# Patient Record
Sex: MALE | Race: WHITE | Employment: UNEMPLOYED | URBAN - METROPOLITAN AREA
[De-identification: names, ages, dates, MRNs, and addresses within clinical notes are randomized per-mention and may not be internally consistent; named-entity substitution may affect disease eponyms.]

---

## 2017-01-09 ENCOUNTER — OFFICE VISIT (OUTPATIENT)
Dept: CARDIOLOGY CLINIC | Age: 37
End: 2017-01-09

## 2017-01-09 VITALS
SYSTOLIC BLOOD PRESSURE: 120 MMHG | HEART RATE: 74 BPM | DIASTOLIC BLOOD PRESSURE: 80 MMHG | OXYGEN SATURATION: 97 % | WEIGHT: 229.2 LBS | HEIGHT: 69 IN | BODY MASS INDEX: 33.95 KG/M2

## 2017-01-09 DIAGNOSIS — R07.2 PRECORDIAL CHEST PAIN: Primary | ICD-10-CM

## 2017-01-09 DIAGNOSIS — F17.210 CIGARETTE NICOTINE DEPENDENCE WITHOUT COMPLICATION: ICD-10-CM

## 2017-01-09 DIAGNOSIS — I10 ESSENTIAL HYPERTENSION: ICD-10-CM

## 2017-01-09 PROCEDURE — 99244 OFF/OP CNSLTJ NEW/EST MOD 40: CPT | Performed by: INTERNAL MEDICINE

## 2017-01-09 PROCEDURE — 93000 ELECTROCARDIOGRAM COMPLETE: CPT | Performed by: INTERNAL MEDICINE

## 2017-01-09 RX ORDER — IBUPROFEN 200 MG
200 TABLET ORAL EVERY 6 HOURS PRN
COMMUNITY
End: 2017-07-07 | Stop reason: ALTCHOICE

## 2019-07-02 ENCOUNTER — HOSPITAL ENCOUNTER (EMERGENCY)
Age: 39
Discharge: HOME | End: 2019-07-02
Payer: MEDICARE

## 2019-07-02 VITALS
HEART RATE: 110 BPM | RESPIRATION RATE: 16 BRPM | DIASTOLIC BLOOD PRESSURE: 104 MMHG | SYSTOLIC BLOOD PRESSURE: 179 MMHG | WEIGHT: 214.95 LBS | TEMPERATURE: 100.5 F | OXYGEN SATURATION: 95 % | BODY MASS INDEX: 31.74 KG/M2

## 2019-07-02 DIAGNOSIS — K02.9 PAIN DUE TO DENTAL CARIES: Primary | ICD-10-CM

## 2019-07-02 DIAGNOSIS — I10 HYPERTENSION, UNCONTROLLED: ICD-10-CM

## 2019-07-02 PROCEDURE — 99282 EMERGENCY DEPT VISIT SF MDM: CPT

## 2019-07-02 RX ORDER — AMOXICILLIN 500 MG/1
500 CAPSULE ORAL 3 TIMES DAILY
Qty: 30 CAPSULE | Refills: 0 | Status: SHIPPED | OUTPATIENT
Start: 2019-07-02 | End: 2019-07-12

## 2019-07-02 RX ORDER — LISINOPRIL 10 MG/1
10 TABLET ORAL DAILY
Qty: 30 TABLET | Refills: 0 | Status: SHIPPED | OUTPATIENT
Start: 2019-07-02 | End: 2019-10-10

## 2019-07-02 RX ORDER — TRAMADOL HYDROCHLORIDE 50 MG/1
50-100 TABLET ORAL EVERY 6 HOURS PRN
Qty: 12 TABLET | Refills: 0 | Status: SHIPPED | OUTPATIENT
Start: 2019-07-02 | End: 2019-07-05

## 2019-07-02 ASSESSMENT — PAIN DESCRIPTION - LOCATION: LOCATION: MOUTH

## 2019-07-02 ASSESSMENT — PAIN DESCRIPTION - PAIN TYPE: TYPE: ACUTE PAIN

## 2019-07-02 ASSESSMENT — PAIN SCALES - GENERAL: PAINLEVEL_OUTOF10: 6

## 2019-10-10 ENCOUNTER — HOSPITAL ENCOUNTER (EMERGENCY)
Age: 39
Discharge: HOME | End: 2019-10-10
Payer: MEDICARE

## 2019-10-10 VITALS
DIASTOLIC BLOOD PRESSURE: 100 MMHG | HEART RATE: 85 BPM | WEIGHT: 210.32 LBS | OXYGEN SATURATION: 97 % | BODY MASS INDEX: 31.15 KG/M2 | SYSTOLIC BLOOD PRESSURE: 150 MMHG | HEIGHT: 69 IN | RESPIRATION RATE: 16 BRPM | TEMPERATURE: 98.9 F

## 2019-10-10 DIAGNOSIS — K02.9 PAIN DUE TO DENTAL CARIES: Primary | ICD-10-CM

## 2019-10-10 PROCEDURE — 99282 EMERGENCY DEPT VISIT SF MDM: CPT

## 2019-10-10 RX ORDER — PENICILLIN V POTASSIUM 500 MG/1
500 TABLET ORAL 4 TIMES DAILY
Qty: 40 TABLET | Refills: 0 | Status: SHIPPED | OUTPATIENT
Start: 2019-10-10 | End: 2019-10-20

## 2019-10-10 RX ORDER — TRAMADOL HYDROCHLORIDE 50 MG/1
50 TABLET ORAL EVERY 6 HOURS PRN
Qty: 12 TABLET | Refills: 0 | Status: SHIPPED | OUTPATIENT
Start: 2019-10-10 | End: 2019-10-13

## 2019-10-10 ASSESSMENT — PAIN DESCRIPTION - FREQUENCY: FREQUENCY: CONTINUOUS

## 2019-10-10 ASSESSMENT — ENCOUNTER SYMPTOMS
NAUSEA: 0
VOICE CHANGE: 0
VOMITING: 0
SHORTNESS OF BREATH: 0
FACIAL SWELLING: 0
TROUBLE SWALLOWING: 0

## 2019-10-10 ASSESSMENT — PAIN DESCRIPTION - ORIENTATION: ORIENTATION: RIGHT;LOWER

## 2019-10-10 ASSESSMENT — PAIN DESCRIPTION - DESCRIPTORS: DESCRIPTORS: ACHING;THROBBING

## 2019-10-10 ASSESSMENT — PAIN SCALES - GENERAL: PAINLEVEL_OUTOF10: 8

## 2019-10-10 ASSESSMENT — PAIN DESCRIPTION - LOCATION: LOCATION: TEETH

## 2020-01-07 ENCOUNTER — APPOINTMENT (OUTPATIENT)
Dept: GENERAL RADIOLOGY | Age: 40
End: 2020-01-07
Payer: MEDICARE

## 2020-01-07 ENCOUNTER — HOSPITAL ENCOUNTER (EMERGENCY)
Age: 40
Discharge: HOME | End: 2020-01-07
Payer: MEDICARE

## 2020-01-07 VITALS
SYSTOLIC BLOOD PRESSURE: 150 MMHG | TEMPERATURE: 98.9 F | DIASTOLIC BLOOD PRESSURE: 69 MMHG | HEART RATE: 86 BPM | OXYGEN SATURATION: 94 % | RESPIRATION RATE: 16 BRPM

## 2020-01-07 PROCEDURE — 4500000021 HC ED LEVEL 1 PROCEDURE

## 2020-01-07 PROCEDURE — 90471 IMMUNIZATION ADMIN: CPT | Performed by: NURSE PRACTITIONER

## 2020-01-07 PROCEDURE — 99283 EMERGENCY DEPT VISIT LOW MDM: CPT

## 2020-01-07 PROCEDURE — 90715 TDAP VACCINE 7 YRS/> IM: CPT | Performed by: NURSE PRACTITIONER

## 2020-01-07 PROCEDURE — 73140 X-RAY EXAM OF FINGER(S): CPT

## 2020-01-07 PROCEDURE — 6360000002 HC RX W HCPCS: Performed by: NURSE PRACTITIONER

## 2020-01-07 RX ORDER — IBUPROFEN 800 MG/1
800 TABLET ORAL EVERY 8 HOURS PRN
Qty: 30 TABLET | Refills: 0 | Status: ON HOLD | OUTPATIENT
Start: 2020-01-07 | End: 2020-04-26 | Stop reason: HOSPADM

## 2020-01-07 RX ADMIN — TETANUS TOXOID, REDUCED DIPHTHERIA TOXOID AND ACELLULAR PERTUSSIS VACCINE, ADSORBED 0.5 ML: 5; 2.5; 8; 8; 2.5 SUSPENSION INTRAMUSCULAR at 01:43

## 2020-01-07 ASSESSMENT — PAIN DESCRIPTION - LOCATION: LOCATION: HAND

## 2020-01-07 ASSESSMENT — PAIN SCALES - GENERAL: PAINLEVEL_OUTOF10: 5

## 2020-01-07 NOTE — ED PROVIDER NOTES
**EVALUATED BY ADVANCED PRACTICE PROVIDER**        [address] [facility]      Pt Name: Kane Gongora  SUGAR:[id number]  [place] 1980  Date of evaluation: 1/7/2020  Provider: LACHELLE Cisneros CNP      Chief Complaint:    Chief Complaint   Patient presents with    Finger Injury       Nursing Notes, Past Medical Hx, Past Surgical Hx, Social Hx, Allergies, and Family Hx were all reviewed and agreed with or any disagreements were addressed in the HPI.    HPI:  (Location, Duration, Timing, Severity, Quality, Assoc Sx, Context, Modifying factors)  This is a  37 y.o. male who presents to the emergency department today complaining of a laceration to his left ring finger. Onset was just prior to arrival.  The context was he cut it on a piece of broken glass. Bleeding is controlled. He rates the pain 5/10. No numbness or tingling. Tetanus status unknown. PastMedical/Surgical History:      Diagnosis Date    Headache     HTN (hypertension)          Procedure Laterality Date    CRANIAL LESION RESECTION      MOUTH SURGERY         Medications:  Previous Medications    No medications on file         Review of Systems:  Review of Systems   Constitutional: Negative for diaphoresis. Musculoskeletal: Positive for arthralgias and joint swelling. Skin: Positive for wound. Allergic/Immunologic: Negative for immunocompromised state. Neurological: Negative for weakness and numbness. Psychiatric/Behavioral: Negative for confusion. All other systems reviewed and are negative. Positives and Pertinent negatives as per HPI. Except as noted above in the ROS, problem specific ROS was completed and is negative. Physical Exam:  Physical Exam  Vitals signs and nursing note reviewed. Constitutional:       General: He is not in acute distress. Appearance: Normal appearance. He is well-developed. He is not toxic-appearing.    HENT:      Head: of motion and entire depth of wound probed and visualized      Contaminated: no    Treatment:     Wound cleansed with: Chlorhexidine. Amount of cleaning:  Extensive  Skin repair:     Repair method:  Sutures    Suture size:  6-0    Suture material:  Nylon    Suture technique:  Horizontal mattress    Number of sutures:  4  Approximation:     Approximation:  Close  Post-procedure details:     Dressing:  Sterile dressing    Patient tolerance of procedure: Tolerated well, no immediate complications        Patient was given:  Medications   Tetanus-Diphth-Acell Pertussis (BOOSTRIX) injection 0.5 mL (0.5 mLs Intramuscular Given 1/7/20 0143)       Differential diagnosis: Tendon laceration, neurologic injury, vascular injury, involvement of bone that could lead to osteomyelitis, retained foreign body, delayed bacterial skin infection, other    Patient seen and examined today for laceration. See HPI for patient presentation. Patient is hemodynamically stable, nontoxic, afebrile, and without tachycardia, tachypnea, and hypoxia. Physical exam as above. 37year-old in no acute distress. 2 cm laceration distal fourth digit left hand on the palmar aspect. Distal extremity pink and well-perfused. Sensation is intact with discrimination. Extensor and flexor tendons intact with 5/5 strength. X-ray shows no fracture or foreign body. Finger was anesthetized and then copiously irrigated and vigorously scrubbed with chlorhexidine. Finger tourniquet was applied. I visualize no bone, tendon, or foreign body. A total of 8 horizontal mattress sutures were placed with good approximation of wound edges. Tourniquet was removed. Distal extremity remained pink and well-perfused. Tetanus status was updated. Patient discharged home with pain medications and strict return precautions including any signs of infection. Advised to return in 7 days for suture removal.    The patient tolerated their visit well.   I evaluated the patient. The physician was available for consultation as needed. The patient and / or the family were informed of the results of any tests, a time was given to answer questions, a plan was proposed and they agreed with plan. CLINICAL IMPRESSION:  1.  Laceration of left ring finger without foreign body without damage to nail, initial encounter        DISPOSITION Decision To Discharge 01/07/2020 02:05:17 AM      PATIENT REFERRED TO:  Dee Yousifadelia  [phone]  Call       [facility] Emergency Department  [address]  [phone]  Go to   As needed      DISCHARGE MEDICATIONS:  New Prescriptions    IBUPROFEN (ADVIL;MOTRIN) 800 MG TABLET    Take 1 tablet by mouth every 8 hours as needed for Pain       DISCONTINUED MEDICATIONS:  Discontinued Medications    No medications on file              (Please note the MDM and HPI sections of this note were completed with a voice recognition program.  Efforts were made to edit the dictations but occasionally words are mis-transcribed.)    Electronically signed, LACHELLE Osorio CNP,           LACHELLE Osorio CNP  01/07/20 5450

## 2020-01-20 ENCOUNTER — OFFICE VISIT (OUTPATIENT)
Dept: INTERNAL MEDICINE CLINIC | Age: 40
End: 2020-01-20
Payer: MEDICARE

## 2020-01-20 VITALS
HEART RATE: 77 BPM | TEMPERATURE: 98.3 F | HEIGHT: 69 IN | BODY MASS INDEX: 32.44 KG/M2 | OXYGEN SATURATION: 96 % | WEIGHT: 219 LBS | SYSTOLIC BLOOD PRESSURE: 186 MMHG | DIASTOLIC BLOOD PRESSURE: 110 MMHG

## 2020-01-20 PROCEDURE — 99213 OFFICE O/P EST LOW 20 MIN: CPT | Performed by: INTERNAL MEDICINE

## 2020-01-20 RX ORDER — LISINOPRIL AND HYDROCHLOROTHIAZIDE 12.5; 1 MG/1; MG/1
1 TABLET ORAL DAILY
Qty: 90 TABLET | Refills: 1 | Status: SHIPPED | OUTPATIENT
Start: 2020-01-20 | End: 2020-02-06

## 2020-01-20 RX ORDER — ADHESIVE BANDAGE 3/4"
1 BANDAGE TOPICAL 2 TIMES DAILY
Qty: 1 EACH | Refills: 0 | Status: SHIPPED | OUTPATIENT
Start: 2020-01-20 | End: 2020-04-07 | Stop reason: SDUPTHER

## 2020-01-20 ASSESSMENT — PATIENT HEALTH QUESTIONNAIRE - PHQ9
SUM OF ALL RESPONSES TO PHQ9 QUESTIONS 1 & 2: 0
1. LITTLE INTEREST OR PLEASURE IN DOING THINGS: 0
2. FEELING DOWN, DEPRESSED OR HOPELESS: 0
SUM OF ALL RESPONSES TO PHQ QUESTIONS 1-9: 0
SUM OF ALL RESPONSES TO PHQ QUESTIONS 1-9: 0

## 2020-01-20 NOTE — PATIENT INSTRUCTIONS
-Check blood pressures at home  -please get blood tests in the next few days  -return to clinic in one week  -start taking new BP medicine every day

## 2020-01-20 NOTE — PROGRESS NOTES
Indicated 12/19/2016       Imaging:   Xr Finger Left (min 2 Views)    Result Date: 1/7/2020  EXAMINATION: 3 XRAY VIEWS OF THE LEFT FINGER 1/7/2020 1:49 am COMPARISON: None. HISTORY: ORDERING SYSTEM PROVIDED HISTORY: laceration TECHNOLOGIST PROVIDED HISTORY: Reason for exam:->laceration Reason for Exam: lac to tip of 2nd digit, glass FINDINGS: There is soft tissue swelling and irregularity involving the distal left ring finger. There is no evidence of an acute fracture or dislocation. No evidence of a radiopaque foreign body. Soft tissue injury/laceration of the distal left ring finger with no evidence of bone involvement or radiopaque foreign body. Assessment/Plan:     Finger laceration suture removal  -wound well healed    HTN  -Started Prinzide 10-12.5 mg daily  -CBC, BMP, TSH with reflex, lipid panel   -return to clinic in 1 week  -check BPs at home    Nicotine Dependence  -Not interested in quitting at this time    Patient was discussed and staffed with preceptor. Rosa Mazariegos DO, PGY-3  Internal Medicine Resident  1/20/2020, 4:59 PM     Addendum to Resident H& P/Progress note:  I have personally seen,examined and evaluated the patient.  I have reviewed the current history, physical findings, labs and assessment and plan and agree with note as documented by resident MD ( Jodie Dutta)      Judah Kim MD, Michael Garcia

## 2020-02-06 ENCOUNTER — OFFICE VISIT (OUTPATIENT)
Dept: FAMILY MEDICINE CLINIC | Age: 40
End: 2020-02-06
Payer: MEDICARE

## 2020-02-06 VITALS
BODY MASS INDEX: 32.67 KG/M2 | DIASTOLIC BLOOD PRESSURE: 90 MMHG | HEIGHT: 69 IN | SYSTOLIC BLOOD PRESSURE: 158 MMHG | WEIGHT: 220.6 LBS

## 2020-02-06 DIAGNOSIS — I10 ESSENTIAL HYPERTENSION: ICD-10-CM

## 2020-02-06 LAB
ANION GAP SERPL CALCULATED.3IONS-SCNC: 15 MMOL/L (ref 3–16)
BASOPHILS ABSOLUTE: 0.1 K/UL (ref 0–0.2)
BASOPHILS RELATIVE PERCENT: 0.6 %
BUN BLDV-MCNC: 12 MG/DL (ref 7–20)
CALCIUM SERPL-MCNC: 10 MG/DL (ref 8.3–10.6)
CHLORIDE BLD-SCNC: 99 MMOL/L (ref 99–110)
CHOLESTEROL, TOTAL: 288 MG/DL (ref 0–199)
CO2: 23 MMOL/L (ref 21–32)
CREAT SERPL-MCNC: 0.8 MG/DL (ref 0.9–1.3)
EOSINOPHILS ABSOLUTE: 0.2 K/UL (ref 0–0.6)
EOSINOPHILS RELATIVE PERCENT: 2 %
GFR AFRICAN AMERICAN: >60
GFR NON-AFRICAN AMERICAN: >60
GLUCOSE BLD-MCNC: 107 MG/DL (ref 70–99)
HCT VFR BLD CALC: 50.1 % (ref 40.5–52.5)
HDLC SERPL-MCNC: 28 MG/DL (ref 40–60)
HEMOGLOBIN: 16.6 G/DL (ref 13.5–17.5)
LDL CHOLESTEROL CALCULATED: ABNORMAL MG/DL
LDL CHOLESTEROL DIRECT: 229 MG/DL
LYMPHOCYTES ABSOLUTE: 3.6 K/UL (ref 1–5.1)
LYMPHOCYTES RELATIVE PERCENT: 29.1 %
MCH RBC QN AUTO: 29.6 PG (ref 26–34)
MCHC RBC AUTO-ENTMCNC: 33.2 G/DL (ref 31–36)
MCV RBC AUTO: 89 FL (ref 80–100)
MONOCYTES ABSOLUTE: 0.9 K/UL (ref 0–1.3)
MONOCYTES RELATIVE PERCENT: 7.1 %
NEUTROPHILS ABSOLUTE: 7.5 K/UL (ref 1.7–7.7)
NEUTROPHILS RELATIVE PERCENT: 61.2 %
PDW BLD-RTO: 13.7 % (ref 12.4–15.4)
PLATELET # BLD: 259 K/UL (ref 135–450)
PMV BLD AUTO: 10.1 FL (ref 5–10.5)
POTASSIUM SERPL-SCNC: 4.5 MMOL/L (ref 3.5–5.1)
RBC # BLD: 5.63 M/UL (ref 4.2–5.9)
SODIUM BLD-SCNC: 137 MMOL/L (ref 136–145)
TRIGL SERPL-MCNC: 318 MG/DL (ref 0–150)
TSH REFLEX: 3.23 UIU/ML (ref 0.27–4.2)
VLDLC SERPL CALC-MCNC: ABNORMAL MG/DL
WBC # BLD: 12.3 K/UL (ref 4–11)

## 2020-02-06 PROCEDURE — 4004F PT TOBACCO SCREEN RCVD TLK: CPT | Performed by: FAMILY MEDICINE

## 2020-02-06 PROCEDURE — G8484 FLU IMMUNIZE NO ADMIN: HCPCS | Performed by: FAMILY MEDICINE

## 2020-02-06 PROCEDURE — G8427 DOCREV CUR MEDS BY ELIG CLIN: HCPCS | Performed by: FAMILY MEDICINE

## 2020-02-06 PROCEDURE — 99214 OFFICE O/P EST MOD 30 MIN: CPT | Performed by: FAMILY MEDICINE

## 2020-02-06 PROCEDURE — G8417 CALC BMI ABV UP PARAM F/U: HCPCS | Performed by: FAMILY MEDICINE

## 2020-02-06 RX ORDER — LISINOPRIL 10 MG/1
10 TABLET ORAL DAILY
Qty: 30 TABLET | Refills: 0 | Status: SHIPPED | OUTPATIENT
Start: 2020-02-06 | End: 2020-03-05 | Stop reason: DRUGHIGH

## 2020-02-06 ASSESSMENT — ENCOUNTER SYMPTOMS
EYE DISCHARGE: 0
SHORTNESS OF BREATH: 0
TROUBLE SWALLOWING: 0
NAUSEA: 0
CHEST TIGHTNESS: 0
SORE THROAT: 0
RHINORRHEA: 0
VOMITING: 0
SINUS PRESSURE: 0
DIARRHEA: 0
ABDOMINAL PAIN: 0
COUGH: 0
WHEEZING: 0

## 2020-02-06 NOTE — PROGRESS NOTES
2020     Gerald Moran (:  1980) is a 37 y.o. male, here for evaluation of the following medical concerns:  Patient presented to the clinic to establish care. He was seeing Dr. López Sampson but believes he is unable to follow up with her at this time. Patient is very specific on the type of treatment he wants. 1.  Encounter to establish care with pcp, patient hasn't had a pcp for sometime, generally uses the ER as his caregiver, BP is poorly controlled and is not interested in multiple medications? 2. HTN- poorly controlled, wants only 10 mg of Lisinopril only? Patient stated that the combo tablet Zestoretic caused him to feel \"funny\" at times and no longer takes this, again he stated he is willing to start on Lisinopril 10 mg, denied headache, vision change, or syncope. 3.   Cellulitis on left hip-past 3/4 months, picks at lesions, yariel. 3 dime sized lesion on hip, erythematous, no erythema surrounding wound no discharge, no fever or chills. 4  Tobacco use- patient smokes yariel. 2 packs a day, is not interested in stopping at this time, understands the need but basically enjoys smoking at this time . Today, patient denied chest pain, sob, n, v, or diarrhea. HPI    Review of Systems   Constitutional: Negative for activity change, fatigue, fever and unexpected weight change. HENT: Negative for congestion, ear pain, rhinorrhea, sinus pressure, sore throat and trouble swallowing. Eyes: Negative for discharge and visual disturbance. Respiratory: Negative for cough, chest tightness, shortness of breath and wheezing. Cardiovascular: Negative for chest pain, palpitations and leg swelling. Gastrointestinal: Negative for abdominal pain, diarrhea, nausea and vomiting. Endocrine: Negative for cold intolerance, heat intolerance, polydipsia and polyphagia. Genitourinary: Negative for decreased urine volume, dysuria, frequency and urgency.    Musculoskeletal: Negative for arthralgias. Skin: Positive for color change and wound. Negative for rash. Neurological: Negative for dizziness, syncope, light-headedness and headaches. Psychiatric/Behavioral: Negative for dysphoric mood. The patient is not nervous/anxious. Prior to Visit Medications    Medication Sig Taking? Authorizing Provider   lisinopril (PRINIVIL;ZESTRIL) 10 MG tablet Take 1 tablet by mouth daily Yes Tulio Aviles, DO   Blood Pressure Monitoring (BLOOD PRESSURE CUFF) MISC 1 Device by Does not apply route 2 times daily Yes Colleen Tolliver, DO   ibuprofen (ADVIL;MOTRIN) 800 MG tablet Take 1 tablet by mouth every 8 hours as needed for Pain Yes LACHELLE Alas - CNP        Social History     Tobacco Use    Smoking status: Current Every Day Smoker     Packs/day: 2.00     Types: Cigarettes     Start date: 12/19/1989    Smokeless tobacco: Never Used   Substance Use Topics    Alcohol use: No        Vitals:    02/06/20 1356 02/06/20 1436   BP: (!) 162/92 (!) 158/90   Weight: 220 lb 9.6 oz (100.1 kg)    Height: 5' 9\" (1.753 m)      Estimated body mass index is 32.58 kg/m² as calculated from the following:    Height as of this encounter: 5' 9\" (1.753 m). Weight as of this encounter: 220 lb 9.6 oz (100.1 kg). Physical Exam  Vitals signs and nursing note reviewed. Constitutional:       Appearance: Normal appearance. He is well-developed. HENT:      Head: Normocephalic and atraumatic. Right Ear: Tympanic membrane, ear canal and external ear normal.      Left Ear: Tympanic membrane, ear canal and external ear normal.      Nose: Nose normal.      Mouth/Throat:      Mouth: Mucous membranes are moist.   Eyes:      Conjunctiva/sclera: Conjunctivae normal.      Pupils: Pupils are equal, round, and reactive to light. Neck:      Thyroid: No thyromegaly. Cardiovascular:      Rate and Rhythm: Normal rate and regular rhythm. Heart sounds: Normal heart sounds. No murmur.    Pulmonary:      Effort: Pulmonary effort is normal.      Breath sounds: Normal breath sounds. No wheezing. Abdominal:      General: Bowel sounds are normal.      Palpations: Abdomen is soft. Tenderness: There is no abdominal tenderness. Musculoskeletal: Normal range of motion. General: No tenderness. Skin:     Findings: Erythema present. No rash. Comments: See HPI   Neurological:      General: No focal deficit present. Mental Status: He is alert and oriented to person, place, and time. Cranial Nerves: No cranial nerve deficit. Deep Tendon Reflexes: Reflexes normal.   Psychiatric:         Behavior: Behavior normal.         Thought Content: Thought content normal.         Judgment: Judgment normal.         ASSESSMENT/PLAN:  1. Encounter to establish care  Care established  Medications reviewed  Questions answered  Labs obtained  RTC in three months for follow up. 2. Hair follicle infection  use medication as prescribed. Clean with soap and water  Discussed conservative treatment  Discussed signs and symptoms for immediate evaluation in the ER  RTC if no improved. Tylenol/Ibuprofen for pain    3. Essential hypertension  Difficult and poorly controlled. He is only willing to take certain medications? Discussed signs and symptoms for immediate evaluation in the ER. Reduce sodium. Monitor diet, exercise and lose weight. Keep a BP log and bring it to your next appointment. Needs to rtc in one month for BP check    4. Tobacco abuse  Patient educated on the need to stop smoking. Had [id number] recommended. Educated patient on consequences of smoking. Refuses medications  Spent yariel 5 minutes discussing this. Difficult due to patient not wanting to change lifestyle. Will need to follow BP closely  He will go and have labs that were ordered several weeks ago at lab. Return in about 4 weeks (around 3/5/2020) for Folllow up BP.

## 2020-02-06 NOTE — PATIENT INSTRUCTIONS
Thank you for coming. Please take you medications as prescribed. Please continue to eat a balanced diet and exercise. Call 1800quit now  If you have questions please let me know via phone or email.

## 2020-02-07 ENCOUNTER — TELEPHONE (OUTPATIENT)
Dept: INTERNAL MEDICINE CLINIC | Age: 40
End: 2020-02-07

## 2020-02-09 ASSESSMENT — ENCOUNTER SYMPTOMS: COLOR CHANGE: 1

## 2020-03-05 ENCOUNTER — OFFICE VISIT (OUTPATIENT)
Dept: FAMILY MEDICINE CLINIC | Age: 40
End: 2020-03-05
Payer: MEDICARE

## 2020-03-05 VITALS
SYSTOLIC BLOOD PRESSURE: 148 MMHG | HEIGHT: 69 IN | WEIGHT: 219.8 LBS | BODY MASS INDEX: 32.56 KG/M2 | DIASTOLIC BLOOD PRESSURE: 84 MMHG

## 2020-03-05 PROCEDURE — G8417 CALC BMI ABV UP PARAM F/U: HCPCS | Performed by: FAMILY MEDICINE

## 2020-03-05 PROCEDURE — 4004F PT TOBACCO SCREEN RCVD TLK: CPT | Performed by: FAMILY MEDICINE

## 2020-03-05 PROCEDURE — G8427 DOCREV CUR MEDS BY ELIG CLIN: HCPCS | Performed by: FAMILY MEDICINE

## 2020-03-05 PROCEDURE — G8484 FLU IMMUNIZE NO ADMIN: HCPCS | Performed by: FAMILY MEDICINE

## 2020-03-05 PROCEDURE — 99214 OFFICE O/P EST MOD 30 MIN: CPT | Performed by: FAMILY MEDICINE

## 2020-03-05 RX ORDER — ATORVASTATIN CALCIUM 10 MG/1
10 TABLET, FILM COATED ORAL DAILY
Qty: 30 TABLET | Refills: 0 | Status: SHIPPED | OUTPATIENT
Start: 2020-03-05 | End: 2020-04-07 | Stop reason: SDUPTHER

## 2020-03-05 RX ORDER — NICOTINE 21 MG/24HR
1 PATCH, TRANSDERMAL 24 HOURS TRANSDERMAL DAILY
Qty: 42 PATCH | Refills: 0 | Status: SHIPPED | OUTPATIENT
Start: 2020-03-05 | End: 2020-04-07 | Stop reason: SDUPTHER

## 2020-03-05 RX ORDER — LISINOPRIL 20 MG/1
20 TABLET ORAL DAILY
Qty: 30 TABLET | Refills: 0 | Status: SHIPPED | OUTPATIENT
Start: 2020-03-05 | End: 2020-04-07 | Stop reason: SDUPTHER

## 2020-03-05 ASSESSMENT — ENCOUNTER SYMPTOMS
NAUSEA: 0
SHORTNESS OF BREATH: 0
VOMITING: 0
COUGH: 0
CHEST TIGHTNESS: 0
TROUBLE SWALLOWING: 0
DIARRHEA: 0
ABDOMINAL PAIN: 0
RHINORRHEA: 0
WHEEZING: 0
SORE THROAT: 0
SINUS PRESSURE: 0

## 2020-03-05 NOTE — PROGRESS NOTES
Place 1 patch onto the skin daily Yes Feng Chinchilla,    Blood Pressure Monitoring (BLOOD PRESSURE CUFF) MISC 1 Device by Does not apply route 2 times daily Yes Colleen Tolliver DO   ibuprofen (ADVIL;MOTRIN) 800 MG tablet Take 1 tablet by mouth every 8 hours as needed for Pain Yes Abel Simental APRN - CNP        Social History     Tobacco Use    Smoking status: Current Every Day Smoker     Packs/day: 2.00     Types: Cigarettes     Start date: 12/19/1989    Smokeless tobacco: Never Used   Substance Use Topics    Alcohol use: No        Vitals:    03/05/20 1542 03/05/20 1616   BP: (!) 142/88 (!) 148/84   Weight: 219 lb 12.8 oz (99.7 kg)    Height: 5' 9\" (1.753 m)      Estimated body mass index is 32.46 kg/m² as calculated from the following:    Height as of this encounter: 5' 9\" (1.753 m). Weight as of this encounter: 219 lb 12.8 oz (99.7 kg). Physical Exam  Vitals signs and nursing note reviewed. Constitutional:       Appearance: He is well-developed. He is obese. HENT:      Head: Normocephalic and atraumatic. Right Ear: Tympanic membrane, ear canal and external ear normal.      Left Ear: Tympanic membrane, ear canal and external ear normal.      Mouth/Throat:      Mouth: Mucous membranes are moist.   Eyes:      Conjunctiva/sclera: Conjunctivae normal.      Pupils: Pupils are equal, round, and reactive to light. Neck:      Thyroid: No thyromegaly. Cardiovascular:      Rate and Rhythm: Normal rate and regular rhythm. Heart sounds: No murmur. Pulmonary:      Effort: Pulmonary effort is normal.      Breath sounds: Normal breath sounds. No wheezing or rales. Abdominal:      General: Bowel sounds are normal.      Palpations: Abdomen is soft. Tenderness: There is no abdominal tenderness. Skin:     Findings: No rash. Neurological:      Mental Status: He is alert and oriented to person, place, and time. ASSESSMENT/PLAN:  1.  Essential hypertension  Difficult and poorly controlled. Discussed signs and symptoms for immediate evaluation in the ER. Reduce sodium. Monitor diet, exercise and lose weight. Keep a BP log and bring it to your next appointment. Needs to rtc in one month for BP check    2. Hyperlipidemia, unspecified hyperlipidemia type  Take medication as prescribed. Discussed the need to exercise 30 minutes each day. Monitor diet, avoid fried foods etc... Educated on need to reduce cholesterol in diet and results of poor diet. 3. Tobacco abuse counseling  Patient educated on the need to stop smoking. Had [phone] recommended. Educated patient on consequences of smoking. Prescribed patch at this time. Spent yariel 5 minutes discussing this. Return in about 4 weeks (around 4/2/2020).

## 2020-04-07 ENCOUNTER — TELEPHONE (OUTPATIENT)
Dept: FAMILY MEDICINE CLINIC | Age: 40
End: 2020-04-07

## 2020-04-07 ENCOUNTER — TELEMEDICINE (OUTPATIENT)
Dept: FAMILY MEDICINE CLINIC | Age: 40
End: 2020-04-07
Payer: MEDICARE

## 2020-04-07 VITALS — WEIGHT: 221 LBS | RESPIRATION RATE: 16 BRPM | BODY MASS INDEX: 32.73 KG/M2 | HEIGHT: 69 IN

## 2020-04-07 PROBLEM — Z71.6 TOBACCO ABUSE COUNSELING: Status: ACTIVE | Noted: 2020-04-07

## 2020-04-07 PROBLEM — E78.49 OTHER HYPERLIPIDEMIA: Status: ACTIVE | Noted: 2020-04-07

## 2020-04-07 PROBLEM — I10 ESSENTIAL HYPERTENSION: Status: ACTIVE | Noted: 2020-04-07

## 2020-04-07 PROCEDURE — G8428 CUR MEDS NOT DOCUMENT: HCPCS | Performed by: FAMILY MEDICINE

## 2020-04-07 PROCEDURE — 99213 OFFICE O/P EST LOW 20 MIN: CPT | Performed by: FAMILY MEDICINE

## 2020-04-07 RX ORDER — ADHESIVE BANDAGE 3/4"
1 BANDAGE TOPICAL 2 TIMES DAILY
Qty: 1 EACH | Refills: 0 | Status: ON HOLD | OUTPATIENT
Start: 2020-04-07 | End: 2020-05-06 | Stop reason: HOSPADM

## 2020-04-07 RX ORDER — LISINOPRIL 20 MG/1
20 TABLET ORAL DAILY
Qty: 30 TABLET | Refills: 0 | Status: SHIPPED | OUTPATIENT
Start: 2020-04-07 | End: 2020-05-22 | Stop reason: SDUPTHER

## 2020-04-07 RX ORDER — NICOTINE 21 MG/24HR
1 PATCH, TRANSDERMAL 24 HOURS TRANSDERMAL DAILY
Qty: 42 PATCH | Refills: 0 | Status: SHIPPED | OUTPATIENT
Start: 2020-04-07 | End: 2020-12-29

## 2020-04-07 RX ORDER — ATORVASTATIN CALCIUM 10 MG/1
10 TABLET, FILM COATED ORAL DAILY
Qty: 30 TABLET | Refills: 0 | Status: ON HOLD | OUTPATIENT
Start: 2020-04-07 | End: 2020-04-26 | Stop reason: HOSPADM

## 2020-04-07 ASSESSMENT — ENCOUNTER SYMPTOMS
SHORTNESS OF BREATH: 0
NAUSEA: 0
VOMITING: 0
ABDOMINAL PAIN: 0
DIARRHEA: 0

## 2020-04-07 NOTE — PROGRESS NOTES
patient on consequences of smoking. Prescribed patch at this time. Spent yariel 5 minutes discussing this. Return in about 4 weeks (around 5/5/2020).

## 2020-04-07 NOTE — PATIENT INSTRUCTIONS
Thank you for the visit   Please take you medications as prescribed. Please continue to eat a balanced diet and exercise. If you have questions please let me know via phone or email.

## 2020-04-25 ENCOUNTER — HOSPITAL ENCOUNTER (INPATIENT)
Age: 40
LOS: 1 days | Discharge: HOME | DRG: 174 | End: 2020-04-26
Attending: EMERGENCY MEDICINE | Admitting: INTERNAL MEDICINE
Payer: MEDICARE

## 2020-04-25 ENCOUNTER — APPOINTMENT (OUTPATIENT)
Dept: GENERAL RADIOLOGY | Age: 40
DRG: 174 | End: 2020-04-25
Payer: MEDICARE

## 2020-04-25 ENCOUNTER — APPOINTMENT (OUTPATIENT)
Dept: CT IMAGING | Age: 40
DRG: 174 | End: 2020-04-25
Payer: MEDICARE

## 2020-04-25 PROBLEM — I24.9 ACUTE CORONARY SYNDROME (HCC): Status: ACTIVE | Noted: 2020-04-25

## 2020-04-25 PROBLEM — I21.4 NSTEMI (NON-ST ELEVATED MYOCARDIAL INFARCTION) (HCC): Status: ACTIVE | Noted: 2020-04-25

## 2020-04-25 PROBLEM — R65.10 SIRS (SYSTEMIC INFLAMMATORY RESPONSE SYNDROME) (HCC): Status: ACTIVE | Noted: 2020-04-25

## 2020-04-25 LAB
A/G RATIO: 1.2 (ref 1.1–2.2)
ALBUMIN SERPL-MCNC: 4.3 G/DL (ref 3.4–5)
ALP BLD-CCNC: 106 U/L (ref 40–129)
ALT SERPL-CCNC: 13 U/L (ref 10–40)
ANION GAP SERPL CALCULATED.3IONS-SCNC: 17 MMOL/L (ref 3–16)
APTT: 34.8 SEC (ref 24.2–36.2)
AST SERPL-CCNC: 50 U/L (ref 15–37)
BASOPHILS ABSOLUTE: 0.1 K/UL (ref 0–0.2)
BASOPHILS RELATIVE PERCENT: 0.7 %
BILIRUB SERPL-MCNC: 0.5 MG/DL (ref 0–1)
BUN BLDV-MCNC: 8 MG/DL (ref 7–20)
C-REACTIVE PROTEIN: 7.2 MG/L (ref 0–5.1)
CALCIUM SERPL-MCNC: 10.1 MG/DL (ref 8.3–10.6)
CHLORIDE BLD-SCNC: 100 MMOL/L (ref 99–110)
CO2: 21 MMOL/L (ref 21–32)
CREAT SERPL-MCNC: 0.7 MG/DL (ref 0.9–1.3)
D DIMER: <200 NG/ML DDU (ref 0–229)
EOSINOPHILS ABSOLUTE: 0.1 K/UL (ref 0–0.6)
EOSINOPHILS RELATIVE PERCENT: 0.6 %
FERRITIN: 450.8 NG/ML (ref 30–400)
GFR AFRICAN AMERICAN: >60
GFR NON-AFRICAN AMERICAN: >60
GLOBULIN: 3.5 G/DL
GLUCOSE BLD-MCNC: 117 MG/DL (ref 70–99)
HCT VFR BLD CALC: 52.5 % (ref 40.5–52.5)
HEMOGLOBIN: 17.7 G/DL (ref 13.5–17.5)
LACTATE DEHYDROGENASE: 216 U/L (ref 100–190)
LIPASE: 50 U/L (ref 13–60)
LV EF: 43 %
LVEF MODALITY: NORMAL
LYMPHOCYTES ABSOLUTE: 3.3 K/UL (ref 1–5.1)
LYMPHOCYTES RELATIVE PERCENT: 17.6 %
MCH RBC QN AUTO: 29.7 PG (ref 26–34)
MCHC RBC AUTO-ENTMCNC: 33.7 G/DL (ref 31–36)
MCV RBC AUTO: 87.9 FL (ref 80–100)
MONOCYTES ABSOLUTE: 1.1 K/UL (ref 0–1.3)
MONOCYTES RELATIVE PERCENT: 5.8 %
NEUTROPHILS ABSOLUTE: 14 K/UL (ref 1.7–7.7)
NEUTROPHILS RELATIVE PERCENT: 75.3 %
PDW BLD-RTO: 13.9 % (ref 12.4–15.4)
PLATELET # BLD: 275 K/UL (ref 135–450)
PMV BLD AUTO: 9.1 FL (ref 5–10.5)
POC ACT LR: 219 SEC
POC ACT LR: 287 SEC
POTASSIUM REFLEX MAGNESIUM: 4.6 MMOL/L (ref 3.5–5.1)
PROCALCITONIN: 0.07 NG/ML (ref 0–0.15)
RBC # BLD: 5.98 M/UL (ref 4.2–5.9)
SARS-COV-2, NAAT: NOT DETECTED
SEDIMENTATION RATE, ERYTHROCYTE: 7 MM/HR (ref 0–15)
SODIUM BLD-SCNC: 138 MMOL/L (ref 136–145)
TOTAL PROTEIN: 7.8 G/DL (ref 6.4–8.2)
TROPONIN: 0.22 NG/ML
TROPONIN: 1.38 NG/ML
WBC # BLD: 18.6 K/UL (ref 4–11)

## 2020-04-25 PROCEDURE — 93306 TTE W/DOPPLER COMPLETE: CPT

## 2020-04-25 PROCEDURE — 85347 COAGULATION TIME ACTIVATED: CPT

## 2020-04-25 PROCEDURE — 86140 C-REACTIVE PROTEIN: CPT

## 2020-04-25 PROCEDURE — 2709999900 HC NON-CHARGEABLE SUPPLY

## 2020-04-25 PROCEDURE — 85379 FIBRIN DEGRADATION QUANT: CPT

## 2020-04-25 PROCEDURE — 36415 COLL VENOUS BLD VENIPUNCTURE: CPT

## 2020-04-25 PROCEDURE — 83615 LACTATE (LD) (LDH) ENZYME: CPT

## 2020-04-25 PROCEDURE — B2151ZZ FLUOROSCOPY OF LEFT HEART USING LOW OSMOLAR CONTRAST: ICD-10-PCS | Performed by: INTERNAL MEDICINE

## 2020-04-25 PROCEDURE — 80053 COMPREHEN METABOLIC PANEL: CPT

## 2020-04-25 PROCEDURE — 92978 ENDOLUMINL IVUS OCT C 1ST: CPT | Performed by: INTERNAL MEDICINE

## 2020-04-25 PROCEDURE — C1725 CATH, TRANSLUMIN NON-LASER: HCPCS

## 2020-04-25 PROCEDURE — C1753 CATH, INTRAVAS ULTRASOUND: HCPCS

## 2020-04-25 PROCEDURE — B2111ZZ FLUOROSCOPY OF MULTIPLE CORONARY ARTERIES USING LOW OSMOLAR CONTRAST: ICD-10-PCS | Performed by: INTERNAL MEDICINE

## 2020-04-25 PROCEDURE — 82728 ASSAY OF FERRITIN: CPT

## 2020-04-25 PROCEDURE — 85652 RBC SED RATE AUTOMATED: CPT

## 2020-04-25 PROCEDURE — 6370000000 HC RX 637 (ALT 250 FOR IP)

## 2020-04-25 PROCEDURE — 85730 THROMBOPLASTIN TIME PARTIAL: CPT

## 2020-04-25 PROCEDURE — 6370000000 HC RX 637 (ALT 250 FOR IP): Performed by: EMERGENCY MEDICINE

## 2020-04-25 PROCEDURE — 027135Z DILATION OF CORONARY ARTERY, TWO ARTERIES WITH TWO DRUG-ELUTING INTRALUMINAL DEVICES, PERCUTANEOUS APPROACH: ICD-10-PCS | Performed by: INTERNAL MEDICINE

## 2020-04-25 PROCEDURE — 4A023N7 MEASUREMENT OF CARDIAC SAMPLING AND PRESSURE, LEFT HEART, PERCUTANEOUS APPROACH: ICD-10-PCS | Performed by: INTERNAL MEDICINE

## 2020-04-25 PROCEDURE — 6360000004 HC RX CONTRAST MEDICATION: Performed by: INTERNAL MEDICINE

## 2020-04-25 PROCEDURE — 2580000003 HC RX 258: Performed by: INTERNAL MEDICINE

## 2020-04-25 PROCEDURE — 6360000004 HC RX CONTRAST MEDICATION: Performed by: EMERGENCY MEDICINE

## 2020-04-25 PROCEDURE — 96374 THER/PROPH/DIAG INJ IV PUSH: CPT

## 2020-04-25 PROCEDURE — 6360000002 HC RX W HCPCS

## 2020-04-25 PROCEDURE — 92928 PRQ TCAT PLMT NTRAC ST 1 LES: CPT | Performed by: INTERNAL MEDICINE

## 2020-04-25 PROCEDURE — C1874 STENT, COATED/COV W/DEL SYS: HCPCS

## 2020-04-25 PROCEDURE — 2100000000 HC CCU R&B

## 2020-04-25 PROCEDURE — 92979 ENDOLUMINL IVUS OCT C EA: CPT | Performed by: INTERNAL MEDICINE

## 2020-04-25 PROCEDURE — 2500000003 HC RX 250 WO HCPCS: Performed by: INTERNAL MEDICINE

## 2020-04-25 PROCEDURE — 71046 X-RAY EXAM CHEST 2 VIEWS: CPT

## 2020-04-25 PROCEDURE — C1887 CATHETER, GUIDING: HCPCS

## 2020-04-25 PROCEDURE — 83690 ASSAY OF LIPASE: CPT

## 2020-04-25 PROCEDURE — 93005 ELECTROCARDIOGRAM TRACING: CPT | Performed by: EMERGENCY MEDICINE

## 2020-04-25 PROCEDURE — 85025 COMPLETE CBC W/AUTO DIFF WBC: CPT

## 2020-04-25 PROCEDURE — 6370000000 HC RX 637 (ALT 250 FOR IP): Performed by: INTERNAL MEDICINE

## 2020-04-25 PROCEDURE — 99285 EMERGENCY DEPT VISIT HI MDM: CPT

## 2020-04-25 PROCEDURE — 71260 CT THORAX DX C+: CPT

## 2020-04-25 PROCEDURE — 93458 L HRT ARTERY/VENTRICLE ANGIO: CPT | Performed by: INTERNAL MEDICINE

## 2020-04-25 PROCEDURE — 6360000002 HC RX W HCPCS: Performed by: EMERGENCY MEDICINE

## 2020-04-25 PROCEDURE — 93356 MYOCRD STRAIN IMG SPCKL TRCK: CPT

## 2020-04-25 PROCEDURE — C1894 INTRO/SHEATH, NON-LASER: HCPCS

## 2020-04-25 PROCEDURE — 84145 PROCALCITONIN (PCT): CPT

## 2020-04-25 PROCEDURE — C1769 GUIDE WIRE: HCPCS

## 2020-04-25 PROCEDURE — 2500000003 HC RX 250 WO HCPCS

## 2020-04-25 PROCEDURE — 84484 ASSAY OF TROPONIN QUANT: CPT

## 2020-04-25 PROCEDURE — U0002 COVID-19 LAB TEST NON-CDC: HCPCS

## 2020-04-25 RX ORDER — SODIUM CHLORIDE 0.9 % (FLUSH) 0.9 %
10 SYRINGE (ML) INJECTION PRN
Status: DISCONTINUED | OUTPATIENT
Start: 2020-04-25 | End: 2020-04-26 | Stop reason: HOSPADM

## 2020-04-25 RX ORDER — FAMOTIDINE 20 MG/1
20 TABLET, FILM COATED ORAL 2 TIMES DAILY
Status: DISCONTINUED | OUTPATIENT
Start: 2020-04-25 | End: 2020-04-26 | Stop reason: HOSPADM

## 2020-04-25 RX ORDER — ACETAMINOPHEN 325 MG/1
650 TABLET ORAL EVERY 4 HOURS PRN
Status: DISCONTINUED | OUTPATIENT
Start: 2020-04-25 | End: 2020-04-26 | Stop reason: HOSPADM

## 2020-04-25 RX ORDER — HEPARIN SODIUM 1000 [USP'U]/ML
30 INJECTION, SOLUTION INTRAVENOUS; SUBCUTANEOUS PRN
Status: DISCONTINUED | OUTPATIENT
Start: 2020-04-25 | End: 2020-04-25

## 2020-04-25 RX ORDER — SODIUM CHLORIDE 0.9 % (FLUSH) 0.9 %
10 SYRINGE (ML) INJECTION PRN
Status: DISCONTINUED | OUTPATIENT
Start: 2020-04-25 | End: 2020-04-25 | Stop reason: SDUPTHER

## 2020-04-25 RX ORDER — ACETAMINOPHEN 325 MG/1
650 TABLET ORAL EVERY 4 HOURS PRN
Status: DISCONTINUED | OUTPATIENT
Start: 2020-04-25 | End: 2020-04-25 | Stop reason: SDUPTHER

## 2020-04-25 RX ORDER — LISINOPRIL 10 MG/1
10 TABLET ORAL DAILY
Status: DISCONTINUED | OUTPATIENT
Start: 2020-04-25 | End: 2020-04-25 | Stop reason: SDUPTHER

## 2020-04-25 RX ORDER — POLYETHYLENE GLYCOL 3350 17 G/17G
17 POWDER, FOR SOLUTION ORAL DAILY PRN
Status: DISCONTINUED | OUTPATIENT
Start: 2020-04-25 | End: 2020-04-26 | Stop reason: HOSPADM

## 2020-04-25 RX ORDER — SODIUM CHLORIDE 9 MG/ML
INJECTION, SOLUTION INTRAVENOUS CONTINUOUS
Status: DISCONTINUED | OUTPATIENT
Start: 2020-04-25 | End: 2020-04-26

## 2020-04-25 RX ORDER — HEPARIN SODIUM 1000 [USP'U]/ML
4000 INJECTION, SOLUTION INTRAVENOUS; SUBCUTANEOUS ONCE
Status: COMPLETED | OUTPATIENT
Start: 2020-04-25 | End: 2020-04-25

## 2020-04-25 RX ORDER — ASPIRIN 81 MG/1
81 TABLET, CHEWABLE ORAL DAILY
Status: DISCONTINUED | OUTPATIENT
Start: 2020-04-26 | End: 2020-04-26 | Stop reason: HOSPADM

## 2020-04-25 RX ORDER — ATORVASTATIN CALCIUM 80 MG/1
80 TABLET, FILM COATED ORAL NIGHTLY
Status: DISCONTINUED | OUTPATIENT
Start: 2020-04-25 | End: 2020-04-26 | Stop reason: HOSPADM

## 2020-04-25 RX ORDER — NICOTINE 21 MG/24HR
1 PATCH, TRANSDERMAL 24 HOURS TRANSDERMAL DAILY
Status: DISCONTINUED | OUTPATIENT
Start: 2020-04-25 | End: 2020-04-26 | Stop reason: HOSPADM

## 2020-04-25 RX ORDER — HEPARIN SODIUM 10000 [USP'U]/100ML
10 INJECTION, SOLUTION INTRAVENOUS CONTINUOUS
Status: DISCONTINUED | OUTPATIENT
Start: 2020-04-25 | End: 2020-04-25

## 2020-04-25 RX ORDER — CARVEDILOL 6.25 MG/1
6.25 TABLET ORAL 2 TIMES DAILY WITH MEALS
Status: DISCONTINUED | OUTPATIENT
Start: 2020-04-25 | End: 2020-04-26 | Stop reason: HOSPADM

## 2020-04-25 RX ORDER — ONDANSETRON 2 MG/ML
4 INJECTION INTRAMUSCULAR; INTRAVENOUS EVERY 4 HOURS PRN
Status: DISCONTINUED | OUTPATIENT
Start: 2020-04-25 | End: 2020-04-26 | Stop reason: HOSPADM

## 2020-04-25 RX ORDER — LISINOPRIL 20 MG/1
20 TABLET ORAL DAILY
Status: DISCONTINUED | OUTPATIENT
Start: 2020-04-26 | End: 2020-04-26 | Stop reason: HOSPADM

## 2020-04-25 RX ORDER — SODIUM CHLORIDE 0.9 % (FLUSH) 0.9 %
10 SYRINGE (ML) INJECTION EVERY 12 HOURS SCHEDULED
Status: DISCONTINUED | OUTPATIENT
Start: 2020-04-25 | End: 2020-04-25 | Stop reason: SDUPTHER

## 2020-04-25 RX ORDER — SODIUM CHLORIDE 0.9 % (FLUSH) 0.9 %
10 SYRINGE (ML) INJECTION EVERY 12 HOURS SCHEDULED
Status: DISCONTINUED | OUTPATIENT
Start: 2020-04-25 | End: 2020-04-26 | Stop reason: HOSPADM

## 2020-04-25 RX ORDER — ACETAMINOPHEN 650 MG/1
650 SUPPOSITORY RECTAL EVERY 4 HOURS PRN
Status: DISCONTINUED | OUTPATIENT
Start: 2020-04-25 | End: 2020-04-26 | Stop reason: HOSPADM

## 2020-04-25 RX ORDER — NITROGLYCERIN 20 MG/100ML
5 INJECTION INTRAVENOUS CONTINUOUS
Status: DISCONTINUED | OUTPATIENT
Start: 2020-04-25 | End: 2020-04-25

## 2020-04-25 RX ORDER — ASPIRIN 81 MG/1
81 TABLET, CHEWABLE ORAL DAILY
Status: DISCONTINUED | OUTPATIENT
Start: 2020-04-25 | End: 2020-04-25 | Stop reason: SDUPTHER

## 2020-04-25 RX ORDER — BENZONATATE 100 MG/1
200 CAPSULE ORAL 3 TIMES DAILY PRN
Status: DISCONTINUED | OUTPATIENT
Start: 2020-04-25 | End: 2020-04-25

## 2020-04-25 RX ORDER — ASPIRIN 325 MG
325 TABLET ORAL ONCE
Status: COMPLETED | OUTPATIENT
Start: 2020-04-25 | End: 2020-04-25

## 2020-04-25 RX ORDER — HEPARIN SODIUM 1000 [USP'U]/ML
60 INJECTION, SOLUTION INTRAVENOUS; SUBCUTANEOUS PRN
Status: DISCONTINUED | OUTPATIENT
Start: 2020-04-25 | End: 2020-04-25

## 2020-04-25 RX ORDER — MORPHINE SULFATE 2 MG/ML
2 INJECTION, SOLUTION INTRAMUSCULAR; INTRAVENOUS
Status: DISCONTINUED | OUTPATIENT
Start: 2020-04-25 | End: 2020-04-26 | Stop reason: HOSPADM

## 2020-04-25 RX ORDER — ATORVASTATIN CALCIUM 80 MG/1
80 TABLET, FILM COATED ORAL DAILY
Status: DISCONTINUED | OUTPATIENT
Start: 2020-04-25 | End: 2020-04-25 | Stop reason: SDUPTHER

## 2020-04-25 RX ADMIN — CARVEDILOL 6.25 MG: 6.25 TABLET, FILM COATED ORAL at 17:38

## 2020-04-25 RX ADMIN — TICAGRELOR 180 MG: 90 TABLET ORAL at 09:09

## 2020-04-25 RX ADMIN — ASPIRIN 325 MG ORAL TABLET 325 MG: 325 PILL ORAL at 05:26

## 2020-04-25 RX ADMIN — HEPARIN SODIUM 4000 UNITS: 1000 INJECTION INTRAVENOUS; SUBCUTANEOUS at 06:18

## 2020-04-25 RX ADMIN — IOPAMIDOL 302 ML: 755 INJECTION, SOLUTION INTRAVENOUS at 10:55

## 2020-04-25 RX ADMIN — NITROGLYCERIN 5 MCG/MIN: 20 INJECTION INTRAVENOUS at 07:40

## 2020-04-25 RX ADMIN — ATORVASTATIN CALCIUM 80 MG: 80 TABLET, FILM COATED ORAL at 20:10

## 2020-04-25 RX ADMIN — TICAGRELOR 90 MG: 90 TABLET ORAL at 20:10

## 2020-04-25 RX ADMIN — CARVEDILOL 6.25 MG: 6.25 TABLET, FILM COATED ORAL at 12:27

## 2020-04-25 RX ADMIN — LISINOPRIL 10 MG: 10 TABLET ORAL at 12:28

## 2020-04-25 RX ADMIN — Medication 10 ML: at 20:11

## 2020-04-25 RX ADMIN — IOPAMIDOL 75 ML: 755 INJECTION, SOLUTION INTRAVENOUS at 06:00

## 2020-04-25 RX ADMIN — FAMOTIDINE 20 MG: 20 TABLET, FILM COATED ORAL at 20:10

## 2020-04-25 RX ADMIN — HEPARIN SODIUM 1000 UNITS/HR: 10000 INJECTION, SOLUTION INTRAVENOUS at 06:22

## 2020-04-25 ASSESSMENT — PAIN SCALES - GENERAL
PAINLEVEL_OUTOF10: 0
PAINLEVEL_OUTOF10: 9
PAINLEVEL_OUTOF10: 0
PAINLEVEL_OUTOF10: 6
PAINLEVEL_OUTOF10: 0

## 2020-04-25 ASSESSMENT — ENCOUNTER SYMPTOMS
COLOR CHANGE: 0
SHORTNESS OF BREATH: 0
BACK PAIN: 0
VOMITING: 0
NAUSEA: 0
WHEEZING: 0
RHINORRHEA: 0
PHOTOPHOBIA: 0
ABDOMINAL PAIN: 0

## 2020-04-25 ASSESSMENT — PAIN DESCRIPTION - PROGRESSION
CLINICAL_PROGRESSION: NOT CHANGED
CLINICAL_PROGRESSION: GRADUALLY IMPROVING

## 2020-04-25 ASSESSMENT — PAIN DESCRIPTION - DESCRIPTORS
DESCRIPTORS: BURNING
DESCRIPTORS: BURNING

## 2020-04-25 ASSESSMENT — PAIN DESCRIPTION - PAIN TYPE
TYPE: ACUTE PAIN
TYPE: ACUTE PAIN

## 2020-04-25 ASSESSMENT — PAIN DESCRIPTION - ONSET
ONSET: ON-GOING
ONSET: ON-GOING

## 2020-04-25 ASSESSMENT — PAIN - FUNCTIONAL ASSESSMENT
PAIN_FUNCTIONAL_ASSESSMENT: ACTIVITIES ARE NOT PREVENTED

## 2020-04-25 ASSESSMENT — PAIN DESCRIPTION - LOCATION
LOCATION: CHEST
LOCATION: CHEST

## 2020-04-25 ASSESSMENT — PAIN DESCRIPTION - ORIENTATION: ORIENTATION: MID

## 2020-04-25 ASSESSMENT — PAIN DESCRIPTION - FREQUENCY
FREQUENCY: CONTINUOUS
FREQUENCY: CONTINUOUS

## 2020-04-25 NOTE — CONSULTS
Cardiology Consultation   Referring Physician: Dr Jonathan Ocampo   Reason for Consultation: chest pain   Chief Complaint:   Chief Complaint   Patient presents with    Heartburn     patient reports took zantac without relief, wanted to come in to show his doctor he has heartburn really bad. Subjective:   History of Present Illness:     Jose David Keenan is a 37 y.o. male who presents with the above chief complaint. He admits to sudden onset severe substernal chest pain. Pain associated with diaphoresis and nausea. No h/o similar complaints. Describes the pain as burning. Presented to [facility] ER for further w/u. Anterior lateral ST changes. D- dimer negative. CT PE negative. Continues to have substernal chest pain 7/10 in intensity despite nitro gtt. Prior cardiac testing:    None     Past Medical History:   has a past medical history of Essential hypertension, Headache, HTN (hypertension), Other hyperlipidemia, and Tobacco abuse counseling. Surgical History:   has a past surgical history that includes Mouth surgery and cranial lesion resection. Social History:   reports that he has been smoking cigarettes. He started smoking about 30 years ago. He has been smoking about 2.00 packs per day. He has never used smokeless tobacco. He reports that he does not drink alcohol or use drugs. Family History:  family history includes Heart Disease in his maternal cousin and maternal uncle; High Blood Pressure in his maternal aunt. Home Medications:  Were reviewed and are listed in nursing record and/or below  Prior to Admission medications    Medication Sig Start Date End Date Taking?  Authorizing Provider   atorvastatin (LIPITOR) 10 MG tablet Take 1 tablet by mouth daily 4/7/20   Yuridia Thomas, DO   Blood Pressure Monitoring (BLOOD PRESSURE CUFF) MISC 1 Device by Does not apply route 2 times daily 4/7/20   Yuridia Thomas, DO   lisinopril (PRINIVIL;ZESTRIL) 20 MG tablet Take 1 tablet by mouth daily 4/7/20   Ana Laura Marcano elevated JVD. Lungs:   Clear to auscultation bilaterally, respirations unlabored. No wheeze, rales, or rhonchi. Chest Wall:  No tenderness or deformity. Heart:  Regular rate. S1/S2 normal. No murmur, rub, or gallop. Abdomen:   Soft, non-tender, bowel sounds active. Musculoskeletal: No muscle wasting or digital clubbing. Extremities: Extremities normal, atraumatic. No cyanosis or edema. Pulses: 2+ radial and carotid pulses, symmetric. Skin: No rashes or lesions. Pysch: Normal mood and affect. Alert and oriented x 4. Neurologic: Normal gross motor and sensory exam.       Labs     CBC:   Lab Results   Component Value Date    WBC 18.6 04/25/2020    RBC 5.98 04/25/2020    HGB 17.7 04/25/2020    HCT 52.5 04/25/2020    MCV 87.9 04/25/2020    RDW 13.9 04/25/2020     04/25/2020     CMP:  Lab Results   Component Value Date     04/25/2020    K 4.6 04/25/2020     04/25/2020    CO2 21 04/25/2020    BUN 8 04/25/2020    CREATININE 0.7 04/25/2020    GFRAA >60 04/25/2020    AGRATIO 1.2 04/25/2020    LABGLOM >60 04/25/2020    GLUCOSE 117 04/25/2020    PROT 7.8 04/25/2020    CALCIUM 10.1 04/25/2020    BILITOT 0.5 04/25/2020    ALKPHOS 106 04/25/2020    AST 50 04/25/2020    ALT 13 04/25/2020     PT/INR:  No results found for: PTINR  HgBA1c:No results found for: LABA1C  Lab Results   Component Value Date    TROPONINI 0.22 (H) 04/25/2020     @lipid@      CURRENT Medications:  Current Facility-Administered Medications: heparin 25,000 units in dextrose 5% 250 mL infusion, 10 mL/hr, Intravenous, Continuous  benzonatate (TESSALON) capsule 200 mg, 200 mg, Oral, TID PRN  nicotine (NICODERM CQ) 21 MG/24HR 1 patch, 1 patch, Transdermal, Daily  nitroGLYCERIN 50 mg in dextrose 5% 250 mL infusion, 5 mcg/min, Intravenous, Continuous     Cardiac testing     EKG: anterior lateral ST changes     Echo:     Stress Test:     Cath:      All above diagnostic testing was independently visualized and reviewed by me (not simply review of report)     Patient Active Problem List   Diagnosis    Essential hypertension    Other hyperlipidemia    Tobacco abuse counseling    Acute coronary syndrome (HCC)    SIRS (systemic inflammatory response syndrome) (HCC)    NSTEMI (non-ST elevated myocardial infarction) ([facility] [address]         Assessment and Plan   1) NSTEMI   - on-going chest pain   - recommend emergent LHC   - further recommendations pending clinical course   - This procedure has been fully reviewed with the patient and written informed consent has been obtained. Thank you for allowing us to participate in the care of Nika Denson.     Reyna Santos MD [address] [facility]   [facility]   (C): [phone]  Valerie Woodward): [phone]

## 2020-04-25 NOTE — ED PROVIDER NOTES
[facility]  EMERGENCY DEPARTMENTENCOUNTER      Pt Name: Gui Davis  MRN: [id number]  [place] 1980  Date ofevaluation: 4/25/2020  Provider: Jaclyn Gann MD    CHIEF COMPLAINT       Chief Complaint   Patient presents with    Heartburn     patient reports took zantac without relief, wanted to come in to show his doctor he has heartburn really bad. HISTORY OF PRESENT ILLNESS   (Location/Symptom, Timing/Onset,Context/Setting, Quality, Duration, Modifying Factors, Severity)  Note limiting factors. Gui Davis is a 37 y.o. male  who  has a past medical history of Essential hypertension, Headache, HTN (hypertension), Other hyperlipidemia, and Tobacco abuse counseling. who presents to the emergency department for evaluation of chest pain which she describes as heartburn. Patient states that the pain is substernal and radiates across his chest.  He states he has had similar symptoms in the past which he states he was told was chest pain. States that his symptoms began this evening and have been persistent. He denies remitting or exacerbating factors. Denies shortness of breath diaphoresis fevers or recent illness. Has abdominal pain or changes in bowel or urine function. He states he did try taking over counter medication and drink water for his symptoms and they were not improved. HPI    NursingNotes were reviewed. REVIEW OF SYSTEMS    (2-9 systems for level 4, 10 or more for level 5)     Review of Systems   Constitutional: Negative for activity change, chills and fever. HENT: Negative for congestion and rhinorrhea. Eyes: Negative for photophobia and visual disturbance. Respiratory: Negative for shortness of breath and wheezing. Cardiovascular: Positive for chest pain. Negative for palpitations and leg swelling. Gastrointestinal: Negative for abdominal pain, nausea and vomiting. Endocrine: Negative for polydipsia and polyuria. Smoking status: Current Every Day Smoker     Packs/day: 2.00     Types: Cigarettes     Start date: 12/19/1989    Smokeless tobacco: Never Used   Substance and Sexual Activity    Alcohol use: No    Drug use: No    Sexual activity: Yes     Partners: Female   Lifestyle    Physical activity     Days per week: None     Minutes per session: None    Stress: None   Relationships    Social connections     Talks on phone: None     Gets together: None     Attends Faith service: None     Active member of club or organization: None     Attends meetings of clubs or organizations: None     Relationship status: None    Intimate partner violence     Fear of current or ex partner: None     Emotionally abused: None     Physically abused: None     Forced sexual activity: None   Other Topics Concern    None   Social History Narrative    None       SCREENINGS             PHYSICAL EXAM    (up to 7 for level 4, 8 or more for level 5)     ED Triage Vitals [04/25/20 0403]   BP Temp Temp Source Pulse Resp SpO2 Height Weight   (!) 178/112 98.2 °F (36.8 °C) Oral 94 16 99 % -- --       Physical Exam  Vitals signs and nursing note reviewed. Constitutional:       General: He is not in acute distress. Appearance: He is well-developed. HENT:      Head: Normocephalic and atraumatic. Mouth/Throat:      Mouth: Mucous membranes are moist.      Pharynx: Oropharynx is clear. Eyes:      Extraocular Movements: Extraocular movements intact. Conjunctiva/sclera: Conjunctivae normal.      Pupils: Pupils are equal, round, and reactive to light. Neck:      Musculoskeletal: Normal range of motion. Trachea: No tracheal deviation. Cardiovascular:      Rate and Rhythm: Normal rate and regular rhythm. Pulmonary:      Effort: Pulmonary effort is normal. No respiratory distress. Breath sounds: Normal breath sounds. No wheezing, rhonchi or rales. Abdominal:      General: There is no distension.       Palpations: Abdomen CONSULTS:  None    PROCEDURES:  Unless otherwise noted below, none     Procedures    FINAL IMPRESSION      1. NSTEMI (non-ST elevated myocardial infarction) ([facility] [address]    2. Chest pain, unspecified type          DISPOSITION/PLAN   DISPOSITION        PATIENT REFERREDTO:  No follow-up provider specified.     DISCHARGEMEDICATIONS:  New Prescriptions    No medications on file          (Please note that portions of this note were completed with a voice recognition program.  Efforts were made to edit the dictations but occasionally words are mis-transcribed.)    Jose Alberto Shen MD (electronically signed)  Attending Emergency Physician          Jose Alberto Shen MD  04/26/20 3219

## 2020-04-25 NOTE — PRE SEDATION
Sedation Pre-Procedure Note    Patient Name: Shirley Grire   YOB: 1980  Room/Bed: [address] [place] [address]  Medical Record Number: [id number]  Date: 4/25/2020   Time: 9:33 AM       Indication:  NSTEMI     Consent: I have discussed with the patient and/or the patient representative the indication, alternatives, and the possible risks and/or complications of the planned procedure and the anesthesia methods. The patient and/or patient representative appear to understand and agree to proceed. Vital Signs:   Vitals:    04/25/20 0917   BP: (!) 167/97   Pulse: 73   Resp: 21   Temp:    SpO2: 97%       Past Medical History:   has a past medical history of Essential hypertension, Headache, HTN (hypertension), Other hyperlipidemia, and Tobacco abuse counseling. Past Surgical History:   has a past surgical history that includes Mouth surgery and cranial lesion resection. Medications:   Scheduled Meds:    nicotine  1 patch Transdermal Daily     Continuous Infusions:    heparin (porcine) 1,000 Units/hr (04/25/20 0622)    nitroGLYCERIN 20 mcg/min (04/25/20 0855)     PRN Meds: benzonatate  Home Meds:   Prior to Admission medications    Medication Sig Start Date End Date Taking?  Authorizing Provider   atorvastatin (LIPITOR) 10 MG tablet Take 1 tablet by mouth daily 4/7/20   Edmund Gann DO   Blood Pressure Monitoring (BLOOD PRESSURE CUFF) MISC 1 Device by Does not apply route 2 times daily 4/7/20   Edmund Gann DO   lisinopril (PRINIVIL;ZESTRIL) 20 MG tablet Take 1 tablet by mouth daily 4/7/20   Edmund Gann DO   nicotine (NICODERM CQ) 21 MG/24HR Place 1 patch onto the skin daily 4/7/20 5/19/20  Edmund Gann DO   ibuprofen (ADVIL;MOTRIN) 800 MG tablet Take 1 tablet by mouth every 8 hours as needed for Pain 1/7/20   LACHELLE Alexandre CNP     Coumadin Use Last 7 Days:  no  Antiplatelet drug therapy use last 7 days: no  Other anticoagulant use last 7 days: no  Additional Medication Information:

## 2020-04-25 NOTE — ED NOTES
Pt comes to ER with complaints of heartburn in chest.  Pt states no relief with antiacid medications. Pt denies nausea or vomiting. No complaints of sob.        Brad Thao RN  04/25/20 0046

## 2020-04-25 NOTE — H&P
atraumatic. Pupils equal, round, reactive to light. No scleral icterus. No conjunctival injection. Normal lips, teeth, and gums. No nasal discharge. Neck:  Supple  Heart:  Normal s1/s2, RRR, no murmurs, gallops, or rubs. no leg edema  Lungs:  diminished bilaterally, no wheeze, no rales, no rhonchi, no use of accessory muscles  Abd: bowel sounds present, soft, nontender, nondistended, no masses  Extrem:  No clubbing, cyanosis,  no edema, 2+ pedal pulses, brisk capillary refill  Skin:  Warm and dry, no open lesions or rash,  normal color/perfusion  Psych:  A&O x 3, appropriate mood and affect. Poor insight   Neuro: grossly intact, moves all four extremities.      Breast: deferred  Rectal: deferred  Genitalia:  deferred    LABS:  Lab Results   Component Value Date    WBC 18.6 04/25/2020    RBC 5.98 04/25/2020    HGB 17.7 04/25/2020    HCT 52.5 04/25/2020    MCV 87.9 04/25/2020    MCH 29.7 04/25/2020    MCHC 33.7 04/25/2020    RDW 13.9 04/25/2020     04/25/2020    MPV 9.1 04/25/2020     Lab Results   Component Value Date     04/25/2020    K 4.6 04/25/2020     04/25/2020    CO2 21 04/25/2020    BUN 8 04/25/2020    CREATININE 0.7 04/25/2020    GFRAA >60 04/25/2020    AGRATIO 1.2 04/25/2020    LABGLOM >60 04/25/2020    GLUCOSE 117 04/25/2020    PROT 7.8 04/25/2020    LABALBU 4.3 04/25/2020    CALCIUM 10.1 04/25/2020    BILITOT 0.5 04/25/2020    ALKPHOS 106 04/25/2020    AST 50 04/25/2020    ALT 13 04/25/2020     No results found for: PROTIME, INR  Recent Labs     04/25/20  0422   TROPONINI 0.22*     Lab Results   Component Value Date    NITRU Negative 12/19/2016    COLORU YELLOW 12/19/2016    PHUR 6.0 12/19/2016    CLARITYU Clear 12/19/2016    SPECGRAV 1.006 12/19/2016    LEUKOCYTESUR Negative 12/19/2016    UROBILINOGEN 0.2 12/19/2016    BILIRUBINUR Negative 12/19/2016    BLOODU Negative 12/19/2016    GLUCOSEU Negative 12/19/2016    KETUA Negative 12/19/2016     No results found for: MG, PHOS  No results found for: LABA1C  No results found for: TSH, TFFSBJHN40, FOLATE, FERRITIN, IRON, TIBC, PTRFSAT, TSH, FREET4  Lab Results   Component Value Date    TRIG 318 02/06/2020    HDL 28 02/06/2020    LDLCALC see below 02/06/2020    LDLDIRECT 229 02/06/2020    LABVLDL see below 02/06/2020     Lab Results   Component Value Date    LIPASE 50.0 04/25/2020     No results found for: BNP  No results found for: LACTA  No results found for: PHART, PH, MVT2KCS, PCO2, PO2ART, PO2, MBA5HSV, HCO3, BEART, BE, THGBART, THB, QNU4ONG, F1LZYJLN, O2SAT  No results found for: LABAMPH, BARBSCNU, LABBENZ, CANNAB, COCAINESCRN, LABMETH, OPIATESCREENURINE, PHENCYCLIDINESCREENURINE, PPXUR, ETOH  Lab Results   Component Value Date    DDIMER <200 04/25/2020     No results found for: VITD25        RADIOLOGY:  Xr Chest Standard (2 Vw)    Result Date: 4/25/2020  EXAMINATION: TWO XRAY VIEWS OF THE CHEST 4/25/2020 4:09 am COMPARISON: None. HISTORY: ORDERING SYSTEM PROVIDED HISTORY: sob TECHNOLOGIST PROVIDED HISTORY: Reason for exam:->sob Reason for Exam: heartburn FINDINGS: The lungs are clear. There is no pleural effusion. The cardiomediastinal silhouette is normal. There is no pneumothorax. No acute disease. Ct Chest Pulmonary Embolism W Contrast    Result Date: 4/25/2020  EXAMINATION: CTA OF THE CHEST 4/25/2020 5:54 am TECHNIQUE: CTA of the chest was performed after the administration of intravenous contrast.  Multiplanar reformatted images are provided for review. MIP images are provided for review. Dose modulation, iterative reconstruction, and/or weight based adjustment of the mA/kV was utilized to reduce the radiation dose to as low as reasonably achievable. COMPARISON: Correlation with concurrent radiograph, CT on 12/19/2016 HISTORY: Acute shortness of breath and chest pain. FINDINGS: Pulmonary Arteries: Suboptimal opacification of the pulmonary arteries. No central pulmonary embolism. No evidence of right heart strain.   Main pulmonary artery is normal in caliber. Mediastinum: Borderline cardiomegaly with left ventricular dilatation. No pericardial effusion. Thoracic aorta is normal caliber. No lymphadenopathy. Tiny hiatal hernia. Thyroid is unremarkable. Lungs/pleura: Mild dependent atelectasis. No consolidation, pleural effusion, or pneumothorax. Upper Abdomen: Unremarkable. Soft Tissues/Bones: Unremarkable. No central pulmonary embolism or evidence of right heart strain; limited evaluation of segmental/subsegmental vessels due to inadequate opacification. No acute pulmonary abnormality. EKG:  SR, rate 80, anterolateral infarct possible}    PHYSICIAN CERTIFICATION    I certify that Nika Denson is expected to be hospitalized for >2 midnights based on the following assessment and plan:      ASSESSMENT:      Patient Active Problem List   Diagnosis    Essential hypertension    Other hyperlipidemia    Tobacco abuse counseling    Acute coronary syndrome ([facility] [address]    SIRS (systemic inflammatory response syndrome) ([facility] [address]    NSTEMI (non-ST elevated myocardial infarction) ([facility] [address]       Principal Problem:    Acute coronary syndrome ([facility] [address]  Active Problems:    Essential hypertension    Other hyperlipidemia    SIRS (systemic inflammatory response syndrome) (HCC)    NSTEMI (non-ST elevated myocardial infarction) ([facility] [address]  Resolved Problems:    * No resolved hospital problems. *      PLAN:    1. Acute coronary syndrome/NSTEMI - spoke with Dr. Paul Mckenna - pt going to cath lab right away - does have significant changes and flipped T waves in 1, II, AVL consistent with infarct  2. HTN - poorly controlled - should improve with nitro drip  3. Hyperlipidemia - continue with statin, but increase to 80mg daily - last cholesterol level elevated at 288 with LDL of 229 and triglycerides of 218. - repeat labs  4.   SIRS due to NSTEMI based on leukocytosis, tachycardia, tachypnea, elevated CRP slight and LDH elevation (mild) no evidence of infection and very doubtful for COVID19 although it can present asymptomatic          DVT prophylaxis Yes:  Heparin drip  GI prophylaxis pepcid  Antibiotic prophylaxis:  No    Code status FULL    I spent 45 minutes providing critical care for acute coronary syndrome, NSTEMI. This time is excluding time spent performing procedures.     Please note that over 50 minutes was spent in evaluating the patient, review of records and results, discussion with staff/family, etc.    Electronically signed by Zee Pérez MD on 4/25/2020 at 7:37 AM

## 2020-04-25 NOTE — ED NOTES
Patient resting in bed at this time talking on phone. Patient rates pain 4 on 0-10 scale. Patient denies any wants or needs at this time. Assessment complete. Call light within reach. Will continue to monitor.       Carlota Perez RN  04/25/20 6287

## 2020-04-25 NOTE — ED NOTES
Writer at bedside with patient at this time. Pt. Requesting water. Pt. Informed that he is not able to drink at this time because he is going to the cath lab. Pt. States \"Well then can you get me some nicotine gum or patch? I asked the lady earlier and she told me no. \" pt informed that writer would speak with the physician about this. Pt. States \"Well if they tell me no I am leaving. \" pt. Informed that writer would speak with provider. Pt. Verbalized understanding. Chest pain remains 4 on 0-10 scale a this time. Pt. Resting in bed. Call light within reach. Will continue to monitor.       Mustapha Schroeder RN  04/25/20 6553

## 2020-04-25 NOTE — OP NOTE
mm Clair MI to d2  6. 3.0 X 8 mm NC balloon for postdilation  7. Runthrough wire used to cross LCx lesion   8. 2.5/3.0 regular balloons for predilation   9. IVUS catheter passed from LM/LCx-distal reference diameter 3.5  10.3.5 X 30 mm [place] MI deployed to mid LCx / 3.0 X 15 mm Clair MI to distal LCX  11.  Postdilation with 3.0/3.5 NC balloons     SUMMARY:     3V CAD   Acute MI D2  S/p IVUS guided PCI D2 X 1 MI   S/p IVUS guided PCI LCx X 2 MI     Before:                          After:       Before:                        After:         RECOMMENDATION:      DAPT X 12 months   High intensity statin  Beta blockade  Staged FFR/PCI RCA in 2- 4 wks   Echo     James Ma MD [address] [facility]   [address]   (C): [phone]  (O): [phone]

## 2020-04-26 VITALS
RESPIRATION RATE: 18 BRPM | BODY MASS INDEX: 34.32 KG/M2 | SYSTOLIC BLOOD PRESSURE: 94 MMHG | WEIGHT: 231.7 LBS | TEMPERATURE: 98.2 F | HEIGHT: 69 IN | DIASTOLIC BLOOD PRESSURE: 68 MMHG | HEART RATE: 77 BPM | OXYGEN SATURATION: 94 %

## 2020-04-26 LAB
ANION GAP SERPL CALCULATED.3IONS-SCNC: 16 MMOL/L (ref 3–16)
BASOPHILS ABSOLUTE: 0.1 K/UL (ref 0–0.2)
BASOPHILS RELATIVE PERCENT: 0.9 %
BUN BLDV-MCNC: 10 MG/DL (ref 7–20)
CALCIUM SERPL-MCNC: 9.8 MG/DL (ref 8.3–10.6)
CHLORIDE BLD-SCNC: 100 MMOL/L (ref 99–110)
CHOLESTEROL, TOTAL: 174 MG/DL (ref 0–199)
CO2: 20 MMOL/L (ref 21–32)
CREAT SERPL-MCNC: 0.8 MG/DL (ref 0.9–1.3)
EKG ATRIAL RATE: 79 BPM
EKG ATRIAL RATE: 81 BPM
EKG DIAGNOSIS: NORMAL
EKG DIAGNOSIS: NORMAL
EKG P AXIS: 40 DEGREES
EKG P AXIS: 43 DEGREES
EKG P-R INTERVAL: 146 MS
EKG P-R INTERVAL: 148 MS
EKG Q-T INTERVAL: 382 MS
EKG Q-T INTERVAL: 382 MS
EKG QRS DURATION: 100 MS
EKG QRS DURATION: 92 MS
EKG QTC CALCULATION (BAZETT): 438 MS
EKG QTC CALCULATION (BAZETT): 443 MS
EKG R AXIS: 63 DEGREES
EKG R AXIS: 65 DEGREES
EKG T AXIS: 77 DEGREES
EKG T AXIS: 95 DEGREES
EKG VENTRICULAR RATE: 79 BPM
EKG VENTRICULAR RATE: 81 BPM
EOSINOPHILS ABSOLUTE: 0.2 K/UL (ref 0–0.6)
EOSINOPHILS RELATIVE PERCENT: 1.4 %
GFR AFRICAN AMERICAN: >60
GFR NON-AFRICAN AMERICAN: >60
GLUCOSE BLD-MCNC: 112 MG/DL (ref 70–99)
HCT VFR BLD CALC: 51.5 % (ref 40.5–52.5)
HDLC SERPL-MCNC: 31 MG/DL (ref 40–60)
HEMOGLOBIN: 17 G/DL (ref 13.5–17.5)
LDL CHOLESTEROL CALCULATED: 115 MG/DL
LYMPHOCYTES ABSOLUTE: 3.1 K/UL (ref 1–5.1)
LYMPHOCYTES RELATIVE PERCENT: 23.7 %
MCH RBC QN AUTO: 29.1 PG (ref 26–34)
MCHC RBC AUTO-ENTMCNC: 33 G/DL (ref 31–36)
MCV RBC AUTO: 88.4 FL (ref 80–100)
MONOCYTES ABSOLUTE: 1.2 K/UL (ref 0–1.3)
MONOCYTES RELATIVE PERCENT: 9 %
NEUTROPHILS ABSOLUTE: 8.5 K/UL (ref 1.7–7.7)
NEUTROPHILS RELATIVE PERCENT: 65 %
PDW BLD-RTO: 14 % (ref 12.4–15.4)
PLATELET # BLD: 269 K/UL (ref 135–450)
PMV BLD AUTO: 9.5 FL (ref 5–10.5)
POTASSIUM REFLEX MAGNESIUM: 4.2 MMOL/L (ref 3.5–5.1)
RBC # BLD: 5.83 M/UL (ref 4.2–5.9)
SODIUM BLD-SCNC: 136 MMOL/L (ref 136–145)
TRIGL SERPL-MCNC: 139 MG/DL (ref 0–150)
TROPONIN: 0.93 NG/ML
VLDLC SERPL CALC-MCNC: 28 MG/DL
WBC # BLD: 13 K/UL (ref 4–11)

## 2020-04-26 PROCEDURE — 84484 ASSAY OF TROPONIN QUANT: CPT

## 2020-04-26 PROCEDURE — 36415 COLL VENOUS BLD VENIPUNCTURE: CPT

## 2020-04-26 PROCEDURE — 6370000000 HC RX 637 (ALT 250 FOR IP): Performed by: INTERNAL MEDICINE

## 2020-04-26 PROCEDURE — 2580000003 HC RX 258: Performed by: INTERNAL MEDICINE

## 2020-04-26 PROCEDURE — 99233 SBSQ HOSP IP/OBS HIGH 50: CPT | Performed by: INTERNAL MEDICINE

## 2020-04-26 PROCEDURE — 85025 COMPLETE CBC W/AUTO DIFF WBC: CPT

## 2020-04-26 PROCEDURE — 94761 N-INVAS EAR/PLS OXIMETRY MLT: CPT

## 2020-04-26 PROCEDURE — 80061 LIPID PANEL: CPT

## 2020-04-26 PROCEDURE — 93010 ELECTROCARDIOGRAM REPORT: CPT | Performed by: INTERNAL MEDICINE

## 2020-04-26 PROCEDURE — 80048 BASIC METABOLIC PNL TOTAL CA: CPT

## 2020-04-26 RX ORDER — CARVEDILOL 6.25 MG/1
6.25 TABLET ORAL 2 TIMES DAILY WITH MEALS
Qty: 60 TABLET | Refills: 3 | Status: SHIPPED | OUTPATIENT
Start: 2020-04-26 | End: 2020-05-22 | Stop reason: SDUPTHER

## 2020-04-26 RX ORDER — ASPIRIN 81 MG/1
81 TABLET, CHEWABLE ORAL DAILY
Qty: 30 TABLET | Refills: 3 | Status: SHIPPED | OUTPATIENT
Start: 2020-04-27 | End: 2020-07-08 | Stop reason: SDUPTHER

## 2020-04-26 RX ORDER — FAMOTIDINE 20 MG/1
20 TABLET, FILM COATED ORAL 2 TIMES DAILY
Qty: 60 TABLET | Refills: 3 | Status: SHIPPED | OUTPATIENT
Start: 2020-04-26 | End: 2020-05-22 | Stop reason: ALTCHOICE

## 2020-04-26 RX ORDER — ATORVASTATIN CALCIUM 80 MG/1
80 TABLET, FILM COATED ORAL NIGHTLY
Qty: 30 TABLET | Refills: 3 | Status: SHIPPED | OUTPATIENT
Start: 2020-04-26 | End: 2020-05-22 | Stop reason: SDUPTHER

## 2020-04-26 RX ADMIN — TICAGRELOR 90 MG: 90 TABLET ORAL at 08:34

## 2020-04-26 RX ADMIN — Medication 10 ML: at 08:34

## 2020-04-26 RX ADMIN — LISINOPRIL 20 MG: 20 TABLET ORAL at 08:35

## 2020-04-26 RX ADMIN — CARVEDILOL 6.25 MG: 6.25 TABLET, FILM COATED ORAL at 08:34

## 2020-04-26 RX ADMIN — ASPIRIN 81 MG 81 MG: 81 TABLET ORAL at 08:35

## 2020-04-26 ASSESSMENT — PAIN SCALES - GENERAL
PAINLEVEL_OUTOF10: 0
PAINLEVEL_OUTOF10: 0

## 2020-04-26 NOTE — PROGRESS NOTES
@9232 Was waiting on ride for discharge. States short of breath and light headed. Did check blood pressure and was 94/68 sitting on the side of the bed. Oxygen sats 94-95% on room air. Did then recheck the blood pressure and was 101/73. States feeling a little better. @1596 did call Dr Roc Milligan and update. States to have him take it easy. Just getting use to the medications. Is still OK for discharge. @2502 placed in the wheelchair [facility] did take him out to front of the hospital. Mother her to . Does have his prescriptions and his paperwork and belongings.
@lipid@      CURRENT Medications:  Current Facility-Administered Medications: lisinopril (PRINIVIL;ZESTRIL) tablet 20 mg, 20 mg, Oral, Daily  sodium chloride flush 0.9 % injection 10 mL, 10 mL, Intravenous, 2 times per day  sodium chloride flush 0.9 % injection 10 mL, 10 mL, Intravenous, PRN  acetaminophen (TYLENOL) tablet 650 mg, 650 mg, Oral, Q4H PRN **OR** acetaminophen (TYLENOL) suppository 650 mg, 650 mg, Rectal, Q4H PRN  polyethylene glycol (GLYCOLAX) packet 17 g, 17 g, Oral, Daily PRN  ondansetron (ZOFRAN) injection 4 mg, 4 mg, Intravenous, Q4H PRN  morphine (PF) injection 2 mg, 2 mg, Intravenous, Q2H PRN  nicotine (NICODERM CQ) 21 MG/24HR 1 patch, 1 patch, Transdermal, Daily  nicotine polacrilex (NICORETTE) gum 2 mg, 2 mg, Oral, PRN  famotidine (PEPCID) tablet 20 mg, 20 mg, Oral, BID  magnesium hydroxide (MILK OF MAGNESIA) 400 MG/5ML suspension 30 mL, 30 mL, Oral, Daily PRN  carvedilol (COREG) tablet 6.25 mg, 6.25 mg, Oral, BID WC  atorvastatin (LIPITOR) tablet 80 mg, 80 mg, Oral, Nightly  aspirin chewable tablet 81 mg, 81 mg, Oral, Daily  ticagrelor (BRILINTA) tablet 90 mg, 90 mg, Oral, BID  perflutren lipid microspheres (DEFINITY) injection 1.65 mg, 1.5 mL, Intravenous, ONCE PRN     Cardiac testing     EKG: anterior lateral ST changes     Echo:    Summary   There is akinesis of the mid to apical/ lateral myocardium. Ejection fraction is visually estimated to be 40-45%. Grade I diastolic dysfunction with normal LV filling pressures. No evidence of left ventricular mass or thrombus noted. Stress Test:     Cath:   INTERVENTION  1. XB 3.0 Guide  2. Runthrough wire used to cross D2   3. 2.5 balloon used to open occluded vessel   4. IVUS catheter cross from LM/LAD/Diag - distal reference diameter 3 mm   5. 3.0 X 15 mm Clair MI to d2  6. 3.0 X 8 mm NC balloon for postdilation  7. Runthrough wire used to cross LCx lesion   8. 2.5/3.0 regular balloons for predilation   9.  IVUS catheter passed from

## 2020-04-26 NOTE — DISCHARGE SUMMARY
3.0 Guide  2. Runthrough wire used to cross D2   3. 2.5 balloon used to open occluded vessel   4. IVUS catheter cross from LM/LAD/Diag - distal reference diameter 3 mm   5. 3.0 X 15 mm Clair MI to d2  6. 3.0 X 8 mm NC balloon for postdilation  7. Runthrough wire used to cross LCx lesion   8. 2.5/3.0 regular balloons for predilation   9. IVUS catheter passed from LM/LCx-distal reference diameter 3.5  10.3.5 X 30 mm [place] MI deployed to mid LCx / 3.0 X 15 mm Clair MI to distal LCX  11. Postdilation with 3.0/3.5 NC balloons      SUMMARY:      3V CAD   Acute MI D2  S/p IVUS guided PCI D2 X 1 MI   S/p IVUS guided PCI LCx X 2 MI       ECHO:     Summary   There is akinesis of the mid to apical/ lateral myocardium.   Ejection fraction is visually estimated to be 40-45%.   Grade I diastolic dysfunction with normal LV filling pressures.   No evidence of left ventricular mass or thrombus noted. Assessment on Discharge: Stable, improved     Discharge ROS:  A complete review of systems was asked and negative except for denies chest pain or heart burn    Discharge Exam:  BP (!) 100/56   Pulse 83   Temp 98.2 °F (36.8 °C) (Oral)   Resp 18   Ht 5' 9\" (1.753 m)   Wt 231 lb 11.3 oz (105.1 kg)   SpO2 94%   BMI 34.22 kg/m²     Gen: NAD  HEENT: NC/AT, moist mucous membranes, no oropharyngeal erythema or exudate  Neck: supple, trachea midline, no anterior cervical or SC LAD  Heart:  Normal s1/s2, RRR, no murmurs, gallops, or rubs. no leg edema  Lungs:  clear bilaterally, no wheeze,no rales or rhonchi, no use of accessory muscles  Abd: bowel sounds present, soft, nontender, nondistended, no masses  Extrem:  No clubbing, cyanosis,  no edema  Skin: no lesion or masses  Psych:  A & O x3  Neuro: grossly intact, moves all four extremities    Pertinent Studies During Hospital Stay:  Radiology:  Xr Chest Standard (2 Vw)    Result Date: 4/25/2020  EXAMINATION: TWO XRAY VIEWS OF THE CHEST 4/25/2020 4:09 am COMPARISON: None.

## 2020-04-26 NOTE — DISCHARGE INSTR - COC
Continuity of Care Form    Patient Name: Nika Denson   :  1980  MRN:  [id number]    Admit date:  2020  Discharge date:  ***    Code Status Order: Full Code   Advance Directives:   Advance Care Flowsheet Documentation     Date/Time Healthcare Directive Type of Healthcare Directive Copy in [address] Agent's Name Healthcare Agent's Phone Number    20 1603  No, patient does not have an advance directive for healthcare treatment -- -- -- -- --          Admitting Physician:  Yoanna Pleitez MD  PCP: Brandee Dixon DO    Discharging Nurse: [facility] Unit/Room#: [id number]-7318/1302-01  Discharging Unit Phone Number: ***    Emergency Contact:   Extended Emergency Contact Information  Primary Emergency Contact: Mara Gonzales  Address: [address] [place] [address] Phone: [phone]  Relation: Parent    Past Surgical History:  Past Surgical History:   Procedure Laterality Date    CRANIAL LESION RESECTION      MOUTH SURGERY         Immunization History:   Immunization History   Administered Date(s) Administered    Td, unspecified formulation 2011    Tdap (Boostrix, Adacel) 2020       Active Problems:  Patient Active Problem List   Diagnosis Code    Essential hypertension I10    Other hyperlipidemia E78.49    Tobacco abuse counseling Z71.6    Acute coronary syndrome ([facility] [address] I24.9    SIRS (systemic inflammatory response syndrome) ([facility] [address] R65.10    NSTEMI (non-ST elevated myocardial infarction) ([facility] [address] I21.4       Isolation/Infection:   Isolation          No Isolation        Patient Infection Status     Infection Onset Added Last Indicated Last Indicated By Review Planned Expiration Resolved Resolved By    None active    Resolved    COVID-19 Rule Out 20 COVID-19 (Ordered)   20 Rule-Out Test Resulted          Nurse Assessment:  Last Vital Signs: BP (!) 100/56 Pulse 83   Temp 98.2 °F (36.8 °C) (Oral)   Resp 18   Ht 5' 9\" (1.753 m)   Wt 231 lb 11.3 oz (105.1 kg)   SpO2 94%   BMI 34.22 kg/m²     Last documented pain score (0-10 scale): Pain Level: 0  Last Weight:   Wt Readings from Last 1 Encounters:   04/26/20 231 lb 11.3 oz (105.1 kg)     Mental Status:  {IP PT MENTAL STATUS:62519}    IV Access:  { JESSE IV ACCESS:783418620}    Nursing Mobility/ADLs:  Walking   {CHP DME WWCQ:685568092}  Transfer  {CHP DME PORJ:636521960}  Bathing  {CHP DME NYXJ:409303249}  Dressing  {CHP DME MXBJ:023701488}  Toileting  {CHP DME UGEB:832278853}  Feeding  {CHP DME RDRX:383923722}  Med Admin  {P DME WVJU:638835366}  Med Delivery   { JESSE MED Delivery:998565448}    Wound Care Documentation and Therapy:        Elimination:  Continence:   · Bowel: {YES / MX:35735}  · Bladder: {YES / NI:10746}  Urinary Catheter: {Urinary Catheter:791872064}   Colostomy/Ileostomy/Ileal Conduit: {YES / FK:80392}       Date of Last BM: ***    Intake/Output Summary (Last 24 hours) at 4/26/2020 1043  Last data filed at 4/26/2020 0834  Gross per 24 hour   Intake 2830 ml   Output 1350 ml   Net 1480 ml     I/O last 3 completed shifts:   In: 2820 [P.O.:2820]  Out: 1350 [Urine:1350]    Safety Concerns:     [address] [employer] Safety Concerns:986295616}    Impairments/Disabilities:      [employer] Impairments/Disabilities:818671024}    Nutrition Therapy:  Current Nutrition Therapy:   [employer] Diet List:259591829}    Routes of Feeding: {CHP DME Other Feedings:686497481}  Liquids: {Slp liquid thickness:09384}  Daily Fluid Restriction: {CHP DME Yes amt example:831160087}  Last Modified Barium Swallow with Video (Video Swallowing Test): {Done Not Done LHPV:308822317}    Treatments at the Time of Hospital Discharge:   Respiratory Treatments: ***  Oxygen Therapy:  {Therapy; copd oxygen:10302}  Ventilator:    {MH CC Vent COXQ:035312775}    Rehab Therapies: {THERAPEUTIC INTERVENTION:0256156788}  Weight Bearing Status/Restrictions: [address]

## 2020-04-26 NOTE — PLAN OF CARE
Problem: Falls - Risk of:  Goal: Will remain free from falls  Description: Will remain free from falls  Outcome: Ongoing  Note: Ambulatory with steady gait. IVs are NS locked, no history of falls. Non-skid socks on. Call light, urinal, and personal belongings within reach. Problem: Bleeding:  Goal: Will show no signs and symptoms of excessive bleeding  Description: Will show no signs and symptoms of excessive bleeding  Outcome: Ongoing  Note: Visually assessed skin for hematomas, gross blood, swelling, and ecchymosis. Hemorrhage precautions in place. Vitals stable. Problem: Tissue Perfusion - Cardiopulmonary, Altered:  Goal: Absence of angina  Description: Absence of angina  Outcome: Ongoing  Goal: Hemodynamic stability will improve  Description: Hemodynamic stability will improve  Outcome: Ongoing  Note: Assess cath lab puncture site for bruising, oozing, hematoma. Notify cardiologist if hematoma develops. Apply manual pressure until hemostasis achieved and hematoma soft.

## 2020-04-27 ENCOUNTER — TELEPHONE (OUTPATIENT)
Dept: CARDIOLOGY CLINIC | Age: 40
End: 2020-04-27

## 2020-04-27 NOTE — TELEPHONE ENCOUNTER
[facility] scheduled for 5/21/2020 at 8:30 AM  Arrive at 7:00      The morning of your procedure you will park in the hospital parking lot and report directly to the cath lab to check in.     Pre-Procedure Instructions   1. You will need to fast for at least 8 hours prior to procedure. No caffeine the morning of.   2. All other medications can be taken in the morning with sips of water. 3. You will need to take 325 mg aspirin the morning of. If you are currently taking 81 mg please take 4 tablets that morning. 4. Do not use any lotions, creams or perfume the morning of procedure. 5. Pre-procedure lab work will need to be completed 5-7 days prior to procedure. 6. Please have a responsible adult to drive you home after procedure. We advise you have someone to stay with you for 24 hours following procedure for precautionary measures. Depending on procedure you may require an overnight stay. 7. Cath lab will provide you with all post procedure instructions. If you have any questions regarding the procedure itself or medications, please call [phone] and ask to speak with a nurse. Called Ronda Radford and let him know of the date/time of procedure. He is agreeable. Will go over instructions during office visit on 5/6/2020. Published on [employer] and e-mail to [place].

## 2020-05-05 ENCOUNTER — APPOINTMENT (OUTPATIENT)
Dept: GENERAL RADIOLOGY | Age: 40
DRG: 175 | End: 2020-05-05
Payer: MEDICARE

## 2020-05-05 ENCOUNTER — HOSPITAL ENCOUNTER (INPATIENT)
Age: 40
LOS: 1 days | Discharge: HOME | DRG: 175 | End: 2020-05-06
Attending: EMERGENCY MEDICINE | Admitting: INTERNAL MEDICINE
Payer: MEDICARE

## 2020-05-05 ENCOUNTER — APPOINTMENT (OUTPATIENT)
Dept: CARDIAC CATH/INVASIVE PROCEDURES | Age: 40
DRG: 175 | End: 2020-05-05
Payer: MEDICARE

## 2020-05-05 PROBLEM — I20.0 UNSTABLE ANGINA (HCC): Status: ACTIVE | Noted: 2020-05-05

## 2020-05-05 PROBLEM — R07.9 CHEST PAIN: Status: ACTIVE | Noted: 2020-05-05

## 2020-05-05 LAB
A/G RATIO: 1.2 (ref 1.1–2.2)
ALBUMIN SERPL-MCNC: 4.2 G/DL (ref 3.4–5)
ALP BLD-CCNC: 103 U/L (ref 40–129)
ALT SERPL-CCNC: 8 U/L (ref 10–40)
ANION GAP SERPL CALCULATED.3IONS-SCNC: 14 MMOL/L (ref 3–16)
AST SERPL-CCNC: 14 U/L (ref 15–37)
BASOPHILS ABSOLUTE: 0.1 K/UL (ref 0–0.2)
BASOPHILS RELATIVE PERCENT: 0.8 %
BILIRUB SERPL-MCNC: 0.3 MG/DL (ref 0–1)
BUN BLDV-MCNC: 9 MG/DL (ref 7–20)
CALCIUM SERPL-MCNC: 9.7 MG/DL (ref 8.3–10.6)
CHLORIDE BLD-SCNC: 99 MMOL/L (ref 99–110)
CO2: 22 MMOL/L (ref 21–32)
CREAT SERPL-MCNC: 0.7 MG/DL (ref 0.9–1.3)
EKG ATRIAL RATE: 80 BPM
EKG ATRIAL RATE: 84 BPM
EKG DIAGNOSIS: NORMAL
EKG DIAGNOSIS: NORMAL
EKG P AXIS: 63 DEGREES
EKG P AXIS: 67 DEGREES
EKG P-R INTERVAL: 166 MS
EKG P-R INTERVAL: 168 MS
EKG Q-T INTERVAL: 356 MS
EKG Q-T INTERVAL: 374 MS
EKG QRS DURATION: 102 MS
EKG QRS DURATION: 92 MS
EKG QTC CALCULATION (BAZETT): 420 MS
EKG QTC CALCULATION (BAZETT): 431 MS
EKG R AXIS: 86 DEGREES
EKG R AXIS: 86 DEGREES
EKG T AXIS: 83 DEGREES
EKG T AXIS: 87 DEGREES
EKG VENTRICULAR RATE: 80 BPM
EKG VENTRICULAR RATE: 84 BPM
EOSINOPHILS ABSOLUTE: 0.3 K/UL (ref 0–0.6)
EOSINOPHILS RELATIVE PERCENT: 1.8 %
GFR AFRICAN AMERICAN: >60
GFR NON-AFRICAN AMERICAN: >60
GLOBULIN: 3.5 G/DL
GLUCOSE BLD-MCNC: 122 MG/DL (ref 70–99)
HCT VFR BLD CALC: 48.9 % (ref 40.5–52.5)
HEMOGLOBIN: 16.4 G/DL (ref 13.5–17.5)
LYMPHOCYTES ABSOLUTE: 4 K/UL (ref 1–5.1)
LYMPHOCYTES RELATIVE PERCENT: 25 %
MCH RBC QN AUTO: 29.4 PG (ref 26–34)
MCHC RBC AUTO-ENTMCNC: 33.6 G/DL (ref 31–36)
MCV RBC AUTO: 87.7 FL (ref 80–100)
MONOCYTES ABSOLUTE: 1.4 K/UL (ref 0–1.3)
MONOCYTES RELATIVE PERCENT: 8.8 %
NEUTROPHILS ABSOLUTE: 10.1 K/UL (ref 1.7–7.7)
NEUTROPHILS RELATIVE PERCENT: 63.6 %
PDW BLD-RTO: 13.7 % (ref 12.4–15.4)
PLATELET # BLD: 307 K/UL (ref 135–450)
PMV BLD AUTO: 8.9 FL (ref 5–10.5)
POTASSIUM SERPL-SCNC: 4.4 MMOL/L (ref 3.5–5.1)
RBC # BLD: 5.57 M/UL (ref 4.2–5.9)
SODIUM BLD-SCNC: 135 MMOL/L (ref 136–145)
TOTAL PROTEIN: 7.7 G/DL (ref 6.4–8.2)
TROPONIN: <0.01 NG/ML
WBC # BLD: 15.8 K/UL (ref 4–11)

## 2020-05-05 PROCEDURE — 92978 ENDOLUMINL IVUS OCT C 1ST: CPT | Performed by: INTERNAL MEDICINE

## 2020-05-05 PROCEDURE — 2580000003 HC RX 258: Performed by: INTERNAL MEDICINE

## 2020-05-05 PROCEDURE — 84484 ASSAY OF TROPONIN QUANT: CPT

## 2020-05-05 PROCEDURE — C1753 CATH, INTRAVAS ULTRASOUND: HCPCS

## 2020-05-05 PROCEDURE — 93454 CORONARY ARTERY ANGIO S&I: CPT | Performed by: INTERNAL MEDICINE

## 2020-05-05 PROCEDURE — B2111ZZ FLUOROSCOPY OF MULTIPLE CORONARY ARTERIES USING LOW OSMOLAR CONTRAST: ICD-10-PCS | Performed by: INTERNAL MEDICINE

## 2020-05-05 PROCEDURE — 93010 ELECTROCARDIOGRAM REPORT: CPT | Performed by: INTERNAL MEDICINE

## 2020-05-05 PROCEDURE — C1769 GUIDE WIRE: HCPCS

## 2020-05-05 PROCEDURE — 6360000004 HC RX CONTRAST MEDICATION: Performed by: INTERNAL MEDICINE

## 2020-05-05 PROCEDURE — 99153 MOD SED SAME PHYS/QHP EA: CPT

## 2020-05-05 PROCEDURE — 6370000000 HC RX 637 (ALT 250 FOR IP): Performed by: PHYSICIAN ASSISTANT

## 2020-05-05 PROCEDURE — 36415 COLL VENOUS BLD VENIPUNCTURE: CPT

## 2020-05-05 PROCEDURE — C1887 CATHETER, GUIDING: HCPCS

## 2020-05-05 PROCEDURE — 2500000003 HC RX 250 WO HCPCS

## 2020-05-05 PROCEDURE — 99152 MOD SED SAME PHYS/QHP 5/>YRS: CPT

## 2020-05-05 PROCEDURE — 71046 X-RAY EXAM CHEST 2 VIEWS: CPT

## 2020-05-05 PROCEDURE — 1200000000 HC SEMI PRIVATE

## 2020-05-05 PROCEDURE — 6370000000 HC RX 637 (ALT 250 FOR IP): Performed by: INTERNAL MEDICINE

## 2020-05-05 PROCEDURE — C1874 STENT, COATED/COV W/DEL SYS: HCPCS

## 2020-05-05 PROCEDURE — 99285 EMERGENCY DEPT VISIT HI MDM: CPT

## 2020-05-05 PROCEDURE — C1725 CATH, TRANSLUMIN NON-LASER: HCPCS

## 2020-05-05 PROCEDURE — 4A023N7 MEASUREMENT OF CARDIAC SAMPLING AND PRESSURE, LEFT HEART, PERCUTANEOUS APPROACH: ICD-10-PCS | Performed by: INTERNAL MEDICINE

## 2020-05-05 PROCEDURE — 85347 COAGULATION TIME ACTIVATED: CPT

## 2020-05-05 PROCEDURE — C1894 INTRO/SHEATH, NON-LASER: HCPCS

## 2020-05-05 PROCEDURE — 6360000002 HC RX W HCPCS

## 2020-05-05 PROCEDURE — 94760 N-INVAS EAR/PLS OXIMETRY 1: CPT

## 2020-05-05 PROCEDURE — B2151ZZ FLUOROSCOPY OF LEFT HEART USING LOW OSMOLAR CONTRAST: ICD-10-PCS | Performed by: INTERNAL MEDICINE

## 2020-05-05 PROCEDURE — 027036Z DILATION OF CORONARY ARTERY, ONE ARTERY WITH THREE DRUG-ELUTING INTRALUMINAL DEVICES, PERCUTANEOUS APPROACH: ICD-10-PCS | Performed by: INTERNAL MEDICINE

## 2020-05-05 PROCEDURE — 80053 COMPREHEN METABOLIC PANEL: CPT

## 2020-05-05 PROCEDURE — 92978 ENDOLUMINL IVUS OCT C 1ST: CPT

## 2020-05-05 PROCEDURE — 93005 ELECTROCARDIOGRAM TRACING: CPT | Performed by: PHYSICIAN ASSISTANT

## 2020-05-05 PROCEDURE — 6360000002 HC RX W HCPCS: Performed by: INTERNAL MEDICINE

## 2020-05-05 PROCEDURE — 2709999900 HC NON-CHARGEABLE SUPPLY

## 2020-05-05 PROCEDURE — 92928 PRQ TCAT PLMT NTRAC ST 1 LES: CPT | Performed by: INTERNAL MEDICINE

## 2020-05-05 PROCEDURE — 92928 PRQ TCAT PLMT NTRAC ST 1 LES: CPT

## 2020-05-05 PROCEDURE — 85025 COMPLETE CBC W/AUTO DIFF WBC: CPT

## 2020-05-05 RX ORDER — NITROGLYCERIN 0.4 MG/1
0.4 TABLET SUBLINGUAL EVERY 5 MIN PRN
Status: DISCONTINUED | OUTPATIENT
Start: 2020-05-05 | End: 2020-05-06 | Stop reason: HOSPADM

## 2020-05-05 RX ORDER — ATORVASTATIN CALCIUM 80 MG/1
80 TABLET, FILM COATED ORAL NIGHTLY
Status: DISCONTINUED | OUTPATIENT
Start: 2020-05-05 | End: 2020-05-06 | Stop reason: HOSPADM

## 2020-05-05 RX ORDER — CARVEDILOL 6.25 MG/1
6.25 TABLET ORAL 2 TIMES DAILY WITH MEALS
Status: DISCONTINUED | OUTPATIENT
Start: 2020-05-05 | End: 2020-05-06 | Stop reason: HOSPADM

## 2020-05-05 RX ORDER — ASPIRIN 81 MG/1
324 TABLET, CHEWABLE ORAL ONCE
Status: COMPLETED | OUTPATIENT
Start: 2020-05-05 | End: 2020-05-05

## 2020-05-05 RX ORDER — POLYETHYLENE GLYCOL 3350 17 G/17G
17 POWDER, FOR SOLUTION ORAL DAILY PRN
Status: DISCONTINUED | OUTPATIENT
Start: 2020-05-05 | End: 2020-05-06 | Stop reason: HOSPADM

## 2020-05-05 RX ORDER — ACETAMINOPHEN 325 MG/1
650 TABLET ORAL EVERY 6 HOURS PRN
Status: DISCONTINUED | OUTPATIENT
Start: 2020-05-05 | End: 2020-05-05 | Stop reason: SDUPTHER

## 2020-05-05 RX ORDER — SODIUM CHLORIDE 0.9 % (FLUSH) 0.9 %
10 SYRINGE (ML) INJECTION PRN
Status: DISCONTINUED | OUTPATIENT
Start: 2020-05-05 | End: 2020-05-06 | Stop reason: HOSPADM

## 2020-05-05 RX ORDER — SODIUM CHLORIDE 9 MG/ML
INJECTION, SOLUTION INTRAVENOUS CONTINUOUS
Status: DISCONTINUED | OUTPATIENT
Start: 2020-05-05 | End: 2020-05-06 | Stop reason: HOSPADM

## 2020-05-05 RX ORDER — SODIUM CHLORIDE 0.9 % (FLUSH) 0.9 %
10 SYRINGE (ML) INJECTION EVERY 12 HOURS SCHEDULED
Status: DISCONTINUED | OUTPATIENT
Start: 2020-05-05 | End: 2020-05-05 | Stop reason: SDUPTHER

## 2020-05-05 RX ORDER — LISINOPRIL 20 MG/1
20 TABLET ORAL DAILY
Status: DISCONTINUED | OUTPATIENT
Start: 2020-05-05 | End: 2020-05-06 | Stop reason: HOSPADM

## 2020-05-05 RX ORDER — ACETAMINOPHEN 325 MG/1
650 TABLET ORAL EVERY 4 HOURS PRN
Status: DISCONTINUED | OUTPATIENT
Start: 2020-05-05 | End: 2020-05-06 | Stop reason: HOSPADM

## 2020-05-05 RX ORDER — SODIUM CHLORIDE 0.9 % (FLUSH) 0.9 %
10 SYRINGE (ML) INJECTION EVERY 12 HOURS SCHEDULED
Status: DISCONTINUED | OUTPATIENT
Start: 2020-05-05 | End: 2020-05-06 | Stop reason: HOSPADM

## 2020-05-05 RX ORDER — ASPIRIN 81 MG/1
81 TABLET, CHEWABLE ORAL DAILY
Status: DISCONTINUED | OUTPATIENT
Start: 2020-05-05 | End: 2020-05-06 | Stop reason: HOSPADM

## 2020-05-05 RX ORDER — PROMETHAZINE HYDROCHLORIDE 25 MG/1
12.5 TABLET ORAL EVERY 6 HOURS PRN
Status: DISCONTINUED | OUTPATIENT
Start: 2020-05-05 | End: 2020-05-06 | Stop reason: HOSPADM

## 2020-05-05 RX ORDER — SODIUM CHLORIDE 0.9 % (FLUSH) 0.9 %
10 SYRINGE (ML) INJECTION PRN
Status: DISCONTINUED | OUTPATIENT
Start: 2020-05-05 | End: 2020-05-05 | Stop reason: SDUPTHER

## 2020-05-05 RX ORDER — ONDANSETRON 2 MG/ML
4 INJECTION INTRAMUSCULAR; INTRAVENOUS EVERY 6 HOURS PRN
Status: DISCONTINUED | OUTPATIENT
Start: 2020-05-05 | End: 2020-05-06 | Stop reason: HOSPADM

## 2020-05-05 RX ORDER — NICOTINE 21 MG/24HR
1 PATCH, TRANSDERMAL 24 HOURS TRANSDERMAL DAILY
Status: DISCONTINUED | OUTPATIENT
Start: 2020-05-05 | End: 2020-05-06 | Stop reason: HOSPADM

## 2020-05-05 RX ORDER — ACETAMINOPHEN 650 MG/1
650 SUPPOSITORY RECTAL EVERY 6 HOURS PRN
Status: DISCONTINUED | OUTPATIENT
Start: 2020-05-05 | End: 2020-05-06 | Stop reason: HOSPADM

## 2020-05-05 RX ADMIN — IOPAMIDOL 125 ML: 755 INJECTION, SOLUTION INTRAVENOUS at 13:53

## 2020-05-05 RX ADMIN — CARVEDILOL 6.25 MG: 6.25 TABLET, FILM COATED ORAL at 17:05

## 2020-05-05 RX ADMIN — Medication 10 ML: at 09:47

## 2020-05-05 RX ADMIN — TICAGRELOR 90 MG: 90 TABLET ORAL at 08:17

## 2020-05-05 RX ADMIN — Medication 10 ML: at 20:56

## 2020-05-05 RX ADMIN — SODIUM CHLORIDE: 9 INJECTION, SOLUTION INTRAVENOUS at 09:46

## 2020-05-05 RX ADMIN — ATORVASTATIN CALCIUM 80 MG: 80 TABLET, FILM COATED ORAL at 20:56

## 2020-05-05 RX ADMIN — TICAGRELOR 90 MG: 90 TABLET ORAL at 20:56

## 2020-05-05 RX ADMIN — SODIUM CHLORIDE: 9 INJECTION, SOLUTION INTRAVENOUS at 20:55

## 2020-05-05 RX ADMIN — LISINOPRIL 20 MG: 20 TABLET ORAL at 08:17

## 2020-05-05 RX ADMIN — ACETAMINOPHEN 650 MG: 325 TABLET, FILM COATED ORAL at 08:16

## 2020-05-05 RX ADMIN — ASPIRIN 81 MG 81 MG: 81 TABLET ORAL at 08:17

## 2020-05-05 RX ADMIN — CARVEDILOL 6.25 MG: 6.25 TABLET, FILM COATED ORAL at 08:17

## 2020-05-05 RX ADMIN — ENOXAPARIN SODIUM 40 MG: 40 INJECTION SUBCUTANEOUS at 08:16

## 2020-05-05 RX ADMIN — ASPIRIN 81 MG 324 MG: 81 TABLET ORAL at 01:32

## 2020-05-05 RX ADMIN — Medication 10 ML: at 08:17

## 2020-05-05 ASSESSMENT — PAIN SCALES - GENERAL
PAINLEVEL_OUTOF10: 0
PAINLEVEL_OUTOF10: 3
PAINLEVEL_OUTOF10: 0

## 2020-05-05 ASSESSMENT — PAIN - FUNCTIONAL ASSESSMENT: PAIN_FUNCTIONAL_ASSESSMENT: ACTIVITIES ARE NOT PREVENTED

## 2020-05-05 ASSESSMENT — ENCOUNTER SYMPTOMS
SHORTNESS OF BREATH: 0
ABDOMINAL PAIN: 0
COUGH: 0
BACK PAIN: 0
VOMITING: 0
COLOR CHANGE: 0

## 2020-05-05 ASSESSMENT — PAIN DESCRIPTION - ORIENTATION: ORIENTATION: LOWER

## 2020-05-05 ASSESSMENT — PAIN DESCRIPTION - PROGRESSION: CLINICAL_PROGRESSION: GRADUALLY WORSENING

## 2020-05-05 ASSESSMENT — PAIN DESCRIPTION - DESCRIPTORS: DESCRIPTORS: ACHING

## 2020-05-05 ASSESSMENT — PAIN DESCRIPTION - ONSET: ONSET: ON-GOING

## 2020-05-05 ASSESSMENT — PAIN DESCRIPTION - FREQUENCY: FREQUENCY: CONTINUOUS

## 2020-05-05 ASSESSMENT — PAIN DESCRIPTION - PAIN TYPE: TYPE: ACUTE PAIN

## 2020-05-05 ASSESSMENT — PAIN DESCRIPTION - LOCATION: LOCATION: BACK

## 2020-05-05 NOTE — CONSULTS
(PRINIVIL;ZESTRIL) 20 MG tablet Take 1 tablet by mouth daily 4/7/20  Yes Ganesh Dapper, DO   nicotine (NICODERM CQ) 21 MG/24HR Place 1 patch onto the skin daily 4/7/20 5/19/20 Yes Ganesh Dapper, DO   nicotine polacrilex (NICORETTE) 2 MG gum Take 1 each by mouth as needed for Smoking cessation 4/26/20   Jesi Méndez MD   Blood Pressure Monitoring (BLOOD PRESSURE CUFF) MISC 1 Device by Does not apply route 2 times daily 4/7/20   Ganesh Dapper, DO          Allergies:  Patient has no known allergies. Review of Systems:   · Constitutional: no unanticipated weight loss. There's been no change in energy level, sleep pattern, or activity level. No fevers, chills. · Eyes: No visual changes or diplopia. No scleral icterus. · ENT: No Headaches, hearing loss or vertigo. No mouth sores or sore throat. · Cardiovascular: as reviewed in HPI  · Respiratory: No cough or wheezing, no sputum production. No hemoptysis. · Gastrointestinal: No abdominal pain, appetite loss, blood in stools. No change in bowel or bladder habits. · Genitourinary: No dysuria, trouble voiding, or hematuria. · Musculoskeletal:  No gait disturbance, no joint complaints. · Integumentary: No rash or pruritis. · Neurological: No headache, diplopia, change in muscle strength, numbness or tingling. · Psychiatric: No anxiety or depression. · Endocrine: No temperature intolerance. No excessive thirst, fluid intake, or urination. No tremor. · Hematologic/Lymphatic: No abnormal bruising or bleeding, blood clots or swollen lymph nodes. · Allergic/Immunologic: No nasal congestion or hives. Objective:   PHYSICAL EXAM:    Vitals:    05/05/20 1400   BP: 104/67   Pulse: 71   Resp: 18   Temp: 97.4 °F (36.3 °C)   SpO2: 97%    Weight: 227 lb 1.2 oz (103 kg)       General Appearance:  Alert, cooperative, no distress, appears stated age. Head:  Normocephalic, without obvious abnormality, atraumatic. Eyes:  Pupils equal and round.  No scleral icterus. Mouth: Moist mucosa, no pharyngeal erythema. Nose: Nares normal. No drainage or sinus tenderness. Neck: Supple, symmetrical, trachea midline. No adenopathy. No tenderness/mass/nodules. No carotid bruit or elevated JVD. Lungs:   Clear to auscultation bilaterally, respirations unlabored. No wheeze, rales, or rhonchi. Chest Wall:  No tenderness or deformity. Heart:  Regular rate. S1/S2 normal. No murmur, rub, or gallop. Abdomen:   Soft, non-tender, bowel sounds active. Musculoskeletal: No muscle wasting or digital clubbing. Extremities: Extremities normal, atraumatic. No cyanosis or edema. Pulses: 2+ radial and carotid pulses, symmetric. Skin: No rashes or lesions. Pysch: Normal mood and affect. Alert and oriented x 4.    Neurologic: Normal gross motor and sensory exam.       Labs     CBC:   Lab Results   Component Value Date    WBC 15.8 05/05/2020    RBC 5.57 05/05/2020    HGB 16.4 05/05/2020    HCT 48.9 05/05/2020    MCV 87.7 05/05/2020    RDW 13.7 05/05/2020     05/05/2020     CMP:  Lab Results   Component Value Date     05/05/2020    K 4.4 05/05/2020    K 4.2 04/26/2020    CL 99 05/05/2020    CO2 22 05/05/2020    BUN 9 05/05/2020    CREATININE 0.7 05/05/2020    GFRAA >60 05/05/2020    AGRATIO 1.2 05/05/2020    LABGLOM >60 05/05/2020    GLUCOSE 122 05/05/2020    PROT 7.7 05/05/2020    CALCIUM 9.7 05/05/2020    BILITOT 0.3 05/05/2020    ALKPHOS 103 05/05/2020    AST 14 05/05/2020    ALT 8 05/05/2020     PT/INR:  No results found for: PTINR  HgBA1c:No results found for: LABA1C  Lab Results   Component Value Date    TROPONINI <0.01 05/05/2020     @lipid@      CURRENT Medications:  Current Facility-Administered Medications: aspirin chewable tablet 81 mg, 81 mg, Oral, Daily  atorvastatin (LIPITOR) tablet 80 mg, 80 mg, Oral, Nightly  carvedilol (COREG) tablet 6.25 mg, 6.25 mg, Oral, BID WC  lisinopril (PRINIVIL;ZESTRIL) tablet 20 mg, 20 mg, Oral, Daily  ticagrelor (BRILINTA) tablet 90 mg, 90 mg, Oral, BID  sodium chloride flush 0.9 % injection 10 mL, 10 mL, Intravenous, 2 times per day  sodium chloride flush 0.9 % injection 10 mL, 10 mL, Intravenous, PRN  acetaminophen (TYLENOL) tablet 650 mg, 650 mg, Oral, Q6H PRN **OR** acetaminophen (TYLENOL) suppository 650 mg, 650 mg, Rectal, Q6H PRN  polyethylene glycol (GLYCOLAX) packet 17 g, 17 g, Oral, Daily PRN  promethazine (PHENERGAN) tablet 12.5 mg, 12.5 mg, Oral, Q6H PRN **OR** ondansetron (ZOFRAN) injection 4 mg, 4 mg, Intravenous, Q6H PRN  enoxaparin (LOVENOX) injection 40 mg, 40 mg, Subcutaneous, Daily  nitroGLYCERIN (NITROSTAT) SL tablet 0.4 mg, 0.4 mg, Sublingual, Q5 Min PRN  nicotine (NICODERM CQ) 21 MG/24HR 1 patch, 1 patch, Transdermal, Daily  0.9 % sodium chloride infusion, , Intravenous, Continuous  sodium chloride flush 0.9 % injection 10 mL, 10 mL, Intravenous, 2 times per day  sodium chloride flush 0.9 % injection 10 mL, 10 mL, Intravenous, PRN     Cardiac testing     EKG:     Echo:     Stress Test:     Cath: All above diagnostic testing was independently visualized and reviewed by me (not simply review of report)     Patient Active Problem List   Diagnosis    Essential hypertension    Other hyperlipidemia    Tobacco abuse counseling    Acute coronary syndrome ([facility]    SIRS (systemic inflammatory response syndrome) ([address]    NSTEMI (non-ST elevated myocardial infarction) ([address]    Chest pain    Unstable angina ([address]         Assessment and Plan   1)           Thank you for allowing us to participate in the care of Ashleigh Alonzo.     Mercedes Hearn MD [address] [facility]   [employer]   (C): [phone]  Molly Jesus): [phone]     [facility]

## 2020-05-05 NOTE — ED PROVIDER NOTES
SOCIAL HISTORY      reports that he has been smoking cigarettes. He started smoking about 30 years ago. He has been smoking about 2.00 packs per day. He has never used smokeless tobacco. He reports that he does not drink alcohol or use drugs. PHYSICAL EXAM    (up to 7 for level 4, 8 or more for level 5)     ED Triage Vitals   BP Temp Temp Source Pulse Resp SpO2 Height Weight   05/05/20 0121 05/05/20 0121 05/05/20 0121 05/05/20 0121 05/05/20 0121 05/05/20 0121 05/05/20 0123 05/05/20 0123   (!) 148/96 98 °F (36.7 °C) Oral 92 14 97 % 5' 9\" (1.753 m) 231 lb 7.7 oz (105 kg)       Physical Exam  Vitals signs and nursing note reviewed. Constitutional:       Appearance: Normal appearance. HENT:      Head: Normocephalic and atraumatic. Eyes:      Pupils: Pupils are equal, round, and reactive to light. Neck:      Musculoskeletal: Normal range of motion. Cardiovascular:      Rate and Rhythm: Normal rate. Pulmonary:      Effort: Pulmonary effort is normal. No respiratory distress. Abdominal:      Tenderness: There is no abdominal tenderness. Musculoskeletal: Normal range of motion. Skin:     General: Skin is warm. Neurological:      General: No focal deficit present. Mental Status: He is alert and oriented to person, place, and time. Psychiatric:         Mood and Affect: Mood normal.         Behavior: Behavior normal.         DIAGNOSTIC RESULTS     EKG: All EKG's are interpreted by MERNA Beth in the absence of a cardiologist.    EKG interpreted by myself - please refer to attending physician's note for complete EKG interpretation:    Rhythm: sinus rhythm     RADIOLOGY:   Non-plain film images such as CT, Ultrasound and MRI are read by the radiologist. Plain radiographic images are visualized and preliminarily interpreted by MERNA Beth with the below findings:    Reviewed radiologist's interpretation.      Interpretation per the Radiologist below, if available at the time of this note:    XR CHEST STANDARD (2 VW)   Final Result   No radiographic evidence of acute cardiopulmonary process. LABS:  Labs Reviewed   CBC WITH AUTO DIFFERENTIAL - Abnormal; Notable for the following components:       Result Value    WBC 15.8 (*)     Neutrophils Absolute 10.1 (*)     Monocytes Absolute 1.4 (*)     All other components within normal limits    Narrative:     Performed at:  [place] [address], [place] [facility]   Phone [phone]   COMPREHENSIVE METABOLIC PANEL - Abnormal; Notable for the following components:    Sodium 135 (*)     Glucose 122 (*)     CREATININE 0.7 (*)     ALT 8 (*)     AST 14 (*)     All other components within normal limits    Narrative:     Performed at:  [address] [facility] [address]   Phone [phone]   TROPONIN    Narrative:     Performed at:  [address] [facility] [address]   Phone [phone]       All other labs were within normal range or not returned as of this dictation. EMERGENCY DEPARTMENT COURSE and DIFFERENTIAL DIAGNOSIS/MDM:   Vitals:    Vitals:    05/05/20 0121 05/05/20 0123   BP: (!) 148/96    Pulse: 92    Resp: 14    Temp: 98 °F (36.7 °C)    TempSrc: Oral    SpO2: 97%    Weight:  231 lb 7.7 oz (105 kg)   Height:  5' 9\" (1.753 m)     Patient returns with chest pain. Had stenting x3 approximately 10 days ago. Per the chart and the patient he is to have staged PCI of the RCA within the next week or 2. EKG does appear changed but does not meet STEMI criteria. He was given aspirin. Will be admitted to the hospitalist.     CONSULTS:  IP CONSULT TO HOSPITALIST    PROCEDURES:  Procedures      FINAL IMPRESSION      1. Chest pain, unspecified type          DISPOSITION/PLAN   DISPOSITION        PATIENT REFERRED TO:  No follow-up provider specified.     DISCHARGE MEDICATIONS:  New Prescriptions    No medications on file       (Please note that portions of this note were completed with a voice recognition program.  Efforts were made to edit the dictations but occasionally words are mis-transcribed.)    Barbie Baker, [address]  05/05/20 5358

## 2020-05-05 NOTE — ED TRIAGE NOTES
Pt states that yesterday afternoon he started to have pain in the right chest and into his shoulder. He states he was walking his dog when this happened. He states that this is similar to the pain that he had when he had his heart attack last month.  Right now he rates the pain at a 0/10

## 2020-05-05 NOTE — PRE SEDATION
MD   ticagrelor (BRILINTA) 90 MG TABS tablet Take 1 tablet by mouth 2 times daily 4/26/20  Yes Annie Bush MD   famotidine (PEPCID) 20 MG tablet Take 1 tablet by mouth 2 times daily 4/26/20  Yes Annie Bush MD   lisinopril (PRINIVIL;ZESTRIL) 20 MG tablet Take 1 tablet by mouth daily 4/7/20  Yes Yuridia Thomas DO   nicotine (NICODERM CQ) 21 MG/24HR Place 1 patch onto the skin daily 4/7/20 5/19/20 Yes Yuridia Thomas DO   nicotine polacrilex (NICORETTE) 2 MG gum Take 1 each by mouth as needed for Smoking cessation 4/26/20   Annie Bush MD   Blood Pressure Monitoring (BLOOD PRESSURE CUFF) MISC 1 Device by Does not apply route 2 times daily 4/7/20   Yuridia Thomas DO     Coumadin Use Last 7 Days:  no  Antiplatelet drug therapy use last 7 days: yes - brilinta  Other anticoagulant use last 7 days: no  Additional Medication Information:  n/a      Pre-Sedation Documentation and Exam:   I have personally completed a history, physical exam & review of systems for this patient (see notes).     Mallampati Airway Assessment:  Mallampati Class I - (soft palate, fauces, uvula & anterior/posterior tonsillar pillars are visible)    Prior History of Anesthesia Complications:   none    ASA Classification:  Class 3 - A patient with severe systemic disease that limits activity but is not incapacitating    Sedation/ Anesthesia Plan:   intravenous sedation    Medications Planned:   midazolam (Versed) intravenously    Patient is an appropriate candidate for plan of sedation: yes    Electronically signed by Barbara Petty MD on 5/5/2020 at 1:49 PM

## 2020-05-05 NOTE — ED NOTES
Report called to Abbe Zepeda RN on 5W. All questions answered.      Bianca Kirk RN  05/05/20 [address], RN  05/05/20 [id number]

## 2020-05-05 NOTE — H&P
1 Device by Does not apply route 2 times daily 4/7/20   Francia Thayer, DO   lisinopril (PRINIVIL;ZESTRIL) 20 MG tablet Take 1 tablet by mouth daily 4/7/20   Francia Thayer, DO       Allergies:  Patient has no known allergies. Social History:  The patient currently lives home    TOBACCO:   reports that he has been smoking cigarettes. He started smoking about 30 years ago. He has been smoking about 2.00 packs per day. He has never used smokeless tobacco.  ETOH:   reports no history of alcohol use. Family History:  Reviewed in detail and negative for DM, Early CAD, Cancer, CVA. Positive as follows:        Problem Relation Age of Onset    High Blood Pressure Maternal Aunt     Heart Disease Maternal Uncle     Heart Disease Maternal Cousin        REVIEW OF SYSTEMS:   Positive for chest pain  and as noted in the HPI. All other systems reviewed and negative. PHYSICAL EXAM:    /62   Pulse 72   Temp 97.5 °F (36.4 °C) (Oral)   Resp 18   Ht 5' 9\" (1.753 m)   Wt 227 lb 1.2 oz (103 kg)   SpO2 97%   BMI 33.53 kg/m²     General appearance: No apparent distress appears stated age and cooperative. HEENT Normal cephalic, atraumatic without obvious deformity. Pupils equal, round, and reactive to light. Extra ocular muscles intact. Conjunctivae/corneas clear. Neck: Supple, No jugular venous distention/bruits. Trachea midline without thyromegaly or adenopathy with full range of motion. Lungs: Clear to auscultation, bilaterally without Rales/Wheezes/Rhonchi with good respiratory effort. Heart: Regular rate and rhythm with Normal S1/S2 without murmurs, rubs or gallops, point of maximum impulse non-displaced  Abdomen: Soft, non-tender or non-distended without rigidity or guarding and positive bowel sounds all four quadrants. Extremities: No clubbing, cyanosis, or edema bilaterally. Full range of motion without deformity and normal gait intact.   Skin: Skin color, texture, turgor normal.  No rashes or

## 2020-05-05 NOTE — PLAN OF CARE
output will improve  Description: Ability to achieve a balanced intake and output will improve  Outcome: Ongoing     Problem: Tobacco Use:  Goal: Will participate in inpatient tobacco-use cessation counseling  Description: Will participate in inpatient tobacco-use cessation counseling  Outcome: Ongoing

## 2020-05-05 NOTE — PROGRESS NOTES
to light. Conjunctivae/corneas clear. Neck: Supple, with full range of motion. No jugular venous distention. Trachea midline. Respiratory:  Normal respiratory effort. Clear to auscultation, bilaterally without Rales/Wheezes/Rhonchi. Cardiovascular: Regular rate and rhythm with normal S1/S2 without murmurs, rubs or gallops. Abdomen: Soft, non-tender, non-distended with normal bowel sounds. Musculoskeletal: No clubbing, cyanosis or edema bilaterally. Full range of motion without deformity. + tenderness to right rhomboid region. Reproducible with movement  Skin: Skin color, texture, turgor normal.  No rashes or lesions. Neurologic:  Neurovascularly intact without any focal sensory/motor deficits. Cranial nerves: II-XII intact, grossly non-focal.  Psychiatric: Alert and oriented, thought content appropriate, normal insight  Capillary Refill: Brisk,< 3 seconds   Peripheral Pulses: +2 palpable, equal bilaterally       Labs:   Recent Labs     05/05/20  0129   WBC 15.8*   HGB 16.4   HCT 48.9        Recent Labs     05/05/20  0129   *   K 4.4   CL 99   CO2 22   BUN 9   CREATININE 0.7*   CALCIUM 9.7     Recent Labs     05/05/20  0129   AST 14*   ALT 8*   BILITOT 0.3   ALKPHOS 103     No results for input(s): INR in the last 72 hours. Recent Labs     05/05/20  0129 05/05/20  0428 05/05/20  0703   TROPONINI <0.01 <0.01 <0.01       Urinalysis:      Lab Results   Component Value Date    NITRU Negative 12/19/2016    BLOODU Negative 12/19/2016    SPECGRAV 1.006 12/19/2016    GLUCOSEU Negative 12/19/2016       Radiology:  XR CHEST STANDARD (2 VW)   Final Result   No radiographic evidence of acute cardiopulmonary process.                  Assessment/Plan:    Active Hospital Problems    Diagnosis Date Noted    Chest pain [R07.9] 05/05/2020     Chest pain-Acute  Suspect etiology muscular but given heart hx will have cardiology see him  -cont home meds,  -tele  -trop -  -NPO  -PCI today per cardiology      DVT Prophylaxis: lovenox  Diet: DIET CARDIAC; No Caffeine  Code Status: Full Code    PT/OT Eval Status: na    Dispo - today or tomorrow    [employer], APRN - CNP

## 2020-05-06 VITALS
SYSTOLIC BLOOD PRESSURE: 110 MMHG | HEIGHT: 69 IN | HEART RATE: 82 BPM | BODY MASS INDEX: 33.63 KG/M2 | DIASTOLIC BLOOD PRESSURE: 74 MMHG | TEMPERATURE: 97.4 F | WEIGHT: 227.07 LBS | OXYGEN SATURATION: 96 % | RESPIRATION RATE: 18 BRPM

## 2020-05-06 PROBLEM — I20.0 UNSTABLE ANGINA (HCC): Status: RESOLVED | Noted: 2020-05-05 | Resolved: 2020-05-06

## 2020-05-06 PROBLEM — R07.9 CHEST PAIN: Status: RESOLVED | Noted: 2020-05-05 | Resolved: 2020-05-06

## 2020-05-06 LAB
HCT VFR BLD CALC: 47.2 % (ref 40.5–52.5)
HEMOGLOBIN: 15.6 G/DL (ref 13.5–17.5)
MCH RBC QN AUTO: 29.3 PG (ref 26–34)
MCHC RBC AUTO-ENTMCNC: 33.1 G/DL (ref 31–36)
MCV RBC AUTO: 88.6 FL (ref 80–100)
PDW BLD-RTO: 13.7 % (ref 12.4–15.4)
PLATELET # BLD: 294 K/UL (ref 135–450)
PMV BLD AUTO: 8.9 FL (ref 5–10.5)
POC ACT LR: >400 SEC
RBC # BLD: 5.32 M/UL (ref 4.2–5.9)
WBC # BLD: 12.3 K/UL (ref 4–11)

## 2020-05-06 PROCEDURE — 85027 COMPLETE CBC AUTOMATED: CPT

## 2020-05-06 PROCEDURE — 6360000002 HC RX W HCPCS: Performed by: INTERNAL MEDICINE

## 2020-05-06 PROCEDURE — 99232 SBSQ HOSP IP/OBS MODERATE 35: CPT | Performed by: NURSE PRACTITIONER

## 2020-05-06 PROCEDURE — 36415 COLL VENOUS BLD VENIPUNCTURE: CPT

## 2020-05-06 PROCEDURE — 94760 N-INVAS EAR/PLS OXIMETRY 1: CPT

## 2020-05-06 PROCEDURE — 2580000003 HC RX 258: Performed by: INTERNAL MEDICINE

## 2020-05-06 PROCEDURE — 6370000000 HC RX 637 (ALT 250 FOR IP): Performed by: INTERNAL MEDICINE

## 2020-05-06 RX ORDER — NITROGLYCERIN 0.4 MG/1
TABLET SUBLINGUAL
Qty: 25 TABLET | Refills: 3 | Status: SHIPPED | OUTPATIENT
Start: 2020-05-06 | End: 2021-01-22 | Stop reason: SDUPTHER

## 2020-05-06 RX ADMIN — TICAGRELOR 90 MG: 90 TABLET ORAL at 08:38

## 2020-05-06 RX ADMIN — LISINOPRIL 20 MG: 20 TABLET ORAL at 08:38

## 2020-05-06 RX ADMIN — ENOXAPARIN SODIUM 40 MG: 40 INJECTION SUBCUTANEOUS at 08:39

## 2020-05-06 RX ADMIN — ASPIRIN 81 MG 81 MG: 81 TABLET ORAL at 08:38

## 2020-05-06 RX ADMIN — Medication 10 ML: at 08:41

## 2020-05-06 RX ADMIN — CARVEDILOL 6.25 MG: 6.25 TABLET, FILM COATED ORAL at 08:38

## 2020-05-06 ASSESSMENT — PAIN SCALES - GENERAL: PAINLEVEL_OUTOF10: 0

## 2020-05-06 NOTE — PROGRESS NOTES
IV and tele d/cd. Discharge instructions reviewed with pt, verbalized understanding. Belongings gathered per pt.  Pt taken down via wheelchair and family to drive pt home  Electronically signed by Nell Her RN on 5/6/2020 at 11:28 AM

## 2020-05-06 NOTE — DISCHARGE SUMMARY
Hospital Medicine Discharge Summary    Patient ID: Berna Loera      Patient's PCP: Shawn Chavarria DO    Admit Date: 5/5/2020     Discharge Date:   5/6/2020    Admitting Physician: Elizabeth Shore MD     Discharge Physician: LACHELLE Vallecillo - CNP     Discharge Diagnoses: Active Hospital Problems    Diagnosis Date Noted    Chest pain [R07.9] 05/05/2020    Unstable angina ([address] [I20.0] 05/05/2020       The patient was seen and examined on day of discharge and this discharge summary is in conjunction with any daily progress note from day of discharge. Hospital Course:     41 y. o. male who [facility] to [facility] with acute onset and progressive course of chest pain started yesterday pain is located to right chest and radiating to shoulder pain is dull aching no aggravting or relieving factors no fever chills sob cough , Pt was just d/c 4/26/20 secondary to NSTEMI with heart cath and 2 stents. Still smoking, was 2PPD now 1.4 PPd.   - from 4/26/20 note Had emergent cardiac cath which revealed LAD with diffuse disease, Lcirc with 99% occlusion and RCA with 70% prox and distal stenosis.  Pt did get 2 stents in L circ and 1 stent to D2 with improvement in sx.  He does have a reduced EF of 40-45% with some akinesis of lateral myocardium and was started on coreg and will continue with lisinopril. 5/5/20 was still having right sided CP- had PCI- RCA stented- now with 3 stents by Dr Mackenzie Sampson    1. Coronary artery disease with recurrent angina  -known multivessel disease  -S/P MI x 3 to RCA on 5/5/2020  -S/P MI to D1 and MI x 2 to LCX in 4/2020 (NSTEMI)  -angina quiet this AM  -continue medical management with DAPT, statin and BB  -risk factor modification     2. Tobacco use  -smoking cessation emphasized  - cant afford patches and is allergic to Chantix  -encouraged setting goals to decrease. He was at 2 PPD now 1 1/2 PPD     3.  Hyperlipidemia with goal LDL < 70  -on high intensity statin  -

## 2020-05-14 ENCOUNTER — TELEMEDICINE (OUTPATIENT)
Dept: FAMILY MEDICINE CLINIC | Age: 40
End: 2020-05-14
Payer: MEDICARE

## 2020-05-14 PROCEDURE — 1111F DSCHRG MED/CURRENT MED MERGE: CPT | Performed by: FAMILY MEDICINE

## 2020-05-14 PROCEDURE — 99214 OFFICE O/P EST MOD 30 MIN: CPT | Performed by: FAMILY MEDICINE

## 2020-05-14 PROCEDURE — G8427 DOCREV CUR MEDS BY ELIG CLIN: HCPCS | Performed by: FAMILY MEDICINE

## 2020-05-14 NOTE — PROGRESS NOTES
[] Abnormal-            Psychiatric:       [x] Normal Affect [x] No Hallucinations        [] Abnormal-     Other pertinent observable physical exam findings-     ASSESSMENT/PLAN:  1. Essential hypertension  Well controlled? Discussed signs and symptoms for immediate evaluation in the ER. Reduce sodium. Monitor diet, exercise and lose weight. Keep a BP log and bring it to your next appointment. Needs to rtc in one month for BP check    2. Tobacco abuse counseling  Patient educated on the need to stop smoking. Had [id number] recommended. Educated patient on consequences of smoking. Patient continuing the patches. Spent yariel 5 minutes discussing this. 3. NSTEMI (non-ST elevated myocardial infarction) ([facility] [address]  Stable today  Continue with medication  Keep appointments with specialist.   Oval Slay questions      Return in about 3 months (around 8/14/2020). Ursula Kimbrough is a 41 y.o. male being evaluated by a Virtual Visit (video visit) encounter to address concerns as mentioned above. A caregiver was present when appropriate. Due to this being a TeleHealth encounter (During [place] [address] public health emergency), evaluation of the following organ systems was limited: Vitals/Constitutional/EENT/Resp/CV/GI//MS/Neuro/Skin/Heme-Lymph-Imm. Pursuant to the emergency declaration under the [address] [place] [address], [address] [place] [address] authority and the [employer] Act, this Virtual Visit was conducted with patient's (and/or legal guardian's) consent, to reduce the patient's risk of exposure to COVID-19 and provide necessary medical care. The patient (and/or legal guardian) has also been advised to contact this office for worsening conditions or problems, and seek emergency medical treatment and/or call 911 if deemed necessary.      Patient identification was verified at the start of the visit: Yes    Total time spent on this encounter: Not

## 2020-05-17 VITALS — HEIGHT: 69 IN | RESPIRATION RATE: 18 BRPM | BODY MASS INDEX: 32.44 KG/M2 | WEIGHT: 219 LBS

## 2020-05-22 ENCOUNTER — OFFICE VISIT (OUTPATIENT)
Dept: CARDIOLOGY CLINIC | Age: 40
End: 2020-05-22
Payer: MEDICARE

## 2020-05-22 VITALS
HEIGHT: 69 IN | TEMPERATURE: 97.7 F | BODY MASS INDEX: 34.07 KG/M2 | OXYGEN SATURATION: 98 % | HEART RATE: 78 BPM | SYSTOLIC BLOOD PRESSURE: 126 MMHG | WEIGHT: 230 LBS | DIASTOLIC BLOOD PRESSURE: 82 MMHG

## 2020-05-22 PROCEDURE — 99214 OFFICE O/P EST MOD 30 MIN: CPT | Performed by: INTERNAL MEDICINE

## 2020-05-22 RX ORDER — PRASUGREL 10 MG/1
10 TABLET, FILM COATED ORAL DAILY
Qty: 96 TABLET | Refills: 3 | Status: SHIPPED | OUTPATIENT
Start: 2020-05-22 | End: 2020-09-29 | Stop reason: SDUPTHER

## 2020-05-22 RX ORDER — CARVEDILOL 6.25 MG/1
6.25 TABLET ORAL 2 TIMES DAILY WITH MEALS
Qty: 180 TABLET | Refills: 3 | Status: SHIPPED | OUTPATIENT
Start: 2020-05-22 | End: 2021-01-05 | Stop reason: SDUPTHER

## 2020-05-22 RX ORDER — LISINOPRIL 20 MG/1
20 TABLET ORAL DAILY
Qty: 90 TABLET | Refills: 3 | Status: SHIPPED | OUTPATIENT
Start: 2020-05-22 | End: 2020-07-03 | Stop reason: SDUPTHER

## 2020-05-22 RX ORDER — ATORVASTATIN CALCIUM 80 MG/1
80 TABLET, FILM COATED ORAL NIGHTLY
Qty: 90 TABLET | Refills: 3 | Status: SHIPPED | OUTPATIENT
Start: 2020-05-22 | End: 2020-11-02 | Stop reason: SDUPTHER

## 2020-05-22 NOTE — PROGRESS NOTES
Cardiology Consultation   Referring Physician: Ruddy Tinajero DO  Reason for Consultation: NSTEMI follow up s/p PCI  Chief Complaint:   Chief Complaint   Patient presents with    Follow-Up from Hospital     ED to [address] [place] [address] 5/5/20 ~ CP  /  HTN, CAD, NSTEMI, HLD  /  no CP or swelling    Shortness of Breath     per pt he is still having some SOB due to his medication Brilinta 90mg    Other     pt was told by Dr. Moises Escalona that he could take Lisinopril or not, should he be on it? Subjective:   History of Present Illness:  Sunitha Pearson is a 41 y.o. male who presents with the above complaint. He was admitted to [facility] with complaints of sudden onset severe substernal chest pain. He underwent heart catheterization on 5/5/20 resulting in MI x 3 to RCA. He presents today for follow up. He admits to shortness of breath that began post procedure. He denies any chest pain with rest or exertion. He reports to medication compliance. Prior cardiac testing:    EKG:     Echo: 4/25/2020  Summary  There is akinesis of the mid to apical/ lateral myocardium. Ejection fraction is visually estimated to be 40-45%. Grade I diastolic dysfunction with normal LV filling pressures. No evidence of left ventricular mass or thrombus noted. Stress Test:     Cath: 5/5/2020  ANGIOGRAM/CORONARY ARTERIOGRAM:        The left main coronary artery is patent   The left anterior descending artery is patent, D1 stent is widely patent . The left circumflex artery is patent, mid stent is widely patent. The right coronary artery is diffusely diseased, prox 80%, distal 80%      INTERVENTION  1. JR 4 guide   2. runthrough wire used to cross the lesion   3. Distal RCA predilated w/ 2.5/3.5 regular balloons  4. IVUS catheter passed to distal RCA, distal reference diameter 4.5 mm   5. 4.0 X 18 mm [place] MI distal RCA  6. 4.0 X 26 / 4.5 X 12 prox RCA  7.  Post dilation pattern, or activity level. No fevers, chills. · Eyes: No visual changes or diplopia. No scleral icterus. · ENT: No Headaches, hearing loss or vertigo. No mouth sores or sore throat. · Cardiovascular: as reviewed in HPI  · Respiratory: No cough or wheezing, no sputum production. No hemoptysis. · Gastrointestinal: No abdominal pain, appetite loss, blood in stools. No change in bowel or bladder habits. · Genitourinary: No dysuria, trouble voiding, or hematuria. · Musculoskeletal:  No gait disturbance, no joint complaints. · Integumentary: No rash or pruritis. · Neurological: No headache, diplopia, change in muscle strength, numbness or tingling. · Psychiatric: No anxiety or depression. · Endocrine: No temperature intolerance. No excessive thirst, fluid intake, or urination. No tremor. · Hematologic/Lymphatic: No abnormal bruising or bleeding, blood clots or swollen lymph nodes. · Allergic/Immunologic: No nasal congestion or hives. Objective:   PHYSICAL EXAM:    Vitals:    05/22/20 1248   BP: 126/82   Pulse: 78   Temp: 97.7 °F (36.5 °C)   SpO2: 98%    Weight: 230 lb (104.3 kg)(with shoes)       General Appearance:  Alert, cooperative, no distress, appears stated age. Head:  Normocephalic, without obvious abnormality, atraumatic. Eyes:  Pupils equal and round. No scleral icterus. Mouth: Moist mucosa, no pharyngeal erythema. Nose: Nares normal. No drainage or sinus tenderness. Neck: Supple, symmetrical, trachea midline. No adenopathy. No tenderness/mass/nodules. No carotid bruit or elevated JVD. Lungs:   Clear to auscultation bilaterally, respirations unlabored. No wheeze, rales, or rhonchi. Chest Wall:  No tenderness or deformity. Heart:  Regular rate. S1/S2 normal. No murmur, rub, or gallop. Abdomen:   Soft, non-tender, bowel sounds active. Musculoskeletal: No muscle wasting or digital clubbing. Extremities: Extremities normal, atraumatic. No cyanosis or edema.    Pulses: 2+

## 2020-06-10 ENCOUNTER — TELEPHONE (OUTPATIENT)
Dept: CARDIOLOGY CLINIC | Age: 40
End: 2020-06-10

## 2020-06-10 ENCOUNTER — OFFICE VISIT (OUTPATIENT)
Dept: FAMILY MEDICINE CLINIC | Age: 40
End: 2020-06-10
Payer: MEDICARE

## 2020-06-10 VITALS
WEIGHT: 225.6 LBS | SYSTOLIC BLOOD PRESSURE: 122 MMHG | DIASTOLIC BLOOD PRESSURE: 84 MMHG | BODY MASS INDEX: 33.41 KG/M2 | HEIGHT: 69 IN

## 2020-06-10 PROCEDURE — G8427 DOCREV CUR MEDS BY ELIG CLIN: HCPCS | Performed by: FAMILY MEDICINE

## 2020-06-10 PROCEDURE — 99214 OFFICE O/P EST MOD 30 MIN: CPT | Performed by: FAMILY MEDICINE

## 2020-06-10 PROCEDURE — G8417 CALC BMI ABV UP PARAM F/U: HCPCS | Performed by: FAMILY MEDICINE

## 2020-06-10 PROCEDURE — 4004F PT TOBACCO SCREEN RCVD TLK: CPT | Performed by: FAMILY MEDICINE

## 2020-06-10 ASSESSMENT — ENCOUNTER SYMPTOMS
WHEEZING: 0
BLOOD IN STOOL: 0
TROUBLE SWALLOWING: 0
VOMITING: 0
SORE THROAT: 0
NAUSEA: 0
COUGH: 0
ABDOMINAL PAIN: 0
SINUS PRESSURE: 0
EYE DISCHARGE: 0
CHEST TIGHTNESS: 0
RHINORRHEA: 0
ANAL BLEEDING: 0
DIARRHEA: 0

## 2020-06-10 NOTE — PROGRESS NOTES
6/10/2020     Chelly Grimes (:  1980) is a 41 y.o. male, here for evaluation of the following medical concerns:    HPI     1. CAD- patient suffered a Nstemi, has two stents placed during his stay, patient had EF of 40-45 percent, yariel. 10 days later patient returned to the hospital with unstable angina and has third stent placed in RCA. Today, he feels much improved, still smoking but trying to reduce tobacco use, still take Lisinopril, atorvastatin, Carvedilol and Nitroglycerin as needed. Patient stated that since starting back to work he feels that he develops chest pain with activity that improves with rest. Stated that days off he doesn't have the pain, patient stated that it generally occurs with mopping the floor and exertion,  No pain today.      2.  HTN- patient returns to clinic for follow up on elevated BP, he was taking Lisinopril at 20 mg, BP is much improved but still high, has agreed to increase this medication, he is not wanting to have a water pill added, stated that it made he feel \"worse\", he denied headache, vision change or dizziness.      3.  Hyperlipidemia- discussed his lipid profile, , patient agreed to start low dose statin due to changing several medication and is concerned of side effects, will titrate later. Understood the importance of treated his elevated cholesterol.      Today, he denied chest pain, sob ,n,v, or diarrhea.     Review of Systems   Constitutional: Negative for activity change, fatigue, fever and unexpected weight change. HENT: Negative for congestion, ear pain, rhinorrhea, sinus pressure, sore throat and trouble swallowing. Eyes: Negative for discharge and visual disturbance. Respiratory: Positive for shortness of breath. Negative for cough, chest tightness and wheezing. Cardiovascular: Positive for chest pain. Negative for palpitations and leg swelling.    Gastrointestinal: Negative for abdominal pain, anal bleeding, blood in stool, Pupils are equal, round, and reactive to light. Neck:      Thyroid: No thyromegaly. Cardiovascular:      Rate and Rhythm: Normal rate and regular rhythm. Heart sounds: No murmur. Pulmonary:      Effort: Pulmonary effort is normal.      Breath sounds: Normal breath sounds. No wheezing or rales. Abdominal:      General: Bowel sounds are normal.      Palpations: Abdomen is soft. Tenderness: There is no abdominal tenderness. Skin:     Findings: No rash. Neurological:      Mental Status: He is alert and oriented to person, place, and time. Cranial Nerves: No cranial nerve deficit. Deep Tendon Reflexes: Reflexes normal.   Psychiatric:         Behavior: Behavior normal.         Judgment: Judgment normal.         ASSESSMENT/PLAN:  1. Essential hypertension  Well controlled. Discussed signs and symptoms for immediate evaluation in the ER. Reduce sodium. Monitor diet, exercise and lose weight. Keep a BP log and bring it to your next appointment. Needs to rtc in one month for BP check    2. Other hyperlipidemia  Take medication as prescribed. Discussed the need to exercise 30 minutes each day. Monitor diet, avoid fried foods etc... Educated on need to reduce cholesterol in diet and results of poor diet. 3. NSTEMI (non-ST elevated myocardial infarction) ([facility] [address]  Stable, but concerning for angina, patient is having pain with activity  He was told to contact his Cardiologist and discuss possible followup. NO PAIN TODAY only with exertion   Continue with medication  Keep appointments with specialist.   Answered questions  Educated on signs and symptoms for immediate evaluation in the ER. Return in about 3 months (around 9/10/2020).

## 2020-06-11 ENCOUNTER — OFFICE VISIT (OUTPATIENT)
Dept: PRIMARY CARE CLINIC | Age: 40
End: 2020-06-11

## 2020-06-11 LAB — SARS-COV-2, NAAT: NOT DETECTED

## 2020-06-11 NOTE — TELEPHONE ENCOUNTER
[facility] scheduled for 6/12/2020 at 8:30  Arrive at 7:00      The morning of your procedure you will park in the hospital parking lot and report directly to the cath lab to check in.     Pre-Procedure Instructions   1. You will need to fast for at least 8 hours prior to procedure. No caffeine the morning of.   2. All other medications can be taken in the morning with sips of water. 3. You will need to take 325 mg aspirin the morning of. If you are currently taking 81 mg please take 4 tablets that morning. 4. Do not use any lotions, creams or perfume the morning of procedure. 5. Pre-procedure lab work will need to be completed 5-7 days prior to procedure. 6. Please have a responsible adult to drive you home after procedure. We advise you have someone to stay with you for 24 hours following procedure for precautionary measures. Depending on procedure you may require an overnight stay. 7. Cath lab will provide you with all post procedure instructions. If you have any questions regarding the procedure itself or medications, please call [phone] and ask to speak with a nurse. Will call the patient after noon to go over instructions as he requested.

## 2020-06-11 NOTE — PRE-PROCEDURE INSTRUCTIONS
Called patient to confirm procedure with arrival time of 0700 and procedure time of 0830. Patient requesting a later start time. Instructed patient to call Dr. Ludin Leiva office to discuss schedule time,  Informed patient he may want to reschedule procedure if time does not work for him. Patient verbalizes understanding.

## 2020-06-12 ENCOUNTER — HOSPITAL ENCOUNTER (OUTPATIENT)
Dept: CARDIAC CATH/INVASIVE PROCEDURES | Age: 40
Discharge: HOME | End: 2020-06-12
Payer: MEDICARE

## 2020-06-12 VITALS
WEIGHT: 227.07 LBS | DIASTOLIC BLOOD PRESSURE: 68 MMHG | HEIGHT: 69 IN | SYSTOLIC BLOOD PRESSURE: 111 MMHG | BODY MASS INDEX: 33.63 KG/M2 | HEART RATE: 68 BPM | RESPIRATION RATE: 16 BRPM | TEMPERATURE: 98.1 F | OXYGEN SATURATION: 99 %

## 2020-06-12 LAB
ANION GAP SERPL CALCULATED.3IONS-SCNC: 9 MMOL/L (ref 3–16)
BUN BLDV-MCNC: 11 MG/DL (ref 7–20)
CALCIUM IONIZED: 1.27 MMOL/L (ref 1.12–1.32)
CALCIUM SERPL-MCNC: 9.1 MG/DL (ref 8.3–10.6)
CHLORIDE BLD-SCNC: 105 MMOL/L (ref 99–110)
CO2: 25 MMOL/L (ref 21–32)
CREAT SERPL-MCNC: 0.7 MG/DL (ref 0.9–1.3)
GFR AFRICAN AMERICAN: >60
GFR AFRICAN AMERICAN: >60
GFR NON-AFRICAN AMERICAN: >60
GFR NON-AFRICAN AMERICAN: >60
GLUCOSE BLD-MCNC: 115 MG/DL (ref 70–99)
GLUCOSE BLD-MCNC: 116 MG/DL (ref 70–99)
HCT VFR BLD CALC: 47.5 % (ref 40.5–52.5)
HEMOGLOBIN: 16.3 G/DL (ref 13.5–17.5)
MCH RBC QN AUTO: 30.7 PG (ref 26–34)
MCHC RBC AUTO-ENTMCNC: 34.3 G/DL (ref 31–36)
MCV RBC AUTO: 89.5 FL (ref 80–100)
PDW BLD-RTO: 13.8 % (ref 12.4–15.4)
PERFORMED ON: ABNORMAL
PLATELET # BLD: 269 K/UL (ref 135–450)
PMV BLD AUTO: 9 FL (ref 5–10.5)
POC CHLORIDE: 106 MMOL/L (ref 99–110)
POC CREATININE: 0.8 MG/DL (ref 0.9–1.3)
POC POTASSIUM: 4 MMOL/L (ref 3.5–5.1)
POC SAMPLE TYPE: ABNORMAL
POC SODIUM: 143 MMOL/L (ref 136–145)
POTASSIUM REFLEX MAGNESIUM: 4 MMOL/L (ref 3.5–5.1)
RBC # BLD: 5.31 M/UL (ref 4.2–5.9)
SODIUM BLD-SCNC: 139 MMOL/L (ref 136–145)
WBC # BLD: 11 K/UL (ref 4–11)

## 2020-06-12 PROCEDURE — 84295 ASSAY OF SERUM SODIUM: CPT

## 2020-06-12 PROCEDURE — 6360000002 HC RX W HCPCS

## 2020-06-12 PROCEDURE — C1894 INTRO/SHEATH, NON-LASER: HCPCS

## 2020-06-12 PROCEDURE — 2500000003 HC RX 250 WO HCPCS

## 2020-06-12 PROCEDURE — 82330 ASSAY OF CALCIUM: CPT

## 2020-06-12 PROCEDURE — 84132 ASSAY OF SERUM POTASSIUM: CPT

## 2020-06-12 PROCEDURE — C1769 GUIDE WIRE: HCPCS

## 2020-06-12 PROCEDURE — 93458 L HRT ARTERY/VENTRICLE ANGIO: CPT

## 2020-06-12 PROCEDURE — 93454 CORONARY ARTERY ANGIO S&I: CPT | Performed by: INTERNAL MEDICINE

## 2020-06-12 PROCEDURE — 99153 MOD SED SAME PHYS/QHP EA: CPT

## 2020-06-12 PROCEDURE — 99152 MOD SED SAME PHYS/QHP 5/>YRS: CPT

## 2020-06-12 PROCEDURE — 6360000004 HC RX CONTRAST MEDICATION: Performed by: INTERNAL MEDICINE

## 2020-06-12 PROCEDURE — 85027 COMPLETE CBC AUTOMATED: CPT

## 2020-06-12 PROCEDURE — 80048 BASIC METABOLIC PNL TOTAL CA: CPT

## 2020-06-12 PROCEDURE — 82435 ASSAY OF BLOOD CHLORIDE: CPT

## 2020-06-12 PROCEDURE — 82565 ASSAY OF CREATININE: CPT

## 2020-06-12 PROCEDURE — 2709999900 HC NON-CHARGEABLE SUPPLY

## 2020-06-12 PROCEDURE — 82947 ASSAY GLUCOSE BLOOD QUANT: CPT

## 2020-06-12 RX ORDER — SODIUM CHLORIDE 0.9 % (FLUSH) 0.9 %
10 SYRINGE (ML) INJECTION EVERY 12 HOURS SCHEDULED
Status: DISCONTINUED | OUTPATIENT
Start: 2020-06-12 | End: 2020-06-13 | Stop reason: HOSPADM

## 2020-06-12 RX ORDER — SODIUM CHLORIDE 0.9 % (FLUSH) 0.9 %
10 SYRINGE (ML) INJECTION PRN
Status: DISCONTINUED | OUTPATIENT
Start: 2020-06-12 | End: 2020-06-13 | Stop reason: HOSPADM

## 2020-06-12 RX ORDER — SODIUM CHLORIDE 9 MG/ML
INJECTION, SOLUTION INTRAVENOUS CONTINUOUS
Status: DISCONTINUED | OUTPATIENT
Start: 2020-06-12 | End: 2020-06-13 | Stop reason: HOSPADM

## 2020-06-12 RX ORDER — RANOLAZINE 500 MG/1
500 TABLET, EXTENDED RELEASE ORAL 2 TIMES DAILY
Qty: 60 TABLET | Refills: 3 | Status: SHIPPED | OUTPATIENT
Start: 2020-06-12 | End: 2020-06-24

## 2020-06-12 RX ADMIN — IOPAMIDOL 30 ML: 755 INJECTION, SOLUTION INTRAVENOUS at 08:35

## 2020-06-13 ASSESSMENT — ENCOUNTER SYMPTOMS: SHORTNESS OF BREATH: 1

## 2020-06-16 ENCOUNTER — TELEPHONE (OUTPATIENT)
Dept: CARDIOLOGY CLINIC | Age: 40
End: 2020-06-16

## 2020-06-23 NOTE — TELEPHONE ENCOUNTER
Dr. Traci Weathers,  See below message update on Ranexa PA. Patient is on BB with Coreg 6.25 mg BID.   s/p IVUS PCI distal/prox RCA x3 MI, LVEF 40-45%, next echo 7/15/20, f/u 7/20/20  Would you like to trial Imdur instead or titrate his Coreg? Please advise. Thanks.

## 2020-06-24 RX ORDER — ISOSORBIDE MONONITRATE 60 MG/1
60 TABLET, EXTENDED RELEASE ORAL DAILY
COMMUNITY
End: 2020-06-24 | Stop reason: SDUPTHER

## 2020-06-29 RX ORDER — ISOSORBIDE MONONITRATE 60 MG/1
60 TABLET, EXTENDED RELEASE ORAL DAILY
Qty: 30 TABLET | Refills: 5 | Status: SHIPPED | OUTPATIENT
Start: 2020-06-29 | End: 2020-07-03 | Stop reason: SDUPTHER

## 2020-06-30 ENCOUNTER — OFFICE VISIT (OUTPATIENT)
Dept: PRIMARY CARE CLINIC | Age: 40
End: 2020-06-30
Payer: MEDICARE

## 2020-06-30 PROCEDURE — G8428 CUR MEDS NOT DOCUMENT: HCPCS | Performed by: NURSE PRACTITIONER

## 2020-06-30 PROCEDURE — 99211 OFF/OP EST MAY X REQ PHY/QHP: CPT | Performed by: NURSE PRACTITIONER

## 2020-06-30 PROCEDURE — G8417 CALC BMI ABV UP PARAM F/U: HCPCS | Performed by: NURSE PRACTITIONER

## 2020-07-03 LAB
SARS-COV-2: NORMAL
SOURCE: NORMAL

## 2020-07-05 ENCOUNTER — CARE COORDINATION (OUTPATIENT)
Dept: CARE COORDINATION | Age: 40
End: 2020-07-05

## 2020-07-05 NOTE — CARE COORDINATION
Date/Time:  2020 9:23 AM    Patient contacted regarding remote symptom monitoring program alert or comment received. Verified patients name and  as identifiers. Discussed COVID-19 related testing which was inconclusive at this time. Test results were inconclusive. Patient informed of results, if available? Yes    RN contacted the patient by telephone regarding yellow alert in Loop remote symptom monitoring program.    Patient reported the following symptoms: cough, vomiting, no new symptoms, no worsening symptoms and runny nose. Due to continued symptoms, patient was advised to self-monitor symptoms at home. Due to no new or worsening symptoms encounter was not routed to provider for escalation. Education provided regarding infection prevention, and signs and symptoms of COVID-19 and when to seek medical attention with patient who verbalized understanding. Discussed exposure protocols and quarantine from [address] you at higher risk for severe illness  and given an opportunity for questions and concerns. The patient agrees to contact the [facility] exposure line [phone], local health department [place] Department of Health: ([phone]) and PCP office for questions related to their healthcare. From CDC: Are you at higher risk for severe illness?  Wash your hands often.  Avoid close contact (6 feet, which is about two arm lengths) with people who are sick.  Put distance between yourself and other people if COVID-19 is spreading in your community.  Clean and disinfect frequently touched surfaces.  Avoid all cruise travel and non-essential air travel.  Call your healthcare professional if you have concerns about COVID-19 and your underlying condition or if you are sick. For more information on steps you can take to protect yourself, see CDC's How to Protect Yourself      Patient will continue to self report symptoms using Loop self monitoring.   Patient has RN's contact information. Patient contacted regarding Sohan Graham. Discussed COVID-19 related testing which was inconclusive at this time. Test results were inconclusive. Patient informed of results, if available? Yes    Care Transition Nurse/ Ambulatory Care Manager contacted the patient by telephone to perform post discharge assessment. Verified name and  with patient as identifiers. Provided introduction to self, and explanation of the CTN/ACM role, and reason for call due to risk factors for infection and/or exposure to COVID-19. Symptoms reviewed with patient who verbalized the following symptoms: cough, vomiting, no new symptoms, no worsening symptoms and runny nose. Due to no new or worsening symptoms encounter was not routed to provider for escalation. Discussed follow-up appointments. If no appointment was previously scheduled, appointment scheduling offered: Yes  [address] follow up appointment(s):   Future Appointments   Date Time Provider Dee Nobles   7/15/2020 10:30 AM SCHEDULE, [facility] ROOM 2 [facility]   2020  8:45 AM Wilbert Avery MD [facility]     [facility] follow up appointment(s):      Patient has following risk factors of: heart failure. CTN/ACM reviewed discharge instructions, medical action plan and red flags such as increased shortness of breath, increasing fever and signs of decompensation with patient who verbalized understanding. Discussed exposure protocols and quarantine with CDC Guidelines What to do if you are sick with coronavirus disease .  Patient was given an opportunity for questions and concerns. The patient agrees to contact the [facility] exposure line [phone], local health department [place] Department of [facility]: ([phone]) and PCP office for questions related to their healthcare. CTN/ACM provided contact information for future needs.     Reviewed and educated patient on any new and changed medications related to discharge diagnosis Patient/family/caregiver given information for [employer] and agrees to enroll yes  Based on Loop alert triggers, patient will be contacted by nurse care manager for worsening symptoms. Pt will be further monitored by COVID Loop Team based on severity of symptoms and risk factors. Patient with a cough, runny nose, and vomiting. He states he has a thick coating in the back of his throat which causes vomiting when he is trying to cough it up. He has been drinking lots of water. Encouraged some warm tea, with honey and lemon. He says when he blows his nose, it helps also. His test results were non conclusive. He plans to call his doctor tomorrow to get another test done. He needs a negative test result to return to work. Encouraged him also, to check with his doctor to see if there is something he can take to help loosen secretions.

## 2020-07-06 ENCOUNTER — OFFICE VISIT (OUTPATIENT)
Dept: PRIMARY CARE CLINIC | Age: 40
End: 2020-07-06
Payer: MEDICARE

## 2020-07-06 PROCEDURE — 99211 OFF/OP EST MAY X REQ PHY/QHP: CPT | Performed by: INTERNAL MEDICINE

## 2020-07-06 PROCEDURE — G8428 CUR MEDS NOT DOCUMENT: HCPCS | Performed by: INTERNAL MEDICINE

## 2020-07-06 PROCEDURE — G8417 CALC BMI ABV UP PARAM F/U: HCPCS | Performed by: INTERNAL MEDICINE

## 2020-07-06 NOTE — PROGRESS NOTES
[facility] received a viral test for COVID-19. They were educated on isolation and quarantine as appropriate. For any symptoms, they were directed to seek care from their PCP, given contact information to establish with a doctor, directed to an urgent care or the emergency room.

## 2020-07-07 RX ORDER — ISOSORBIDE MONONITRATE 60 MG/1
60 TABLET, EXTENDED RELEASE ORAL DAILY
Qty: 30 TABLET | Refills: 5 | Status: SHIPPED | OUTPATIENT
Start: 2020-07-07 | End: 2021-02-13 | Stop reason: SDUPTHER

## 2020-07-07 RX ORDER — LISINOPRIL 20 MG/1
20 TABLET ORAL DAILY
Qty: 90 TABLET | Refills: 3 | Status: SHIPPED | OUTPATIENT
Start: 2020-07-07 | End: 2020-08-09 | Stop reason: SDUPTHER

## 2020-07-08 RX ORDER — ASPIRIN 81 MG/1
81 TABLET, CHEWABLE ORAL DAILY
Qty: 30 TABLET | Refills: 11 | Status: SHIPPED | OUTPATIENT
Start: 2020-07-08 | End: 2020-08-15 | Stop reason: SDUPTHER

## 2020-07-09 LAB
SARS-COV-2: NOT DETECTED
SOURCE: NORMAL

## 2020-07-15 ENCOUNTER — HOSPITAL ENCOUNTER (OUTPATIENT)
Dept: NON INVASIVE DIAGNOSTICS | Age: 40
Discharge: HOME | End: 2020-07-15
Payer: MEDICARE

## 2020-07-15 LAB
LV EF: 48 %
LVEF MODALITY: NORMAL

## 2020-07-15 PROCEDURE — 93306 TTE W/DOPPLER COMPLETE: CPT

## 2020-07-15 NOTE — RESULT ENCOUNTER NOTE
Please notify patient that their heart function is improving!  EF has increase, continue current therapy

## 2020-07-20 ENCOUNTER — OFFICE VISIT (OUTPATIENT)
Dept: CARDIOLOGY CLINIC | Age: 40
End: 2020-07-20
Payer: MEDICARE

## 2020-07-20 VITALS
BODY MASS INDEX: 33.77 KG/M2 | HEART RATE: 81 BPM | HEIGHT: 69 IN | SYSTOLIC BLOOD PRESSURE: 101 MMHG | DIASTOLIC BLOOD PRESSURE: 63 MMHG | OXYGEN SATURATION: 96 % | WEIGHT: 228 LBS | TEMPERATURE: 97.7 F

## 2020-07-20 PROCEDURE — 99214 OFFICE O/P EST MOD 30 MIN: CPT | Performed by: INTERNAL MEDICINE

## 2020-07-20 PROCEDURE — 93000 ELECTROCARDIOGRAM COMPLETE: CPT | Performed by: INTERNAL MEDICINE

## 2020-07-20 RX ORDER — FAMOTIDINE 20 MG/1
20 TABLET, FILM COATED ORAL 2 TIMES DAILY
Qty: 180 TABLET | Refills: 1 | Status: SHIPPED | OUTPATIENT
Start: 2020-07-20 | End: 2020-08-09 | Stop reason: SDUPTHER

## 2020-07-20 NOTE — PROGRESS NOTES
Cardiology Consultation   Referring Physician: Carina England DO  Reason for Consultation: NSTEMI follow up s/p PCI  Chief Complaint:   Chief Complaint   Patient presents with    3 Month Follow-Up     HTN, HLD, CAD, NSTEMI/ no chest pains, edema,     Other     back pain on left side - pt     Shortness of Breath     with activity     Dizziness     everyday, pt states he has vertigo     Palpitations     last night - heart racing when he was laying down     Headache     last 4 days     Other      pt on what he should take for headaches/pain. pt has stated he has taken both tylenol and advil      Subjective:   History of Present Illness:  Tasneem Blas is a 41 y.o. male who presents with the above complaint. He was admitted to [facility] with complaints of sudden onset severe substernal chest pain. He underwent heart catheterization on 20 resulting in MI x 3 to RCA. He called the office with worsening chest pain and underwent repeat angiography 20 that showed patent stents. Today, he states he is doing well and denies any new chest pains or worsening shortness of breath. He reports chronic shoulder pain and plans to follow up with his PCP. He reports compliance with his medications and tolerating. He states he limiting his sugar and sodium intake. He states he was drinking 2 cases of pop a week and now down to a 12 pack/week. He reports one episode of palpitations last night but denies any recurrent symptoms. Patient denies exertional chest pain/pressure, dyspnea at rest, STEWARD, PND, orthopnea, lightheadedness, weight changes, changes in LE edema, and syncope. Prior cardiac testing:    EK20 reviewed. NSR     Echo: 2020  There is akinesis of the mid to apical/ lateral myocardium. Ejection fraction is visually estimated to be 40-45%. Grade I diastolic dysfunction with normal LV filling pressures.   No evidence of left ventricular mass or thrombus noted. Echo: 7/15/20  Left ventricular cavity size is normal.  There is moderate concentric left ventricular hypertrophy. Ejection fraction is visually estimated to be 45-50%. There is moderate hypokinesis of the mid to apical inferoseptal and inferior walls. Diastolic filling parameters suggest grade I diastolic dysfunction. Normal right ventricular size. Right ventricular systolic function is moderately reduced. TAPSE 1.2 cm     Cath: 5/5/2020  ANGIOGRAM/CORONARY ARTERIOGRAM:        The left main coronary artery is patent   The left anterior descending artery is patent, D1 stent is widely patent . The left circumflex artery is patent, mid stent is widely patent. The right coronary artery is diffusely diseased, prox 80%, distal 80%      INTERVENTION  1. JR 4 guide   2. runthrough wire used to cross the lesion   3. Distal RCA predilated w/ 2.5/3.5 regular balloons  4. IVUS catheter passed to distal RCA, distal reference diameter 4.5 mm   5. 4.0 X 18 mm [place] MI distal RCA  6. 4.0 X 26 / 4.5 X 12 prox RCA  7. Post dilation distal/prox  RCA w/ 4.0 NC balloon to 18 keyur      SUMMARY:      Single vessel CAD   S/p successful IVUS guided PCI distal/prox RCA X 3 MI     Cath: 6/12/20   The left main coronary artery is patent   The left anterior descending artery is widely patent D1 stent is widely patent   The left circumflex artery is widely patent, small vessel OM disease   The right coronary artery is widely patent, with patents stents, small vessel disease of the distal RPDA      Past Medical History:   has a past medical history of Coronary artery disease, Essential hypertension, Headache, HTN (hypertension), Other hyperlipidemia, and Tobacco abuse counseling. Surgical History:   has a past surgical history that includes Mouth surgery; cranial lesion resection; Coronary angioplasty with stent (05/05/2020); and Coronary angioplasty with stent (04/25/2020).      Social History:   reports that he has been smoking cigarettes. He started smoking about 30 years ago. He has been smoking about 1.00 pack per day. He has never used smokeless tobacco. He reports that he does not drink alcohol or use drugs. Family History:  family history includes Heart Disease in his maternal cousin and maternal uncle; High Blood Pressure in his maternal aunt. Home Medications:  Were reviewed and are listed in nursing record and/or below  Prior to Admission medications    Medication Sig Start Date End Date Taking? Authorizing Provider   FAMOTIDINE PO Take by mouth   Yes Historical Provider, MD   aspirin 81 MG chewable tablet Take 1 tablet by mouth daily 7/8/20  Yes Ivon Mullins MD   lisinopril (PRINIVIL;ZESTRIL) 20 MG tablet Take 1 tablet by mouth daily 7/7/20  Yes Ivon Mullins MD   isosorbide mononitrate (IMDUR) 60 MG extended release tablet Take 1 tablet by mouth daily 7/7/20  Yes Ivon Mullins MD   prasugrel (EFFIENT) 10 MG TABS Take 1 tablet by mouth daily 5/22/20  Yes Ivon Mullins MD   atorvastatin (LIPITOR) 80 MG tablet Take 1 tablet by mouth nightly 5/22/20  Yes Ivon Mullins MD   carvedilol (COREG) 6.25 MG tablet Take 1 tablet by mouth 2 times daily (with meals) 5/22/20  Yes Ivon Mullins MD   nitroGLYCERIN (NITROSTAT) 0.4 MG SL tablet up to max of 3 total doses. If no relief after 1 dose, call 911. 5/6/20  Yes LACHELLE Nava CNP   nicotine (NICODERM CQ) 21 MG/24HR Place 1 patch onto the skin daily  Patient not taking: Reported on 7/20/2020 4/7/20 5/19/20  Carina England DO          Allergies:  Patient has no known allergies. Review of Systems:   · Constitutional: no unanticipated weight loss. There's been no change in energy level, sleep pattern, or activity level. No fevers, chills. · Eyes: No visual changes or diplopia. No scleral icterus. · ENT: No Headaches, hearing loss or vertigo. No mouth sores or sore throat.   · Cardiovascular: as reviewed in Value Date    WBC 11.0 06/12/2020    RBC 5.31 06/12/2020    HGB 16.3 06/12/2020    HCT 47.5 06/12/2020    MCV 89.5 06/12/2020    RDW 13.8 06/12/2020     06/12/2020     CMP:  Lab Results   Component Value Date     06/12/2020    K 4.0 06/12/2020     06/12/2020    CO2 25 06/12/2020    BUN 11 06/12/2020    CREATININE 0.8 06/12/2020    CREATININE 0.7 06/12/2020    GFRAA >60 06/12/2020    AGRATIO 1.2 05/05/2020    LABGLOM >60 06/12/2020    GLUCOSE 116 06/12/2020    PROT 7.7 05/05/2020    CALCIUM 9.1 06/12/2020    BILITOT 0.3 05/05/2020    ALKPHOS 103 05/05/2020    AST 14 05/05/2020    ALT 8 05/05/2020     PT/INR:  No results found for: PTINR  HgBA1c:No results found for: LABA1C  Lab Results   Component Value Date    TROPONINI <0.01 05/05/2020       CURRENT Medications:  Current Outpatient Medications on File Prior to Visit   Medication Sig Dispense Refill    FAMOTIDINE PO Take by mouth      aspirin 81 MG chewable tablet Take 1 tablet by mouth daily 30 tablet 11    lisinopril (PRINIVIL;ZESTRIL) 20 MG tablet Take 1 tablet by mouth daily 90 tablet 3    isosorbide mononitrate (IMDUR) 60 MG extended release tablet Take 1 tablet by mouth daily 30 tablet 5    prasugrel (EFFIENT) 10 MG TABS Take 1 tablet by mouth daily 96 tablet 3    atorvastatin (LIPITOR) 80 MG tablet Take 1 tablet by mouth nightly 90 tablet 3    carvedilol (COREG) 6.25 MG tablet Take 1 tablet by mouth 2 times daily (with meals) 180 tablet 3    nitroGLYCERIN (NITROSTAT) 0.4 MG SL tablet up to max of 3 total doses. If no relief after 1 dose, call 911. 25 tablet 3    nicotine (NICODERM CQ) 21 MG/24HR Place 1 patch onto the skin daily (Patient not taking: Reported on 7/20/2020) 42 patch 0     No current facility-administered medications on file prior to visit.         Assessment and Plan   1) CAD  -s/p successful IVUS guided PCI distal/prox RCA X 3 MI   -On BB carvedilol 6.25 mg for anti remodeling  -Switched to Effient 10 mg due to SOB with Brilinta  -Continue DAPT for a minimum of 1 year   -Risk factor modification discussed     2) Essential hypertensin  -Controlled   -On Lisinopril 20 mg daily  -Encouraged low sodium diet     3) Tobacco abuse   -Encouraged smoking cessation     4) Hyperlipidemia  -On Lipitor 80 mg daily  - on 4/26/2020  -Will repeat lipid panel at follow up     Follow up in 6 months. Thank you for allowing us to participate in the care of James Curiel. Bharat Regalado MD [address] [place] [address] and Interventional Cardiology   [facility]   (C): [phone]  [facility]): [phone]     This note was scribed in the presence of Dr Ramo Denny MD by Nona Jones RN. Physician Attestation:  The scribes documentation has been prepared under my direction and personally reviewed by me in its entirety. I confirm the note above accurately reflects all work, treatment, procedures, and medical decision making performed by me.     Electronically signed by Kamini Enciso MD on 7/25/2020 at 10:26 PM

## 2020-07-20 NOTE — PATIENT INSTRUCTIONS
Patient Education        DASH Diet: Care Instructions  Your Care Instructions     The DASH diet is an eating plan that can help lower your blood pressure. DASH stands for Dietary Approaches to Stop Hypertension. Hypertension is high blood pressure. The DASH diet focuses on eating foods that are high in calcium, potassium, and magnesium. These nutrients can lower blood pressure. The foods that are highest in these nutrients are fruits, vegetables, low-fat dairy products, nuts, seeds, and legumes. But taking calcium, potassium, and magnesium supplements instead of eating foods that are high in those nutrients does not have the same effect. The DASH diet also includes whole grains, fish, and poultry. The DASH diet is one of several lifestyle changes your doctor may recommend to lower your high blood pressure. Your doctor may also want you to decrease the amount of sodium in your diet. Lowering sodium while following the DASH diet can lower blood pressure even further than just the DASH diet alone. Follow-up care is a key part of your treatment and safety. Be sure to make and go to all appointments, and call your doctor if you are having problems. It's also a good idea to know your test results and keep a list of the medicines you take. How can you care for yourself at home? Following the DASH diet  · Eat 4 to 5 servings of fruit each day. A serving is 1 medium-sized piece of fruit, ½ cup chopped or canned fruit, 1/4 cup dried fruit, or 4 ounces (½ cup) of fruit juice. Choose fruit more often than fruit juice. · Eat 4 to 5 servings of vegetables each day. A serving is 1 cup of lettuce or raw leafy vegetables, ½ cup of chopped or cooked vegetables, or 4 ounces (½ cup) of vegetable juice. Choose vegetables more often than vegetable juice. · Get 2 to 3 servings of low-fat and fat-free dairy each day. A serving is 8 ounces of milk, 1 cup of yogurt, or 1 ½ ounces of cheese. · Eat 6 to 8 servings of grains each day. A serving is 1 slice of bread, 1 ounce of dry cereal, or ½ cup of cooked rice, pasta, or cooked cereal. Try to choose whole-grain products as much as possible. · Limit lean meat, poultry, and fish to 2 servings each day. A serving is 3 ounces, about the size of a deck of cards. · Eat 4 to 5 servings of nuts, seeds, and legumes (cooked dried beans, lentils, and split peas) each week. A serving is 1/3 cup of nuts, 2 tablespoons of seeds, or ½ cup of cooked beans or peas. · Limit fats and oils to 2 to 3 servings each day. A serving is 1 teaspoon of vegetable oil or 2 tablespoons of salad dressing. · Limit sweets and added sugars to 5 servings or less a week. A serving is 1 tablespoon jelly or jam, ½ cup sorbet, or 1 cup of lemonade. · Eat less than 2,300 milligrams (mg) of sodium a day. If you limit your sodium to 1,500 mg a day, you can lower your blood pressure even more. Tips for success  · Start small. Do not try to make dramatic changes to your diet all at once. You might feel that you are missing out on your favorite foods and then be more likely to not follow the plan. Make small changes, and stick with them. Once those changes become habit, add a few more changes. · Try some of the following:  ? Make it a goal to eat a fruit or vegetable at every meal and at snacks. This will make it easy to get the recommended amount of fruits and vegetables each day. ? Try yogurt topped with fruit and nuts for a snack or healthy dessert. ? Add lettuce, tomato, cucumber, and onion to sandwiches. ? Combine a ready-made pizza crust with low-fat mozzarella cheese and lots of vegetable toppings. Try using tomatoes, squash, spinach, broccoli, carrots, cauliflower, and onions. ? Have a variety of cut-up vegetables with a low-fat dip as an appetizer instead of chips and dip. ? Sprinkle sunflower seeds or chopped almonds over salads. Or try adding chopped walnuts or almonds to cooked vegetables.   ? Try some vegetarian meals using beans and peas. Add garbanzo or kidney beans to salads. Make burritos and tacos with mashed pierre beans or black beans. Where can you learn more? Go to https://chtayloreb.[employer]. org and sign in to your Sway account. Enter T570 in the Bapul box to learn more about \"DASH Diet: Care Instructions. \"     If you do not have an account, please click on the \"Sign Up Now\" link. Current as of: December 16, 2019               Content Version: 12.5  © 0069-0918 Healthwise, Incorporated. Care instructions adapted under license by [facility] ([facility]). If you have questions about a medical condition or this instruction, always ask your healthcare professional. [address] any warranty or liability for your use of this information.

## 2020-08-10 RX ORDER — FAMOTIDINE 20 MG/1
20 TABLET, FILM COATED ORAL 2 TIMES DAILY
Qty: 180 TABLET | Refills: 1 | Status: SHIPPED | OUTPATIENT
Start: 2020-08-10 | End: 2020-11-28 | Stop reason: SDUPTHER

## 2020-08-10 RX ORDER — LISINOPRIL 20 MG/1
20 TABLET ORAL DAILY
Qty: 90 TABLET | Refills: 3 | Status: SHIPPED | OUTPATIENT
Start: 2020-08-10 | End: 2020-12-11 | Stop reason: SDUPTHER

## 2020-08-17 RX ORDER — ASPIRIN 81 MG/1
81 TABLET, CHEWABLE ORAL DAILY
Qty: 30 TABLET | Refills: 11 | Status: SHIPPED | OUTPATIENT
Start: 2020-08-17 | End: 2020-09-29 | Stop reason: SDUPTHER

## 2020-10-01 RX ORDER — PRASUGREL 10 MG/1
10 TABLET, FILM COATED ORAL DAILY
Qty: 90 TABLET | Refills: 3 | Status: SHIPPED | OUTPATIENT
Start: 2020-10-01 | End: 2020-11-02 | Stop reason: SDUPTHER

## 2020-10-01 RX ORDER — ASPIRIN 81 MG/1
81 TABLET, CHEWABLE ORAL DAILY
Qty: 30 TABLET | Refills: 11 | Status: SHIPPED | OUTPATIENT
Start: 2020-10-01 | End: 2020-11-02 | Stop reason: SDUPTHER

## 2020-11-03 RX ORDER — ATORVASTATIN CALCIUM 80 MG/1
80 TABLET, FILM COATED ORAL NIGHTLY
Qty: 90 TABLET | Refills: 3 | Status: SHIPPED | OUTPATIENT
Start: 2020-11-03 | End: 2020-12-11 | Stop reason: SDUPTHER

## 2020-11-03 RX ORDER — ASPIRIN 81 MG/1
81 TABLET, CHEWABLE ORAL DAILY
Qty: 30 TABLET | Refills: 11 | Status: SHIPPED | OUTPATIENT
Start: 2020-11-03 | End: 2020-12-11 | Stop reason: SDUPTHER

## 2020-11-03 RX ORDER — PRASUGREL 10 MG/1
10 TABLET, FILM COATED ORAL DAILY
Qty: 90 TABLET | Refills: 3 | Status: SHIPPED | OUTPATIENT
Start: 2020-11-03 | End: 2020-12-11 | Stop reason: SDUPTHER

## 2020-11-30 RX ORDER — FAMOTIDINE 20 MG/1
20 TABLET, FILM COATED ORAL 2 TIMES DAILY
Qty: 180 TABLET | Refills: 2 | Status: SHIPPED | OUTPATIENT
Start: 2020-11-30 | End: 2021-01-05 | Stop reason: SDUPTHER

## 2020-12-29 ENCOUNTER — APPOINTMENT (OUTPATIENT)
Dept: GENERAL RADIOLOGY | Age: 40
End: 2020-12-29
Payer: MEDICARE

## 2020-12-29 ENCOUNTER — HOSPITAL ENCOUNTER (EMERGENCY)
Age: 40
Discharge: HOME | End: 2020-12-29
Payer: MEDICARE

## 2020-12-29 ENCOUNTER — PATIENT MESSAGE (OUTPATIENT)
Dept: CARDIOLOGY CLINIC | Age: 40
End: 2020-12-29

## 2020-12-29 VITALS
TEMPERATURE: 98.3 F | SYSTOLIC BLOOD PRESSURE: 110 MMHG | DIASTOLIC BLOOD PRESSURE: 73 MMHG | RESPIRATION RATE: 17 BRPM | OXYGEN SATURATION: 98 % | HEART RATE: 60 BPM

## 2020-12-29 LAB
ANION GAP SERPL CALCULATED.3IONS-SCNC: 9 MMOL/L (ref 3–16)
BASOPHILS ABSOLUTE: 0.1 K/UL (ref 0–0.2)
BASOPHILS RELATIVE PERCENT: 0.6 %
BUN BLDV-MCNC: 10 MG/DL (ref 7–20)
CALCIUM SERPL-MCNC: 9.4 MG/DL (ref 8.3–10.6)
CHLORIDE BLD-SCNC: 103 MMOL/L (ref 99–110)
CO2: 24 MMOL/L (ref 21–32)
CREAT SERPL-MCNC: 0.8 MG/DL (ref 0.9–1.3)
EOSINOPHILS ABSOLUTE: 0.1 K/UL (ref 0–0.6)
EOSINOPHILS RELATIVE PERCENT: 1.2 %
GFR AFRICAN AMERICAN: >60
GFR NON-AFRICAN AMERICAN: >60
GLUCOSE BLD-MCNC: 91 MG/DL (ref 70–99)
HCT VFR BLD CALC: 48.7 % (ref 40.5–52.5)
HEMOGLOBIN: 16.4 G/DL (ref 13.5–17.5)
LYMPHOCYTES ABSOLUTE: 3.2 K/UL (ref 1–5.1)
LYMPHOCYTES RELATIVE PERCENT: 33.1 %
MCH RBC QN AUTO: 30 PG (ref 26–34)
MCHC RBC AUTO-ENTMCNC: 33.6 G/DL (ref 31–36)
MCV RBC AUTO: 89.1 FL (ref 80–100)
MONOCYTES ABSOLUTE: 0.7 K/UL (ref 0–1.3)
MONOCYTES RELATIVE PERCENT: 6.9 %
NEUTROPHILS ABSOLUTE: 5.7 K/UL (ref 1.7–7.7)
NEUTROPHILS RELATIVE PERCENT: 58.2 %
PDW BLD-RTO: 13.5 % (ref 12.4–15.4)
PLATELET # BLD: 229 K/UL (ref 135–450)
PMV BLD AUTO: 8.8 FL (ref 5–10.5)
POTASSIUM REFLEX MAGNESIUM: 4.7 MMOL/L (ref 3.5–5.1)
RBC # BLD: 5.47 M/UL (ref 4.2–5.9)
SODIUM BLD-SCNC: 136 MMOL/L (ref 136–145)
TROPONIN: <0.01 NG/ML
WBC # BLD: 9.8 K/UL (ref 4–11)

## 2020-12-29 PROCEDURE — 99284 EMERGENCY DEPT VISIT MOD MDM: CPT

## 2020-12-29 PROCEDURE — 93005 ELECTROCARDIOGRAM TRACING: CPT | Performed by: PHYSICIAN ASSISTANT

## 2020-12-29 PROCEDURE — 84484 ASSAY OF TROPONIN QUANT: CPT

## 2020-12-29 PROCEDURE — 80048 BASIC METABOLIC PNL TOTAL CA: CPT

## 2020-12-29 PROCEDURE — 71046 X-RAY EXAM CHEST 2 VIEWS: CPT

## 2020-12-29 PROCEDURE — 85025 COMPLETE CBC W/AUTO DIFF WBC: CPT

## 2020-12-29 PROCEDURE — 99214 OFFICE O/P EST MOD 30 MIN: CPT | Performed by: INTERNAL MEDICINE

## 2020-12-29 PROCEDURE — 6370000000 HC RX 637 (ALT 250 FOR IP): Performed by: PHYSICIAN ASSISTANT

## 2020-12-29 RX ORDER — ASPIRIN 81 MG/1
243 TABLET, CHEWABLE ORAL ONCE
Status: COMPLETED | OUTPATIENT
Start: 2020-12-29 | End: 2020-12-29

## 2020-12-29 RX ADMIN — ASPIRIN 243 MG: 81 TABLET, CHEWABLE ORAL at 16:19

## 2020-12-29 ASSESSMENT — HEART SCORE: ECG: 0

## 2020-12-29 ASSESSMENT — PAIN - FUNCTIONAL ASSESSMENT: PAIN_FUNCTIONAL_ASSESSMENT: ACTIVITIES ARE NOT PREVENTED

## 2020-12-29 ASSESSMENT — PAIN DESCRIPTION - PAIN TYPE: TYPE: ACUTE PAIN

## 2020-12-29 ASSESSMENT — ENCOUNTER SYMPTOMS
WHEEZING: 0
SHORTNESS OF BREATH: 0
DIARRHEA: 0
VOMITING: 0
CONSTIPATION: 0
COUGH: 0
CHEST TIGHTNESS: 1
NAUSEA: 0
ABDOMINAL PAIN: 0

## 2020-12-29 ASSESSMENT — PAIN DESCRIPTION - ORIENTATION
ORIENTATION: RIGHT;LEFT
ORIENTATION: UPPER

## 2020-12-29 ASSESSMENT — PAIN DESCRIPTION - DESCRIPTORS: DESCRIPTORS: ACHING

## 2020-12-29 ASSESSMENT — PAIN SCALES - GENERAL
PAINLEVEL_OUTOF10: 1

## 2020-12-29 ASSESSMENT — PAIN DESCRIPTION - ONSET: ONSET: ON-GOING

## 2020-12-29 ASSESSMENT — PAIN DESCRIPTION - LOCATION
LOCATION: BACK;ARM
LOCATION: BACK

## 2020-12-29 ASSESSMENT — PAIN DESCRIPTION - FREQUENCY: FREQUENCY: CONTINUOUS

## 2020-12-29 ASSESSMENT — PAIN DESCRIPTION - PROGRESSION: CLINICAL_PROGRESSION: NOT CHANGED

## 2020-12-29 NOTE — ED TRIAGE NOTES
Ailene Soulier is a 41 y.o. male  Brought himself to the ER for back pain that is at the base of his neck that is a 1/10. The patient states the pain is worse with movement. The patient also states that he had a chest pain a couple of days ago that went away and currently has no chest pain and pain down his arms. The patient is alert and oriented with an open and patent airway with a normal respiratory effort.

## 2020-12-29 NOTE — TELEPHONE ENCOUNTER
I called and spoke with patient advised not to use hospitals SERVICES when communicating urgent matters like chest pain and to jody our office directly to make sure there is no delay in treatment. He reports bilateral arm pain and upper back pain. He also reporting that he feels like his heart is racing, bounding out of his chest and shortness of breath. He states the symptoms were similar  to those he experienced before he had his previous heart stenting. I instructed him to go to ED for evaluation and treatment patient verbalized understanding.

## 2020-12-29 NOTE — PROGRESS NOTES
Medication Reconciliation    List of medications patient is currently taking is complete. Patient received his morning home medications prior to arrival to the emergency department today. He endorses that he has not been taking his medications regularly.     Ludin Reynolds PharmD, BCPS  12/29/2020 4:54 PM

## 2020-12-29 NOTE — ED PROVIDER NOTES
[address] [facility] [place]        Pt Name: Henrique Lugo  MRN: [id number]  [place] 1980  Date of evaluation: 12/29/2020  Provider: MERNA Nunez  PCP: Walter Eddy DO    SHAQ. I have evaluated this patient. My supervising physician was available for consultation. CHIEF COMPLAINT       Chief Complaint   Patient presents with    Back Pain     back pain and bilateral arm pain. deneis n/v.  denies diaphoresis. onste 4 days ago       HISTORY OF PRESENT ILLNESS   (Location, Timing/Onset, Context/Setting, Quality, Duration, Modifying Factors, Severity, Associated Signs and Symptoms)  Note limiting factors. Henrique Lugo is a 41 y.o. male with PMH including CAD (NSTEMI, 4/2020, 3 stents placed, follows with Dr. Merlene Miller), HTN, MARTELL, HLD presents to the ER today with complaints of chest discomfort, feels like heartburn. Pain radiated to both arms, with his previous MI the pain radiated only into his right arm. Reports that this has been going on for the last 4 days, and thinks it feels similar to when he had the previous MI in April, but also wonders if it could be related to heartburn. Today he noticed the symptoms became worse with exertion and he felt short of breath, but again wasn't sure if this was related to heartburn and worrying about it or if it was related to his heart. He has taken his home medications today, to include aspirin 81mg. He has not been taking his medications \"to treat my enlarged heart\" because it \"puts me down\" and makes him feel very tired. He has no further complaints at this time. Nursing Notes were all reviewed and agreed with or any disagreements were addressed in the HPI. REVIEW OF SYSTEMS    (2-9 systems for level 4, 10 or more for level 5)     Review of Systems   Constitutional: Negative for chills, diaphoresis and fever. Respiratory: Positive for chest tightness.  Negative for cough, shortness of breath Behavior is cooperative. Thought Content: Thought content normal.         DIAGNOSTIC RESULTS   LABS:    Labs Reviewed   BASIC METABOLIC PANEL W/ REFLEX TO MG FOR LOW K - Abnormal; Notable for the following components:       Result Value    CREATININE 0.8 (*)     All other components within normal limits    Narrative:     Performed at:  [facility]  [address] [place], [place] [address]   Phone [phone]   CBC WITH AUTO DIFFERENTIAL    Narrative:     Performed at:  [facility]  [address] [place], [place] [address]   Phone [phone]   TROPONIN    Narrative:     Performed at:  [place] [address] [place], [place] [address]   Phone [phone]       All other labs were within normal range or not returned as of this dictation. EKG: All EKG's are interpreted by the Emergency Department Physician in the absence of a cardiologist.  Please see their note for interpretation of EKG. RADIOLOGY:   Non-plain film images such as CT, Ultrasound and MRI are read by the radiologist. Plain radiographic images are visualized and preliminarily interpreted by the ED Provider with the below findings:        Interpretation per the Radiologist below, if available at the time of this note:    XR CHEST (2 VW)   Final Result   No active cardiopulmonary disease           Xr Chest (2 Vw)    Result Date: 12/29/2020  EXAMINATION: TWO XRAY VIEWS OF THE CHEST 12/29/2020 2:00 pm COMPARISON: 05/05/2020 HISTORY: ORDERING SYSTEM PROVIDED HISTORY: Chest Pain TECHNOLOGIST PROVIDED HISTORY: Reason for exam:->Chest Pain Reason for Exam: CHEST & BACK PAIN,THUY NUMNESS. SX x 4 DAYS Acuity: Acute Type of Exam: Initial FINDINGS: Heart size and pulmonary vasculature within normal limits. Coronary artery stents noted. Lungs clear.   Costophrenic angles sharp     No active cardiopulmonary disease           PROCEDURES Unless otherwise noted below, none     Procedures    CRITICAL CARE TIME   N/A    CONSULTS:  IP CONSULT TO CARDIOLOGY      EMERGENCY DEPARTMENT COURSE and DIFFERENTIAL DIAGNOSIS/MDM:   Vitals:    Vitals:    12/29/20 1350 12/29/20 1354   BP:  126/85   Pulse: 67    Resp: 18    Temp: 98.3 °F (36.8 °C)    TempSrc: Tympanic    SpO2: 100%        Patient was given the following medications:  Medications   aspirin chewable tablet 243 mg (243 mg Oral Given 12/29/20 1619)           ED COURSE & MEDICAL DECISION MAKING    - The patient presented to the ER with complaints of symptoms of heartburn, and concern for possible heart attack. Vital signs were reviewed. Exam with WDWN male in no apparent distress, stating he feels much improved. Peripheral IV placed. Labs, Imaging ordered. - Pertinent Labs & Imaging studies reviewed. (See chart for details)   -  Patient seen and evaluated in the emergency department. -  Triage and nursing notes reviewed and incorporated. -  Old chart records reviewed and incorporated. -  SHAQ. I have evaluated this patient. My supervising physician was available for consultation.  -  Differential diagnosis includes: acute coronary syndrome, pulmonary embolism, COPD/asthma, pneumonia, musculoskeletal, reflux/PUD/gastritis, pneumothorax, CHF, thoracic aortic dissection, anxiety  -  Work-up included:  See above  -  ED treatment included:  aspirin  - Consults: Cardiology - The patient has establish care with Dr. Jessica Hensley, that is who placed his stents in April 2020. I did contact Dr. Jessica Hensley and discussed the case with him, and he came to the emergency department to evaluate the patient formally. At this time he does recommend discharge and will follow up with the patient in the next 3 weeks, encouraged used of nitroglycerin and to stop smoking.  -  Results discussed with patient.   Labs show no leukocytosis or concerning anemia, metabolic panel with no concerning abnormalities, his troponin is less than 0.01. Imaging studies show no acute cardiopulmonary process. Please see attending physician's note for EKG interpretation. Patient feels significantly improved. His heart score is 2. At this time, we recommend discharge, as the patient was physically evaluated in the emergency department by his cardiologist that placed the stents a few months ago, and Dr. Cliff Fofana does recommend discharge and outpatient follow-up. The patient is very agreeable with plan of care and disposition.  -  Disposition:   Home in stable condition      FINAL IMPRESSION      1.  Chest discomfort          DISPOSITION/PLAN   DISPOSITION Decision To Discharge 12/29/2020 05:04:41 PM      PATIENT REFERREDTO:  Brodie Homans, DO  [address] [place] [address]    Schedule an appointment as soon as possible for a visit       Russ Amezcua MD  [address] [place] [address] [facility] [address] [place] [id number] [phone]    Schedule an appointment as soon as possible for a visit in 3 weeks      [facility] Emergency Department  [address] [facility]  [phone]    If symptoms worsen      DISCHARGE MEDICATIONS:  New Prescriptions    No medications on file       DISCONTINUED MEDICATIONS:  Discontinued Medications    FAMOTIDINE PO    Take by mouth    NICOTINE (NICODERM CQ) 21 MG/24HR    Place 1 patch onto the skin daily              (Please note that portions of this note were completed with a voice recognition program.  Efforts were made to edit the dictations but occasionally words are mis-transcribed.)    MERNA Justice (electronically signed)            Ama Scott, [address]  12/29/20 6180

## 2020-12-29 NOTE — CONSULTS
Cardiology Consultation   Referring Physician: GARLAND Bradley   Reason for Consultation: chest pain   Chief Complaint: chest pain                                                                               Subjective:   History of Present Illness:  Queenie Colbert is a 41 y.o. male who presents with the above complaint. One week ago developed intermittent chest pain with associated back, neck, shoulder pain. None exertional. Spontaneously resolved, no specific exacerbating symptoms. Presented to [facility] ER for evaluation. Currently he denies chest pain, PND, orthopnea, dyspnea at rest, palpitations, syncope or edema. Prior catheterization on 20 resulting in MI x 3 to RCA. and underwent repeat angiography 20 that showed patent stents.      Prior cardiac testing:     EK20 reviewed. NSR      Echo: 2020  There is akinesis of the mid to apical/ lateral myocardium. Ejection fraction is visually estimated to be 40-45%. Grade I diastolic dysfunction with normal LV filling pressures. No evidence of left ventricular mass or thrombus noted.     Echo: 7/15/20  Left ventricular cavity size is normal.  There is moderate concentric left ventricular hypertrophy. Ejection fraction is visually estimated to be 45-50%. There is moderate hypokinesis of the mid to apical inferoseptal and inferior walls. Diastolic filling parameters suggest grade I diastolic dysfunction. Normal right ventricular size. Right ventricular systolic function is moderately reduced. TAPSE 1.2 cm     Cath: 2020  ANGIOGRAM/CORONARY ARTERIOGRAM:        The left main coronary artery is patent   The left anterior descending artery is patent, D1 stent is widely patent .  The left circumflex artery is patent, mid stent is widely patent.   The right coronary artery is diffusely diseased, prox 80%, distal 80%      INTERVENTION  1. JR 4 guide   2. runthrough wire used to cross the lesion   3. Distal RCA predilated w/ 2.5/3.5 regular balloons  4. IVUS catheter passed to distal RCA, distal reference diameter 4.5 mm   5. 4.0 X 18 mm Ari MI distal RCA  6. 4.0 X 26 / 4.5 X 12 prox RCA  7. Post dilation distal/prox  RCA w/ 4.0 NC balloon to 18 keyur      SUMMARY:      Single vessel CAD   S/p successful IVUS guided PCI distal/prox RCA X 3 MI      Cath: 6/12/20   The left main coronary artery is patent   The left anterior descending artery is widely patent D1 stent is widely patent   The left circumflex artery is widely patent, small vessel OM disease   The right coronary artery is widely patent, with patents stents, small vessel disease of the distal RPDA      Past Medical History:   has a past medical history of Coronary artery disease, Essential hypertension, Headache, HTN (hypertension), Other hyperlipidemia, and Tobacco abuse counseling.     Surgical History:   has a past surgical history that includes Mouth surgery; cranial lesion resection; Coronary angioplasty with stent (05/05/2020); and Coronary angioplasty with stent (04/25/2020).    Social History:   reports that he has been smoking cigarettes. He started smoking about 30 years ago. He has been smoking about 1.00 pack per day. He has never used smokeless tobacco. He reports that he does not drink alcohol or use drugs.      Family History:  family history includes Heart Disease in his maternal cousin and maternal uncle; High Blood Pressure in his maternal aunt.     Home Medications:  Were reviewed and are listed in nursing record and/or below  Home Medications           Prior to Admission medications    Medication Sig Start Date End Date Taking?  Authorizing Provider   FAMOTIDINE PO Take by mouth     Yes Historical Provider, MD   aspirin 81 MG chewable tablet Take 1 tablet by mouth daily 7/8/20   Yes Chantel Ocampo MD   lisinopril (PRINIVIL;ZESTRIL) 20 MG tablet Take 1 tablet by mouth daily 7/7/20   Yes Chantel Ocampo MD   isosorbide mononitrate (IMDUR) 60 MG extended release tablet Take 1 tablet by mouth daily 7/7/20   Yes Donovan Chaidez MD   prasugrel (EFFIENT) 10 MG TABS Take 1 tablet by mouth daily 5/22/20   Yes Donovan Chaidez MD   atorvastatin (LIPITOR) 80 MG tablet Take 1 tablet by mouth nightly 5/22/20   Yes Donovan Chaidez MD   carvedilol (COREG) 6.25 MG tablet Take 1 tablet by mouth 2 times daily (with meals) 5/22/20   Yes Donovan Chaidez MD   nitroGLYCERIN (NITROSTAT) 0.4 MG SL tablet up to max of 3 total doses. If no relief after 1 dose, call 911. 5/6/20   Yes LACHELLE Nava CNP   nicotine (NICODERM CQ) 21 MG/24HR Place 1 patch onto the skin daily  Patient not taking: Reported on 7/20/2020 4/7/20 5/19/20   Latonia Waters DO               Allergies:  Patient has no known allergies.      Review of Systems:   · Constitutional: no unanticipated weight loss. There's been no change in energy level, sleep pattern, or activity level. No fevers, chills. · Eyes: No visual changes or diplopia. No scleral icterus. · ENT: No Headaches, hearing loss or vertigo. No mouth sores or sore throat. · Cardiovascular: as reviewed in HPI  · Respiratory: No cough or wheezing, no sputum production. No hemoptysis. · Gastrointestinal: No abdominal pain, appetite loss, blood in stools. No change in bowel or bladder habits. · Genitourinary: No dysuria, trouble voiding, or hematuria. · Musculoskeletal:  No gait disturbance, no joint complaints. · Integumentary: No rash or pruritis. · Neurological: No headache, diplopia, change in muscle strength, numbness or tingling. · Psychiatric: No anxiety or depression. · Endocrine: No temperature intolerance. No excessive thirst, fluid intake, or urination. No tremor. · Hematologic/Lymphatic: No abnormal bruising or bleeding, blood clots or swollen lymph nodes.   · Allergic/Immunologic: No nasal congestion or hives.     Objective:   PHYSICAL EXAM:        Vitals:     07/20/20 0842   BP: 101/63   Pulse: 81   Temp: 97.7 °F (36.5 °C)   SpO2: 96%    Weight: 228 lb (103.4 kg)(WITH SHOES)       General Appearance:  Alert, cooperative, no distress, appears stated age. Head:  Normocephalic, without obvious abnormality, atraumatic. Eyes:  Pupils equal and round. No scleral icterus. Mouth: Moist mucosa, no pharyngeal erythema. Nose: Nares normal. No drainage or sinus tenderness. Neck: Supple, symmetrical, trachea midline. No adenopathy. No tenderness/mass/nodules. No carotid bruit or elevated JVD. Lungs:   Clear to auscultation bilaterally, respirations unlabored. No wheeze, rales, or rhonchi. Chest Wall:  No tenderness or deformity. Heart:  Regular rate. S1/S2 normal. No murmur, rub, or gallop. Abdomen:   Soft, non-tender, bowel sounds active. Musculoskeletal: No muscle wasting or digital clubbing. Extremities: Extremities normal, atraumatic. No cyanosis or edema. Pulses: 2+ radial and carotid pulses, symmetric. Skin: No rashes or lesions. Pysch: Normal mood and affect. Alert and oriented x 4.    Neurologic: Normal gross motor and sensory exam.       Labs      CBC:         Lab Results   Component Value Date     WBC 11.0 06/12/2020     RBC 5.31 06/12/2020     HGB 16.3 06/12/2020     HCT 47.5 06/12/2020     MCV 89.5 06/12/2020     RDW 13.8 06/12/2020      06/12/2020      CMP:        Lab Results   Component Value Date      06/12/2020     K 4.0 06/12/2020      06/12/2020     CO2 25 06/12/2020     BUN 11 06/12/2020     CREATININE 0.8 06/12/2020     CREATININE 0.7 06/12/2020     GFRAA >60 06/12/2020     AGRATIO 1.2 05/05/2020     LABGLOM >60 06/12/2020     GLUCOSE 116 06/12/2020     PROT 7.7 05/05/2020     CALCIUM 9.1 06/12/2020     BILITOT 0.3 05/05/2020     ALKPHOS 103 05/05/2020     AST 14 05/05/2020     ALT 8 05/05/2020      PT/INR:  No results found for: PTINR  HgBA1c:No results found for: LABA1C        Lab Results   Component Value Date     TROPONINI <0.01 05/05/2020         CURRENT Medications:         Current Outpatient Medications on File Prior to Visit   Medication Sig Dispense Refill    FAMOTIDINE PO Take by mouth        aspirin 81 MG chewable tablet Take 1 tablet by mouth daily 30 tablet 11    lisinopril (PRINIVIL;ZESTRIL) 20 MG tablet Take 1 tablet by mouth daily 90 tablet 3    isosorbide mononitrate (IMDUR) 60 MG extended release tablet Take 1 tablet by mouth daily 30 tablet 5    prasugrel (EFFIENT) 10 MG TABS Take 1 tablet by mouth daily 96 tablet 3    atorvastatin (LIPITOR) 80 MG tablet Take 1 tablet by mouth nightly 90 tablet 3    carvedilol (COREG) 6.25 MG tablet Take 1 tablet by mouth 2 times daily (with meals) 180 tablet 3    nitroGLYCERIN (NITROSTAT) 0.4 MG SL tablet up to max of 3 total doses.  If no relief after 1 dose, call 911. 25 tablet 3    nicotine (NICODERM CQ) 21 MG/24HR Place 1 patch onto the skin daily (Patient not taking: Reported on 7/20/2020) 42 patch 0      No current facility-administered medications on file prior to visit.          Assessment and Plan   1) Chest pain   - ACS ruled out  - more likely MSK  - can be d/c home with f/u with me in 3 weeks     2) CAD   -s/p successful IVUS guided PCI distal/prox RCA X 3 MI   -On BB carvedilol 6.25 mg for anti remodeling  -Continue DAPT for a minimum of 1 year   -Risk factor modification discussed      3) Tobacco abuse   -Encouraged smoking cessation           Thank you for allowing us to participate in the care of Hilda North MD [address] [place] [address] and Interventional Cardiology   [address]   (C): [phone]  Merari Collins): [phone]

## 2020-12-29 NOTE — TELEPHONE ENCOUNTER
From: Randall Hutchinson  To:  Janell Montgomery MD  Sent: 12/29/2020 12:59 PM EST  Subject: Visit Follow-Up Question    I feel like I'm having a heart attach my arm still hurts

## 2020-12-30 NOTE — ED PROVIDER NOTES
EKG NSR W SINUS ARRHYTHMIA, RATE 70 BPM, NO ABRIL. I solely interpreted this patient's EKG; I did not participate in management/treatment/disposition.      Nona Ganser, MD  01/04/21 2633

## 2020-12-31 LAB
EKG ATRIAL RATE: 70 BPM
EKG DIAGNOSIS: NORMAL
EKG P AXIS: 52 DEGREES
EKG P-R INTERVAL: 176 MS
EKG Q-T INTERVAL: 404 MS
EKG QRS DURATION: 104 MS
EKG QTC CALCULATION (BAZETT): 436 MS
EKG R AXIS: 68 DEGREES
EKG T AXIS: 36 DEGREES
EKG VENTRICULAR RATE: 70 BPM

## 2020-12-31 PROCEDURE — 93010 ELECTROCARDIOGRAM REPORT: CPT | Performed by: INTERNAL MEDICINE

## 2021-01-06 RX ORDER — CARVEDILOL 6.25 MG/1
6.25 TABLET ORAL 2 TIMES DAILY WITH MEALS
Qty: 180 TABLET | Refills: 3 | Status: SHIPPED | OUTPATIENT
Start: 2021-01-06 | End: 2021-07-30 | Stop reason: SDUPTHER

## 2021-01-06 RX ORDER — FAMOTIDINE 20 MG/1
20 TABLET, FILM COATED ORAL 2 TIMES DAILY
Qty: 180 TABLET | Refills: 2 | Status: SHIPPED | OUTPATIENT
Start: 2021-01-06 | End: 2021-08-04 | Stop reason: SDUPTHER

## 2021-01-06 RX ORDER — ASPIRIN 81 MG/1
81 TABLET, CHEWABLE ORAL DAILY
Qty: 90 TABLET | Refills: 3 | Status: SHIPPED | OUTPATIENT
Start: 2021-01-06 | End: 2021-02-13 | Stop reason: SDUPTHER

## 2021-01-06 RX ORDER — ATORVASTATIN CALCIUM 80 MG/1
80 TABLET, FILM COATED ORAL NIGHTLY
Qty: 90 TABLET | Refills: 3 | Status: SHIPPED | OUTPATIENT
Start: 2021-01-06 | End: 2021-07-30 | Stop reason: SDUPTHER

## 2021-01-06 RX ORDER — PRASUGREL 10 MG/1
10 TABLET, FILM COATED ORAL DAILY
Qty: 90 TABLET | Refills: 3 | Status: ON HOLD | OUTPATIENT
Start: 2021-01-06 | End: 2021-03-19 | Stop reason: HOSPADM

## 2021-01-22 RX ORDER — NITROGLYCERIN 0.4 MG/1
TABLET SUBLINGUAL
Qty: 25 TABLET | Refills: 3 | Status: SHIPPED | OUTPATIENT
Start: 2021-01-22 | End: 2021-02-13 | Stop reason: SDUPTHER

## 2021-01-22 NOTE — TELEPHONE ENCOUNTER
Medication Refill    nitroGLYCERIN (NITROSTAT) 0.4 MG SL tablet  up to max of 3 total doses. If no relief after 1 dose, call 911.     [facility] [address], [address] [facility] -  [phone]

## 2021-02-15 RX ORDER — ISOSORBIDE MONONITRATE 60 MG/1
60 TABLET, EXTENDED RELEASE ORAL DAILY
Qty: 30 TABLET | Refills: 5 | Status: ON HOLD | OUTPATIENT
Start: 2021-02-15 | End: 2021-03-19 | Stop reason: HOSPADM

## 2021-02-15 RX ORDER — NITROGLYCERIN 0.4 MG/1
TABLET SUBLINGUAL
Qty: 25 TABLET | Refills: 3 | Status: ON HOLD | OUTPATIENT
Start: 2021-02-15 | End: 2021-03-19 | Stop reason: HOSPADM

## 2021-02-15 RX ORDER — ASPIRIN 81 MG/1
81 TABLET, CHEWABLE ORAL DAILY
Qty: 90 TABLET | Refills: 3 | Status: ON HOLD | OUTPATIENT
Start: 2021-02-15 | End: 2021-03-19 | Stop reason: HOSPADM

## 2021-03-05 ENCOUNTER — TELEPHONE (OUTPATIENT)
Dept: FAMILY MEDICINE CLINIC | Age: 41
End: 2021-03-05

## 2021-03-05 ENCOUNTER — APPOINTMENT (OUTPATIENT)
Dept: GENERAL RADIOLOGY | Age: 41
DRG: 165 | End: 2021-03-05
Payer: MEDICARE

## 2021-03-05 ENCOUNTER — HOSPITAL ENCOUNTER (INPATIENT)
Age: 41
LOS: 14 days | Discharge: HOME | DRG: 165 | End: 2021-03-19
Attending: EMERGENCY MEDICINE | Admitting: THORACIC SURGERY (CARDIOTHORACIC VASCULAR SURGERY)
Payer: MEDICARE

## 2021-03-05 DIAGNOSIS — I21.4 NSTEMI (NON-ST ELEVATED MYOCARDIAL INFARCTION) (HCC): Primary | ICD-10-CM

## 2021-03-05 DIAGNOSIS — Z95.1 S/P CABG X 4: ICD-10-CM

## 2021-03-05 DIAGNOSIS — Z98.890 S/P LEFT ATRIAL APPENDAGE LIGATION: ICD-10-CM

## 2021-03-05 DIAGNOSIS — I25.10 CAD IN NATIVE ARTERY: ICD-10-CM

## 2021-03-05 LAB
ALBUMIN SERPL-MCNC: 4 G/DL (ref 3.4–5)
ALP BLD-CCNC: 119 U/L (ref 40–129)
ALT SERPL-CCNC: 12 U/L (ref 10–40)
ANION GAP SERPL CALCULATED.3IONS-SCNC: 9 MMOL/L (ref 3–16)
APTT: 52.3 SEC (ref 24.2–36.2)
APTT: 56.1 SEC (ref 24.2–36.2)
AST SERPL-CCNC: 23 U/L (ref 15–37)
BASOPHILS ABSOLUTE: 0.1 K/UL (ref 0–0.2)
BASOPHILS RELATIVE PERCENT: 1 %
BILIRUB SERPL-MCNC: <0.2 MG/DL (ref 0–1)
BILIRUBIN DIRECT: <0.2 MG/DL (ref 0–0.3)
BILIRUBIN, INDIRECT: NORMAL MG/DL (ref 0–1)
BUN BLDV-MCNC: 12 MG/DL (ref 7–20)
CALCIUM SERPL-MCNC: 9.3 MG/DL (ref 8.3–10.6)
CHLORIDE BLD-SCNC: 102 MMOL/L (ref 99–110)
CO2: 24 MMOL/L (ref 21–32)
CREAT SERPL-MCNC: 0.8 MG/DL (ref 0.9–1.3)
EKG ATRIAL RATE: 61 BPM
EKG DIAGNOSIS: NORMAL
EKG P AXIS: 50 DEGREES
EKG P-R INTERVAL: 164 MS
EKG Q-T INTERVAL: 416 MS
EKG QRS DURATION: 106 MS
EKG QTC CALCULATION (BAZETT): 418 MS
EKG R AXIS: 85 DEGREES
EKG T AXIS: 26 DEGREES
EKG VENTRICULAR RATE: 61 BPM
EOSINOPHILS ABSOLUTE: 0.2 K/UL (ref 0–0.6)
EOSINOPHILS RELATIVE PERCENT: 1.7 %
GFR AFRICAN AMERICAN: >60
GFR NON-AFRICAN AMERICAN: >60
GLUCOSE BLD-MCNC: 126 MG/DL (ref 70–99)
HCT VFR BLD CALC: 48.9 % (ref 40.5–52.5)
HEMOGLOBIN: 16.5 G/DL (ref 13.5–17.5)
LIPASE: 56 U/L (ref 13–60)
LV EF: 60 %
LVEF MODALITY: NORMAL
LYMPHOCYTES ABSOLUTE: 4.1 K/UL (ref 1–5.1)
LYMPHOCYTES RELATIVE PERCENT: 34.4 %
MCH RBC QN AUTO: 30.2 PG (ref 26–34)
MCHC RBC AUTO-ENTMCNC: 33.8 G/DL (ref 31–36)
MCV RBC AUTO: 89.4 FL (ref 80–100)
MONOCYTES ABSOLUTE: 1 K/UL (ref 0–1.3)
MONOCYTES RELATIVE PERCENT: 8.3 %
NEUTROPHILS ABSOLUTE: 6.5 K/UL (ref 1.7–7.7)
NEUTROPHILS RELATIVE PERCENT: 54.6 %
PDW BLD-RTO: 13.8 % (ref 12.4–15.4)
PLATELET # BLD: 253 K/UL (ref 135–450)
PMV BLD AUTO: 9.1 FL (ref 5–10.5)
POTASSIUM REFLEX MAGNESIUM: 4.5 MMOL/L (ref 3.5–5.1)
PRO-BNP: 151 PG/ML (ref 0–124)
RBC # BLD: 5.47 M/UL (ref 4.2–5.9)
SODIUM BLD-SCNC: 135 MMOL/L (ref 136–145)
TOTAL PROTEIN: 7.2 G/DL (ref 6.4–8.2)
TROPONIN: 0.04 NG/ML
TROPONIN: 0.06 NG/ML
TROPONIN: 0.13 NG/ML
WBC # BLD: 12 K/UL (ref 4–11)

## 2021-03-05 PROCEDURE — 94760 N-INVAS EAR/PLS OXIMETRY 1: CPT

## 2021-03-05 PROCEDURE — 93010 ELECTROCARDIOGRAM REPORT: CPT | Performed by: INTERNAL MEDICINE

## 2021-03-05 PROCEDURE — 83690 ASSAY OF LIPASE: CPT

## 2021-03-05 PROCEDURE — 6370000000 HC RX 637 (ALT 250 FOR IP): Performed by: EMERGENCY MEDICINE

## 2021-03-05 PROCEDURE — 6370000000 HC RX 637 (ALT 250 FOR IP): Performed by: FAMILY MEDICINE

## 2021-03-05 PROCEDURE — 80048 BASIC METABOLIC PNL TOTAL CA: CPT

## 2021-03-05 PROCEDURE — 36415 COLL VENOUS BLD VENIPUNCTURE: CPT

## 2021-03-05 PROCEDURE — 6370000000 HC RX 637 (ALT 250 FOR IP): Performed by: INTERNAL MEDICINE

## 2021-03-05 PROCEDURE — 2500000003 HC RX 250 WO HCPCS: Performed by: EMERGENCY MEDICINE

## 2021-03-05 PROCEDURE — 71045 X-RAY EXAM CHEST 1 VIEW: CPT

## 2021-03-05 PROCEDURE — 2060000000 HC ICU INTERMEDIATE R&B

## 2021-03-05 PROCEDURE — 78452 HT MUSCLE IMAGE SPECT MULT: CPT

## 2021-03-05 PROCEDURE — 84484 ASSAY OF TROPONIN QUANT: CPT

## 2021-03-05 PROCEDURE — 83880 ASSAY OF NATRIURETIC PEPTIDE: CPT

## 2021-03-05 PROCEDURE — 2500000003 HC RX 250 WO HCPCS

## 2021-03-05 PROCEDURE — 85730 THROMBOPLASTIN TIME PARTIAL: CPT

## 2021-03-05 PROCEDURE — 3430000000 HC RX DIAGNOSTIC RADIOPHARMACEUTICAL: Performed by: INTERNAL MEDICINE

## 2021-03-05 PROCEDURE — A9502 TC99M TETROFOSMIN: HCPCS | Performed by: INTERNAL MEDICINE

## 2021-03-05 PROCEDURE — 99254 IP/OBS CNSLTJ NEW/EST MOD 60: CPT | Performed by: INTERNAL MEDICINE

## 2021-03-05 PROCEDURE — 96374 THER/PROPH/DIAG INJ IV PUSH: CPT

## 2021-03-05 PROCEDURE — 2580000003 HC RX 258: Performed by: INTERNAL MEDICINE

## 2021-03-05 PROCEDURE — 6360000002 HC RX W HCPCS: Performed by: INTERNAL MEDICINE

## 2021-03-05 PROCEDURE — 99284 EMERGENCY DEPT VISIT MOD MDM: CPT

## 2021-03-05 PROCEDURE — 80076 HEPATIC FUNCTION PANEL: CPT

## 2021-03-05 PROCEDURE — 6360000002 HC RX W HCPCS: Performed by: EMERGENCY MEDICINE

## 2021-03-05 PROCEDURE — 93005 ELECTROCARDIOGRAM TRACING: CPT | Performed by: EMERGENCY MEDICINE

## 2021-03-05 PROCEDURE — 85025 COMPLETE CBC W/AUTO DIFF WBC: CPT

## 2021-03-05 PROCEDURE — 93017 CV STRESS TEST TRACING ONLY: CPT

## 2021-03-05 PROCEDURE — 6360000002 HC RX W HCPCS

## 2021-03-05 PROCEDURE — 96375 TX/PRO/DX INJ NEW DRUG ADDON: CPT

## 2021-03-05 RX ORDER — NICOTINE 21 MG/24HR
1 PATCH, TRANSDERMAL 24 HOURS TRANSDERMAL DAILY
Status: DISCONTINUED | OUTPATIENT
Start: 2021-03-05 | End: 2021-03-08

## 2021-03-05 RX ORDER — FAMOTIDINE 20 MG/1
20 TABLET, FILM COATED ORAL 2 TIMES DAILY
Status: DISCONTINUED | OUTPATIENT
Start: 2021-03-05 | End: 2021-03-15

## 2021-03-05 RX ORDER — ONDANSETRON 2 MG/ML
4 INJECTION INTRAMUSCULAR; INTRAVENOUS EVERY 4 HOURS PRN
Status: DISCONTINUED | OUTPATIENT
Start: 2021-03-05 | End: 2021-03-15

## 2021-03-05 RX ORDER — SODIUM CHLORIDE 0.9 % (FLUSH) 0.9 %
10 SYRINGE (ML) INJECTION PRN
Status: DISCONTINUED | OUTPATIENT
Start: 2021-03-05 | End: 2021-03-08 | Stop reason: SDUPTHER

## 2021-03-05 RX ORDER — PRASUGREL 10 MG/1
10 TABLET, FILM COATED ORAL DAILY
Status: DISCONTINUED | OUTPATIENT
Start: 2021-03-05 | End: 2021-03-08

## 2021-03-05 RX ORDER — HEPARIN SODIUM 1000 [USP'U]/ML
60 INJECTION, SOLUTION INTRAVENOUS; SUBCUTANEOUS PRN
Status: DISCONTINUED | OUTPATIENT
Start: 2021-03-05 | End: 2021-03-05

## 2021-03-05 RX ORDER — HEPARIN SODIUM 1000 [USP'U]/ML
4000 INJECTION, SOLUTION INTRAVENOUS; SUBCUTANEOUS ONCE
Status: COMPLETED | OUTPATIENT
Start: 2021-03-05 | End: 2021-03-05

## 2021-03-05 RX ORDER — ASPIRIN 81 MG/1
81 TABLET, CHEWABLE ORAL DAILY
Status: DISCONTINUED | OUTPATIENT
Start: 2021-03-05 | End: 2021-03-15

## 2021-03-05 RX ORDER — ISOSORBIDE MONONITRATE 60 MG/1
60 TABLET, EXTENDED RELEASE ORAL DAILY
Status: DISCONTINUED | OUTPATIENT
Start: 2021-03-05 | End: 2021-03-15

## 2021-03-05 RX ORDER — HEPARIN SODIUM 1000 [USP'U]/ML
30 INJECTION, SOLUTION INTRAVENOUS; SUBCUTANEOUS PRN
Status: DISCONTINUED | OUTPATIENT
Start: 2021-03-05 | End: 2021-03-05

## 2021-03-05 RX ORDER — HEPARIN SODIUM 10000 [USP'U]/100ML
12.1 INJECTION, SOLUTION INTRAVENOUS CONTINUOUS
Status: DISPENSED | OUTPATIENT
Start: 2021-03-05 | End: 2021-03-12

## 2021-03-05 RX ORDER — SODIUM CHLORIDE 0.9 % (FLUSH) 0.9 %
10 SYRINGE (ML) INJECTION EVERY 12 HOURS SCHEDULED
Status: DISCONTINUED | OUTPATIENT
Start: 2021-03-05 | End: 2021-03-08 | Stop reason: SDUPTHER

## 2021-03-05 RX ORDER — POLYETHYLENE GLYCOL 3350 17 G/17G
17 POWDER, FOR SOLUTION ORAL DAILY PRN
Status: DISCONTINUED | OUTPATIENT
Start: 2021-03-05 | End: 2021-03-17

## 2021-03-05 RX ORDER — SODIUM CHLORIDE 9 MG/ML
INJECTION, SOLUTION INTRAVENOUS CONTINUOUS
Status: DISCONTINUED | OUTPATIENT
Start: 2021-03-05 | End: 2021-03-07

## 2021-03-05 RX ORDER — LISINOPRIL 20 MG/1
20 TABLET ORAL DAILY
Status: DISCONTINUED | OUTPATIENT
Start: 2021-03-05 | End: 2021-03-08

## 2021-03-05 RX ORDER — CARVEDILOL 6.25 MG/1
6.25 TABLET ORAL 2 TIMES DAILY WITH MEALS
Status: DISCONTINUED | OUTPATIENT
Start: 2021-03-05 | End: 2021-03-15

## 2021-03-05 RX ORDER — MORPHINE SULFATE 4 MG/ML
4 INJECTION, SOLUTION INTRAMUSCULAR; INTRAVENOUS ONCE
Status: COMPLETED | OUTPATIENT
Start: 2021-03-05 | End: 2021-03-05

## 2021-03-05 RX ORDER — NITROGLYCERIN 0.4 MG/1
0.4 TABLET SUBLINGUAL EVERY 5 MIN PRN
Status: DISCONTINUED | OUTPATIENT
Start: 2021-03-05 | End: 2021-03-16

## 2021-03-05 RX ORDER — ACETAMINOPHEN 325 MG/1
650 TABLET ORAL EVERY 4 HOURS PRN
Status: DISCONTINUED | OUTPATIENT
Start: 2021-03-05 | End: 2021-03-15

## 2021-03-05 RX ORDER — ASPIRIN 81 MG/1
324 TABLET, CHEWABLE ORAL ONCE
Status: COMPLETED | OUTPATIENT
Start: 2021-03-05 | End: 2021-03-05

## 2021-03-05 RX ORDER — ACETAMINOPHEN 650 MG/1
650 SUPPOSITORY RECTAL EVERY 4 HOURS PRN
Status: DISCONTINUED | OUTPATIENT
Start: 2021-03-05 | End: 2021-03-15

## 2021-03-05 RX ORDER — ATORVASTATIN CALCIUM 80 MG/1
80 TABLET, FILM COATED ORAL NIGHTLY
Status: DISCONTINUED | OUTPATIENT
Start: 2021-03-05 | End: 2021-03-15

## 2021-03-05 RX ADMIN — HEPARIN SODIUM 10 ML/HR: 10000 INJECTION, SOLUTION INTRAVENOUS at 05:23

## 2021-03-05 RX ADMIN — TETROFOSMIN 30 MILLICURIE: 1.38 INJECTION, POWDER, LYOPHILIZED, FOR SOLUTION INTRAVENOUS at 13:56

## 2021-03-05 RX ADMIN — SODIUM CHLORIDE: 9 INJECTION, SOLUTION INTRAVENOUS at 08:00

## 2021-03-05 RX ADMIN — MORPHINE SULFATE 4 MG: 4 INJECTION, SOLUTION INTRAMUSCULAR; INTRAVENOUS at 05:17

## 2021-03-05 RX ADMIN — ATORVASTATIN CALCIUM 80 MG: 80 TABLET, FILM COATED ORAL at 20:04

## 2021-03-05 RX ADMIN — TETROFOSMIN 10 MILLICURIE: 1.38 INJECTION, POWDER, LYOPHILIZED, FOR SOLUTION INTRAVENOUS at 09:20

## 2021-03-05 RX ADMIN — ASPIRIN 81 MG: 81 TABLET, CHEWABLE ORAL at 09:17

## 2021-03-05 RX ADMIN — PRASUGREL 10 MG: 10 TABLET, FILM COATED ORAL at 09:21

## 2021-03-05 RX ADMIN — ISOSORBIDE MONONITRATE 60 MG: 60 TABLET, EXTENDED RELEASE ORAL at 09:17

## 2021-03-05 RX ADMIN — FAMOTIDINE 20 MG: 20 TABLET, FILM COATED ORAL at 09:17

## 2021-03-05 RX ADMIN — REGADENOSON 0.4 MG: 0.08 INJECTION, SOLUTION INTRAVENOUS at 13:53

## 2021-03-05 RX ADMIN — Medication 10 ML: at 20:04

## 2021-03-05 RX ADMIN — FAMOTIDINE 20 MG: 20 TABLET, FILM COATED ORAL at 20:04

## 2021-03-05 RX ADMIN — ASPIRIN 324 MG: 81 TABLET, CHEWABLE ORAL at 05:17

## 2021-03-05 RX ADMIN — ACETAMINOPHEN 650 MG: 325 TABLET ORAL at 20:13

## 2021-03-05 RX ADMIN — HEPARIN SODIUM 4000 UNITS: 1000 INJECTION INTRAVENOUS; SUBCUTANEOUS at 05:23

## 2021-03-05 RX ADMIN — CARVEDILOL 6.25 MG: 6.25 TABLET, FILM COATED ORAL at 18:07

## 2021-03-05 ASSESSMENT — ENCOUNTER SYMPTOMS
EYE PAIN: 0
CHEST TIGHTNESS: 0
EYE ITCHING: 0
PHOTOPHOBIA: 0
SHORTNESS OF BREATH: 0
ABDOMINAL DISTENTION: 0
RECTAL PAIN: 0
BACK PAIN: 0
CHOKING: 0
EYE DISCHARGE: 0
DIARRHEA: 0
COUGH: 0
STRIDOR: 0
WHEEZING: 0
COLOR CHANGE: 0
EYE REDNESS: 0
BLOOD IN STOOL: 0
CONSTIPATION: 0
ABDOMINAL PAIN: 0
NAUSEA: 0
ANAL BLEEDING: 0
VOMITING: 0
APNEA: 0

## 2021-03-05 ASSESSMENT — PAIN DESCRIPTION - FREQUENCY
FREQUENCY: CONTINUOUS

## 2021-03-05 ASSESSMENT — PAIN SCALES - GENERAL
PAINLEVEL_OUTOF10: 5
PAINLEVEL_OUTOF10: 0
PAINLEVEL_OUTOF10: 0
PAINLEVEL_OUTOF10: 3
PAINLEVEL_OUTOF10: 4

## 2021-03-05 ASSESSMENT — PAIN DESCRIPTION - DESCRIPTORS
DESCRIPTORS: HEADACHE
DESCRIPTORS: THROBBING

## 2021-03-05 ASSESSMENT — PAIN DESCRIPTION - LOCATION
LOCATION: CHEST
LOCATION: HEAD

## 2021-03-05 ASSESSMENT — PAIN DESCRIPTION - PAIN TYPE
TYPE: ACUTE PAIN

## 2021-03-05 ASSESSMENT — PAIN - FUNCTIONAL ASSESSMENT: PAIN_FUNCTIONAL_ASSESSMENT: ACTIVITIES ARE NOT PREVENTED

## 2021-03-05 ASSESSMENT — PAIN DESCRIPTION - PROGRESSION: CLINICAL_PROGRESSION: NOT CHANGED

## 2021-03-05 ASSESSMENT — PAIN DESCRIPTION - ORIENTATION: ORIENTATION: ANTERIOR

## 2021-03-05 ASSESSMENT — PAIN DESCRIPTION - ONSET: ONSET: ON-GOING

## 2021-03-05 NOTE — CONSULTS
Cardiology Consultation   Referring Physician: Dr. Fartun Gaming   Reason for Consultation: NSTEMI  Chief Complaint:   Chief Complaint   Patient presents with    Chest Pain     Pt reports \"chest pain since 10 pm\". Pt states that he has \"had a heart attack in the past\". Pt alert and oriented with no signs of distress noted. Pt rates pain as a 3/10. Subjective:   History of Present Illness:     Tor Chirinos is a 41 y.o. male with significant history of CAD ( s/p PCI LCx/RCA), HTN, HL, tobacco abuse who presents with the above complaint. Episode of chest discomfort at night, while at rest, with no associated symptoms. No specific alleviating or exacerbating factors. Currently asymptomatic and denies chest pain, PND, orthopnea, dyspnea at rest, palpitations, syncope or edema. Prior cardiac testing:    EK20 reviewed. NSR      Echo: 2020  There is akinesis of the mid to apical/ lateral myocardium. Ejection fraction is visually estimated to be 40-45%. Grade I diastolic dysfunction with normal LV filling pressures. No evidence of left ventricular mass or thrombus noted.     Echo: 7/15/20  Left ventricular cavity size is normal.  There is moderate concentric left ventricular hypertrophy. Ejection fraction is visually estimated to be 45-50%. There is moderate hypokinesis of the mid to apical inferoseptal and inferior walls. Diastolic filling parameters suggest grade I diastolic dysfunction. Normal right ventricular size. Right ventricular systolic function is moderately reduced. TAPSE 1.2 cm     Cath: 2020  ANGIOGRAM/CORONARY ARTERIOGRAM:        The left main coronary artery is patent   The left anterior descending artery is patent, D1 stent is widely patent .  The left circumflex artery is patent, mid stent is widely patent. The right coronary artery is diffusely diseased, prox 80%, distal 80%      INTERVENTION  1. JR 4 guide   2. runthrough wire used to cross the lesion   3.  Distal RCA predilated w/ 2.5/3.5 regular balloons  4. IVUS catheter passed to distal RCA, distal reference diameter 4.5 mm   5. 4.0 X 18 mm Ari MI distal RCA  6. 4.0 X 26 / 4.5 X 12 prox RCA  7. Post dilation distal/prox  RCA w/ 4.0 NC balloon to 18 keyur      SUMMARY:      Single vessel CAD   S/p successful IVUS guided PCI distal/prox RCA X 3 MI      Cath: 6/12/20   The left main coronary artery is patent   The left anterior descending artery is widely patent D1 stent is widely patent   The left circumflex artery is widely patent, small vessel OM disease   The right coronary artery is widely patent, with patents stents, small vessel disease of the distal RPDA      Past Medical History:   has a past medical history of Coronary artery disease, Essential hypertension, Headache, HTN (hypertension), Other hyperlipidemia, and Tobacco abuse counseling. Surgical History:   has a past surgical history that includes Mouth surgery; cranial lesion resection; Coronary angioplasty with stent (05/05/2020); and Coronary angioplasty with stent (04/25/2020). Social History:   reports that he has been smoking cigarettes. He started smoking about 31 years ago. He has been smoking about 1.00 pack per day. He has never used smokeless tobacco. He reports that he does not drink alcohol or use drugs. Family History:  family history includes Heart Disease in his maternal cousin and maternal uncle; High Blood Pressure in his maternal aunt. Home Medications:  Were reviewed and are listed in nursing record and/or below  Prior to Admission medications    Medication Sig Start Date End Date Taking? Authorizing Provider   isosorbide mononitrate (IMDUR) 60 MG extended release tablet Take 1 tablet by mouth daily 2/15/21  Yes Tomer Damian MD   aspirin 81 MG chewable tablet Take 1 tablet by mouth daily 2/15/21  Yes Tomer Damian MD   nitroGLYCERIN (NITROSTAT) 0.4 MG SL tablet up to max of 3 total doses.  If no relief after 1 dose, call 911. 2/15/21 atraumatic. Eyes:  Pupils equal and round. No scleral icterus. Mouth: Moist mucosa, no pharyngeal erythema. Nose: Nares normal. No drainage or sinus tenderness. Neck: Supple, symmetrical, trachea midline. No adenopathy. No tenderness/mass/nodules. No carotid bruit or elevated JVD. Lungs:   Clear to auscultation bilaterally, respirations unlabored. No wheeze, rales, or rhonchi. Chest Wall:  No tenderness or deformity. Heart:  Regular rate. S1/S2 normal. No murmur, rub, or gallop. Abdomen:   Soft, non-tender, bowel sounds active. Musculoskeletal: No muscle wasting or digital clubbing. Extremities: Extremities normal, atraumatic. No cyanosis or edema. Pulses: 2+ radial and carotid pulses, symmetric. Skin: No rashes or lesions. Pysch: Normal mood and affect. Alert and oriented x 4.    Neurologic: Normal gross motor and sensory exam.       Labs     CBC:   Lab Results   Component Value Date    WBC 12.0 03/05/2021    RBC 5.47 03/05/2021    HGB 16.5 03/05/2021    HCT 48.9 03/05/2021    MCV 89.4 03/05/2021    RDW 13.8 03/05/2021     03/05/2021     CMP:  Lab Results   Component Value Date     03/05/2021    K 4.5 03/05/2021     03/05/2021    CO2 24 03/05/2021    BUN 12 03/05/2021    CREATININE 0.8 03/05/2021    GFRAA >60 03/05/2021    AGRATIO 1.2 05/05/2020    LABGLOM >60 03/05/2021    GLUCOSE 126 03/05/2021    PROT 7.2 03/05/2021    CALCIUM 9.3 03/05/2021    BILITOT <0.2 03/05/2021    ALKPHOS 119 03/05/2021    AST 23 03/05/2021    ALT 12 03/05/2021     PT/INR:  No results found for: PTINR  HgBA1c:No results found for: LABA1C  @RESUFAST(CKTOTAL,CKMB,CKMBINDEX,TROPONINI    CURRENT Medications:  Current Facility-Administered Medications: heparin 25,000 unit in sodium chloride 0.45% 250 mL (premix) infusion, 10 mL/hr, Intravenous, Continuous  prasugrel (EFFIENT) tablet 10 mg, 10 mg, Oral, Daily  nitroGLYCERIN (NITROSTAT) SL tablet 0.4 mg, 0.4 mg, Sublingual, Q5 Min PRN  lisinopril (PRINIVIL;ZESTRIL) tablet 20 mg, 20 mg, Oral, Daily  isosorbide mononitrate (IMDUR) extended release tablet 60 mg, 60 mg, Oral, Daily  famotidine (PEPCID) tablet 20 mg, 20 mg, Oral, BID  carvedilol (COREG) tablet 6.25 mg, 6.25 mg, Oral, BID WC  atorvastatin (LIPITOR) tablet 80 mg, 80 mg, Oral, Nightly  aspirin chewable tablet 81 mg, 81 mg, Oral, Daily  sodium chloride flush 0.9 % injection 10 mL, 10 mL, Intravenous, 2 times per day  sodium chloride flush 0.9 % injection 10 mL, 10 mL, Intravenous, PRN  ondansetron (ZOFRAN) injection 4 mg, 4 mg, Intravenous, Q4H PRN  polyethylene glycol (GLYCOLAX) packet 17 g, 17 g, Oral, Daily PRN  acetaminophen (TYLENOL) tablet 650 mg, 650 mg, Oral, Q4H PRN **OR** acetaminophen (TYLENOL) suppository 650 mg, 650 mg, Rectal, Q4H PRN  0.9 % sodium chloride infusion, , Intravenous, Continuous  technetium tetrofosmin (Tc-MYOVIEW) injection 10 millicurie, 10 millicurie, Intravenous, ONCE PRN  nicotine (NICODERM CQ) 21 MG/24HR 1 patch, 1 patch, Transdermal, Daily     Cardiac testing     EKG: NSR, no acute ischemia     Echo:     Stress Test:      Cath: All above diagnostic testing was independently visualized and reviewed by me (not simply review of report)     Patient Active Problem List   Diagnosis    Essential hypertension    Other hyperlipidemia    Tobacco abuse counseling    Acute coronary syndrome ([address]    SIRS (systemic inflammatory response syndrome) ([address]    NSTEMI (non-ST elevated myocardial infarction) ([address]    Coronary artery disease    Tobacco use         Assessment and Plan   1) NSTEMI  - c/w heparin gtt   - asa/effient   - statin  - beta blockade  - nuclear stress test to localize ischemia and true burden    2) CAD   - asa/effient   - heparin gtt   - statin     3) tobacco abuse   - cessation advised       Thank you for allowing us to participate in the care of Rebekah Baxter.     Osiris Alexandra MD [address] [facility],  Interventional Cardiology, and Peripheral Vascular [address]   (C): [phone]  (O): [phone]

## 2021-03-05 NOTE — PROGRESS NOTES
Pt arrived via stretcher from ED to room 5270  Heart monitor connected and verified with [facility]. VS, assessment, and admission complete. 4 eyes assessment complete. Pt oriented to unit and room. Call light and bedside table in reach. All questions answered. Pt resting quietly in bed with no complaints or voiced needs at this time. Will continue to monitor.

## 2021-03-05 NOTE — PLAN OF CARE
Problem: Pain:  Goal: Pain level will decrease  Description: Pain level will decrease  Outcome: Ongoing  Goal: Control of acute pain  Description: Control of acute pain  Outcome: Ongoing  Goal: Control of chronic pain  Description: Control of chronic pain  Outcome: Ongoing  Goal: Patient's pain/discomfort is manageable  Description: Patient's pain/discomfort is manageable  Outcome: Ongoing     Problem: Infection:  Goal: Will remain free from infection  Description: Will remain free from infection  Outcome: Ongoing     Problem: Safety:  Goal: Free from accidental physical injury  Description: Free from accidental physical injury  Outcome: Ongoing  Goal: Free from intentional harm  Description: Free from intentional harm  Outcome: Ongoing     Problem: Daily Care:  Goal: Daily care needs are met  Description: Daily care needs are met  Outcome: Ongoing     Problem: Skin Integrity:  Goal: Skin integrity will stabilize  Description: Skin integrity will stabilize  Outcome: Ongoing     Problem: Discharge Planning:  Goal: Patients continuum of care needs are met  Description: Patients continuum of care needs are met  Outcome: Ongoing     Problem: Cardiac:  Goal: Ability to maintain vital signs within normal range will improve  Description: Ability to maintain vital signs within normal range will improve  Outcome: Ongoing  Goal: Cardiovascular alteration will improve  Description: Cardiovascular alteration will improve  Outcome: Ongoing     Problem: Health Behavior:  Goal: Will modify at least one risk factor affecting health status  Description: Will modify at least one risk factor affecting health status  Outcome: Ongoing  Goal: Identification of resources available to assist in meeting health care needs will improve  Description: Identification of resources available to assist in meeting health care needs will improve  Outcome: Ongoing     Problem: Physical Regulation:  Goal: Complications related to the disease process, condition or treatment will be avoided or minimized  Description: Complications related to the disease process, condition or treatment will be avoided or minimized  Outcome: Ongoing

## 2021-03-05 NOTE — PROGRESS NOTES
Stress test results reported to [facility] Dr Torin Luis; spoke with Kathy Blake.  Electronically signed by Shakeel Ricardo RN on 3/5/2021 at 4:55 PM

## 2021-03-05 NOTE — PROGRESS NOTES
Seen and examined  NSTEMI  Lexiscan stress to identify area at risk   [facility] scheduled for Monday AM   NPO Hilda Alexandra MD [address], Interventional Cardiology, and Peripheral Vascular [address]   (C): [phone]  Kniza Putt): [phone]

## 2021-03-05 NOTE — H&P
Hospital Medicine History & Physical      PCP: Aimee Taylor DO    Date of Admission: 3/5/2021    Date of Service: Pt seen/examined, with 1st encounter, on 3/5/2021 and Admitted to Inpatient     Chief Complaint:  Chest pain      History Of Present Illness: The patient is a 41 y.o. male with cad s/p yaz to rca x3 (5/2020), s/d chf, htn, hld, who presents to [facility] with chest pain, got some relief with nitro at home. States it feels like his previous MI. When I saw the Pt later he denies chest pain. ED workup  Vitals stable, on room air  Pertinent labs: troponins . 04, wbc 12  Imaging: no infiltrates or consolidation      Past Medical History:        Diagnosis Date    Coronary artery disease     Essential hypertension 4/7/2020    Headache     HTN (hypertension)     Other hyperlipidemia 4/7/2020    Tobacco abuse counseling 4/7/2020       Past Surgical History:        Procedure Laterality Date    CORONARY ANGIOPLASTY WITH STENT PLACEMENT  05/05/2020    YAZ RCA    CORONARY ANGIOPLASTY WITH STENT PLACEMENT  04/25/2020    YAZ Diag    CRANIAL LESION RESECTION      MOUTH SURGERY         Medications Prior to Admission:    Prior to Admission medications    Medication Sig Start Date End Date Taking? Authorizing Provider   isosorbide mononitrate (IMDUR) 60 MG extended release tablet Take 1 tablet by mouth daily 2/15/21  Yes Sally Morales MD   aspirin 81 MG chewable tablet Take 1 tablet by mouth daily 2/15/21  Yes Sally Morales MD   nitroGLYCERIN (NITROSTAT) 0.4 MG SL tablet up to max of 3 total doses.  If no relief after 1 dose, call 911. 2/15/21  Yes Sally Morales MD   carvedilol (COREG) 6.25 MG tablet Take 1 tablet by mouth 2 times daily (with meals) 1/6/21  Yes Sonja Caputo MD   famotidine (PEPCID) 20 MG tablet Take 1 tablet by mouth 2 times daily 1/6/21  Yes Karla PLUMMER Vaibhav Moon MD   atorvastatin (LIPITOR) 80 MG tablet Take 1 tablet by mouth nightly 1/6/21  Yes Salazar Mckee MD   prasugrel (EFFIENT) 10 MG TABS Take 1 tablet by mouth daily 1/6/21  Yes Salazar Mckee MD   lisinopril (PRINIVIL;ZESTRIL) 20 MG tablet Take 1 tablet by mouth daily 12/14/20  Yes Salazar Mckee MD       Allergies:  Patient has no known allergies. Social History:      TOBACCO:   reports that he has been smoking cigarettes. He started smoking about 31 years ago. He has been smoking about 1.00 pack per day. He has never used smokeless tobacco.  ETOH:   reports no history of alcohol use. Family History:          Problem Relation Age of Onset    High Blood Pressure Maternal Aunt     Heart Disease Maternal Uncle     Heart Disease Maternal Cousin        REVIEW OF SYSTEMS:   Positive for chest pain and as noted in the HPI. All other systems reviewed and negative. PHYSICAL EXAM:    /83   Pulse 70   Temp 97.5 °F (36.4 °C) (Oral)   Resp 18   Wt 235 lb 14.3 oz (107 kg)   SpO2 99%   BMI 34.84 kg/m²     General appearance: No apparent distress, cooperative. Morbidly obese  HEENT Normal cephalic, atraumatic without obvious deformity. PERRL. EOM. Conjunctivae/corneas clear. Neck: Supple, No jugular venous distention/bruits. Trachea midline   Lungs: Clear to auscultation, bilaterally without Rales/Wheezes/Rhonchi without accessory muscle use. Heart: Regular rate and rhythm with Normal S1/S2 without murmurs, rubs or gallops  Abdomen: Soft, non-tender or non-distended without rigidity or guarding and positive bowel sounds all four quadrants. Extremities: No clubbing, cyanosis, or edema bilaterally. Skin: Skin color, texture, turgor normal.  No rashes or lesions. Neurologic: Alert and oriented X 3, neurovascularly intact with sensory/motor intact upper extremities/lower extremities, bilaterally. Grossly non-focal.  Mental status: Alert, oriented, thought content appropriate.   Peripheral Pulses: +3 Easily felt, not easily obliterated with pressure  Cap refill  +2 sec    CXR:  I have reviewed the CXR with the following interpretation: negative    CBC   Recent Labs     03/05/21 0412   WBC 12.0*   HGB 16.5   HCT 48.9         RENAL  Recent Labs     03/05/21 0412   *   K 4.5      CO2 24   BUN 12   CREATININE 0.8*     LFT'S  Recent Labs     03/05/21 0412   AST 23   ALT 12   BILIDIR <0.2   BILITOT <0.2   ALKPHOS 119     COAG  No results for input(s): INR in the last 72 hours. CARDIAC ENZYMES  Recent Labs     03/05/21 0412   TROPONINI 0.04*       U/A:    Lab Results   Component Value Date    COLORU YELLOW 12/19/2016    CLARITYU Clear 12/19/2016    SPECGRAV 1.006 12/19/2016    LEUKOCYTESUR Negative 12/19/2016    BLOODU Negative 12/19/2016    GLUCOSEU Negative 12/19/2016       ABG  No results found for: IKZ3IKR, BEART, N5RUOJCM, PHART, THGBART, CEC0QWZ, PO2ART, CKP0BCB        Active Hospital Problems    Diagnosis Date Noted    NSTEMI (non-ST elevated myocardial infarction) ([facility] [address] [I21.4] 04/25/2020         PHYSICIANS CERTIFICATION:    I certify that Javad Adam is expected to be hospitalized for more than 2 midnights based on the following assessment and plan:      ASSESSMENT/PLAN:    Chest pain, nstemi  Known cad  - s/p yaz to the rca x3 in 5/2020  - ECG shows no ischemic changes  - troponins . 04, will trend  - on effient, ASA, statin, bb  - on heparin gtt  - check lipid panel  -cardiology consulted     HTN  - stable  - cont home meds    tobacco dependence  - counseled on cessation  - nicotine patch      DVT Prophylaxis: heparin  Diet: Diet NPO Effective Now Exceptions are: Sips of Water with Meds  Code Status: Full Code    Dispo - in pt       Kiara Boyd DO    Thank you Marzena Colbert DO for the opportunity to be involved in this patient's care. If you have any questions or concerns please feel free to contact me at [phone].

## 2021-03-05 NOTE — PROGRESS NOTES
Returned from testing; denies chest pain.  Electronically signed by Lujean Kanner, RN on 3/5/2021 at 3:34 PM

## 2021-03-05 NOTE — PRE-PROCEDURE INSTRUCTIONS
Dr. Steffen Cody called regarding Troponin elevation to 0.13.   Writer instructed by Dr Jessica Salazar to proceed with Lanora Sers stress test.  Electronically signed by Joby Yee RN on 3/5/2021 at 1:38 PM

## 2021-03-05 NOTE — PROGRESS NOTES
4 Eyes Skin Assessment     NAME:  Caitlin Velasco  YOB: 1980  MEDICAL RECORD NUMBER:  [id number]    The patient is being assess for  Admission    I agree that 2 RN's have performed a thorough Head to Toe Skin Assessment on the patient. ALL assessment sites listed below have been assessed. Areas assessed by both nurses:    Head, Face, Ears, Shoulders, Back, Chest, Arms, Elbows, Hands, Sacrum. Buttock, Coccyx, Ischium and Legs. Feet and Heels        Does the Patient have a Wound?  No noted wound(s)       [employer] Prevention initiated:  No   Wound Care Orders initiated:  No    Pressure Injury (Stage 3,4, Unstageable, DTI, NWPT, and Complex wounds) if present place consult order under [de-identified] No    New and Established Ostomies if present place consult order under : No      Nurse 1 eSignature: Electronically signed by Chari Valderrama RN on 3/5/21 at 7:01 AM EST    **SHARE this note so that the co-signing nurse is able to place an eSignature**    Nurse 2 eSignature: Electronically signed by Radha Salinas RN on 3/5/21 at 7:01 AM EST

## 2021-03-05 NOTE — PROGRESS NOTES
Clinical Pharmacy Note  Heparin Dosing       Lab Results   Component Value Date    APTT 56.1 03/05/2021     Lab Results   Component Value Date    HGB 16.5 03/05/2021    HCT 48.9 03/05/2021     03/05/2021       Current Infusion Rate: 10 mL/hr    Plan:  Continue current rate: 10 mL/hr  Next aPTT: 1800 on 3-5-21    Pharmacy will continue to monitor and adjust based on aPTT results.   Rosana White [facility]  3/5/2021  1:00 PM

## 2021-03-05 NOTE — ED PROVIDER NOTES
[address] [facility]      Pt Name: Vincent Randhawa  MRN: [id number]  [place] 1980  Date of evaluation: 3/5/2021  Provider: Stan Kasper MD    CHIEF COMPLAINT       Chief Complaint   Patient presents with    Chest Pain     Pt reports \"chest pain since 10 pm\". Pt states that he has \"had a heart attack in the past\". Pt alert and oriented with no signs of distress noted. Pt rates pain as a 3/10. HISTORY OF PRESENT ILLNESS    Vincent Randhawa is a 41 y.o. male who presents to the emergency department with chest pain. Patient endorses chest pain starting at 10 PM tonight. States is 3 out of 10 pressure-like in nature. Took multiple doses of nitro which kind of helped. States it feels similar to the heart attacks has had in the past.  No shortness of breath. No other associated symptoms. Symptoms started spontaneously. Have been constant but improving. No fevers or chills. Nursing Notes were reviewed. Including nursing noted for FM, Surgical History, Past Medical History, Social History, vitals, and allergies; agree with all. REVIEW OF SYSTEMS       Review of Systems   Constitutional: Negative for activity change, appetite change, chills, diaphoresis, fatigue, fever and unexpected weight change. HENT: Negative for congestion, dental problem, drooling, ear discharge and ear pain. Eyes: Negative for photophobia, pain, discharge, redness, itching and visual disturbance. Respiratory: Negative for apnea, cough, choking, chest tightness, shortness of breath, wheezing and stridor. Cardiovascular: Positive for chest pain. Negative for palpitations and leg swelling. Gastrointestinal: Negative for abdominal distention, abdominal pain, anal bleeding, blood in stool, constipation, diarrhea, nausea, rectal pain and vomiting. Endocrine: Negative for cold intolerance and heat intolerance.    Genitourinary: Negative for decreased urine volume and urgency. Musculoskeletal: Negative for arthralgias and back pain. Skin: Negative for color change and pallor. Neurological: Negative for dizziness and facial asymmetry. Hematological: Negative for adenopathy. Does not bruise/bleed easily. Psychiatric/Behavioral: Negative for agitation, behavioral problems, confusion and decreased concentration. Except as noted above the remainder of the review of systems was reviewed and negative. PAST MEDICAL HISTORY     Past Medical History:   Diagnosis Date    Coronary artery disease     Essential hypertension 4/7/2020    Headache     HTN (hypertension)     Other hyperlipidemia 4/7/2020    Tobacco abuse counseling 4/7/2020       SURGICAL HISTORY       Past Surgical History:   Procedure Laterality Date    CORONARY ANGIOPLASTY WITH STENT PLACEMENT  05/05/2020    MI RCA    CORONARY ANGIOPLASTY WITH STENT PLACEMENT  04/25/2020    MI Diag    CRANIAL LESION RESECTION      MOUTH SURGERY         CURRENT MEDICATIONS       Previous Medications    ASPIRIN 81 MG CHEWABLE TABLET    Take 1 tablet by mouth daily    ATORVASTATIN (LIPITOR) 80 MG TABLET    Take 1 tablet by mouth nightly    CARVEDILOL (COREG) 6.25 MG TABLET    Take 1 tablet by mouth 2 times daily (with meals)    FAMOTIDINE (PEPCID) 20 MG TABLET    Take 1 tablet by mouth 2 times daily    ISOSORBIDE MONONITRATE (IMDUR) 60 MG EXTENDED RELEASE TABLET    Take 1 tablet by mouth daily    LISINOPRIL (PRINIVIL;ZESTRIL) 20 MG TABLET    Take 1 tablet by mouth daily    NITROGLYCERIN (NITROSTAT) 0.4 MG SL TABLET    up to max of 3 total doses. If no relief after 1 dose, call 911. PRASUGREL (EFFIENT) 10 MG TABS    Take 1 tablet by mouth daily       ALLERGIES     Patient has no known allergies.     FAMILY HISTORY        Family History   Problem Relation Age of Onset    High Blood Pressure Maternal Aunt     Heart Disease Maternal Uncle     Heart Disease Maternal Cousin        SOCIAL HISTORY       Social History     Socioeconomic History    Marital status: Single     Spouse name: None    Number of children: None    Years of education: None    Highest education level: None   Occupational History    None   Social Needs    Financial resource strain: None    Food insecurity     Worry: None     Inability: None    Transportation needs     Medical: None     Non-medical: None   Tobacco Use    Smoking status: Current Every Day Smoker     Packs/day: 1.00     Types: Cigarettes     Start date: 12/19/1989    Smokeless tobacco: Never Used   Substance and Sexual Activity    Alcohol use: No    Drug use: No    Sexual activity: Yes     Partners: Female   Lifestyle    Physical activity     Days per week: None     Minutes per session: None    Stress: None   Relationships    Social connections     Talks on phone: None     Gets together: None     Attends Faith service: None     Active member of club or organization: None     Attends meetings of clubs or organizations: None     Relationship status: None    Intimate partner violence     Fear of current or ex partner: None     Emotionally abused: None     Physically abused: None     Forced sexual activity: None   Other Topics Concern    None   Social History Narrative    None       PHYSICAL EXAM       ED Triage Vitals   BP Temp Temp Source Pulse Resp SpO2 Height Weight   03/05/21 0405 03/05/21 0405 03/05/21 0405 03/05/21 0405 03/05/21 0405 03/05/21 0405 -- 03/05/21 0505   (!) 137/94 97.6 °F (36.4 °C) Oral 62 23 100 %  235 lb 14.3 oz (107 kg)       Physical Exam  Vitals signs and nursing note reviewed. Constitutional:       General: He is not in acute distress. Appearance: He is well-developed. He is ill-appearing. He is not diaphoretic. HENT:      Head: Normocephalic and atraumatic. Eyes:      General:         Right eye: No discharge. Left eye: No discharge. Pupils: Pupils are equal, round, and reactive to light.    Neck:      Musculoskeletal: Normal range of motion. Thyroid: No thyromegaly. Trachea: No tracheal deviation. Cardiovascular:      Rate and Rhythm: Normal rate and regular rhythm. Heart sounds: No murmur. Pulmonary:      Breath sounds: No wheezing or rales. Chest:      Chest wall: No tenderness. Abdominal:      General: There is no distension. Palpations: Abdomen is soft. There is no mass. Tenderness: There is no abdominal tenderness. There is no guarding or rebound. Musculoskeletal: Normal range of motion. General: No tenderness or deformity. Skin:     General: Skin is warm. Coloration: Skin is not pale. Findings: No erythema or rash. Neurological:      Mental Status: He is alert. Cranial Nerves: No cranial nerve deficit. Motor: No abnormal muscle tone.       Coordination: Coordination normal.         DIAGNOSTIC RESULTS     EKG: All EKG's are interpreted by the Emergency Department Physician who either signs or Co-signs this chart in the absence of acardiologist.    EKG normal sinus rhythm nonspecific EKG changes no ectopy    RADIOLOGY:   Non-plain film images such as CT, Ultrasoundand MRI are read by the radiologist. Plain radiographic images are visualized and preliminarily interpreted by the emergency physician with the below findings:    Chest x-ray reassuring    ED BEDSIDE ULTRASOUND:   Performed by ED Physician - none    LABS:  Labs Reviewed   CBC WITH AUTO DIFFERENTIAL - Abnormal; Notable for the following components:       Result Value    WBC 12.0 (*)     All other components within normal limits    Narrative:     Performed at:  [address] [address]   Phone [phone]   BASIC METABOLIC PANEL W/ REFLEX TO MG FOR LOW K - Abnormal; Notable for the following components:    Sodium 135 (*)     Glucose 126 (*)     CREATININE 0.8 (*)     All other components within normal limits    Narrative:     Performed at:  [facility]  [address] [facility] [address] [place] [address]   Phone [phone]   TROPONIN - Abnormal; Notable for the following components:    Troponin 0.04 (*)     All other components within normal limits    Narrative:     Performed at:  [facility]  [address] [facility] [address] [place] [address]   Phone [phone]   BRAIN NATRIURETIC PEPTIDE - Abnormal; Notable for the following components:    Pro- (*)     All other components within normal limits    Narrative:     Performed at:  [facility]  [address] [facility] [facility] [address] [place] [address]   Phone [phone]   LIPASE    Narrative:     Performed at:  [facility]  [facility] [address] [facility] [address]   Phone [phone]   HEPATIC FUNCTION PANEL    Narrative:     Performed at:  [facility]  [address] [facility] [address] [employer] [address]   Phone [phone]   APTT   APTT   APTT       All other labs were withinnormal range or not returned as of this dictation. EMERGENCY DEPARTMENT COURSE and DIFFERENTIAL DIAGNOSIS/MDM:     PMH, Surgical Hx, FH, Social Hx reviewed by myself (ETOH usage, Tobacco usage, Drug usage reviewed by myself, no pertinent Hx)- No Pertinent Hx     Old records were reviewed by me    52-year-old male with concern for NSTEMI. Aspirin and heparin. Pain controlled. Morphine given. Pain currently 0 out of 10. Admission to the hospitalist.  Follows with Dr. Milana Mcfarland. CRITICAL CARE TIME   Total Critical Caretime was 39 minutes, excluding separately reportable procedures. There was a high probability of clinically significant/life threatening deterioration in the patient's condition which required my urgent intervention.         PROCEDURES:  Unlessotherwise noted below, none    FINAL IMPRESSION      1. NSTEMI (non-ST elevated myocardial infarction) ([facility] [address] DISPOSITION/PLAN   DISPOSITION Decision To Admit 03/05/2021 05:13:33 AM    PATIENT REFERRED TO:  No follow-up provider specified.     DISCHARGE MEDICATIONS:  New Prescriptions    No medications on file          (Please note that portions ofthis note were completed with a voice recognition program.  Efforts were made to edit the dictations but occasionally words are mis-transcribed.)    Kim Estrella MD(electronically signed)  Attending Emergency Physician           Kim Estrella MD  03/05/21 1648

## 2021-03-06 LAB
ANION GAP SERPL CALCULATED.3IONS-SCNC: 7 MMOL/L (ref 3–16)
APTT: 43 SEC (ref 24.2–36.2)
APTT: 52.2 SEC (ref 24.2–36.2)
APTT: 62.7 SEC (ref 24.2–36.2)
BASOPHILS ABSOLUTE: 0.1 K/UL (ref 0–0.2)
BASOPHILS RELATIVE PERCENT: 0.6 %
BUN BLDV-MCNC: 10 MG/DL (ref 7–20)
CALCIUM SERPL-MCNC: 9.2 MG/DL (ref 8.3–10.6)
CHLORIDE BLD-SCNC: 105 MMOL/L (ref 99–110)
CO2: 24 MMOL/L (ref 21–32)
CREAT SERPL-MCNC: 0.7 MG/DL (ref 0.9–1.3)
EOSINOPHILS ABSOLUTE: 0.1 K/UL (ref 0–0.6)
EOSINOPHILS RELATIVE PERCENT: 1.5 %
GFR AFRICAN AMERICAN: >60
GFR NON-AFRICAN AMERICAN: >60
GLUCOSE BLD-MCNC: 136 MG/DL (ref 70–99)
HCT VFR BLD CALC: 45.7 % (ref 40.5–52.5)
HEMOGLOBIN: 15.1 G/DL (ref 13.5–17.5)
LYMPHOCYTES ABSOLUTE: 3.4 K/UL (ref 1–5.1)
LYMPHOCYTES RELATIVE PERCENT: 40.6 %
MCH RBC QN AUTO: 29.8 PG (ref 26–34)
MCHC RBC AUTO-ENTMCNC: 33.1 G/DL (ref 31–36)
MCV RBC AUTO: 89.9 FL (ref 80–100)
MONOCYTES ABSOLUTE: 0.5 K/UL (ref 0–1.3)
MONOCYTES RELATIVE PERCENT: 6.2 %
NEUTROPHILS ABSOLUTE: 4.2 K/UL (ref 1.7–7.7)
NEUTROPHILS RELATIVE PERCENT: 51.1 %
PDW BLD-RTO: 13.8 % (ref 12.4–15.4)
PLATELET # BLD: 240 K/UL (ref 135–450)
PMV BLD AUTO: 9.4 FL (ref 5–10.5)
POTASSIUM REFLEX MAGNESIUM: 4.2 MMOL/L (ref 3.5–5.1)
RBC # BLD: 5.08 M/UL (ref 4.2–5.9)
REASON FOR REJECTION: NORMAL
REJECTED TEST: NORMAL
SODIUM BLD-SCNC: 136 MMOL/L (ref 136–145)
WBC # BLD: 8.3 K/UL (ref 4–11)

## 2021-03-06 PROCEDURE — 6370000000 HC RX 637 (ALT 250 FOR IP): Performed by: FAMILY MEDICINE

## 2021-03-06 PROCEDURE — 85730 THROMBOPLASTIN TIME PARTIAL: CPT

## 2021-03-06 PROCEDURE — 6370000000 HC RX 637 (ALT 250 FOR IP): Performed by: INTERNAL MEDICINE

## 2021-03-06 PROCEDURE — 6360000002 HC RX W HCPCS: Performed by: FAMILY MEDICINE

## 2021-03-06 PROCEDURE — 99232 SBSQ HOSP IP/OBS MODERATE 35: CPT | Performed by: NURSE PRACTITIONER

## 2021-03-06 PROCEDURE — 36415 COLL VENOUS BLD VENIPUNCTURE: CPT

## 2021-03-06 PROCEDURE — 2580000003 HC RX 258: Performed by: INTERNAL MEDICINE

## 2021-03-06 PROCEDURE — 80048 BASIC METABOLIC PNL TOTAL CA: CPT

## 2021-03-06 PROCEDURE — 2500000003 HC RX 250 WO HCPCS: Performed by: INTERNAL MEDICINE

## 2021-03-06 PROCEDURE — 85025 COMPLETE CBC W/AUTO DIFF WBC: CPT

## 2021-03-06 PROCEDURE — 2060000000 HC ICU INTERMEDIATE R&B

## 2021-03-06 RX ORDER — HEPARIN SODIUM 1000 [USP'U]/ML
2000 INJECTION, SOLUTION INTRAVENOUS; SUBCUTANEOUS ONCE
Status: COMPLETED | OUTPATIENT
Start: 2021-03-06 | End: 2021-03-06

## 2021-03-06 RX ORDER — BUPROPION HYDROCHLORIDE 150 MG/1
150 TABLET, EXTENDED RELEASE ORAL DAILY
Status: COMPLETED | OUTPATIENT
Start: 2021-03-06 | End: 2021-03-08

## 2021-03-06 RX ADMIN — HEPARIN SODIUM 2000 UNITS: 1000 INJECTION INTRAVENOUS; SUBCUTANEOUS at 07:08

## 2021-03-06 RX ADMIN — SODIUM CHLORIDE: 9 INJECTION, SOLUTION INTRAVENOUS at 01:00

## 2021-03-06 RX ADMIN — FAMOTIDINE 20 MG: 20 TABLET, FILM COATED ORAL at 10:37

## 2021-03-06 RX ADMIN — CARVEDILOL 6.25 MG: 6.25 TABLET, FILM COATED ORAL at 10:37

## 2021-03-06 RX ADMIN — FAMOTIDINE 20 MG: 20 TABLET, FILM COATED ORAL at 19:58

## 2021-03-06 RX ADMIN — Medication 10 ML: at 19:58

## 2021-03-06 RX ADMIN — ATORVASTATIN CALCIUM 80 MG: 80 TABLET, FILM COATED ORAL at 19:58

## 2021-03-06 RX ADMIN — ACETAMINOPHEN 650 MG: 325 TABLET ORAL at 11:51

## 2021-03-06 RX ADMIN — BUPROPION HYDROCHLORIDE 150 MG: 150 TABLET, EXTENDED RELEASE ORAL at 14:02

## 2021-03-06 RX ADMIN — CARVEDILOL 6.25 MG: 6.25 TABLET, FILM COATED ORAL at 18:01

## 2021-03-06 RX ADMIN — HEPARIN SODIUM 12.1 ML/HR: 10000 INJECTION, SOLUTION INTRAVENOUS at 07:12

## 2021-03-06 RX ADMIN — ASPIRIN 81 MG: 81 TABLET, CHEWABLE ORAL at 10:36

## 2021-03-06 RX ADMIN — LISINOPRIL 20 MG: 20 TABLET ORAL at 10:36

## 2021-03-06 RX ADMIN — PRASUGREL 10 MG: 10 TABLET, FILM COATED ORAL at 10:36

## 2021-03-06 RX ADMIN — ISOSORBIDE MONONITRATE 60 MG: 60 TABLET, EXTENDED RELEASE ORAL at 10:36

## 2021-03-06 ASSESSMENT — PAIN SCALES - GENERAL
PAINLEVEL_OUTOF10: 0

## 2021-03-06 ASSESSMENT — PAIN DESCRIPTION - PROGRESSION: CLINICAL_PROGRESSION: GRADUALLY WORSENING

## 2021-03-06 ASSESSMENT — PAIN DESCRIPTION - PAIN TYPE: TYPE: ACUTE PAIN

## 2021-03-06 ASSESSMENT — PAIN - FUNCTIONAL ASSESSMENT: PAIN_FUNCTIONAL_ASSESSMENT: PREVENTS OR INTERFERES SOME ACTIVE ACTIVITIES AND ADLS

## 2021-03-06 ASSESSMENT — PAIN DESCRIPTION - LOCATION: LOCATION: HEAD

## 2021-03-06 NOTE — PLAN OF CARE
Problem: Pain:  Goal: Pain level will decrease  Description: Pain level will decrease  3/6/2021 0007 by Mathew Purcell RN  Outcome: Ongoing     Problem: Pain:  Goal: Control of acute pain  Description: Control of acute pain  3/6/2021 0007 by Mathew Purcell RN  Outcome: Ongoing     Problem: Pain:  Goal: Control of chronic pain  Description: Control of chronic pain  3/6/2021 0007 by Mathew Purcell RN  Outcome: Ongoing     Problem: Pain:  Goal: Patient's pain/discomfort is manageable  Description: Patient's pain/discomfort is manageable  3/6/2021 0007 by Mathew Purcell RN  Outcome: Ongoing     Problem: Infection:  Goal: Will remain free from infection  Description: Will remain free from infection  3/6/2021 0007 by Mathew Purcell RN  Outcome: Ongoing     Problem: Safety:  Goal: Free from accidental physical injury  Description: Free from accidental physical injury  3/6/2021 0007 by Mathew Purcell RN  Outcome: Ongoing     Problem: Safety:  Goal: Free from intentional harm  Description: Free from intentional harm  3/6/2021 0007 by Mathew Purcell RN  Outcome: Ongoing     Problem: Daily Care:  Goal: Daily care needs are met  Description: Daily care needs are met  3/6/2021 0007 by Mathew Purcell RN  Outcome: Ongoing     Problem: Skin Integrity:  Goal: Skin integrity will stabilize  Description: Skin integrity will stabilize  3/6/2021 0007 by Mathew Purcell RN  Outcome: Ongoing     Problem: Discharge Planning:  Goal: Patients continuum of care needs are met  Description: Patients continuum of care needs are met  3/6/2021 0007 by Mathew Purcell RN  Outcome: Ongoing     Problem: Cardiac:  Goal: Ability to maintain vital signs within normal range will improve  Description: Ability to maintain vital signs within normal range will improve  3/6/2021 0007 by aMthew Purcell RN  Outcome: Ongoing     Problem: Cardiac:  Goal: Cardiovascular alteration will improve  Description: Cardiovascular alteration will

## 2021-03-06 NOTE — PROGRESS NOTES
*Late Entry  Pt now agreeable to stay after discussing the risks of leaving with his mother and this RN. Pt still unwilling to try nicotine gum at this time, but is trying other methods of coping such as eating food. Pt encouraged to notify this RN should he feel worsening nicotine withdrawal symptoms.

## 2021-03-06 NOTE — PLAN OF CARE
Problem: Pain:  Description: Pain management should include both nonpharmacologic and pharmacologic interventions. Goal: Pain level will decrease  Description: Pain level will decrease  Outcome: Ongoing  Pain being managed with medications as needed      Problem: Infection:  Goal: Will remain free from infection  Description: Will remain free from infection  Outcome: Ongoing  No indication of infection      Problem: Safety:  Goal: Free from accidental physical injury  Description: Free from accidental physical injury  Outcome: Ongoing  ID band in place. Call light within reach. Problem: Daily Care:  Goal: Daily care needs are met  Description: Daily care needs are met  Outcome: Ongoing   Assistance provided as needed     Problem: Skin Integrity:  Goal: Skin integrity will stabilize  Description: Skin integrity will stabilize  Outcome: Ongoing   Skin assessment completed every shift per protocol. Pt assessed for incontinence, appropriate barrier cream applied as needed. Pt encouraged to turn/rotate every 2 hours. Assistance provided if pt unable to do so themselves. Will continue to monitor. Problem: Discharge Planning:  Goal: Patients continuum of care needs are met  Description: Patients continuum of care needs are met  Outcome: Ongoing  SW involved in d/c planning      Problem: Cardiac:  Goal: Ability to maintain vital signs within normal range will improve  Description: Ability to maintain vital signs within normal range will improve  Outcome: Ongoing  Heart rate and rhythm continuously monitored. Vitals taken every four hours. Palpable pulses. Daily weights.       Problem: Health Behavior:  Goal: Will modify at least one risk factor affecting health status  Description: Will modify at least one risk factor affecting health status  Outcome: Ongoing  Heparin drip, nicotine patch      Problem: Falls - Risk of:  Goal: Will remain free from falls  Description: Will remain free from falls  Outcome: Ongoing  Patient is wearing nonskid socks. Bed is locked, and in lowest position. Room is free of clutter. Call light is within reach and used appropriately. Fall risk assessed per protocol. Will continue to monitor.       Problem: Tobacco Use:  Goal: Inpatient tobacco use cessation counseling participation  Description: Inpatient tobacco use cessation counseling participation  Outcome: Ongoing  Patient expressed interest in quitting

## 2021-03-06 NOTE — PROGRESS NOTES
Cardiology - PROGRESS NOTE    Admit Date: 3/5/2021     Reason for follow up: chest pain     Alfa Allen is a 41 y.o. male with significant history of CAD ( s/p PCI LCx/RCA), HTN, HL, tobacco abuse who presents with the above complaint. Episode of chest discomfort at night, while at rest, with no associated symptoms. No specific alleviating or exacerbating factors      Social History:   reports that he has been smoking cigarettes. He started smoking about 31 years ago. He has been smoking about 1.00 pack per day. He has never used smokeless tobacco. He reports that he does not drink alcohol or use drugs. Family History: family history includes Heart Disease in his maternal cousin and maternal uncle; High Blood Pressure in his maternal aunt. Interval History:   Patient seen and examined and notes reviewed   Awakens easily   NAD  No chest pain  Remains on heparin  Having difficulty with nicotine withdrawal   C/o left lateral thigh numbness when standing   All other systems reviewed and negative except as above. Diet: DIET CARDIAC;   Diet NPO, After Midnight Exceptions are: Sips of Water with Meds  Pain is:None  Nausea:None    In: 2842 [P.O.:960; I.V.:1882]  Out: -    Wt Readings from Last 3 Encounters:   03/06/21 228 lb 2.8 oz (103.5 kg)   07/20/20 228 lb (103.4 kg)   06/12/20 227 lb 1.2 oz (103 kg)           Data:   Scheduled Meds:   Scheduled Meds:   prasugrel  10 mg Oral Daily    lisinopril  20 mg Oral Daily    isosorbide mononitrate  60 mg Oral Daily    famotidine  20 mg Oral BID    carvedilol  6.25 mg Oral BID WC    atorvastatin  80 mg Oral Nightly    aspirin  81 mg Oral Daily    sodium chloride flush  10 mL Intravenous 2 times per day    nicotine  1 patch Transdermal Daily     Continuous Infusions:   heparin (PORCINE) Infusion 12.1 mL/hr (03/06/21 0712)    sodium chloride 75 mL/hr at 03/06/21 0100     PRN Meds:.nitroGLYCERIN, sodium chloride flush, ondansetron, polyethylene glycol, acetaminophen **OR** acetaminophen, nicotine polacrilex  Continuous Infusions:   heparin (PORCINE) Infusion 12.1 mL/hr (03/06/21 0712)    sodium chloride 75 mL/hr at 03/06/21 0100       Intake/Output Summary (Last 24 hours) at 3/6/2021 0847  Last data filed at 3/6/2021 0550  Gross per 24 hour   Intake 2842 ml   Output --   Net 2842 ml       CBC:   Recent Labs     03/06/21  0556   WBC 8.3   HGB 15.1        BMP:  Recent Labs     03/06/21  0556      K 4.2      CO2 24   BUN 10   CREATININE 0.7*   GLUCOSE 136*     ABGs: No results found for: PHART, PO2ART, RCX2TVK  INR: No results for input(s): INR in the last 72 hours. CARDIAC LABS     ENZYMES:  Recent Labs     03/05/21  0412 03/05/21  0759 03/05/21  1216   TROPONINI 0.04* 0.06* 0.13*     FASTING LIPID PANEL:  Lab Results   Component Value Date    HDL 31 04/26/2020    LDLDIRECT 229 02/06/2020    LDLCALC 115 04/26/2020    TRIG 139 04/26/2020     LIVER PROFILE:  Recent Labs     03/05/21  0412   AST 23   ALT 12       -----------------------------------------------------------------  Telemetry: personally reviewed   RAD:     CXR:  FINDINGS:   The lungs are underinflated, resulting in vascular crowding and subsegmental   atelectasis.  There is no focal consolidation, pleural effusion or   pneumothorax.  The heart and mediastinal contours are stable.  No acute bony   findings are identified.           Impression   Low lung volumes with bibasilar atelectasis. EKG: 3/5/2021  Echo:   7/2020  Summary   Left ventricular cavity size is normal.   There is moderate concentric left ventricular hypertrophy. Ejection fraction is visually estimated to be 45-50%. There is moderate hypokinesis of the mid to apical inferoseptal and inferior   walls. Diastolic filling parameters suggest grade I diastolic dysfunction. Normal right ventricular size.    Right ventricular systolic function is moderately reduced. TAPSE 1.2 cm    GXT:   3/5/2021  Summary    moderate size moderate severity inferior lateral wall defect consistent with    ischemia     Cath: 5/5/2020  ANGIOGRAM/CORONARY ARTERIOGRAM:        The left main coronary artery is patent   The left anterior descending artery is patent, D1 stent is widely patent .  The left circumflex artery is patent, mid stent is widely patent. The right coronary artery is diffusely diseased, prox 80%, distal 80%      INTERVENTION  1. JR 4 guide   2. runthrough wire used to cross the lesion   3. Distal RCA predilated w/ 2.5/3.5 regular balloons  4. IVUS catheter passed to distal RCA, distal reference diameter 4.5 mm   5. 4.0 X 18 mm Ari MI distal RCA  6. 4.0 X 26 / 4.5 X 12 prox RCA  7. Post dilation distal/prox  RCA w/ 4.0 NC balloon to 18 keyur         Objective:   Vitals: /79   Pulse 59   Temp 97.5 °F (36.4 °C) (Oral)   Resp 18   Ht 5' 9\" (1.753 m)   Wt 228 lb 2.8 oz (103.5 kg)   SpO2 94%   BMI 33.70 kg/m²   General appearance: alert, appears stated age and cooperative, No acute distress   Skin: Skin color, texture, turgor normal. No rashes or ecchymosis. HEENT: Head: Normocephalic, no lesions, without obvious abnormality.   Neck: no carotid bruit  Lungs: clear to auscultation bilaterally, no accessory muscle use, no respiratory distress, on RA  Heart: regular rate and rhythm, S1, S2 normal, no murmur, click, rub or gallop  Abdomen: soft, non-tender; bowel sounds normal; no masses,  no organomegaly  Extremities: extremities normal, atraumatic, no cyanosis or edema, pulses: DP +2/+2, PT +2/+2  Neurologic: Mental status: Alert, oriented, thought content appropriate, no tremors, no gross sensory motor deficit,   Psychiatric: normal insight and affect      Assessment & Plan:    Patient Active Problem List:     Essential hypertension     Other hyperlipidemia     Tobacco abuse counseling     Acute coronary syndrome (HCC)     SIRS (systemic inflammatory

## 2021-03-06 NOTE — PROGRESS NOTES
Clinical Pharmacy Note  Heparin Dosing       Lab Results   Component Value Date    APTT 52.3 03/05/2021     Lab Results   Component Value Date    HGB 16.5 03/05/2021    HCT 48.9 03/05/2021     03/05/2021       Current Infusion Rate: 10 mL/hr    Plan:  Continue current rate: 10 mL/hr  Next aPTT: 0600 on 03/06/21    Pharmacy will continue to monitor and adjust based on aPTT results.     Vanesa Govea, [address] [facility]  3/5/2021  7:20 PM

## 2021-03-06 NOTE — PROGRESS NOTES
Clinical Pharmacy Note  Heparin Dosing       Lab Results   Component Value Date    APTT 62.7 03/06/2021     Lab Results   Component Value Date    HGB 15.1 03/06/2021    HCT 45.7 03/06/2021     03/06/2021       Current Infusion Rate: 12.1 mL/hr    Plan:  Bolus: 00 units  Rate: 12.1 mL/hr  Next aPTT: 2100    Pharmacy will continue to monitor and adjust based on aPTT results.

## 2021-03-06 NOTE — PROGRESS NOTES
Hospitalist Progress Note    CC: <principal problem not specified>      Admit date: 3/5/2021  Days in hospital:  1    Subjective/interval history: Pt S/E. No new complaints. Denies chest pain. States he wants to try smoking cessation cold turkey. He has tried chantix, nicotine and patches without help. O2 status:    ROS:   Pertinent items are noted in HPI. Objective:    /79   Pulse 59   Temp 97.5 °F (36.4 °C) (Oral)   Resp 18   Ht 5' 9\" (1.753 m)   Wt 228 lb 2.8 oz (103.5 kg)   SpO2 94%   BMI 33.70 kg/m²     Gen: morbidly obese, NAD  HEENT: NC/AT, moist mucous membranes, no oropharyngeal erythema or exudate  Neck: supple, trachea midline, no anterior cervical or SC LAD  Heart: Normal s1/s2, RRR, no murmurs, gallops, or rubs. Lungs: clear bilaterally, no wheezing, no rales, no rhonchi, no use of accessory muscles  Abd: bowel sounds present, soft, nontender, nondistended, no masses  Extrem: No clubbing, cyanosis, no edema  Skin: no rashes or lesions  Psych: A & O x3, affect appropriate  Neuro: grossly intact, moves all four extremities spontaneously.  No focal deficits   Cap refill: +2 sec    Medications:  Scheduled Meds:   prasugrel  10 mg Oral Daily    lisinopril  20 mg Oral Daily    isosorbide mononitrate  60 mg Oral Daily    famotidine  20 mg Oral BID    carvedilol  6.25 mg Oral BID WC    atorvastatin  80 mg Oral Nightly    aspirin  81 mg Oral Daily    sodium chloride flush  10 mL Intravenous 2 times per day    nicotine  1 patch Transdermal Daily       PRN Meds:  nitroGLYCERIN, sodium chloride flush, ondansetron, polyethylene glycol, acetaminophen **OR** acetaminophen, nicotine polacrilex    IV:   heparin (PORCINE) Infusion 12.1 mL/hr (03/06/21 0712)    sodium chloride 75 mL/hr at 03/06/21 0100         Intake/Output Summary (Last 24 hours) at 3/6/2021 0950  Last data filed at 3/6/2021 0550  Gross per 24 hour   Intake 2842 ml   Output --   Net 2842 ml Results:  CBC:   Recent Labs     03/05/21  0412 03/06/21  0556   WBC 12.0* 8.3   HGB 16.5 15.1   HCT 48.9 45.7   MCV 89.4 89.9    240     BMP:   Recent Labs     03/05/21  0412 03/06/21  0556   * 136   K 4.5 4.2    105   CO2 24 24   BUN 12 10   CREATININE 0.8* 0.7*     Mag: No results for input(s): MAG in the last 72 hours. Phos: No results found for: PHOS  No components found for: GLU    LIVER PROFILE:   Recent Labs     03/05/21 0412   AST 23   ALT 12   LIPASE 56.0   BILIDIR <0.2   BILITOT <0.2   ALKPHOS 119     PT/INR: No results for input(s): PROTIME, INR in the last 72 hours. APTT:   Recent Labs     03/05/21  1142 03/05/21  1752 03/06/21  0556   APTT 56.1* 52.3* 43.0*     UA:No results for input(s): NITRITE, COLORU, PHUR, LABCAST, WBCUA, RBCUA, MUCUS, TRICHOMONAS, YEAST, BACTERIA, CLARITYU, SPECGRAV, LEUKOCYTESUR, UROBILINOGEN, BILIRUBINUR, BLOODU, GLUCOSEU, AMORPHOUS in the last 72 hours. Invalid input(s): Toni Anderson input(s): ABG  Lab Results   Component Value Date    CALCIUM 9.2 03/06/2021       Assessment:    Active Problems:    NSTEMI (non-ST elevated myocardial infarction) ([facility] [address]  Resolved Problems:    * No resolved hospital problems. [facility] course: a 41 y.o. male with cad s/p yaz to rca x3 (5/2020), s/d chf, htn, hld, who presents to [facility] with chest pain, got some relief with nitro at home. States it feels like his previous MI. When I saw the Pt later he denies chest pain. ED workup  Vitals stable, on room air  Pertinent labs: troponins . 04, wbc 12  Imaging: no infiltrates or consolidation     Plan:  Chest pain, nstemi  Known cad  - s/p yaz to the rca x3 in 5/2020  - troponins . 04 -> -> .13  - on effient, ASA, statin, bb  - on heparin gtt  - stress test with ischemia   -cardiology consulted, will have angio on Monday with Dr Christine Giron     HTN  - stable  - cont home meds     tobacco dependence  - counseled on cessation  - nicotine patch  - wants to try wellbutrin, will start       Code status:  full  DVT prophylaxis: [] Lovenox  [x] Heparin  [] SCDs because of  [] warfarin/oral direct thrombin inhibitor [] Encourage ambulation      Disposition:  [] Home [] Rehab [] Psych [] SNF  [] LTAC  [] Transfer to ICU  [] Transfer to PCU [] Other: in pt      Electronically signed by Greg Guillaume DO on 3/6/2021 at 9:50 AM

## 2021-03-06 NOTE — PROGRESS NOTES
Clinical Pharmacy Note  Heparin Dosing       Lab Results   Component Value Date    APTT 43.0 03/06/2021     Lab Results   Component Value Date    HGB 15.1 03/06/2021    HCT 45.7 03/06/2021     03/06/2021       Current Infusion Rate: 10 mL/hr    Plan:  Bolus: 2000 units  Rate: Increase to 12.1 mL/hr  Next aPTT: 1300  3/6/21    Pharmacy will continue to monitor and adjust based on aPTT results.     Kenyetta Gamez, PharmD  3/6/2021 6:52 AM

## 2021-03-06 NOTE — PROGRESS NOTES
Pt called out to PCA requesting to leave the unit to smoke a cigarette. Pt is a current 1.5 ppd smoker and is already wearing a nicotine patch. Pt educated on no smoking policy and offered possibility of nicotine gum. Pt also educated on tobacco cessation, especially with admission for NSTEMI. Pt refused nicotine gum and said he would like to leave AMA.  Pt also requesting a doctor's note to excuse him from work this evening as he was admitted to the hospital. Message sent to on call NP.

## 2021-03-06 NOTE — PROGRESS NOTES
Stress reviewed, likely Lcx territory   Plan for coronary angiogram Monday AM   NPO sund midnight   C/w current therapy     Darcus Cockayne, MD [address], Interventional Cardiology, and Peripheral Vascular [address] [facility]   (C): [phone]  (O): [phone]

## 2021-03-07 LAB
ALBUMIN SERPL-MCNC: 3.7 G/DL (ref 3.4–5)
ALP BLD-CCNC: 104 U/L (ref 40–129)
ALT SERPL-CCNC: 8 U/L (ref 10–40)
ANION GAP SERPL CALCULATED.3IONS-SCNC: 11 MMOL/L (ref 3–16)
APTT: 54.3 SEC (ref 24.2–36.2)
AST SERPL-CCNC: 14 U/L (ref 15–37)
BASOPHILS ABSOLUTE: 0.1 K/UL (ref 0–0.2)
BASOPHILS RELATIVE PERCENT: 0.7 %
BILIRUB SERPL-MCNC: <0.2 MG/DL (ref 0–1)
BILIRUBIN DIRECT: <0.2 MG/DL (ref 0–0.3)
BILIRUBIN, INDIRECT: ABNORMAL MG/DL (ref 0–1)
BUN BLDV-MCNC: 7 MG/DL (ref 7–20)
CALCIUM SERPL-MCNC: 9.2 MG/DL (ref 8.3–10.6)
CHLORIDE BLD-SCNC: 108 MMOL/L (ref 99–110)
CHOLESTEROL, TOTAL: 172 MG/DL (ref 0–199)
CO2: 23 MMOL/L (ref 21–32)
CREAT SERPL-MCNC: 0.7 MG/DL (ref 0.9–1.3)
EOSINOPHILS ABSOLUTE: 0.1 K/UL (ref 0–0.6)
EOSINOPHILS RELATIVE PERCENT: 1.2 %
GFR AFRICAN AMERICAN: >60
GFR NON-AFRICAN AMERICAN: >60
GLUCOSE BLD-MCNC: 107 MG/DL (ref 70–99)
HCT VFR BLD CALC: 43.3 % (ref 40.5–52.5)
HDLC SERPL-MCNC: 30 MG/DL (ref 40–60)
HEMOGLOBIN: 14.1 G/DL (ref 13.5–17.5)
LDL CHOLESTEROL CALCULATED: 93 MG/DL
LYMPHOCYTES ABSOLUTE: 3.7 K/UL (ref 1–5.1)
LYMPHOCYTES RELATIVE PERCENT: 40.1 %
MCH RBC QN AUTO: 29.6 PG (ref 26–34)
MCHC RBC AUTO-ENTMCNC: 32.6 G/DL (ref 31–36)
MCV RBC AUTO: 90.7 FL (ref 80–100)
MONOCYTES ABSOLUTE: 0.7 K/UL (ref 0–1.3)
MONOCYTES RELATIVE PERCENT: 7.5 %
NEUTROPHILS ABSOLUTE: 4.6 K/UL (ref 1.7–7.7)
NEUTROPHILS RELATIVE PERCENT: 50.5 %
PDW BLD-RTO: 14.1 % (ref 12.4–15.4)
PLATELET # BLD: 242 K/UL (ref 135–450)
PMV BLD AUTO: 10 FL (ref 5–10.5)
POTASSIUM REFLEX MAGNESIUM: 4 MMOL/L (ref 3.5–5.1)
RBC # BLD: 4.78 M/UL (ref 4.2–5.9)
SODIUM BLD-SCNC: 142 MMOL/L (ref 136–145)
TOTAL PROTEIN: 6.5 G/DL (ref 6.4–8.2)
TRIGL SERPL-MCNC: 244 MG/DL (ref 0–150)
VLDLC SERPL CALC-MCNC: 49 MG/DL
WBC # BLD: 9.2 K/UL (ref 4–11)

## 2021-03-07 PROCEDURE — 36415 COLL VENOUS BLD VENIPUNCTURE: CPT

## 2021-03-07 PROCEDURE — 85025 COMPLETE CBC W/AUTO DIFF WBC: CPT

## 2021-03-07 PROCEDURE — 6370000000 HC RX 637 (ALT 250 FOR IP): Performed by: INTERNAL MEDICINE

## 2021-03-07 PROCEDURE — 2500000003 HC RX 250 WO HCPCS: Performed by: INTERNAL MEDICINE

## 2021-03-07 PROCEDURE — 2060000000 HC ICU INTERMEDIATE R&B

## 2021-03-07 PROCEDURE — 2580000003 HC RX 258: Performed by: INTERNAL MEDICINE

## 2021-03-07 PROCEDURE — 6370000000 HC RX 637 (ALT 250 FOR IP): Performed by: FAMILY MEDICINE

## 2021-03-07 PROCEDURE — 80061 LIPID PANEL: CPT

## 2021-03-07 PROCEDURE — 80076 HEPATIC FUNCTION PANEL: CPT

## 2021-03-07 PROCEDURE — 99232 SBSQ HOSP IP/OBS MODERATE 35: CPT | Performed by: NURSE PRACTITIONER

## 2021-03-07 PROCEDURE — 80048 BASIC METABOLIC PNL TOTAL CA: CPT

## 2021-03-07 PROCEDURE — 85730 THROMBOPLASTIN TIME PARTIAL: CPT

## 2021-03-07 RX ADMIN — CARVEDILOL 6.25 MG: 6.25 TABLET, FILM COATED ORAL at 10:07

## 2021-03-07 RX ADMIN — ATORVASTATIN CALCIUM 80 MG: 80 TABLET, FILM COATED ORAL at 20:39

## 2021-03-07 RX ADMIN — FAMOTIDINE 20 MG: 20 TABLET, FILM COATED ORAL at 20:38

## 2021-03-07 RX ADMIN — HEPARIN SODIUM 12.1 ML/HR: 10000 INJECTION, SOLUTION INTRAVENOUS at 04:51

## 2021-03-07 RX ADMIN — ISOSORBIDE MONONITRATE 60 MG: 60 TABLET, EXTENDED RELEASE ORAL at 10:08

## 2021-03-07 RX ADMIN — LISINOPRIL 20 MG: 20 TABLET ORAL at 10:08

## 2021-03-07 RX ADMIN — BUPROPION HYDROCHLORIDE 150 MG: 150 TABLET, EXTENDED RELEASE ORAL at 10:08

## 2021-03-07 RX ADMIN — CARVEDILOL 6.25 MG: 6.25 TABLET, FILM COATED ORAL at 18:14

## 2021-03-07 RX ADMIN — SODIUM CHLORIDE: 9 INJECTION, SOLUTION INTRAVENOUS at 00:00

## 2021-03-07 RX ADMIN — Medication 10 ML: at 20:39

## 2021-03-07 RX ADMIN — FAMOTIDINE 20 MG: 20 TABLET, FILM COATED ORAL at 10:08

## 2021-03-07 RX ADMIN — PRASUGREL 10 MG: 10 TABLET, FILM COATED ORAL at 10:08

## 2021-03-07 RX ADMIN — ASPIRIN 81 MG: 81 TABLET, CHEWABLE ORAL at 10:07

## 2021-03-07 ASSESSMENT — PAIN SCALES - GENERAL
PAINLEVEL_OUTOF10: 0
PAINLEVEL_OUTOF10: 0

## 2021-03-07 NOTE — PROGRESS NOTES
sodium chloride flush, ondansetron, polyethylene glycol, acetaminophen **OR** acetaminophen, nicotine polacrilex  Continuous Infusions:   heparin (PORCINE) Infusion 12.1 mL/hr (03/07/21 0451)    sodium chloride 75 mL/hr at 03/07/21 0000       Intake/Output Summary (Last 24 hours) at 3/7/2021 0810  Last data filed at 3/7/2021 0342  Gross per 24 hour   Intake 3865.38 ml   Output --   Net 3865.38 ml       CBC:   Recent Labs     03/07/21  0646   WBC 9.2   HGB 14.1        BMP:  Recent Labs     03/06/21  0556      K 4.2      CO2 24   BUN 10   CREATININE 0.7*   GLUCOSE 136*     ABGs: No results found for: PHART, PO2ART, KUC0MBS  INR: No results for input(s): INR in the last 72 hours. CARDIAC LABS     ENZYMES:  Recent Labs     03/05/21  0412 03/05/21  0759 03/05/21  1216   TROPONINI 0.04* 0.06* 0.13*     FASTING LIPID PANEL:  Lab Results   Component Value Date    HDL 31 04/26/2020    LDLDIRECT 229 02/06/2020    LDLCALC 115 04/26/2020    TRIG 139 04/26/2020     LIVER PROFILE:  Recent Labs     03/05/21  0412   AST 23   ALT 12       -----------------------------------------------------------------  Telemetry: personally reviewed   RAD:     CXR:  FINDINGS:   The lungs are underinflated, resulting in vascular crowding and subsegmental   atelectasis.  There is no focal consolidation, pleural effusion or   pneumothorax.  The heart and mediastinal contours are stable.  No acute bony   findings are identified.           Impression   Low lung volumes with bibasilar atelectasis. EKG: 3/5/2021  Echo:   7/2020  Summary   Left ventricular cavity size is normal.   There is moderate concentric left ventricular hypertrophy. Ejection fraction is visually estimated to be 45-50%. There is moderate hypokinesis of the mid to apical inferoseptal and inferior   walls. Diastolic filling parameters suggest grade I diastolic dysfunction. Normal right ventricular size.    Right ventricular systolic function is response syndrome) (HCC)     NSTEMI (non-ST elevated myocardial infarction) ([facility] [address]     Coronary artery disease     Tobacco use        Plan:  1. Chest pain      Plan for [facility] on 3/8/2021   NPO after midnight    On IV heparin    Continue    On ASA, statin, Coreg, Imdur, ACE, effient    Nitro SL PRN   2. CAD   Hx of PCI to RCA 5/2020  3. Tobacco abuse   Patient counseled  4. Dyslipidemia    On statin therapy    Continue   5.  Left leg numbness   Likely sciatic pain however given hx of CAD and tobacco abuse check vascular U/S     LACHELLE Fernandez-CNP   [employer]  Cardiology   3/7/2021  8:10 AM

## 2021-03-07 NOTE — PROGRESS NOTES
Clinical Pharmacy Note  Heparin Dosing       Lab Results   Component Value Date    APTT 52.2 03/06/2021     Lab Results   Component Value Date    HGB 15.1 03/06/2021    HCT 45.7 03/06/2021     03/06/2021       Current Infusion Rate: 12.1 mL/hr    Plan:  Rate: Continue 12.1 mL/hr  Next aPTT: 0600  3/7/21    Pharmacy will continue to monitor and adjust based on aPTT results.     Parsanth Ge, PharmD  3/6/2021 11:21 PM

## 2021-03-07 NOTE — CARE COORDINATION
Reviewed patient's chart and attempted to see patient to assess for needs and discuss case management. Patient off the floor at this time. SW/CM to follow up with patient as time permits.    GREGG Benito, SPENCER, Social Work/Case Management   [phone]  Electronically signed by GREGG Benito LSW on 3/7/2021 at 4:21 PM

## 2021-03-07 NOTE — PLAN OF CARE
Problem: Pain:  Description: Pain management should include both nonpharmacologic and pharmacologic interventions. Goal: Pain level will decrease  Description: Pain level will decrease  3/7/2021 1025 by Nikita Knight RN  Outcome: Ongoing  Pain being managed with PO medications as ordered by MD      Problem: Infection:  Goal: Will remain free from infection  Description: Will remain free from infection  3/7/2021 1025 by Nikita Knight RN  Outcome: Ongoing  No indication of infection      Problem: Daily Care:  Goal: Daily care needs are met  Description: Daily care needs are met  3/7/2021 1025 by Nikita Knight RN  Outcome: Ongoing  Assistance provided as needed by pt      Problem: Skin Integrity:  Goal: Skin integrity will stabilize  Description: Skin integrity will stabilize  3/7/2021 1025 by Nikita Knight RN  Outcome: Ongoing  Skin assessment completed every shift per protocol. Pt assessed for incontinence, appropriate barrier cream applied as needed. Pt encouraged to turn/rotate every 2 hours. Assistance provided if pt unable to do so themselves. Will continue to monitor. Problem: Discharge Planning:  Goal: Patients continuum of care needs are met  Description: Patients continuum of care needs are met  3/7/2021 1025 by Nikita Knight RN  Outcome: Ongoing  SW involved in d/c planning      Problem: Cardiac:  Goal: Ability to maintain vital signs within normal range will improve  Description: Ability to maintain vital signs within normal range will improve  3/7/2021 1025 by Nikita Knight RN  Outcome: Ongoing  Heart rate and rhythm continuously monitored. Vitals taken every four hours. Palpable pulses. Daily weights.      Goal: Cardiovascular alteration will improve  Description: Cardiovascular alteration will improve  3/7/2021 1025 by Nikita Knight RN  Outcome: Ongoing  Heparin drip      Problem: Health Behavior:  Goal: Will modify at least one risk factor affecting health status  Description: Will modify

## 2021-03-07 NOTE — PROGRESS NOTES
Hospitalist Progress Note    CC: <principal problem not specified>      Admit date: 3/5/2021  Days in hospital:  2    Subjective/interval history: Pt S/E. No acute events. Pt sleepy comfortably in bed    O2 status:    ROS:   Pertinent items are noted in HPI. Objective:    /67   Pulse 71   Temp 97.4 °F (36.3 °C) (Oral)   Resp 18   Ht 5' 9\" (1.753 m)   Wt 231 lb 0.7 oz (104.8 kg)   SpO2 96%   BMI 34.12 kg/m²     Gen: morbidly obese, NAD  HEENT: NC/AT, moist mucous membranes  Neck: supple, trachea midline  Heart: Normal s1/s2, RRR, no murmurs, gallops, or rubs. Lungs: clear bilaterally, no wheezing, no rales, no rhonchi, no use of accessory muscles  Abd: bowel sounds present, soft, nontender, nondistended, no masses  Extrem: No clubbing, cyanosis, no edema  Skin: no rashes or lesions  Psych: Asleep, easily awakened, oriented, affect appropriate  Neuro: grossly intact, moves all four extremities spontaneously.  No focal deficits   Cap refill: +2 sec    Medications:  Scheduled Meds:   buPROPion  150 mg Oral Daily    prasugrel  10 mg Oral Daily    lisinopril  20 mg Oral Daily    isosorbide mononitrate  60 mg Oral Daily    famotidine  20 mg Oral BID    carvedilol  6.25 mg Oral BID WC    atorvastatin  80 mg Oral Nightly    aspirin  81 mg Oral Daily    sodium chloride flush  10 mL Intravenous 2 times per day    nicotine  1 patch Transdermal Daily       PRN Meds:  nitroGLYCERIN, sodium chloride flush, ondansetron, polyethylene glycol, acetaminophen **OR** acetaminophen, nicotine polacrilex    IV:   heparin (PORCINE) Infusion 12.1 mL/hr (03/07/21 0451)    sodium chloride 75 mL/hr at 03/07/21 0000         Intake/Output Summary (Last 24 hours) at 3/7/2021 0926  Last data filed at 3/7/2021 0342  Gross per 24 hour   Intake 3865.38 ml   Output --   Net 3865.38 ml       Results:  CBC:   Recent Labs     03/05/21  0412 03/06/21  0556 03/07/21  0646   WBC 12.0* 8.3 9.2   HGB 16.5 15.1 14.1   HCT 48.9 45.7 43.3   MCV 89.4 89.9 90.7    240 242     BMP:   Recent Labs     03/05/21  0412 03/06/21  0556 03/07/21  0646   * 136 142   K 4.5 4.2 4.0    105 108   CO2 24 24 23   BUN 12 10 7   CREATININE 0.8* 0.7* 0.7*     Mag: No results for input(s): MAG in the last 72 hours. Phos: No results found for: PHOS  No components found for: GLU    LIVER PROFILE:   Recent Labs     03/05/21  0412 03/07/21  0646   AST 23 14*   ALT 12 8*   LIPASE 56.0  --    BILIDIR <0.2 <0.2   BILITOT <0.2 <0.2   ALKPHOS 119 104     PT/INR: No results for input(s): PROTIME, INR in the last 72 hours. APTT:   Recent Labs     03/06/21  1408 03/06/21  2151 03/07/21  0646   APTT 62.7* 52.2* 54.3*     UA:No results for input(s): NITRITE, COLORU, PHUR, LABCAST, WBCUA, RBCUA, MUCUS, TRICHOMONAS, YEAST, BACTERIA, CLARITYU, SPECGRAV, LEUKOCYTESUR, UROBILINOGEN, BILIRUBINUR, BLOODU, GLUCOSEU, AMORPHOUS in the last 72 hours. Invalid input(s): Tasneem Sharp input(s): ABG  Lab Results   Component Value Date    CALCIUM 9.2 03/07/2021       Assessment:    Active Problems:    NSTEMI (non-ST elevated myocardial infarction) (HCC)    Tobacco abuse    Left leg numbness  Resolved Problems:    * No resolved hospital problems. [facility] course: a 41 y.o. male with cad s/p yaz to rca x3 (5/2020), s/d chf, htn, hld, admitted with chest pain, got some relief with nitro at home. States it feels like his previous MI. When I saw the Pt later he denies chest pain. ED workup  Vitals stable, on room air  Pertinent labs: troponins . 04, wbc 12  Imaging: no infiltrates or consolidation     Plan:  Chest pain, nstemi  Known cad  - s/p yaz to the rca x3 in 5/2020  - troponins . 04 -> -> .13  - on effient, ASA, statin, bb  - on heparin gtt  - stress test with ischemia   -cardiology consulted, will have angio on Monday with Dr Catrina Ruano     HTN  - stable  - cont home meds     tobacco dependence  - counseled on cessation  - nicotine patch  - started wellbutrin    Code status:  full  DVT prophylaxis: [] Lovenox  [x] Heparin  [] SCDs because of  [] warfarin/oral direct thrombin inhibitor [] Encourage ambulation      Disposition:  [] Home [] Rehab [] Psych [] SNF  [] LTAC  [] Transfer to ICU  [] Transfer to PCU [] Other: in pt      Electronically signed by Tabitha Trujillo DO on 3/7/2021 at 9:26 AM

## 2021-03-07 NOTE — PLAN OF CARE
Problem: Pain:  Goal: Pain level will decrease  Description: Pain level will decrease  3/6/2021 2326 by Sakina Fan RN  Outcome: Ongoing     Problem: Pain:  Goal: Control of acute pain  Description: Control of acute pain  3/6/2021 2326 by Sakina Fan RN  Outcome: Ongoing     Problem: Pain:  Goal: Control of chronic pain  Description: Control of chronic pain  3/6/2021 2326 by Sakina Fan RN  Outcome: Ongoing     Problem: Pain:  Goal: Patient's pain/discomfort is manageable  Description: Patient's pain/discomfort is manageable  3/6/2021 2326 by Sakina Fan RN  Outcome: Ongoing     Problem: Infection:  Goal: Will remain free from infection  Description: Will remain free from infection  3/6/2021 2326 by Sakina Fan RN  Outcome: Ongoing     Problem: Safety:  Goal: Free from accidental physical injury  Description: Free from accidental physical injury  3/6/2021 2326 by Sakina Fan RN  Outcome: Ongoing     Problem: Safety:  Goal: Free from intentional harm  Description: Free from intentional harm  3/6/2021 2326 by Sakina Fan RN  Outcome: Ongoing     Problem: Daily Care:  Goal: Daily care needs are met  Description: Daily care needs are met  3/6/2021 2326 by Sakina Fan RN  Outcome: Ongoing     Problem: Skin Integrity:  Goal: Skin integrity will stabilize  Description: Skin integrity will stabilize  3/6/2021 2326 by Sakina Fan RN  Outcome: Ongoing     Problem: Discharge Planning:  Goal: Patients continuum of care needs are met  Description: Patients continuum of care needs are met  3/6/2021 2326 by Sakina Fan RN  Outcome: Ongoing     Problem: Cardiac:  Goal: Ability to maintain vital signs within normal range will improve  Description: Ability to maintain vital signs within normal range will improve  3/6/2021 2326 by Sakina Fan RN  Outcome: Ongoing     Problem: Cardiac:  Goal: Cardiovascular alteration will improve  Description: Cardiovascular alteration will improve  3/6/2021 2326 by Laura Gold RN  Outcome: Ongoing     Problem: Health Behavior:  Goal: Will modify at least one risk factor affecting health status  Description: Will modify at least one risk factor affecting health status  3/6/2021 2326 by Laura Gold RN  Outcome: Ongoing     Problem: Health Behavior:  Goal: Identification of resources available to assist in meeting health care needs will improve  Description: Identification of resources available to assist in meeting health care needs will improve  3/6/2021 2326 by Laura Gold RN  Outcome: Ongoing     Problem: Physical Regulation:  Goal: Complications related to the disease process, condition or treatment will be avoided or minimized  Description: Complications related to the disease process, condition or treatment will be avoided or minimized  3/6/2021 2326 by Laura Gold RN  Outcome: Ongoing     Problem: Falls - Risk of:  Goal: Will remain free from falls  Description: Will remain free from falls  3/6/2021 2326 by Laura Gold RN  Outcome: Ongoing     Problem: Falls - Risk of:  Goal: Absence of physical injury  Description: Absence of physical injury  3/6/2021 2326 by Laura Gold RN  Outcome: Ongoing     Problem: Tobacco Use:  Goal: Inpatient tobacco use cessation counseling participation  Description: Inpatient tobacco use cessation counseling participation  3/6/2021 2326 by Laura Gold RN  Outcome: Ongoing

## 2021-03-07 NOTE — PROGRESS NOTES
Clinical Pharmacy Note  Heparin Dosing       Lab Results   Component Value Date    APTT 54.3 03/07/2021     Lab Results   Component Value Date    HGB 14.1 03/07/2021    HCT 43.3 03/07/2021     03/07/2021       Current Infusion Rate: 12.1 mL/hr    Plan:  Rate: Continue 12.1 mL/hr  Next aPTT: 0600  3/8/21    Pharmacy will continue to monitor and adjust based on aPTT results.     Antonette Amaya Rph  3/7/2021 9:12 AM

## 2021-03-08 ENCOUNTER — APPOINTMENT (OUTPATIENT)
Dept: CARDIAC CATH/INVASIVE PROCEDURES | Age: 41
DRG: 165 | End: 2021-03-08
Payer: MEDICARE

## 2021-03-08 LAB
ANION GAP SERPL CALCULATED.3IONS-SCNC: 12 MMOL/L (ref 3–16)
APTT: 49.8 SEC (ref 24.2–36.2)
APTT: 52.3 SEC (ref 24.2–36.2)
BASOPHILS ABSOLUTE: 0.1 K/UL (ref 0–0.2)
BASOPHILS RELATIVE PERCENT: 0.6 %
BUN BLDV-MCNC: 13 MG/DL (ref 7–20)
CALCIUM SERPL-MCNC: 9.4 MG/DL (ref 8.3–10.6)
CHLORIDE BLD-SCNC: 105 MMOL/L (ref 99–110)
CO2: 23 MMOL/L (ref 21–32)
CREAT SERPL-MCNC: 0.7 MG/DL (ref 0.9–1.3)
EOSINOPHILS ABSOLUTE: 0.2 K/UL (ref 0–0.6)
EOSINOPHILS RELATIVE PERCENT: 1.6 %
ESTIMATED AVERAGE GLUCOSE: 116.9 MG/DL
GFR AFRICAN AMERICAN: >60
GFR NON-AFRICAN AMERICAN: >60
GLUCOSE BLD-MCNC: 104 MG/DL (ref 70–99)
HBA1C MFR BLD: 5.7 %
HCT VFR BLD CALC: 44.7 % (ref 40.5–52.5)
HEMOGLOBIN: 15 G/DL (ref 13.5–17.5)
LYMPHOCYTES ABSOLUTE: 3.8 K/UL (ref 1–5.1)
LYMPHOCYTES RELATIVE PERCENT: 37.5 %
MCH RBC QN AUTO: 30 PG (ref 26–34)
MCHC RBC AUTO-ENTMCNC: 33.6 G/DL (ref 31–36)
MCV RBC AUTO: 89.3 FL (ref 80–100)
MONOCYTES ABSOLUTE: 0.7 K/UL (ref 0–1.3)
MONOCYTES RELATIVE PERCENT: 7.2 %
NEUTROPHILS ABSOLUTE: 5.4 K/UL (ref 1.7–7.7)
NEUTROPHILS RELATIVE PERCENT: 53.1 %
PDW BLD-RTO: 14.2 % (ref 12.4–15.4)
PLATELET # BLD: 232 K/UL (ref 135–450)
PMV BLD AUTO: 9.5 FL (ref 5–10.5)
POC ACT LR: 237 SEC
POTASSIUM REFLEX MAGNESIUM: 4 MMOL/L (ref 3.5–5.1)
RBC # BLD: 5 M/UL (ref 4.2–5.9)
SARS-COV-2, NAAT: NOT DETECTED
SODIUM BLD-SCNC: 140 MMOL/L (ref 136–145)
WBC # BLD: 10.2 K/UL (ref 4–11)

## 2021-03-08 PROCEDURE — C1894 INTRO/SHEATH, NON-LASER: HCPCS

## 2021-03-08 PROCEDURE — 85730 THROMBOPLASTIN TIME PARTIAL: CPT

## 2021-03-08 PROCEDURE — 99153 MOD SED SAME PHYS/QHP EA: CPT

## 2021-03-08 PROCEDURE — 36415 COLL VENOUS BLD VENIPUNCTURE: CPT

## 2021-03-08 PROCEDURE — 6370000000 HC RX 637 (ALT 250 FOR IP): Performed by: INTERNAL MEDICINE

## 2021-03-08 PROCEDURE — 6370000000 HC RX 637 (ALT 250 FOR IP)

## 2021-03-08 PROCEDURE — 87641 MR-STAPH DNA AMP PROBE: CPT

## 2021-03-08 PROCEDURE — C1725 CATH, TRANSLUMIN NON-LASER: HCPCS

## 2021-03-08 PROCEDURE — 92920 PRQ TRLUML C ANGIOP 1ART&/BR: CPT | Performed by: INTERNAL MEDICINE

## 2021-03-08 PROCEDURE — 2709999900 HC NON-CHARGEABLE SUPPLY

## 2021-03-08 PROCEDURE — 92920 PRQ TRLUML C ANGIOP 1ART&/BR: CPT

## 2021-03-08 PROCEDURE — 87635 SARS-COV-2 COVID-19 AMP PRB: CPT

## 2021-03-08 PROCEDURE — 99254 IP/OBS CNSLTJ NEW/EST MOD 60: CPT | Performed by: THORACIC SURGERY (CARDIOTHORACIC VASCULAR SURGERY)

## 2021-03-08 PROCEDURE — 6360000002 HC RX W HCPCS

## 2021-03-08 PROCEDURE — 6370000000 HC RX 637 (ALT 250 FOR IP): Performed by: FAMILY MEDICINE

## 2021-03-08 PROCEDURE — 85025 COMPLETE CBC W/AUTO DIFF WBC: CPT

## 2021-03-08 PROCEDURE — 80048 BASIC METABOLIC PNL TOTAL CA: CPT

## 2021-03-08 PROCEDURE — 2500000003 HC RX 250 WO HCPCS

## 2021-03-08 PROCEDURE — 83036 HEMOGLOBIN GLYCOSYLATED A1C: CPT

## 2021-03-08 PROCEDURE — 2500000003 HC RX 250 WO HCPCS: Performed by: INTERNAL MEDICINE

## 2021-03-08 PROCEDURE — 2140000000 HC CCU INTERMEDIATE R&B

## 2021-03-08 PROCEDURE — 99152 MOD SED SAME PHYS/QHP 5/>YRS: CPT

## 2021-03-08 PROCEDURE — 6360000002 HC RX W HCPCS: Performed by: INTERNAL MEDICINE

## 2021-03-08 PROCEDURE — 6360000004 HC RX CONTRAST MEDICATION: Performed by: FAMILY MEDICINE

## 2021-03-08 PROCEDURE — C1769 GUIDE WIRE: HCPCS

## 2021-03-08 PROCEDURE — 93458 L HRT ARTERY/VENTRICLE ANGIO: CPT | Performed by: INTERNAL MEDICINE

## 2021-03-08 PROCEDURE — 85347 COAGULATION TIME ACTIVATED: CPT

## 2021-03-08 PROCEDURE — C1887 CATHETER, GUIDING: HCPCS

## 2021-03-08 PROCEDURE — 93926 LOWER EXTREMITY STUDY: CPT

## 2021-03-08 PROCEDURE — 93458 L HRT ARTERY/VENTRICLE ANGIO: CPT

## 2021-03-08 PROCEDURE — 6370000000 HC RX 637 (ALT 250 FOR IP): Performed by: NURSE PRACTITIONER

## 2021-03-08 RX ORDER — SODIUM CHLORIDE 0.9 % (FLUSH) 0.9 %
10 SYRINGE (ML) INJECTION EVERY 12 HOURS SCHEDULED
Status: DISCONTINUED | OUTPATIENT
Start: 2021-03-08 | End: 2021-03-10 | Stop reason: SDUPTHER

## 2021-03-08 RX ORDER — SODIUM CHLORIDE 0.9 % (FLUSH) 0.9 %
10 SYRINGE (ML) INJECTION PRN
Status: DISCONTINUED | OUTPATIENT
Start: 2021-03-08 | End: 2021-03-10 | Stop reason: SDUPTHER

## 2021-03-08 RX ORDER — BUPROPION HYDROCHLORIDE 150 MG/1
150 TABLET, EXTENDED RELEASE ORAL 2 TIMES DAILY
Status: DISCONTINUED | OUTPATIENT
Start: 2021-03-09 | End: 2021-03-19 | Stop reason: HOSPADM

## 2021-03-08 RX ORDER — EPTIFIBATIDE 0.75 MG/ML
2 INJECTION, SOLUTION INTRAVENOUS CONTINUOUS
Status: DISCONTINUED | OUTPATIENT
Start: 2021-03-08 | End: 2021-03-09

## 2021-03-08 RX ORDER — ACETAMINOPHEN 325 MG/1
650 TABLET ORAL EVERY 4 HOURS PRN
Status: DISCONTINUED | OUTPATIENT
Start: 2021-03-08 | End: 2021-03-08 | Stop reason: SDUPTHER

## 2021-03-08 RX ORDER — SENNA AND DOCUSATE SODIUM 50; 8.6 MG/1; MG/1
2 TABLET, FILM COATED ORAL DAILY
Status: DISCONTINUED | OUTPATIENT
Start: 2021-03-08 | End: 2021-03-15

## 2021-03-08 RX ADMIN — ATORVASTATIN CALCIUM 80 MG: 80 TABLET, FILM COATED ORAL at 21:04

## 2021-03-08 RX ADMIN — IOPAMIDOL 90 ML: 755 INJECTION, SOLUTION INTRAVENOUS at 09:50

## 2021-03-08 RX ADMIN — HEPARIN SODIUM 12.1 ML/HR: 10000 INJECTION, SOLUTION INTRAVENOUS at 03:14

## 2021-03-08 RX ADMIN — ISOSORBIDE MONONITRATE 60 MG: 60 TABLET, EXTENDED RELEASE ORAL at 11:10

## 2021-03-08 RX ADMIN — EPTIFIBATIDE 2 MCG/KG/MIN: 75 INJECTION INTRAVENOUS at 10:06

## 2021-03-08 RX ADMIN — FAMOTIDINE 20 MG: 20 TABLET, FILM COATED ORAL at 21:04

## 2021-03-08 RX ADMIN — DOCUSATE SODIUM 50 MG AND SENNOSIDES 8.6 MG 2 TABLET: 8.6; 5 TABLET, FILM COATED ORAL at 18:00

## 2021-03-08 RX ADMIN — CARVEDILOL 6.25 MG: 6.25 TABLET, FILM COATED ORAL at 18:00

## 2021-03-08 RX ADMIN — EPTIFIBATIDE 2 MCG/KG/MIN: 75 INJECTION INTRAVENOUS at 21:04

## 2021-03-08 RX ADMIN — FAMOTIDINE 20 MG: 20 TABLET, FILM COATED ORAL at 11:10

## 2021-03-08 RX ADMIN — BUPROPION HYDROCHLORIDE 150 MG: 150 TABLET, EXTENDED RELEASE ORAL at 11:10

## 2021-03-08 ASSESSMENT — PAIN SCALES - GENERAL
PAINLEVEL_OUTOF10: 0
PAINLEVEL_OUTOF10: 0

## 2021-03-08 NOTE — PROGRESS NOTES
1000: Pt admitted to [place] [address] room 1312 from cath lab with staff at bedside. R radial TR band in place with 9ml of air. <3 second cap refill hand warm to touch but discolored. Cath lab did only put 7ml of air and site was still bleeding. Integrilin infusing @2mcg/kg/min and fluids @75ml/hr. No morning medications administered from the floor. Did received ASA and effient in cath lab before procedure. 1003: Left upper /67 map of 79    1005: Right upper /74 map of 85    1010: Dr. Jasmin Joaquin at bedside to speak with patient. 1016: Lisinopril held d/t no ACE before surgery along with nicotine patch. 1050: 2ml of air removed from TR band. 7ml remain. 1115: 2ml of air removed form TR band. 5ml remain    1130: 2ml of air removed from TR band. 3ml remain. MRSA and COVID-19 swabbed collected    1140: pt voided in urinal, urine sample sent down. 1215: Dr. Cricket Hernandez and Israel Morning NP with CTS at bedside. 1245: remaining amount of air removed from TR band    1330: TR band removed from wrist. Site cleansed and dry dressing placed over top.     1500: reassessment complete.      1900: Handoff with Ángela Muller

## 2021-03-08 NOTE — CONSULTS
Consultation H&P    Date of Admission:  3/5/2021  4:01 AM  Date of Consultation:  3/8/2021    PCP:  Eunice Colon DO      Cards: Gurvinder Lopez    Chief Complaint:  CAD    History of Present Illness: We are asked to see this patient in consultation by Dr. Gurvinder Lopez regarding candidacy for cabg. Bhavin Causey is a 41 y.o. male with hx CAD/NSTEMI - 4/25/20 MI diag, LCx; 5/5/20 MI RCA. On ASA, statin, BB, Imdur, ACE, Effient. Presented to ED 3/5/21 - chest pain, 3/10, pressure type, similar to prior, took ntg with partial relief,   bp 137/94. Trop 0.04, probnp 151  Admitted. NSTEMI. Hep gtt. Peak trop 0.13 3/5. Lexiscan 3/5/21 inferolat ischemia  Cath 3/8/21, PTCA RCA. Integrillin. Past Medical History:  Past Medical History:   Diagnosis Date    Coronary artery disease     Essential hypertension 04/07/2020    GERD (gastroesophageal reflux disease)     Headache     NSTEMI (non-ST elevated myocardial infarction) ([facility] [address]     4/25/20, 5/5/20, 3/5/21    Obesity     Other hyperlipidemia 04/07/2020    Tobacco abuse counseling 04/07/2020       Past Surgical History:  Past Surgical History:   Procedure Laterality Date    CORONARY ANGIOPLASTY  03/08/2021    PTCA RCA    CORONARY ANGIOPLASTY WITH STENT PLACEMENT  05/05/2020    MI RCA    CORONARY ANGIOPLASTY WITH STENT PLACEMENT  04/25/2020    MI Diag, LCx    MOUTH SURGERY      wisdom teeth removed       Home Medications:   Prior to Admission medications    Medication Sig Start Date End Date Taking? Authorizing Provider   isosorbide mononitrate (IMDUR) 60 MG extended release tablet Take 1 tablet by mouth daily 2/15/21  Yes Sammuel Cooks, MD   aspirin 81 MG chewable tablet Take 1 tablet by mouth daily 2/15/21  Yes Sammuel Cooks, MD   nitroGLYCERIN (NITROSTAT) 0.4 MG SL tablet up to max of 3 total doses.  If no relief after 1 dose, call 911. 2/15/21  Yes Sammuel Cooks, MD   carvedilol (COREG) 6.25 MG tablet Take 1 tablet by mouth 2 times daily (with meals) 1/6/21  Yes Faye Quigley MD   famotidine (PEPCID) 20 MG tablet Take 1 tablet by mouth 2 times daily 1/6/21  Yes Faye Quigley MD   atorvastatin (LIPITOR) 80 MG tablet Take 1 tablet by mouth nightly 1/6/21  Yes Faye Quigley MD   prasugrel (EFFIENT) 10 MG TABS Take 1 tablet by mouth daily 1/6/21  Yes Faye Quigley MD   lisinopril (PRINIVIL;ZESTRIL) 20 MG tablet Take 1 tablet by mouth daily 12/14/20  Yes Faye Quigley MD        Facility Administered Medications:   Bibi Sal ON 3/9/2021] buPROPion  150 mg Oral BID    sodium chloride flush  10 mL Intravenous 2 times per day    sennosides-docusate sodium  2 tablet Oral Daily    isosorbide mononitrate  60 mg Oral Daily    famotidine  20 mg Oral BID    carvedilol  6.25 mg Oral BID WC    atorvastatin  80 mg Oral Nightly    aspirin  81 mg Oral Daily       Allergies:  No Known Allergies     Social History:    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  Caffeine: pop, 4L daily  Lifestyle: mother and aunt live with him  Social History     Socioeconomic History    Marital status: Single     Spouse name: Not on file    Number of children: Not on file    Years of education: Not on file    Highest education level: Not on file   Occupational History    Not on file   Social Needs    Financial resource strain: Not on file    Food insecurity     Worry: Not on file     Inability: Not on file    Transportation needs     Medical: Not on file     Non-medical: Not on file   Tobacco Use    Smoking status: Current Every Day Smoker     Packs/day: 1.00     Types: Cigarettes     Start date: 12/19/1989    Smokeless tobacco: Never Used    Tobacco comment: started age 13.  tried vaping age 25 when trying to quit smoking   Substance and Sexual Activity    Alcohol use: No    Drug use: Yes     Types: Marijuana     Comment: did use age 22, last time age 26    Sexual activity: Yes     Partners: Female   Lifestyle    Physical activity     Days per week: Not on file     Minutes per session: Not on file  Stress: Not on file   Relationships    Social connections     Talks on phone: Not on file     Gets together: Not on file     Attends Yarsani service: Not on file     Active member of club or organization: Not on file     Attends meetings of clubs or organizations: Not on file     Relationship status: Not on file    Intimate partner violence     Fear of current or ex partner: Not on file     Emotionally abused: Not on file     Physically abused: Not on file     Forced sexual activity: Not on file   Other Topics Concern    Not on file   Social History Narrative    Not on file       Family History:      Problem Relation Age of Onset    Other Mother         smoker    Diabetes Mother         borderline    Other Father          of natural causes age 76    High Blood Pressure Maternal Aunt     Heart Disease Maternal Aunt         pacemaker    Heart Disease Maternal Uncle          of MI 80    Heart Disease Maternal Cousin         dx 39    Diabetes Brother        Review of Systems:  Reviewed, negative unless noted  Constitutional: weight change, weakness, impairment of ADLs  Eyes: eyestrain, redness, discharges  ENMT: post nasal drip, sinus pain, discharge   Cardiovascular: faintness, vertigo, color changes in fingers/toes  Respiratory: cough, sputum, hemoptysis  GI: excessive thirst, changes in bowel habits, abdominal swelling  : painful urination, pyuria, incontinence  Musculoskeletal: cramps, swelling, limitation of motor activity  Integumentary: cyanosis, changes in skin, dryness  Neurological: paralysis, tingling, tremors  Psychiatric: restlessness, irritability, mood swings  Endocrine: heat/cold intolerance, excessive sweating, hair loss  Hematologic/lymphatic: swollen glands, anemia, easy bruising/bleeding      Physical Examination:    BP (S) 128/74 Comment: RIGHT UPPER  Pulse 59   Temp 97.3 °F (36.3 °C) (Oral)   Resp 16   Ht 5' 9\" (1.753 m)   Wt 229 lb 0.9 oz (103.9 kg)   SpO2 95% BMI 33.83 kg/m²      BP RUE:  128/74 (85)  BP LUE: 115/67 (79)  Admission Weight: 235 lb 14.3 oz (107 kg)   Hand dominance: right    General appearance: NAD, well nourished  Eyes: anicteric, PERRLA  ENMT: no scars or lesions, no nasal deformity, poor dentition, no cyanosis of oral mucosa  Neck: no masses, no thyroid enlargement, no JVD. Respiratory: effort is unlabored, symmetric, no crackles, wheezes or rubs. No palpable/percussable abnormalities. Cardiovascular: regular, no murmur. PMI normal, no thrill. No carotid bruits. No edema or varicosities. Abdominal aorta cannot be appreciated given body habitus. Pulses:    carotid brachial radial femoral popliteal DP PT   RIGHT   TR band   2+ 2+   LEFT   2+   2+ 2+   GI: abdomen soft, nondistended, no organomegaly. No masses. Lymphatic: no cervical/supraclavicular adenopathy  Musculoskeletal: strength and tone normal. Full ROM. No scoliosis. Extremities: warm and pink. No clubbing or petechiae. Skin: no dermatitis or ulceration. No nodularity or induration. Neuro: CN grossly intact. Sensation and motor function grossly intact. Psychiatric: oriented, appropriate mood/affect. MEDICAL DECISION MAKING/TESTING  Studies personally reviewed. Stress:   3/5/21   moderate size moderate severity inferior lateral wall defect consistent with    ischemia   Ejection fraction:60 %    Cath:   4/25/20   The left main coronary artery is patent without disease.   The left anterior descending artery is patent with mild diffuse disease               D2 100% occluded    The left circumflex artery is severely diseased, mid 99% .    The right coronary artery is severely disease, prox 70% distal 70%  Diag, LCx stents    5/5/20   The left main coronary artery is patent    The left anterior descending artery is patent, D1 stent is widely patent .   The left circumflex artery is patent, mid stent is widely patent.   The right coronary artery is diffusely diseased, prox 80%, distal 80% stent RCA    6/12/20  The left main coronary artery is patent    The left anterior descending artery is widely patent             D1 stent is widely patent    The left circumflex artery is widely patent, small vessel OM disease    The right coronary artery is widely patent, with patents stents, small vessel disease of the distal RPDA     3/8/21   The left main coronary artery is patent .   The left anterior descending artery has a mid 90% lesion,               D1 stent is widely patent .   The left circumflex artery has an ostial 90% lesion, mid ISR 90% .    The right coronary artery has a mid and distal 99% lesion with left to right collaterals of the PDA               Ostial and distal stents are patent   S/p POBA mid and distal RCA       Echo:   4/25/20  There is akinesis of the mid to apical/ lateral myocardium. Ejection fraction is visually estimated to be 40-45%. Grade I diastolic dysfunction with normal LV filling pressures. No evidence of left ventricular mass or thrombus noted. 7/15/20  Left ventricular cavity size is normal.   There is moderate concentric left ventricular hypertrophy. Ejection fraction is visually estimated to be 45-50%. There is moderate hypokinesis of the mid to apical inferoseptal and inferior   walls. Diastolic filling parameters suggest grade I diastolic dysfunction. Normal right ventricular size. Right ventricular systolic function is moderately reduced. TAPSE 1.2 cm    CXR: 3/5/21  The lungs are underinflated, resulting in vascular crowding and subsegmental   atelectasis. Alize Spoon is no focal consolidation, pleural effusion or   pneumothorax.  The heart and mediastinal contours are stable.  No acute bony   findings are identified.        CT chest:   12/19/16   Pulmonary Arteries: Pulmonary arteries are adequately opacified for   evaluation. Alize Spoon is an apparent filling defect within branch of the right   upper lobe pulmonary artery which is favored to represent Labs     03/06/21  2151 03/07/21  0646 03/08/21  0528   APTT 52.2* 54.3* 52.3*     Liver Profile:  Lab Results   Component Value Date    AST 14 03/07/2021    ALT 8 03/07/2021    BILIDIR <0.2 03/07/2021    BILITOT <0.2 03/07/2021    ALKPHOS 104 03/07/2021    LABALBU 3.7 03/07/2021     Lab Results   Component Value Date    CHOL 172 03/07/2021    HDL 30 03/07/2021    TRIG 244 03/07/2021     HgbA1c:  No results found for: LABA1C  UA:   Lab Results   Component Value Date    COLORU YELLOW 12/19/2016    PHUR 6.0 12/19/2016    CLARITYU Clear 12/19/2016    SPECGRAV 1.006 12/19/2016    LEUKOCYTESUR Negative 12/19/2016    UROBILINOGEN 0.2 12/19/2016    BILIRUBINUR Negative 12/19/2016    BLOODU Negative 12/19/2016    GLUCOSEU Negative 12/19/2016     covid neg 7/6/20    History obtained: chart, pt    Risk factors: smoking, htn, hld    Diagnosis:  CAD    Plan:   - CAD  3VD. Symptomatic. NSTEMI. Culprit vessel addressed - PTCA RCA. Preserved LV function. Proposed cabg. Typical periop/postop course reviewed including initial limitations on driving/heavy lifting. Risks, benefits and postoperative complications discussed including bleeding (Effient), infection, stroke, death (NSTEMI), postop pulmonary (smoker) and renal issues. Discussed with pt including choice of conduits. Not sure that he will be able to quit smoking. Consider everything. Will check back with him later.       Discussed with Dr. Crystal Kendall MD  3/8/2021  12:24 PM

## 2021-03-08 NOTE — PLAN OF CARE
Problem: Pain:  Goal: Pain level will decrease  Description: Pain level will decrease  3/7/2021 2125 by Corbin Angel RN  Outcome: Ongoing     Problem: Pain:  Goal: Control of acute pain  Description: Control of acute pain  3/7/2021 2125 by Corbin Angel RN  Outcome: Ongoing     Problem: Pain:  Goal: Control of chronic pain  Description: Control of chronic pain  3/7/2021 2125 by Corbin Angel RN  Outcome: Ongoing     Problem: Pain:  Goal: Patient's pain/discomfort is manageable  Description: Patient's pain/discomfort is manageable  3/7/2021 2125 by Corbin Angel RN  Outcome: Ongoing     Problem: Infection:  Goal: Will remain free from infection  Description: Will remain free from infection  3/7/2021 2125 by Corbin Angel RN  Outcome: Ongoing     Problem: Safety:  Goal: Free from accidental physical injury  Description: Free from accidental physical injury  3/7/2021 2125 by Corbin Angel RN  Outcome: Ongoing     Problem: Safety:  Goal: Free from intentional harm  Description: Free from intentional harm  3/7/2021 2125 by Corbin Angel RN  Outcome: Ongoing     Problem: Daily Care:  Goal: Daily care needs are met  Description: Daily care needs are met  3/7/2021 2125 by Corbin Angel RN  Outcome: Ongoing     Problem: Skin Integrity:  Goal: Skin integrity will stabilize  Description: Skin integrity will stabilize  3/7/2021 2125 by Corbin Angel RN  Outcome: Ongoing     Problem: Discharge Planning:  Goal: Patients continuum of care needs are met  Description: Patients continuum of care needs are met  3/7/2021 2125 by Corbin Angel RN  Outcome: Ongoing     Problem: Cardiac:  Goal: Ability to maintain vital signs within normal range will improve  Description: Ability to maintain vital signs within normal range will improve  3/7/2021 2125 by Corbin Angel RN  Outcome: Ongoing     Problem: Cardiac:  Goal: Cardiovascular alteration will improve  Description: Cardiovascular alteration will

## 2021-03-08 NOTE — PLAN OF CARE
Problem: Pain:  Goal: Pain level will decrease  Description: Pain level will decrease  3/8/2021 0813 by Angelina Hdz RN  Outcome: Ongoing     Problem: Pain:  Goal: Control of acute pain  Description: Control of acute pain  3/8/2021 0813 by Angelina Hdz RN  Outcome: Ongoing     Problem: Pain:  Goal: Control of chronic pain  Description: Control of chronic pain  3/8/2021 0813 by Angelina Hdz RN  Outcome: Ongoing     Problem: Pain:  Goal: Patient's pain/discomfort is manageable  Description: Patient's pain/discomfort is manageable  3/8/2021 0813 by Angelina Hdz RN  Outcome: Ongoing  Assessing pain using appropriate pain rating scale q shift and PRN. Medicating pt as prescribed and indicated. Offering pt non-pharmacologic pain interventions. Reassessing pain and pts response to pain intervention. Problem: Infection:  Goal: Will remain free from infection  Description: Will remain free from infection  3/8/2021 0813 by Angelina Hdz RN  Outcome: Ongoing     Problem: Safety:  Goal: Free from accidental physical injury  Description: Free from accidental physical injury  3/8/2021 0813 by Angelina Hdz RN  Outcome: Ongoing     Problem: Safety:  Goal: Free from intentional harm  Description: Free from intentional harm  3/8/2021 0813 by Angelina Hdz RN  Outcome: Ongoing     Problem: Daily Care:  Goal: Daily care needs are met  Description: Daily care needs are met  3/8/2021 0813 by Angelina Hdz RN  Outcome: Ongoing     Problem: Skin Integrity:  Goal: Skin integrity will stabilize  Description: Skin integrity will stabilize  3/8/2021 0813 by Angelina Hdz RN  Outcome: Ongoing  Assessing tommy scale Q shift. Performing skin assessments every shift. Maintaining dry and clean skin. Promoting adequate nutritional intake. Heels elevated off bed. Performing skin care q shift. Utilizing lift equipment when indicated.      Problem: Discharge Planning:  Goal: Patients continuum of care needs are met  Description: Patients continuum of care needs are met  3/8/2021 0813 by Isaias Johnson RN  Outcome: Ongoing     Problem: Cardiac:  Goal: Ability to maintain vital signs within normal range will improve  Description: Ability to maintain vital signs within normal range will improve  3/8/2021 0813 by Isaias Johnson RN  Outcome: Ongoing     Problem: Cardiac:  Goal: Cardiovascular alteration will improve  Description: Cardiovascular alteration will improve  3/8/2021 0813 by Isaias Johnson RN  Outcome: Ongoing     Problem: Health Behavior:  Goal: Will modify at least one risk factor affecting health status  Description: Will modify at least one risk factor affecting health status  3/8/2021 0813 by Isaias Johnson RN  Outcome: Ongoing     Problem: Health Behavior:  Goal: Identification of resources available to assist in meeting health care needs will improve  Description: Identification of resources available to assist in meeting health care needs will improve  3/8/2021 0813 by Isaias Johnson RN  Outcome: Ongoing     Problem: Physical Regulation:  Goal: Complications related to the disease process, condition or treatment will be avoided or minimized  Description: Complications related to the disease process, condition or treatment will be avoided or minimized  3/8/2021 0813 by Isaias Johnson RN  Outcome: Ongoing     Problem: Falls - Risk of:  Goal: Will remain free from falls  Description: Will remain free from falls  3/8/2021 0813 by Isaias Johnson RN  Outcome: Ongoing     Problem: Falls - Risk of:  Goal: Absence of physical injury  Description: Absence of physical injury  3/8/2021 0813 by Isaias Johnson RN  Outcome: Ongoing  Bed alarm on, bed in lowest position, wheels locked. Fall risk assessment completed every shift. Nonskid socks on, fall sign posted. Pt educated on use of call light. No falls on this shift.      Problem: Tobacco Use:  Goal: Inpatient tobacco use cessation counseling participation  Description: Inpatient tobacco use cessation counseling participation  3/8/2021 0813 by Everardo Mirza RN  Outcome: Ongoing

## 2021-03-08 NOTE — OP NOTE
slender sheath. Left coronary angiography was done using a Jasmin L3.5 diagnostic catheter. Right coronary angiography was done using a Jasmin R4 guide catheter. CONTRAST:  Total of 90 cc. COMPLICATIONS:  None. At the end of the procedure a TR device was used for hemostasis. EBL: 10 cc    HEMODYNAMICS:  Aortic pressure was 99/68/78;  LVEDP 10. There was no gradient between the left ventricle and aorta. ANGIOGRAM/CORONARY ARTERIOGRAM:       The left main coronary artery is patent . The left anterior descending artery has a mid 90% lesion,    D1 stent is widely patent . The left circumflex artery has an ostial 90% lesion, mid ISR 90% . The right coronary artery has a mid and distal 99% lesion with left to right collaterals of the PDA    Ostial and distal stents are patent       INTERVENTION  1. JR 4 guide  2. Runthrought wire used to corss RCA lesion   3.  POBA of the mid and distal RCA w/ 3.0 X 12 balloon     SUMMARY:   Severe native 3V CAD   S/p POBA mid and distal RCA       RECOMMENDATION:      - heparin gtt  - Integrilin gtt   - CTSx consultation for CABG   - Transfer to [facility] Hernando Sabillon MD [address] [place], [place] [address]e, Interventional Cardiology, and Peripheral Vascular Disease   [address]   (C): [phone]  (O): [phone]

## 2021-03-08 NOTE — PROGRESS NOTES
Clinical Pharmacy Note  Heparin Dosing       Lab Results   Component Value Date    APTT 52.3 03/08/2021     Lab Results   Component Value Date    HGB 15.0 03/08/2021    HCT 44.7 03/08/2021     03/08/2021       Current Infusion Rate: 12.1 mL/hr    Plan:  Rate: Continue 12.1 mL/hr  Next aPTT: 0600  3/9/21    Pharmacy will continue to monitor and adjust based on aPTT results.     Jayson Brandt PharmD  3/8/2021 7:00 AM

## 2021-03-08 NOTE — PROGRESS NOTES
Pt to be transferred to CVU after cath lab per cath lab RN. This RN gave report to ICU nurse Last Ogden at the bedside. pts belongings taken down to room 1312 including cell phone and , jacket, shoes and other miscellaneous clothing pieces. This RN updated Daxa Orantes, the patients Mother on plan of care.     Electronically signed by Naren Oneal RN on 3/8/2021 at 9:59 AM

## 2021-03-08 NOTE — PRE SEDATION
Sedation Pre-Procedure Note    Patient Name: Asia Grimes   YOB: 1980  Room/Bed: CATH/NONE  Medical Record Number: [id number]  Date: 3/8/2021   Time: 9:50 AM       Indication:  nstemi    Consent: I have discussed with the patient and/or the patient representative the indication, alternatives, and the possible risks and/or complications of the planned procedure and the anesthesia methods. The patient and/or patient representative appear to understand and agree to proceed. Vital Signs:   Vitals:    03/08/21 0738   BP: 123/83   Pulse: 62   Resp: 22   Temp: 98 °F (36.7 °C)   SpO2: 96%       Past Medical History:   has a past medical history of Coronary artery disease, Essential hypertension, Headache, HTN (hypertension), Other hyperlipidemia, and Tobacco abuse counseling. Past Surgical History:   has a past surgical history that includes Mouth surgery; cranial lesion resection; Coronary angioplasty with stent (05/05/2020); and Coronary angioplasty with stent (04/25/2020). Medications:   Scheduled Meds:    [START ON 3/9/2021] buPROPion  150 mg Oral BID    sodium chloride flush  10 mL Intravenous 2 times per day    buPROPion  150 mg Oral Daily    lisinopril  20 mg Oral Daily    isosorbide mononitrate  60 mg Oral Daily    famotidine  20 mg Oral BID    carvedilol  6.25 mg Oral BID WC    atorvastatin  80 mg Oral Nightly    aspirin  81 mg Oral Daily    sodium chloride flush  10 mL Intravenous 2 times per day    nicotine  1 patch Transdermal Daily     Continuous Infusions:    eptifibatide      heparin (PORCINE) Infusion 12.1 mL/hr (03/08/21 0314)     PRN Meds: sodium chloride flush, acetaminophen, nitroGLYCERIN, sodium chloride flush, ondansetron, polyethylene glycol, acetaminophen **OR** acetaminophen, nicotine polacrilex  Home Meds:   Prior to Admission medications    Medication Sig Start Date End Date Taking?  Authorizing Provider   isosorbide mononitrate (IMDUR) 60 MG extended release tablet Take 1 tablet by mouth daily 2/15/21  Yes Ashley Jasmine MD   aspirin 81 MG chewable tablet Take 1 tablet by mouth daily 2/15/21  Yes Ashley Jasmine MD   nitroGLYCERIN (NITROSTAT) 0.4 MG SL tablet up to max of 3 total doses. If no relief after 1 dose, call 911. 2/15/21  Yes Ashley Jasmine MD   carvedilol (COREG) 6.25 MG tablet Take 1 tablet by mouth 2 times daily (with meals) 1/6/21  Yes Avery Pang MD   famotidine (PEPCID) 20 MG tablet Take 1 tablet by mouth 2 times daily 1/6/21  Yes Avery Pang MD   atorvastatin (LIPITOR) 80 MG tablet Take 1 tablet by mouth nightly 1/6/21  Yes Avery Pang MD   prasugrel (EFFIENT) 10 MG TABS Take 1 tablet by mouth daily 1/6/21  Yes Avery Pang MD   lisinopril (PRINIVIL;ZESTRIL) 20 MG tablet Take 1 tablet by mouth daily 12/14/20  Yes Avery Pang MD     Coumadin Use Last 7 Days:  no  Antiplatelet drug therapy use last 7 days: yes - effient   Other anticoagulant use last 7 days: no  Additional Medication Information:  n/a      Pre-Sedation Documentation and Exam:   I have personally completed a history, physical exam & review of systems for this patient (see notes).     Mallampati Airway Assessment:  normal    Prior History of Anesthesia Complications:   none    ASA Classification:  Class 3 - A patient with severe systemic disease that limits activity but is not incapacitating    Sedation/ Anesthesia Plan:   intravenous sedation    Medications Planned:   midazolam (Versed) intravenously    Patient is an appropriate candidate for plan of sedation: yes    Electronically signed by Avery Pang MD on 3/8/2021 at 9:50 AM

## 2021-03-08 NOTE — PROGRESS NOTES
Hospitalist Progress Note    CC: <principal problem not specified>      Admit date: 3/5/2021  Days in hospital:  3    Subjective/interval history: Pt S/E. Pt returned from angiography today. Denies chest pain. O2 status: room air    ROS:   Pertinent items are noted in HPI. Objective:    /83   Pulse 62   Temp 98 °F (36.7 °C) (Oral)   Resp 22   Ht 5' 9\" (1.753 m)   Wt 229 lb 0.9 oz (103.9 kg)   SpO2 96%   BMI 33.83 kg/m²     Gen: morbidly obese, NAD  HEENT: NC/AT, moist mucous membranes  Neck: supple, trachea midline  Heart: Normal s1/s2, RRR, no murmurs, gallops, or rubs. Lungs: clear bilaterally, no wheezing, no rales, no rhonchi, no use of accessory muscles  Abd: bowel sounds present, soft, nontender, nondistended, no masses  Extrem: No clubbing, cyanosis, no edema  Skin: no rashes or lesions  Psych: Alert, oriented x3, affect appropriate  Neuro: grossly intact, moves all four extremities spontaneously.  No focal deficits   Cap refill: +2 sec    Medications:  Scheduled Meds:   buPROPion  150 mg Oral Daily    prasugrel  10 mg Oral Daily    lisinopril  20 mg Oral Daily    isosorbide mononitrate  60 mg Oral Daily    famotidine  20 mg Oral BID    carvedilol  6.25 mg Oral BID WC    atorvastatin  80 mg Oral Nightly    aspirin  81 mg Oral Daily    sodium chloride flush  10 mL Intravenous 2 times per day    nicotine  1 patch Transdermal Daily       PRN Meds:  nitroGLYCERIN, sodium chloride flush, ondansetron, polyethylene glycol, acetaminophen **OR** acetaminophen, nicotine polacrilex    IV:   heparin (PORCINE) Infusion 12.1 mL/hr (03/08/21 0314)         Intake/Output Summary (Last 24 hours) at 3/8/2021 0932  Last data filed at 3/8/2021 0336  Gross per 24 hour   Intake 1623.17 ml   Output --   Net 1623.17 ml       Results:  CBC:   Recent Labs     03/06/21  0556 03/07/21  0646 03/08/21  0528   WBC 8.3 9.2 10.2   HGB 15.1 14.1 15.0   HCT 45.7 43.3 44.7   MCV 89.9 90.7 89.3  242 232     BMP:   Recent Labs     03/06/21  0556 03/07/21  0646 03/08/21  0528    142 140   K 4.2 4.0 4.0    108 105   CO2 24 23 23   BUN 10 7 13   CREATININE 0.7* 0.7* 0.7*     Mag: No results for input(s): MAG in the last 72 hours. Phos: No results found for: PHOS  No components found for: GLU    LIVER PROFILE:   Recent Labs     03/07/21  0646   AST 14*   ALT 8*   BILIDIR <0.2   BILITOT <0.2   ALKPHOS 104     PT/INR: No results for input(s): PROTIME, INR in the last 72 hours. APTT:   Recent Labs     03/06/21  2151 03/07/21  0646 03/08/21  0528   APTT 52.2* 54.3* 52.3*     UA:No results for input(s): NITRITE, COLORU, PHUR, LABCAST, WBCUA, RBCUA, MUCUS, TRICHOMONAS, YEAST, BACTERIA, CLARITYU, SPECGRAV, LEUKOCYTESUR, UROBILINOGEN, BILIRUBINUR, BLOODU, GLUCOSEU, AMORPHOUS in the last 72 hours. Invalid input(s): Tricia Petersen input(s): ABG  Lab Results   Component Value Date    CALCIUM 9.4 03/08/2021       Assessment:    Active Problems:    NSTEMI (non-ST elevated myocardial infarction) (HCC)    Tobacco abuse    Left leg numbness    Dyslipidemia  Resolved Problems:    * No resolved hospital problems. [facility] course: a 41 y.o. male with cad s/p yaz to rca x3 (5/2020), s/d chf, htn, hld, admitted with chest pain, got some relief with nitro at home. States it feels like his previous MI. When I saw the Pt later he denies chest pain. ED workup  Vitals stable, on room air  Pertinent labs: troponins . 04, wbc 12  Imaging: no infiltrates or consolidation     Plan:  Chest pain, nstemi  Known cad  - stress test with ischemia   - s/p c today, with 90% lesion in the lad, d1 stent is patent, lcx 90% ostial lesion, mid isr 90% lesion, rca 2 lesions 99%  - s/p yaz to the rca x3 in 5/2020  - on effient, ASA, statin, bb  - on heparin gtt  -cardiology consulted  - ct surgery consulted for consideration of cabg     HTN  - stable  - cont home meds     tobacco dependence  - counseled on

## 2021-03-09 LAB
ANION GAP SERPL CALCULATED.3IONS-SCNC: 11 MMOL/L (ref 3–16)
APTT: 52.8 SEC (ref 24.2–36.2)
APTT: 74.2 SEC (ref 24.2–36.2)
BASE EXCESS ARTERIAL: 0.3 MMOL/L (ref -3–3)
BASOPHILS ABSOLUTE: 0.1 K/UL (ref 0–0.2)
BASOPHILS RELATIVE PERCENT: 0.8 %
BUN BLDV-MCNC: 12 MG/DL (ref 7–20)
CALCIUM SERPL-MCNC: 9.6 MG/DL (ref 8.3–10.6)
CARBOXYHEMOGLOBIN ARTERIAL: 0 % (ref 0–1.5)
CHLORIDE BLD-SCNC: 105 MMOL/L (ref 99–110)
CO2: 25 MMOL/L (ref 21–32)
CREAT SERPL-MCNC: 0.7 MG/DL (ref 0.9–1.3)
EOSINOPHILS ABSOLUTE: 0.2 K/UL (ref 0–0.6)
EOSINOPHILS RELATIVE PERCENT: 2 %
GFR AFRICAN AMERICAN: >60
GFR NON-AFRICAN AMERICAN: >60
GLUCOSE BLD-MCNC: 107 MG/DL (ref 70–99)
HCO3 ARTERIAL: 24.6 MMOL/L (ref 21–29)
HCT VFR BLD CALC: 45.4 % (ref 40.5–52.5)
HEMOGLOBIN, ART, EXTENDED: 15.8 G/DL (ref 13.5–17.5)
HEMOGLOBIN: 15.4 G/DL (ref 13.5–17.5)
LV EF: 65 %
LVEF MODALITY: NORMAL
LYMPHOCYTES ABSOLUTE: 3 K/UL (ref 1–5.1)
LYMPHOCYTES RELATIVE PERCENT: 31.5 %
MCH RBC QN AUTO: 30.3 PG (ref 26–34)
MCHC RBC AUTO-ENTMCNC: 33.9 G/DL (ref 31–36)
MCV RBC AUTO: 89.2 FL (ref 80–100)
METHEMOGLOBIN ARTERIAL: 0.5 %
MONOCYTES ABSOLUTE: 0.9 K/UL (ref 0–1.3)
MONOCYTES RELATIVE PERCENT: 9.2 %
MRSA SCREEN RT-PCR: NORMAL
NEUTROPHILS ABSOLUTE: 5.5 K/UL (ref 1.7–7.7)
NEUTROPHILS RELATIVE PERCENT: 56.5 %
O2 CONTENT ARTERIAL: 22 ML/DL
O2 SAT, ARTERIAL: 97.6 %
O2 THERAPY: ABNORMAL
PCO2 ARTERIAL: 37.8 MMHG (ref 35–45)
PDW BLD-RTO: 13.8 % (ref 12.4–15.4)
PH ARTERIAL: 7.42 (ref 7.35–7.45)
PLATELET # BLD: 234 K/UL (ref 135–450)
PMV BLD AUTO: 9.4 FL (ref 5–10.5)
PO2 ARTERIAL: 147 MMHG (ref 75–108)
POTASSIUM REFLEX MAGNESIUM: 4.1 MMOL/L (ref 3.5–5.1)
RBC # BLD: 5.1 M/UL (ref 4.2–5.9)
SODIUM BLD-SCNC: 141 MMOL/L (ref 136–145)
TCO2 ARTERIAL: 25.7 MMOL/L
WBC # BLD: 9.6 K/UL (ref 4–11)

## 2021-03-09 PROCEDURE — 6370000000 HC RX 637 (ALT 250 FOR IP): Performed by: NURSE PRACTITIONER

## 2021-03-09 PROCEDURE — 6370000000 HC RX 637 (ALT 250 FOR IP): Performed by: INTERNAL MEDICINE

## 2021-03-09 PROCEDURE — 97166 OT EVAL MOD COMPLEX 45 MIN: CPT

## 2021-03-09 PROCEDURE — 93931 UPPER EXTREMITY STUDY: CPT

## 2021-03-09 PROCEDURE — 2140000000 HC CCU INTERMEDIATE R&B

## 2021-03-09 PROCEDURE — 99232 SBSQ HOSP IP/OBS MODERATE 35: CPT | Performed by: INTERNAL MEDICINE

## 2021-03-09 PROCEDURE — 93306 TTE W/DOPPLER COMPLETE: CPT

## 2021-03-09 PROCEDURE — 36600 WITHDRAWAL OF ARTERIAL BLOOD: CPT

## 2021-03-09 PROCEDURE — 2500000003 HC RX 250 WO HCPCS: Performed by: INTERNAL MEDICINE

## 2021-03-09 PROCEDURE — 94010 BREATHING CAPACITY TEST: CPT

## 2021-03-09 PROCEDURE — 80048 BASIC METABOLIC PNL TOTAL CA: CPT

## 2021-03-09 PROCEDURE — 36415 COLL VENOUS BLD VENIPUNCTURE: CPT

## 2021-03-09 PROCEDURE — 97162 PT EVAL MOD COMPLEX 30 MIN: CPT

## 2021-03-09 PROCEDURE — 97530 THERAPEUTIC ACTIVITIES: CPT

## 2021-03-09 PROCEDURE — 6370000000 HC RX 637 (ALT 250 FOR IP): Performed by: FAMILY MEDICINE

## 2021-03-09 PROCEDURE — 93971 EXTREMITY STUDY: CPT

## 2021-03-09 PROCEDURE — 6360000002 HC RX W HCPCS: Performed by: INTERNAL MEDICINE

## 2021-03-09 PROCEDURE — 85730 THROMBOPLASTIN TIME PARTIAL: CPT

## 2021-03-09 PROCEDURE — 94761 N-INVAS EAR/PLS OXIMETRY MLT: CPT

## 2021-03-09 PROCEDURE — 97535 SELF CARE MNGMENT TRAINING: CPT

## 2021-03-09 PROCEDURE — 82803 BLOOD GASES ANY COMBINATION: CPT

## 2021-03-09 PROCEDURE — 93880 EXTRACRANIAL BILAT STUDY: CPT

## 2021-03-09 PROCEDURE — 85025 COMPLETE CBC W/AUTO DIFF WBC: CPT

## 2021-03-09 RX ORDER — EPTIFIBATIDE 0.75 MG/ML
2 INJECTION, SOLUTION INTRAVENOUS CONTINUOUS
Status: DISPENSED | OUTPATIENT
Start: 2021-03-09 | End: 2021-03-12

## 2021-03-09 RX ADMIN — CARVEDILOL 6.25 MG: 6.25 TABLET, FILM COATED ORAL at 07:50

## 2021-03-09 RX ADMIN — BUPROPION HYDROCHLORIDE 150 MG: 150 TABLET, EXTENDED RELEASE ORAL at 20:08

## 2021-03-09 RX ADMIN — ASPIRIN 81 MG: 81 TABLET, CHEWABLE ORAL at 07:50

## 2021-03-09 RX ADMIN — EPTIFIBATIDE 2 MCG/KG/MIN: 75 INJECTION INTRAVENOUS at 01:30

## 2021-03-09 RX ADMIN — ISOSORBIDE MONONITRATE 60 MG: 60 TABLET, EXTENDED RELEASE ORAL at 07:50

## 2021-03-09 RX ADMIN — HEPARIN SODIUM 12.1 ML/HR: 10000 INJECTION, SOLUTION INTRAVENOUS at 22:09

## 2021-03-09 RX ADMIN — EPTIFIBATIDE 2 MCG/KG/MIN: 75 INJECTION INTRAVENOUS at 16:51

## 2021-03-09 RX ADMIN — HEPARIN SODIUM 12.1 ML/HR: 10000 INJECTION, SOLUTION INTRAVENOUS at 01:30

## 2021-03-09 RX ADMIN — EPTIFIBATIDE 2 MCG/KG/MIN: 75 INJECTION INTRAVENOUS at 23:35

## 2021-03-09 RX ADMIN — FAMOTIDINE 20 MG: 20 TABLET, FILM COATED ORAL at 07:50

## 2021-03-09 RX ADMIN — FAMOTIDINE 20 MG: 20 TABLET, FILM COATED ORAL at 20:08

## 2021-03-09 RX ADMIN — EPTIFIBATIDE 2 MCG/KG/MIN: 75 INJECTION INTRAVENOUS at 11:35

## 2021-03-09 RX ADMIN — CARVEDILOL 6.25 MG: 6.25 TABLET, FILM COATED ORAL at 16:52

## 2021-03-09 RX ADMIN — SALINE NASAL SPRAY 1 SPRAY: 1.5 SOLUTION NASAL at 20:08

## 2021-03-09 RX ADMIN — ATORVASTATIN CALCIUM 80 MG: 80 TABLET, FILM COATED ORAL at 20:08

## 2021-03-09 RX ADMIN — BUPROPION HYDROCHLORIDE 150 MG: 150 TABLET, EXTENDED RELEASE ORAL at 07:50

## 2021-03-09 RX ADMIN — DOCUSATE SODIUM 50 MG AND SENNOSIDES 8.6 MG 2 TABLET: 8.6; 5 TABLET, FILM COATED ORAL at 07:50

## 2021-03-09 ASSESSMENT — PAIN SCALES - GENERAL
PAINLEVEL_OUTOF10: 0
PAINLEVEL_OUTOF10: 0

## 2021-03-09 NOTE — PROGRESS NOTES
Clinical Pharmacy Note  Heparin Dosing       Lab Results   Component Value Date    APTT 49.8 03/08/2021     Lab Results   Component Value Date    HGB 15.0 03/08/2021    HCT 44.7 03/08/2021     03/08/2021     Gtt off for procedure earlier today, restarted at noon. Current Infusion Rate: 12.1 mL/hr    Plan:  Rate: Continue 12.1 mL/hr  Next aPTT: 0430  3/9/21    Pharmacy will continue to monitor and adjust based on aPTT results.     Darlyn Linder, PharmD  3/8/2021 11:16 PM

## 2021-03-09 NOTE — PROGRESS NOTES
Occupational Therapy   Occupational Therapy Initial Assessment/ Treatment/ Discharge  Date: 3/9/2021   Patient Name: Christine Gastelum  MRN: [id number]     : 1980    Date of Service: 3/9/2021    Discharge Recommendations:  Home with assist PRN  OT Equipment Recommendations  Equipment Needed: No  Other: defer until after surgery    Assessment   Performance deficits / Impairments: Decreased high-level IADLs  Assessment: Prior to admission pt was living at home with family, was independent with ADLs and IADLs. Pt admitted to ED with c/o chest pain and SOB, cardiac cath performed with cardiac surgery following/ pending possible surgery. At this time however pt at his baseline, demonstrating independent for transfers and mobility. Pt was educated on sternal precautions and demonstrated good understanding of sternal precautions during transfers and ADLs. Pt demonstrates no acute OT needs at this time, however will await new therapy orders post surgery if warranted. Pt reporting he is unsure if he will be discharging prior to surgery, however if pt discharges prior to therapy pt would benefit from PRN assist at home. Treatment Diagnosis: decreased IADLs  Prognosis: Fair  Decision Making: Medium Complexity  OT Education: Plan of Care;OT Role  Patient Education: verb understanding; ed pt on sternal precautions after pending surgery, verb understanding. REQUIRES OT FOLLOW UP: No  Activity Tolerance  Activity Tolerance: Patient Tolerated treatment well  Activity Tolerance: Pt reported no fatigue after mobility in room  Safety Devices  Safety Devices in place: Not Applicable(left in room with RN; RN reporting pt up ad robert)           Patient Diagnosis(es): The encounter diagnosis was NSTEMI (non-ST elevated myocardial infarction) ([facility] [address]      has a past medical history of Coronary artery disease, Essential hypertension, GERD (gastroesophageal reflux disease), Headache, NSTEMI (non-ST elevated myocardial infarction) ([facility] [address] Obesity, Other hyperlipidemia, and Tobacco abuse counseling.   has a past surgical history that includes Mouth surgery; Coronary angioplasty with stent (05/05/2020); Coronary angioplasty with stent (04/25/2020); and Coronary angioplasty (03/08/2021). Treatment Diagnosis: decreased IADLs      Restrictions  Position Activity Restriction  Other position/activity restrictions: up ad robert; Therapy order: \"practice sternal precautions for pending CABG\"    Subjective   General  Chart Reviewed: Yes  Patient assessed for rehabilitation services?: Yes  Additional Pertinent Hx: Pt admitted to ED with c/o CP and SOB. Diagnosed with NSTEMI. Cardiac cath on 3/8/21, culprit vessels addressed. Cardiac surgery following, pt reporting believing he will have cardiac surgery on friday 3/12. PMHx includes:  Coronary artery disease, Essential hypertension (04/07/2020), GERD (gastroesophageal reflux disease), Headache, NSTEMI (non-ST elevated myocardial infarction) ([facility] [address] Obesity, Other hyperlipidemia (04/07/2020), and Tobacco abuse counseling (04/07/2020). Family / Caregiver Present: No  Referring Practitioner: Ector Paul MD  Diagnosis: NSTEMI  Subjective  Subjective: Pt semi supine in bed upon arrival, agreeable to OT eval and treat.     Social/Functional History  Social/Functional History  Lives With: Family  Type of Home: House  Home Layout: (bed/ bathroom on basement level, kitchen on main level)  Home Access: Stairs to enter without rails  Entrance Stairs - Number of Steps: 3  Bathroom Shower/Tub: (walk in shower downstairs near his bedroom, however pt reporting shower does not work downstairs and has been going upstairs to [employer].)  [employer]: Standard(pt reporting very low)  Home Equipment: (no AE prior)  ADL Assistance: Independent  Homemaking Assistance: Independent  Ambulation Assistance: Independent  Transfer Assistance: Independent  Active : No  Patient's  Info: mother drives  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)  Additional Comments: pt denies recent falls       Objective   Vision: Impaired  Vision Exceptions: (pt reporting he needs glasses but doesnt have)  Hearing: Within functional limits    Orientation  Overall Orientation Status: Within Functional Limits     Balance  Sitting Balance: Independent  Standing Balance: Independent  Standing Balance  Time: 10 mins total  Activity: mobility in room, into bathroom  Functional Mobility  Functional - Mobility Device: No device  Activity: To/from bathroom  Assist Level: Independent  Toilet Transfers  Toilet - Technique: Ambulating  Equipment Used: Standard toilet  Toilet Transfer: Independent  ADL  Grooming: Independent  LE Dressing: Independent  Toileting: Independent           Transfers  Sit to stand: Independent  Stand to sit: Independent  Transfer Comments: Pt educated on sternal precautions that will be in place after his surgery, pt demonstrated good understanding of completing during sit to stand transfers and bed mobility. Cognition  Overall Cognitive Status: WFL  Cognition Comment: pt somewhat slow to process questions, possible cognitive deficits at baseline? LUE AROM (degrees)  LUE AROM : WFL  Left Hand AROM (degrees)  Left Hand AROM: WFL  RUE AROM (degrees)  RUE AROM : WFL  Right Hand AROM (degrees)  Right Hand AROM: WFL             Treatment included functional transfer training, ADLs, and patient education.             Plan   Plan  Times per week: eval and dc    AM-PAC Score        AM-PAC Inpatient Daily Activity Raw Score: 24 (03/09/21 1147)  AM-PAC Inpatient ADL T-Scale Score : 57.54 (03/09/21 1147)  ADL Inpatient CMS 0-100% Score: 0 (03/09/21 1147)  ADL Inpatient CMS G-Code Modifier : CH (03/09/21 1147)    Goals  Patient Goals   Patient goals : eval and dc       Therapy Time   Individual Concurrent Group Co-treatment   Time In 1102         Time Out 1130         Minutes 28         Timed Code Treatment Minutes: 13 Minutes(+15 jose alberto RAMIREZ  [facility], OTR/L [phone]

## 2021-03-09 NOTE — PROGRESS NOTES
Pt had a uneventful night with no complications. Pt remained hemodynamically stable throughout the shift. Pt denied pain for the entire shift. Pt was up ad robert with no complications.

## 2021-03-09 NOTE — PLAN OF CARE
Problem: Pain:  Description: Pain management should include both nonpharmacologic and pharmacologic interventions.   Goal: Pain level will decrease  Description: Pain level will decrease  Outcome: Ongoing     Problem: Safety:  Goal: Free from accidental physical injury  Description: Free from accidental physical injury  Outcome: Ongoing     Problem: Safety:  Goal: Free from intentional harm  Description: Free from intentional harm  Outcome: Ongoing     Problem: Discharge Planning:  Goal: Patients continuum of care needs are met  Description: Patients continuum of care needs are met  Outcome: Ongoing     Problem: Cardiac:  Goal: Ability to maintain vital signs within normal range will improve  Description: Ability to maintain vital signs within normal range will improve  Outcome: Ongoing     Problem: Cardiac:  Goal: Cardiovascular alteration will improve  Description: Cardiovascular alteration will improve  Outcome: Ongoing     Problem: Falls - Risk of:  Goal: Will remain free from falls  Description: Will remain free from falls  Outcome: Ongoing     Problem: Falls - Risk of:  Goal: Absence of physical injury  Description: Absence of physical injury  Outcome: Ongoing

## 2021-03-09 NOTE — PROGRESS NOTES
Clinical Pharmacy Note  Heparin Dosing       Lab Results   Component Value Date    APTT 52.8 03/09/2021     Lab Results   Component Value Date    HGB 15.0 03/08/2021    HCT 44.7 03/08/2021     03/08/2021       Current Infusion Rate: 12.1 mL/hr    Plan:  Rate: Continue 12.1 mL/hr  Next aPTT: 0600  3/10/21    Pharmacy will continue to monitor and adjust based on aPTT results.     Jose Alberto Gama, PharmD  3/9/2021 5:02 AM

## 2021-03-09 NOTE — PROGRESS NOTES
0715--Handoff report with [facility], RN. Pt A&Ox4 in bed, independent     1145--Pt states he's been having nosebleeds. APTT ordered. I sent message to let Dr. Dony Hdz know. 1900--Handoff report with Caridad Basilio RN. Pt A&Ox4, ambulating around room independently.      Electronically signed by Tracy Wild RN on 3/9/2021 at 7:21 PM

## 2021-03-09 NOTE — PROGRESS NOTES
Hospitalist Progress Note      PCP: Ole Chua DO    Date of Admission: 3/5/2021    Subjective: pt getting venous mapping done, awaiting CABG Friday, had some nose bleed earlier in the day - resolved    Medications:  Reviewed    Infusion Medications    eptifibatide 2 mcg/kg/min (03/09/21 1651)    heparin (PORCINE) Infusion 12.1 mL/hr (03/09/21 0130)     Scheduled Medications    buPROPion  150 mg Oral BID    sodium chloride flush  10 mL Intravenous 2 times per day    sennosides-docusate sodium  2 tablet Oral Daily    isosorbide mononitrate  60 mg Oral Daily    famotidine  20 mg Oral BID    carvedilol  6.25 mg Oral BID WC    atorvastatin  80 mg Oral Nightly    aspirin  81 mg Oral Daily     PRN Meds: sodium chloride, sodium chloride flush, nitroGLYCERIN, ondansetron, polyethylene glycol, acetaminophen **OR** acetaminophen      Intake/Output Summary (Last 24 hours) at 3/9/2021 1714  Last data filed at 3/9/2021 1357  Gross per 24 hour   Intake 2716.1 ml   Output --   Net 2716.1 ml       Physical Exam Performed:    /79   Pulse 61   Temp 97.5 °F (36.4 °C) (Oral)   Resp 18   Ht 5' 9\" (1.753 m)   Wt 229 lb 15 oz (104.3 kg)   SpO2 97%   BMI 33.96 kg/m²     Gen: NAD  HEENT: NC/AT, moist mucous membranes  Neck: supple, trachea midline  Heart: Normal s1/s2, RRR, no murmurs, gallops, or rubs. Lungs: clear bilaterally, no wheezing, no rales, no rhonchi, no use of accessory muscles  Abd: bowel sounds present, soft, nontender, nondistended, no masses  Extrem: No clubbing, cyanosis, no edema  Skin: no rashes or lesions  Psych: Alert, oriented x3, affect appropriate  Neuro: grossly intact, moves all four extremities spontaneously.  No focal deficits   Cap refill: +2 sec    Labs:   Recent Labs     03/07/21  0646 03/08/21  0528 03/09/21  0417   WBC 9.2 10.2 9.6   HGB 14.1 15.0 15.4   HCT 43.3 44.7 45.4    232 234     Recent Labs     03/07/21  0646 03/08/21  0528 03/09/21  0417    140 141 K 4.0 4.0 4.1    105 105   CO2 23 23 25   BUN 7 13 12   CREATININE 0.7* 0.7* 0.7*   CALCIUM 9.2 9.4 9.6     Recent Labs     03/07/21  0646   AST 14*   ALT 8*   BILIDIR <0.2   BILITOT <0.2   ALKPHOS 104     No results for input(s): INR in the last 72 hours. No results for input(s): Wyatt Dom in the last 72 hours. Urinalysis:      Lab Results   Component Value Date    NITRU Negative 12/19/2016    BLOODU Negative 12/19/2016    SPECGRAV 1.006 12/19/2016    GLUCOSEU Negative 12/19/2016       Radiology:  VL DUP CAROTID LEFT         VL PRE OP VEIN MAPPING         VL DUP LOWER EXTREMITY ARTERIES LEFT   Final Result      NM Cardiac Stress Test Nuclear Imaging   Final Result      XR CHEST PORTABLE   Final Result   Low lung volumes with bibasilar atelectasis. VL DUP CAROTID BILATERAL    (Results Pending)   VL DUP UPPER EXTREMITY ARTERIES LIMITED    (Results Pending)           Assessment/Plan:    Active Hospital Problems    Diagnosis    Dyslipidemia [E78.5]    Left leg numbness [R20.0]    Tobacco abuse [Z72.0]    NSTEMI (non-ST elevated myocardial infarction) [facility]) [I21.4]       Hospital course: a 41 y. o. male with cad s/p yaz to rca x3 (5/2020), s/d chf, htn, hld, admitted with chest pain, got some relief with nitro at home. States it feels like his previous MI. When I saw the Pt later he denies chest pain. ED workup  Vitals stable, on room air  Pertinent labs: troponins . 04, wbc 12  Imaging: no infiltrates or consolidation     Plan:  Chest pain, nstemi  Known cad  - stress test with ischemia   - s/p c 3/8/21, with 90% lesion in the lad, d1 stent is patent, lcx 90% ostial lesion, mid isr 90% lesion, rca 2 lesions 99%  - s/p yaz to the rca x3 in 5/2020  - on effient, ASA, statin, bb  - on heparin gtt  - cardiology consulted  - ct surgery consulted for consideration of cabg - plan surgery on Friday    Epistaxis - poss 2/2 heparin gtt, resolved     HTN  - stable  - cont home meds     tobacco dependence  - counseled on cessation  - nicotine patch  - started wellbutrin: 150 mg daily x 3 days, then increase to bid. Stop smoking/nicotine patch 5-7 days after starting      Diet: DIET CARDIAC;   Dietary Nutrition Supplements: Frozen Oral Supplement, Standard High Calorie Oral Supplement  Code Status: Full Code    PT/OT Eval Status: ordered    Aleks Elmore MD

## 2021-03-09 NOTE — CARE COORDINATION
LSW reviewed chart. LSW spoke with Woo Dumont NP who reports he is to have surgery on Friday. LSW spoke with patient over the phone to introduce  role and to initiate discussion regarding DC planning. LSW informed him that it is recommended he have someone with him for 24 hours of the day for 7 days when her returns home. LSW informed him that he may need to be seen by home care Team of possible services of RN and therapy once he returns [facility]. He reports he lives in a house with 3 + 1 step entry and then he stays in the basement. He reports it would be easier for him to enter the basement from the outside with less steps. He lives with his mother, aunt, cousin in the house. There is a 1/2 bath on the basement level and would need to come upstairs for bathing/kitchen. He reports he does work. LSW asked him if he needed paperwork from Dr Alban Moore for the time he is to be off work. He became very irritated with this when I informed him paperwork from his employer needs to be sent to her office. He reports He should have known this on Monday. He was fixated on the reason no one told him about paperwork to be signed that needs to come from his employer. LSW informed him it is called [facility] and is started with his employer. He is active with Dr Guru Frank for pcp needs. He likes to use [facility] on [address]. He is agreeable to home care if needed. LSW pulled an in network list of Home care agencies under [place] for his future needs.   Ferny Sears [facility]     Case Management   [phone]    3/9/2021  11:56 AM

## 2021-03-09 NOTE — PROGRESS NOTES
Restriction  Other position/activity restrictions: up ad robert; Therapy order: \"practice sternal precautions for pending CABG\"  Vision/Hearing  Vision: Impaired  Vision Exceptions: (pt reporting he needs glasses but doesnt have)  Hearing: Within functional limits     Subjective  General  Chart Reviewed: Yes  Patient assessed for rehabilitation services?: Yes  Additional Pertinent Hx: Per H&P, \"35 y.o. male with cad s/p yaz to rca x3 (5/2020), s/d chf, htn, hld, admitted with chest pain, got some relief with nitro at home. States it feels like his previous MI.\"  Admitted for Chest Pain, NSTEMI and underwent \"Cath 3/8/21, PTCA RCA. Integrillin\". Pt reported planning CABG Friday 3/12/21, cards sx following. Family / Caregiver Present: No  Subjective  Subjective: Pt reported being up ad robert, no problems, no pain. Pt reported surgery plaaning for Friday 3/12/21. Discussed sternal precautions with pt this eval session.      Orientation  Orientation  Overall Orientation Status: Within Functional Limits  Social/Functional History  Social/Functional History  Lives With: Family  Type of Home: House  Home Layout: (bed/ bathroom on basement level, kitchen on main level)  Home Access: Stairs to enter without rails  Entrance Stairs - Number of Steps: 3  Bathroom Shower/Tub: (walk in shower downstairs near his bedroom, however pt reporting shower does not work downstairs and has been going upstairs to [employer].)  [employer]: Standard(pt reporting very low)  Home Equipment: (no AE prior)  ADL Assistance: Independent  Homemaking Assistance: Independent  Ambulation Assistance: Independent  Transfer Assistance: Independent  Active : No  Patient's  Info: mother drives  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 Pt able ot perform, but with light use of R elbow to fully achieve upright. Cues for cont practice while here to make easier transition to post-op. Pt demod and VU of ed provded. Transfers  Sit to Stand: Independent  Stand to sit: Independent  Ambulation  Ambulation?: Yes  Ambulation 1  Surface: level tile  Device: No Device  Quality of Gait: steady, no LOB or deviations noted. Gait Deviations: None  Distance: in room distances, and to/from bathroom x 40-50 ft at present. Pt reported being up ad robert without difficulty, but did discuss role and goals of PT with pt as to be expected post-op. Pt agreed to POC. Comments: Pt left standing up in room, with nuring to attend to IV alarm. Stairs/Curb  Stairs?: No(verbally discussed that post-op, no restirctions going up/down steps and can practice here as needed post-op. Pt VU.)     Balance  Sitting - Static: Good  Sitting - Dynamic: Good  Standing - Static: Good  Standing - Dynamic: Good  Comments: Independent without need for equipment or devices at this time. Plan   Plan  Specific instructions for Next Treatment: TBD based on post-op orders if needed. Safety Devices  Type of devices: Nurse notified, Call light within reach(pt left with nursing.)               AM-PAC Score  AM-PAC Inpatient Mobility Raw Score : 24 (03/09/21 1156)  AM-PAC Inpatient T-Scale Score : 61.14 (03/09/21 1156)  Mobility Inpatient CMS 0-100% Score: 0 (03/09/21 1156)  Mobility Inpatient CMS G-Code Modifier : [address] (03/09/21 1156)     Goals: No acute PT goals needed at this time--will address goals if/as needed post-op as ordered.      Therapy Time   Individual Concurrent Group Co-treatment   Time In 1100         Time Out 1130         Minutes 30            [place] [address]

## 2021-03-10 ENCOUNTER — APPOINTMENT (OUTPATIENT)
Dept: CT IMAGING | Age: 41
DRG: 165 | End: 2021-03-10
Payer: MEDICARE

## 2021-03-10 LAB
ABO/RH: NORMAL
ANION GAP SERPL CALCULATED.3IONS-SCNC: 10 MMOL/L (ref 3–16)
ANTIBODY SCREEN: NORMAL
APTT: 55.5 SEC (ref 24.2–36.2)
BASOPHILS ABSOLUTE: 0.1 K/UL (ref 0–0.2)
BASOPHILS RELATIVE PERCENT: 1 %
BILIRUBIN URINE: NEGATIVE
BLOOD, URINE: NEGATIVE
BUN BLDV-MCNC: 10 MG/DL (ref 7–20)
CALCIUM SERPL-MCNC: 9.2 MG/DL (ref 8.3–10.6)
CHLORIDE BLD-SCNC: 102 MMOL/L (ref 99–110)
CLARITY: CLEAR
CO2: 26 MMOL/L (ref 21–32)
COLOR: YELLOW
CREAT SERPL-MCNC: 0.7 MG/DL (ref 0.9–1.3)
EOSINOPHILS ABSOLUTE: 0.2 K/UL (ref 0–0.6)
EOSINOPHILS RELATIVE PERCENT: 2.2 %
GFR AFRICAN AMERICAN: >60
GFR NON-AFRICAN AMERICAN: >60
GLUCOSE BLD-MCNC: 113 MG/DL (ref 70–99)
GLUCOSE URINE: NEGATIVE MG/DL
HCT VFR BLD CALC: 44.6 % (ref 40.5–52.5)
HEMOGLOBIN: 15.2 G/DL (ref 13.5–17.5)
KETONES, URINE: NEGATIVE MG/DL
LEUKOCYTE ESTERASE, URINE: NEGATIVE
LYMPHOCYTES ABSOLUTE: 3.5 K/UL (ref 1–5.1)
LYMPHOCYTES RELATIVE PERCENT: 36.2 %
MCH RBC QN AUTO: 30.2 PG (ref 26–34)
MCHC RBC AUTO-ENTMCNC: 34 G/DL (ref 31–36)
MCV RBC AUTO: 88.9 FL (ref 80–100)
MICROSCOPIC EXAMINATION: NORMAL
MONOCYTES ABSOLUTE: 0.9 K/UL (ref 0–1.3)
MONOCYTES RELATIVE PERCENT: 8.8 %
NEUTROPHILS ABSOLUTE: 5.1 K/UL (ref 1.7–7.7)
NEUTROPHILS RELATIVE PERCENT: 51.8 %
NITRITE, URINE: NEGATIVE
PDW BLD-RTO: 14.1 % (ref 12.4–15.4)
PH UA: 7 (ref 5–8)
PLATELET # BLD: 238 K/UL (ref 135–450)
PMV BLD AUTO: 9.4 FL (ref 5–10.5)
POTASSIUM REFLEX MAGNESIUM: 4 MMOL/L (ref 3.5–5.1)
PROTEIN UA: NEGATIVE MG/DL
RBC # BLD: 5.02 M/UL (ref 4.2–5.9)
SODIUM BLD-SCNC: 138 MMOL/L (ref 136–145)
SPECIFIC GRAVITY UA: 1.01 (ref 1–1.03)
URINE REFLEX TO CULTURE: NORMAL
URINE TYPE: NORMAL
UROBILINOGEN, URINE: 0.2 E.U./DL
WBC # BLD: 9.8 K/UL (ref 4–11)

## 2021-03-10 PROCEDURE — 6360000002 HC RX W HCPCS: Performed by: NURSE PRACTITIONER

## 2021-03-10 PROCEDURE — 85025 COMPLETE CBC W/AUTO DIFF WBC: CPT

## 2021-03-10 PROCEDURE — 6370000000 HC RX 637 (ALT 250 FOR IP): Performed by: FAMILY MEDICINE

## 2021-03-10 PROCEDURE — 2580000003 HC RX 258: Performed by: INTERNAL MEDICINE

## 2021-03-10 PROCEDURE — 36415 COLL VENOUS BLD VENIPUNCTURE: CPT

## 2021-03-10 PROCEDURE — 86901 BLOOD TYPING SEROLOGIC RH(D): CPT

## 2021-03-10 PROCEDURE — 6370000000 HC RX 637 (ALT 250 FOR IP): Performed by: NURSE PRACTITIONER

## 2021-03-10 PROCEDURE — 99233 SBSQ HOSP IP/OBS HIGH 50: CPT | Performed by: INTERNAL MEDICINE

## 2021-03-10 PROCEDURE — 6360000004 HC RX CONTRAST MEDICATION: Performed by: NURSE PRACTITIONER

## 2021-03-10 PROCEDURE — 99231 SBSQ HOSP IP/OBS SF/LOW 25: CPT | Performed by: THORACIC SURGERY (CARDIOTHORACIC VASCULAR SURGERY)

## 2021-03-10 PROCEDURE — 6360000002 HC RX W HCPCS: Performed by: INTERNAL MEDICINE

## 2021-03-10 PROCEDURE — 85730 THROMBOPLASTIN TIME PARTIAL: CPT

## 2021-03-10 PROCEDURE — 6370000000 HC RX 637 (ALT 250 FOR IP): Performed by: INTERNAL MEDICINE

## 2021-03-10 PROCEDURE — 2500000003 HC RX 250 WO HCPCS: Performed by: INTERNAL MEDICINE

## 2021-03-10 PROCEDURE — 94761 N-INVAS EAR/PLS OXIMETRY MLT: CPT

## 2021-03-10 PROCEDURE — 71275 CT ANGIOGRAPHY CHEST: CPT

## 2021-03-10 PROCEDURE — 99254 IP/OBS CNSLTJ NEW/EST MOD 60: CPT | Performed by: SURGERY

## 2021-03-10 PROCEDURE — 86900 BLOOD TYPING SEROLOGIC ABO: CPT

## 2021-03-10 PROCEDURE — 86850 RBC ANTIBODY SCREEN: CPT

## 2021-03-10 PROCEDURE — 80048 BASIC METABOLIC PNL TOTAL CA: CPT

## 2021-03-10 PROCEDURE — 2580000003 HC RX 258: Performed by: NURSE PRACTITIONER

## 2021-03-10 PROCEDURE — 81003 URINALYSIS AUTO W/O SCOPE: CPT

## 2021-03-10 PROCEDURE — 2140000000 HC CCU INTERMEDIATE R&B

## 2021-03-10 RX ORDER — DIPHENHYDRAMINE HYDROCHLORIDE 50 MG/ML
50 INJECTION INTRAMUSCULAR; INTRAVENOUS ONCE
Status: COMPLETED | OUTPATIENT
Start: 2021-03-10 | End: 2021-03-10

## 2021-03-10 RX ORDER — SODIUM CHLORIDE 0.9 % (FLUSH) 0.9 %
10 SYRINGE (ML) INJECTION PRN
Status: DISCONTINUED | OUTPATIENT
Start: 2021-03-10 | End: 2021-03-14

## 2021-03-10 RX ORDER — LIDOCAINE HYDROCHLORIDE 10 MG/ML
5 INJECTION, SOLUTION EPIDURAL; INFILTRATION; INTRACAUDAL; PERINEURAL ONCE
Status: DISCONTINUED | OUTPATIENT
Start: 2021-03-10 | End: 2021-03-15

## 2021-03-10 RX ORDER — SODIUM CHLORIDE 0.9 % (FLUSH) 0.9 %
10 SYRINGE (ML) INJECTION EVERY 12 HOURS SCHEDULED
Status: DISCONTINUED | OUTPATIENT
Start: 2021-03-10 | End: 2021-03-14

## 2021-03-10 RX ADMIN — DOCUSATE SODIUM 50 MG AND SENNOSIDES 8.6 MG 2 TABLET: 8.6; 5 TABLET, FILM COATED ORAL at 07:40

## 2021-03-10 RX ADMIN — ATORVASTATIN CALCIUM 80 MG: 80 TABLET, FILM COATED ORAL at 22:08

## 2021-03-10 RX ADMIN — ACETAMINOPHEN 650 MG: 325 TABLET ORAL at 23:48

## 2021-03-10 RX ADMIN — IOPAMIDOL 75 ML: 755 INJECTION, SOLUTION INTRAVENOUS at 07:58

## 2021-03-10 RX ADMIN — EPTIFIBATIDE 2 MCG/KG/MIN: 75 INJECTION INTRAVENOUS at 12:35

## 2021-03-10 RX ADMIN — CARVEDILOL 6.25 MG: 6.25 TABLET, FILM COATED ORAL at 16:04

## 2021-03-10 RX ADMIN — FAMOTIDINE 20 MG: 20 TABLET, FILM COATED ORAL at 22:08

## 2021-03-10 RX ADMIN — EPTIFIBATIDE 2 MCG/KG/MIN: 75 INJECTION INTRAVENOUS at 05:43

## 2021-03-10 RX ADMIN — EPTIFIBATIDE 2 MCG/KG/MIN: 75 INJECTION INTRAVENOUS at 18:12

## 2021-03-10 RX ADMIN — Medication 10 ML: at 09:24

## 2021-03-10 RX ADMIN — HEPARIN SODIUM 12.1 ML/HR: 10000 INJECTION, SOLUTION INTRAVENOUS at 22:48

## 2021-03-10 RX ADMIN — DIPHENHYDRAMINE HYDROCHLORIDE 50 MG: 50 INJECTION, SOLUTION INTRAMUSCULAR; INTRAVENOUS at 09:24

## 2021-03-10 RX ADMIN — Medication 10 ML: at 07:40

## 2021-03-10 RX ADMIN — CARVEDILOL 6.25 MG: 6.25 TABLET, FILM COATED ORAL at 07:40

## 2021-03-10 RX ADMIN — ACETAMINOPHEN 650 MG: 325 TABLET ORAL at 16:12

## 2021-03-10 RX ADMIN — ISOSORBIDE MONONITRATE 60 MG: 60 TABLET, EXTENDED RELEASE ORAL at 07:40

## 2021-03-10 RX ADMIN — BUPROPION HYDROCHLORIDE 150 MG: 150 TABLET, EXTENDED RELEASE ORAL at 22:08

## 2021-03-10 RX ADMIN — ASPIRIN 81 MG: 81 TABLET, CHEWABLE ORAL at 07:40

## 2021-03-10 RX ADMIN — FAMOTIDINE 20 MG: 20 TABLET, FILM COATED ORAL at 07:40

## 2021-03-10 RX ADMIN — Medication 10 ML: at 22:09

## 2021-03-10 RX ADMIN — BUPROPION HYDROCHLORIDE 150 MG: 150 TABLET, EXTENDED RELEASE ORAL at 07:40

## 2021-03-10 ASSESSMENT — PAIN SCALES - GENERAL
PAINLEVEL_OUTOF10: 0
PAINLEVEL_OUTOF10: 0

## 2021-03-10 ASSESSMENT — PAIN DESCRIPTION - LOCATION: LOCATION: HEAD

## 2021-03-10 NOTE — PROGRESS NOTES
Clinical Pharmacy Note  Heparin Dosing       Lab Results   Component Value Date    APTT 55.5 03/10/2021     Lab Results   Component Value Date    HGB 15.2 03/10/2021    HCT 44.6 03/10/2021     03/10/2021       Current Infusion Rate: 12.1 mL/hr    Plan:  Rate: Continue 12.1 mL/hr  Next aPTT: 0600  3/11/21    Pharmacy will continue to monitor and adjust based on aPTT results.     Gracie Orantes PharmD  3/10/2021 6:48 AM

## 2021-03-10 NOTE — PROGRESS NOTES
Order for PICC per Kayla Mccarty. Pre procedure and timeout done with pt's RN. RN noticed recent infiltrated IV at right wrist. IV removed. Swelling noted at site. PT recently also had left Heart cath on 3/8/21 with access through right radial. Given the circumstances. Pt was set up and assessed without use of turnicate. Pt was setup for PICC line. Before procedure began, Patients started to  complain of numbness on right arm. \"My whole arm feels numb with burning near wrist site. \" Procedure was halted and arm was placed back into position of comfort. Pt reports tinglinging \"getting better\" and bedside RN was given update. Given that Patient is a RIGHT arm only. PICC RN did not attempt left.

## 2021-03-10 NOTE — PROGRESS NOTES
Progress Note    CAD -  NSTEMI. PTCA RCA 3/8/21    Vital Signs:                                                 /74   Pulse 69   Temp 97.9 °F (36.6 °C) (Oral)   Resp 18   Ht 5' 9\" (1.753 m)   Wt 223 lb 15.8 oz (101.6 kg)   SpO2 96%   BMI 33.08 kg/m²  O2 Flow Rate (L/min): 0 L/min      Admission Weight: 235 lb 14.3 oz (107 kg)      Drips:  Hep, integ    I/O:      Intake/Output Summary (Last 24 hours) at 3/10/2021 0717  Last data filed at 3/10/2021 0618  Gross per 24 hour   Intake 3086 ml   Output 1875 ml   Net 1211 ml     CV: reg  Pulm: clear    Data Review:  CBC:   Recent Labs     03/08/21  0528 03/09/21  0417 03/10/21  0427   WBC 10.2 9.6 9.8   HGB 15.0 15.4 15.2   HCT 44.7 45.4 44.6   MCV 89.3 89.2 88.9    234 238     BMP:   Recent Labs     03/08/21  0528 03/09/21  0417 03/10/21  0427    141 138   K 4.0 4.1 4.0    105 102   CO2 23 25 26   BUN 13 12 10   CREATININE 0.7* 0.7* 0.7*   CALCIUM 9.4 9.6 9.2     Cardiac Enzymes: No results for input(s): CKTOTAL, CKMB, CKMBINDEX, TROPONINI in the last 72 hours. PT/INR: No results for input(s): PROTIME, INR in the last 72 hours. APTT:   Recent Labs     03/09/21  0417 03/09/21  1153 03/10/21  0427   APTT 52.8* 74.2* 55.5*     PFTs 3/9 FEV1 68% predicted  L radial 3/9 patent, palmar arch patent  Carotid duplex 3/9 LSC stenosis <70. No carotid stenosis. Echo 3/9 EF 65  Vein mapping 3/9 adequate conduit    Assessment/Plan:  CV - CAD. Plan OR 3/15.   - ASA, statin, BB, Imdur  pulm - off O2  Renal - send UA  Heme - last effient 3/8. Cont metabolism. - hep, integ gtts  vasc - CTA chest to evaluate LSC stenosis. LUE bp only marginally lower. Rachna Allen MD  3/10/2021  7:17 AM     CTA chest reviewed. Discussed with radiologist and vascular surgery. Dr. Garcia Postal to see. Need to consider [address] stenosis in cor grafting/conduit strategy.

## 2021-03-10 NOTE — CONSULTS
VASCULAR SURGERY CONSULTATION    Cha Lemus is a 41 y.o. male admitted with NSTEMI with plans for CABG this coming Monday. W/U including carotid duplex demonstrated L subclavian stenosis (>50%). Pt denies any upper extremity symptoms of claudication, rest pain or tissue breakdown. Asked by Elizabeth Gage MD to see pt for input re: subclavian stenosis as the plan was to perform LIMA graft to the LAD.     Past Medical History:   Diagnosis Date    Coronary artery disease     Essential hypertension 2020    GERD (gastroesophageal reflux disease)     Headache     NSTEMI (non-ST elevated myocardial infarction) ([facility] [address]     20, 20, 3/5/21    Obesity     Other hyperlipidemia 2020    Tobacco abuse counseling 2020     Past Surgical History:   Procedure Laterality Date    CORONARY ANGIOPLASTY  2021    PTCA RCA    CORONARY ANGIOPLASTY WITH STENT PLACEMENT  2020    MI RCA    CORONARY ANGIOPLASTY WITH STENT PLACEMENT  2020    MI Diag, LCx    MOUTH SURGERY      wisdom teeth removed     Family History   Problem Relation Age of Onset    Other Mother         smoker    Diabetes Mother         borderline    Other Father          of natural causes age 76    High Blood Pressure Maternal Aunt     Heart Disease Maternal Aunt         pacemaker    Heart Disease Maternal Uncle          of MI 80    Heart Disease Maternal Cousin         dx 39    Diabetes Brother      Social History     Socioeconomic History    Marital status: Single     Spouse name: None    Number of children: None    Years of education: None    Highest education level: None   Occupational History    None   Social Needs    Financial resource strain: None    Food insecurity     Worry: None     Inability: None    Transportation needs     Medical: None     Non-medical: None   Tobacco Use    Smoking status: Current Every Day Smoker     Packs/day: 1.00     Types: Cigarettes     Start date: 1989  Smokeless tobacco: Never Used    Tobacco comment: started age 13.  tried vaping age 25 when trying to quit smoking   Substance and Sexual Activity    Alcohol use: No    Drug use: Yes     Types: Marijuana     Comment: did use age 22, last time age 26    Sexual activity: Yes     Partners: Female   Lifestyle    Physical activity     Days per week: None     Minutes per session: None    Stress: None   Relationships    Social connections     Talks on phone: None     Gets together: None     Attends Anabaptist service: None     Active member of club or organization: None     Attends meetings of clubs or organizations: None     Relationship status: None    Intimate partner violence     Fear of current or ex partner: None     Emotionally abused: None     Physically abused: None     Forced sexual activity: None   Other Topics Concern    None   Social History Narrative    None     Current Facility-Administered Medications   Medication Dose Route Frequency Provider Last Rate Last Admin    lidocaine PF 1 % injection 5 mL  5 mL Intradermal Once Ethelle Sergeant, APRN - CNP        sodium chloride flush 0.9 % injection 10 mL  10 mL Intravenous 2 times per day Ethelle Sergeant, APRN - CNP   10 mL at 03/10/21 0924    sodium chloride flush 0.9 % injection 10 mL  10 mL Intravenous PRN Ethelle Sergeant, APRN - CNP        eptifibatide (INTEGRILIN) 0.75 mg/mL infusion  2 mcg/kg/min Intravenous Continuous Ysabel Maravilla MD 16.7 mL/hr at 03/10/21 1235 2 mcg/kg/min at 03/10/21 1235    sodium chloride (OCEAN, BABY AYR) 0.65 % nasal spray 1 spray  1 spray Each Nostril Q6H PRN Cata Jackson MD   1 spray at 03/09/21 2008    buPROPion [facility]) extended release tablet 150 mg  150 mg Oral BID Kiara Boyd DO   150 mg at 03/10/21 0740    sennosides-docusate sodium (SENOKOT-S) 8.6-50 MG tablet 2 tablet  2 tablet Oral Daily Ethelle Sergeant, APRN - CNP   2 tablet at 03/10/21 0740    heparin 25,000 unit in sodium chloride 0.45% 250 mL (premix) infusion  12.1 mL/hr Intravenous Continuous Katlyn Carballo MD 12.1 mL/hr at 03/09/21 2209 12.1 mL/hr at 03/09/21 2209    nitroGLYCERIN (NITROSTAT) SL tablet 0.4 mg  0.4 mg Sublingual Q5 Min PRN Katlyn Carballo MD        isosorbide mononitrate (IMDUR) extended release tablet 60 mg  60 mg Oral Daily Katlyn Carballo MD   60 mg at 03/10/21 0740    famotidine (PEPCID) tablet 20 mg  20 mg Oral BID Katlyn Carballo MD   20 mg at 03/10/21 0740    carvedilol (COREG) tablet 6.25 mg  6.25 mg Oral BID WC Katlyn Carballo MD   6.25 mg at 03/10/21 1604    atorvastatin (LIPITOR) tablet 80 mg  80 mg Oral Nightly Katlyn Carballo MD   80 mg at 03/09/21 2008    aspirin chewable tablet 81 mg  81 mg Oral Daily Katlyn Carballo MD   81 mg at 03/10/21 0740    ondansetron (ZOFRAN) injection 4 mg  4 mg Intravenous Q4H PRN Katlyn Carballo MD        polyethylene glycol [facility]) packet 17 g  17 g Oral Daily PRN Katlyn Carballo MD        acetaminophen (TYLENOL) tablet 650 mg  650 mg Oral Q4H PRN Katlyn Carballo MD   650 mg at 03/10/21 1612    Or    acetaminophen (TYLENOL) suppository 650 mg  650 mg Rectal Q4H PRN Katlyn Carballo MD         No Known Allergies    Review of Systems  Pertinent items are noted in HPI. Remainder of 15 pt ROS confirmed by this MD personally. Objective:     /66   Pulse 59   Temp 97.8 °F (36.6 °C) (Oral)   Resp 14   Ht 5' 9\" (1.753 m)   Wt 223 lb 15.8 oz (101.6 kg)   SpO2 96%   BMI 33.08 kg/m²     General:  alert, appears stated age and cooperative   Skin:  normal   Eyes: conjunctivae/corneas clear. PERRL, EOM's intact. Fundi benign.    Mouth: MMM no lesions   Lymph Nodes:  Cervical, supraclavicular, and axillary nodes normal.   Lungs:  clear to auscultation bilaterally   Heart:  regular rate and rhythm, S1, S2 normal, no murmur, click, rub or gallop   Abdomen: soft, non-tender; bowel sounds normal; no masses,  no organomegaly   CVA:  absent   Genitourinary: defer exam   Extremities:  extremities normal, atraumatic, no cyanosis or edema   Ecchymotic B forearms from catherizations    Pulses:   R bruit  L bruit   2   carotid 2 soft   2   brachial 2    2   radial 2    2   femoral 2    2   popliteal 2    2   posterior tibial 2    0   dorsalis pedis 2    na   bypass graft na      L supraclavicular bruit loudest at base of neck in SC fossa     Neurologic:  negative   Psychiatric:  non focal       CDS 3/9/2021 - personally reviewed: L vertebral and distal subclavian waveforms abnormal and dampened  Impressions   Right Impression   The right distal common carotid artery demonstrates mild focal plaque   formation. The right internal carotid artery demonstrates no significant stenosis. The right vertebral artery demonstrates normal antegrade flow. Left Impression   The left subclavian artery is visualized and demonstrates turbulent flow. The left proximal subclavian velocity is 245 cm/s indicative of stenosis. The left internal carotid artery demonstrates no significant stenosis. The left vertebral artery demonstrates abnormal antegrade flow.       Conclusions        Summary        No measureable bilateral carotid stenotic disease.        Patent bilateral vertebral arteries with normal right antegrade flow    patterns but dampened left vertebral waveforms suggestive of inflow    subclavian disease.        Elevated L subclavian velocity suggestive of a greater than 50% stenosis.    Bilateral brachial blood pressures were not obtained.    Clinical significance and correlation suggested.        Signature        ------------------------------------------------------------------    Electronically signed by Emile Trammell MD (Interpreting    physician) on 03/10/2021 at 10:20 AM    ------------------------------------------------------------------       Patient Status:Routine. Study [address] [facility].    Technical Quality:Good visualization.     Risk Factors         - The patient's risk factor(s) include: dyslipidemia, obesity and treated       arterial hypertension.         - The patient has a current/recent (within 1 year) tobacco history of 42       years .       Velocities are measured in cm/s ; Diameters are measured in mm       Carotid Right Measurements   +---------+----+----+-----+----+   ! Location ! PSV ! EDV ! Angle! RI  !   +---------+----+----+-----+----+   ! Prox CCA !91. 5!15. 8! 60   !0.83!   +---------+----+----+-----+----+   ! Mid CCA  !123 !22  !60   !0.82! +---------+----+----+-----+----+   ! Dist CCA !99.8!19. 6! 60   !0.8 ! +---------+----+----+-----+----+   ! Prox ICA !88  !19. 6! 60   !0.78!   +---------+----+----+-----+----+   ! Mid ICA [id number]. 5!35. 4! 60   !0.62!   +---------+----+----+-----+----+   ! Prox ECA !878 !25. 3! 60   !0.88!   +---------+----+----+-----+----+   ! [place] 58. 1!14. 1! 60   !0.8 ! +---------+----+----+-----+----+         - There is antegrade vertebral flow noted on the right side.         - Additional Measurements:ICAPSV/CCAPSV 0.76. ICAEDV/CCAEDV 2.24.       Carotid Left Measurements   +---------------+----+----+-----+----+   ! Location       !PSV ! EDV ! Angle! RI  !   +---------------+----+----+-----+----+   ! Prox CCA       !121 !18. 9! 60   !0.84!   +---------------+----+----+-----+----+   ! Mid CCA        !110 !19. 6! 60   !0.82! +---------------+----+----+-----+----+   ! Dist CCA       !97. 4!30. 6! 60   !0.69!   +---------------+----+----+-----+----+   ! Prox ICA       !58  !22. 3! 60   !0.62!   +---------------+----+----+-----+----+   ! Mid ICA        !61.6!18. 2! 60   !0.7 ! +---------------+----+----+-----+----+   ! Dist ICA       !43. 8!17. 4! 42   !0.6 ! +---------------+----+----+-----+----+   ! Prox ECA       !191 !    !60   !    !   +---------------+----+----+-----+----+   ! Vertebral      !47. 1!14. 1! 60   !0.7 ! +---------------+----+----+-----+----+   ! Prox Subclavian! 245 !    !60   !    ! +---------------+----+----+-----+----+         - There is antegrade vertebral flow noted on the left side.         - Additional Measurements:ICAPSV/CCAPSV 0.56. ICAEDV/CCAEDV 1. 18.         CTA chest and neck 3/10/2021 -personally reviewed: L subclavian stenosis approximately 60%  FINDINGS:   Pulmonary Arteries: There is no acute pulmonary thromboembolus.       Mediastinum: Coronary artery calcifications are a marker of atherosclerosis. There are no enlarged thoracic lymph nodes.       The aorta is normal in course and caliber.  Mild atherosclerosis involves the   distal aortic arch.  No change in the moderate hemodynamic stenosis involving   the proximal left subclavian artery.  Beam hardening artifact from the   concentrated contrast in the left subclavian vein limits evaluation.       Lungs/pleura: The airway is patent. Makenna Files is no pneumothorax or pleural   effusion.  Mild emphysema involves the bilateral upper lungs.  There is   dependent bibasilar atelectasis.       Upper Abdomen: Images of the upper abdomen are unremarkable.       Soft Tissues/Bones: Degenerative changes involve the thoracic spine.           Impression   1. Unchanged moderate hemodynamic stenosis involving the proximal left   subclavian artery. Assessment:     1) S/P  NSTEMI planned CABG  2) Significant L subclavian stenosis (approx 60%) - duplex imaging suggests hemodynamic significance based on waveform  changes distally and vertebral artery flow changes  3) HTN     Rec:     Subclavian stenotic disease is currently asymptomatic but if the LIMA is to be used for a bypass conduit this stenosis is likely to become significant as a high flow and demand vascular bed is added to its supply. If the LIMA is to be used in-situ pre-CABG subclavian stenting would be recommended to ensure adequate inflow for the cardiac demand. Other options for surgical revascularization conduits might be considered.   Will discuss with  her plans for revascularization. Will follow. Thanks for this consultation.     [employer]

## 2021-03-10 NOTE — PROGRESS NOTES
Hospitalist Progress Note      PCP: Feliciano Wilburn DO    Date of Admission: 3/5/2021    Subjective: difficulty with IV access, unable to get PICC 2/2 feeling numbness in rt arm during procedure    Medications:  Reviewed    Infusion Medications    eptifibatide 2 mcg/kg/min (03/10/21 1235)    heparin (PORCINE) Infusion 12.1 mL/hr (03/09/21 2209)     Scheduled Medications    lidocaine 1 % injection  5 mL Intradermal Once    sodium chloride flush  10 mL Intravenous 2 times per day    buPROPion  150 mg Oral BID    sennosides-docusate sodium  2 tablet Oral Daily    isosorbide mononitrate  60 mg Oral Daily    famotidine  20 mg Oral BID    carvedilol  6.25 mg Oral BID WC    atorvastatin  80 mg Oral Nightly    aspirin  81 mg Oral Daily     PRN Meds: sodium chloride flush, sodium chloride, nitroGLYCERIN, ondansetron, polyethylene glycol, acetaminophen **OR** acetaminophen      Intake/Output Summary (Last 24 hours) at 3/10/2021 1527  Last data filed at 3/10/2021 0731  Gross per 24 hour   Intake 1091 ml   Output 2190 ml   Net -1099 ml       Physical Exam Performed:    /66   Pulse 59   Temp 97.8 °F (36.6 °C) (Oral)   Resp 14   Ht 5' 9\" (1.753 m)   Wt 223 lb 15.8 oz (101.6 kg)   SpO2 96%   BMI 33.08 kg/m²     Gen: NAD  HEENT: NC/AT, moist mucous membranes  Neck: supple, trachea midline  Heart: Normal s1/s2, RRR, no murmurs, gallops, or rubs. Lungs: clear bilaterally, no wheezing, no rales, no rhonchi, no use of accessory muscles  Abd: bowel sounds present, soft, nontender, nondistended, no masses  Extrem: No clubbing, cyanosis, no edema  Skin: no rashes or lesions  Psych: Alert, oriented x3, affect appropriate  Neuro: grossly intact, moves all four extremities spontaneously.  No focal deficits   Cap refill: +2 sec    Labs:   Recent Labs     03/08/21  0528 03/09/21  0417 03/10/21  0427   WBC 10.2 9.6 9.8   HGB 15.0 15.4 15.2   HCT 44.7 45.4 44.6    234 238     Recent Labs     03/08/21  0587 03/09/21  0417 03/10/21  0427    141 138   K 4.0 4.1 4.0    105 102   CO2 23 25 26   BUN 13 12 10   CREATININE 0.7* 0.7* 0.7*   CALCIUM 9.4 9.6 9.2     No results for input(s): AST, ALT, BILIDIR, BILITOT, ALKPHOS in the last 72 hours. No results for input(s): INR in the last 72 hours. No results for input(s): Poppy Kushal in the last 72 hours. Urinalysis:      Lab Results   Component Value Date    NITRU Negative 03/10/2021    BLOODU Negative 03/10/2021    SPECGRAV 1.008 03/10/2021    GLUCOSEU Negative 03/10/2021       Radiology:  CTA CHEST W CONTRAST   Final Result   1. Unchanged moderate hemodynamic stenosis involving the proximal left   subclavian artery. VL DUP CAROTID LEFT         VL PRE OP VEIN MAPPING         VL DUP CAROTID BILATERAL   Final Result      VL DUP LOWER EXTREMITY ARTERIES LEFT   Final Result      NM Cardiac Stress Test Nuclear Imaging   Final Result      XR CHEST PORTABLE   Final Result   Low lung volumes with bibasilar atelectasis. VL DUP UPPER EXTREMITY ARTERIES LIMITED    (Results Pending)           Assessment/Plan:    Active Hospital Problems    Diagnosis    Dyslipidemia [E78.5]    Left leg numbness [R20.0]    Tobacco abuse [Z72.0]    NSTEMI (non-ST elevated myocardial infarction) [facility]) [I21.4]         Hospital course: a 41 y. o. male with cad s/p yaz to rca x3 (5/2020), s/d chf, htn, hld, admitted with chest pain, got some relief with nitro at home. States it feels like his previous MI. When I saw the Pt later he denies chest pain. ED workup  Vitals stable, on room air  Pertinent labs: troponins . 04, wbc 12  Imaging: no infiltrates or consolidation     Plan:  Chest pain, nstemi  Known cad  - stress test with ischemia   - s/p [facility] 3/8/21, with 90% lesion in the lad, d1 stent is patent, lcx 90% ostial lesion, mid isr 90% lesion, rca 2 lesions 99%  - s/p yaz to the rca x3 in 5/2020  - on effient, ASA, statin, bb  - on heparin/integrilin gtt  - cardiology consulted  - ct surgery consulted for consideration of cabg - plan surgery on Friday     Epistaxis - poss 2/2 heparin gtt, resolved    Proximal left subclavian artery moderate stenosis - reviewed on CTA chest, vascular surgery consulted for input prior to CABG     HTN  - controlled  - on coreg     tobacco dependence  - counseled on cessation  - nicotine patch  - started wellbutrin: 150 mg daily x 3 days, then increase to bid. Stop smoking/nicotine patch 5-7 days after starting      Diet: DIET CARDIAC;   Dietary Nutrition Supplements: Frozen Oral Supplement, Standard High Calorie Oral Supplement  Code Status: Full Code    PT/OT Eval Status: ordered    Dispo - getting work up and planning for CABG ?later this week    Walter Marie MD

## 2021-03-10 NOTE — PROGRESS NOTES
Cardiology - PROGRESS NOTE    Admit Date: 3/5/2021     Reason for follow up: chest pain     Mckinley Back is a 41 y.o. male with significant history of CAD ( s/p PCI LCx/RCA), HTN, HL, tobacco abuse who presents with the above complaint. Episode of chest discomfort at night, while at rest, with no associated symptoms. No specific alleviating or exacerbating factors      Social History:   reports that he has been smoking cigarettes. He started smoking about 31 years ago. He has been smoking about 1.00 pack per day. He has never used smokeless tobacco. He reports current drug use. Drug: Marijuana. He reports that he does not drink alcohol. Family History: family history includes Diabetes in his brother and mother; Heart Disease in his maternal aunt, maternal cousin, and maternal uncle; High Blood Pressure in his maternal aunt; Other in his father and mother. Interval History:   Patient seen and examined   Doing well, no complaints   SX scheduled for Friday         Diet: DIET CARDIAC;   Dietary Nutrition Supplements: Frozen Oral Supplement, Standard High Calorie Oral Supplement  Pain is:None  Nausea:None    In: 2776.1 [P.O.:540; I.V.:2236.1]  Out: -    Wt Readings from Last 3 Encounters:   03/09/21 229 lb 15 oz (104.3 kg)   07/20/20 228 lb (103.4 kg)   06/12/20 227 lb 1.2 oz (103 kg)           Data:   Scheduled Meds:   Scheduled Meds:   buPROPion  150 mg Oral BID    sodium chloride flush  10 mL Intravenous 2 times per day    sennosides-docusate sodium  2 tablet Oral Daily    isosorbide mononitrate  60 mg Oral Daily    famotidine  20 mg Oral BID    carvedilol  6.25 mg Oral BID WC    atorvastatin  80 mg Oral Nightly    aspirin  81 mg Oral Daily     Continuous Infusions:   eptifibatide 2 mcg/kg/min (03/09/21 1651)    heparin (PORCINE) Infusion 12.1 mL/hr (03/09/21 0130)     PRN Meds:.sodium chloride, sodium chloride flush, nitroGLYCERIN, ondansetron, polyethylene glycol, acetaminophen **OR** acetaminophen  Continuous Infusions:   eptifibatide 2 mcg/kg/min (03/09/21 1651)    heparin (PORCINE) Infusion 12.1 mL/hr (03/09/21 0130)       Intake/Output Summary (Last 24 hours) at 3/9/2021 2041  Last data filed at 3/9/2021 2012  Gross per 24 hour   Intake 2776.1 ml   Output --   Net 2776.1 ml       CBC:   Recent Labs     03/09/21 0417   WBC 9.6   HGB 15.4        BMP:  Recent Labs     03/09/21 0417      K 4.1      CO2 25   BUN 12   CREATININE 0.7*   GLUCOSE 107*     ABGs:   Lab Results   Component Value Date    PHART 7.421 03/09/2021    PO2ART 147.0 03/09/2021    PTZ6SNV 37.8 03/09/2021     INR: No results for input(s): INR in the last 72 hours. CARDIAC LABS     ENZYMES:  No results for input(s): CKMB, CKMBINDEX, TROPONINI in the last 72 hours. Invalid input(s): CKTOTAL;3  FASTING LIPID PANEL:  Lab Results   Component Value Date    HDL 30 03/07/2021    LDLDIRECT 229 02/06/2020    [address] 93 03/07/2021    TRIG 244 03/07/2021     LIVER PROFILE:  Recent Labs     03/07/21  0646   AST 14*   ALT 8*       -----------------------------------------------------------------  Telemetry: personally reviewed   RAD:     CXR:  FINDINGS:   The lungs are underinflated, resulting in vascular crowding and subsegmental   atelectasis.  There is no focal consolidation, pleural effusion or   pneumothorax.  The heart and mediastinal contours are stable.  No acute bony   findings are identified.           Impression   Low lung volumes with bibasilar atelectasis. EKG: 3/5/2021  Echo:   7/2020  Summary   Left ventricular cavity size is normal.   There is moderate concentric left ventricular hypertrophy. Ejection fraction is visually estimated to be 45-50%. There is moderate hypokinesis of the mid to apical inferoseptal and inferior   walls. Diastolic filling parameters suggest grade I diastolic dysfunction. Normal right ventricular size.    Right ventricular systolic function is moderately reduced. TAPSE 1.2 cm    GXT:   3/5/2021  Summary    moderate size moderate severity inferior lateral wall defect consistent with    ischemia     Cath: 5/5/2020  ANGIOGRAM/CORONARY ARTERIOGRAM:        The left main coronary artery is patent   The left anterior descending artery is patent, D1 stent is widely patent .  The left circumflex artery is patent, mid stent is widely patent. The right coronary artery is diffusely diseased, prox 80%, distal 80%      INTERVENTION  1. JR 4 guide   2. runthrough wire used to cross the lesion   3. Distal RCA predilated w/ 2.5/3.5 regular balloons  4. IVUS catheter passed to distal RCA, distal reference diameter 4.5 mm   5. 4.0 X 18 mm [place] distal RCA  6. 4.0 X 26 / 4.5 X 12 prox RCA  7. Post dilation distal/prox  RCA w/ 4.0 NC balloon to 18 keyur     ANGIOGRAM/CORONARY ARTERIOGRAM:        The left main coronary artery is patent .     The left anterior descending artery has a mid 90% lesion,               D1 stent is widely patent .     The left circumflex artery has an ostial 90% lesion, mid ISR 90% .    The right coronary artery has a mid and distal 99% lesion with left to right collaterals of the PDA               Ostial and distal stents are patent         INTERVENTION  8. JR 4 guide  9. Runthrought wire used to corss RCA lesion   10. POBA of the mid and distal RCA w/ 3.0 X 12 balloon      SUMMARY:   Severe native 3V CAD   S/p POBA mid and distal RCA         RECOMMENDATION:       - heparin gtt  - Integrilin gtt   - CTSx consultation for CABG   - Transfer to CVU    Objective:   Vitals: /73   Pulse 79   Temp 98.3 °F (36.8 °C) (Oral)   Resp 18   Ht 5' 9\" (1.753 m)   Wt 229 lb 15 oz (104.3 kg)   SpO2 97%   BMI 33.96 kg/m²   General appearance: alert, appears stated age and cooperative, No acute distress   Skin: Skin color, texture, turgor normal. No rashes or ecchymosis.   HEENT: Head: Normocephalic, no lesions, without obvious abnormality. Neck: no carotid bruit  Lungs: clear to auscultation bilaterally, no accessory muscle use, no respiratory distress, on RA  Heart: regular rate and rhythm, S1, S2 normal, no murmur, click, rub or gallop  Abdomen: soft, non-tender; bowel sounds normal; no masses,  no organomegaly  Extremities: extremities normal, atraumatic, no cyanosis or edema, pulses: DP +2/+2, PT +2/+2  Neurologic: Mental status: Alert, oriented, thought content appropriate, no tremors, no gross sensory motor deficit,   Psychiatric: normal insight and affect      Assessment & Plan:    Patient Active Problem List:     Essential hypertension     Other hyperlipidemia     Tobacco abuse counseling     Acute coronary syndrome (HCC)     SIRS (systemic inflammatory response syndrome) ([facility])     NSTEMI (non-ST elevated myocardial infarction) ([facility] [address]     Coronary artery disease     Tobacco use        Plan:  1. NSTEMI   - heparin gtt   - Integrilin gtt    - asa/statin/beta blockade    - CABG Friday    2. Tobacco abuse   Patient counseled  3.  Dyslipidemia    On statin therapy    Continue     Sudeep Joaquin MD [facility], Interventional Cardiology, and Peripheral Vascular Disease   [facility]   (C): [phone]  (O): [phone]

## 2021-03-10 NOTE — PROGRESS NOTES
Late entry due to patient care. 03/09/2021    @ 1915 - Bedside shift hand-off done w/ off-going RN MEAGHAN Hernandez. He's alert and oriented, ambulating in room, SR on the monitor, on room air, not in any distress, and denies any pain. He's on Integrilin drip @ 2 mcg/kg/min and Heparin drip @ 12.1 ml/hr. He denied any needs. @ 2000 - Shift assessment completed. Plan of care for this shift was explained to him and he verbalized understanding. @ 440 4805 to 7050 - This RN did pre-op education which included preparations the night before surgery and morning of surgery. This RN informed him that the Anesthesiologist will put a RIJ CVC and brachial A-line. He was educated on having a catalan catheter and chest tubes, on being intubated during and after surgery, on importance of pain control, ambulation, and sternal precaution, and how to do sternal precautions. He verbalized understanding of these teachings. @ 2338 - Vitals are stable, he denies any pain nor any SOB, & he's ambulating inside his room. Denied any needs except for a turkey sandwich and Jackson lemon-lime soda. 03/10/2021    @ 0209 - Asleep, w/ easy and even respirations. @ 5146 - Shift hand-off done w/ oncoming RN Edward Mena, to assume pt. care. This RN handed-off the pt. in a stable condition.     Electronically signed by Jos Dumont RN on 3/10/2021 at 6:40 AM

## 2021-03-10 NOTE — PLAN OF CARE
Problem: Pain:  Goal: Pain level will decrease  Description: Pain level will decrease  Outcome: Met This Shift     Problem: Infection:  Goal: Will remain free from infection  Description: Will remain free from infection  Outcome: Ongoing     Problem: Safety:  Goal: Free from accidental physical injury  Description: Free from accidental physical injury  Outcome: Ongoing

## 2021-03-11 LAB
ANION GAP SERPL CALCULATED.3IONS-SCNC: 14 MMOL/L (ref 3–16)
APTT: 56.9 SEC (ref 24.2–36.2)
BASOPHILS ABSOLUTE: 0.1 K/UL (ref 0–0.2)
BASOPHILS RELATIVE PERCENT: 0.7 %
BUN BLDV-MCNC: 11 MG/DL (ref 7–20)
CALCIUM SERPL-MCNC: 9.7 MG/DL (ref 8.3–10.6)
CHLORIDE BLD-SCNC: 102 MMOL/L (ref 99–110)
CO2: 23 MMOL/L (ref 21–32)
CREAT SERPL-MCNC: 0.8 MG/DL (ref 0.9–1.3)
EOSINOPHILS ABSOLUTE: 0.2 K/UL (ref 0–0.6)
EOSINOPHILS RELATIVE PERCENT: 2.3 %
GFR AFRICAN AMERICAN: >60
GFR NON-AFRICAN AMERICAN: >60
GLUCOSE BLD-MCNC: 105 MG/DL (ref 70–99)
HCT VFR BLD CALC: 47.9 % (ref 40.5–52.5)
HEMOGLOBIN: 16.1 G/DL (ref 13.5–17.5)
LYMPHOCYTES ABSOLUTE: 3.5 K/UL (ref 1–5.1)
LYMPHOCYTES RELATIVE PERCENT: 34.1 %
MCH RBC QN AUTO: 30 PG (ref 26–34)
MCHC RBC AUTO-ENTMCNC: 33.6 G/DL (ref 31–36)
MCV RBC AUTO: 89.3 FL (ref 80–100)
MONOCYTES ABSOLUTE: 0.8 K/UL (ref 0–1.3)
MONOCYTES RELATIVE PERCENT: 7.8 %
NEUTROPHILS ABSOLUTE: 5.6 K/UL (ref 1.7–7.7)
NEUTROPHILS RELATIVE PERCENT: 55.1 %
PDW BLD-RTO: 14.1 % (ref 12.4–15.4)
PLATELET # BLD: 244 K/UL (ref 135–450)
PMV BLD AUTO: 9.8 FL (ref 5–10.5)
POTASSIUM REFLEX MAGNESIUM: 4.5 MMOL/L (ref 3.5–5.1)
RBC # BLD: 5.36 M/UL (ref 4.2–5.9)
SODIUM BLD-SCNC: 139 MMOL/L (ref 136–145)
WBC # BLD: 10.2 K/UL (ref 4–11)

## 2021-03-11 PROCEDURE — 94761 N-INVAS EAR/PLS OXIMETRY MLT: CPT

## 2021-03-11 PROCEDURE — 6370000000 HC RX 637 (ALT 250 FOR IP): Performed by: NURSE PRACTITIONER

## 2021-03-11 PROCEDURE — 6370000000 HC RX 637 (ALT 250 FOR IP): Performed by: FAMILY MEDICINE

## 2021-03-11 PROCEDURE — 2500000003 HC RX 250 WO HCPCS: Performed by: INTERNAL MEDICINE

## 2021-03-11 PROCEDURE — 85730 THROMBOPLASTIN TIME PARTIAL: CPT

## 2021-03-11 PROCEDURE — 2140000000 HC CCU INTERMEDIATE R&B

## 2021-03-11 PROCEDURE — 6360000002 HC RX W HCPCS: Performed by: INTERNAL MEDICINE

## 2021-03-11 PROCEDURE — 6370000000 HC RX 637 (ALT 250 FOR IP): Performed by: INTERNAL MEDICINE

## 2021-03-11 PROCEDURE — 2580000003 HC RX 258: Performed by: NURSE PRACTITIONER

## 2021-03-11 PROCEDURE — 36415 COLL VENOUS BLD VENIPUNCTURE: CPT

## 2021-03-11 PROCEDURE — 99231 SBSQ HOSP IP/OBS SF/LOW 25: CPT | Performed by: SURGERY

## 2021-03-11 PROCEDURE — 80048 BASIC METABOLIC PNL TOTAL CA: CPT

## 2021-03-11 PROCEDURE — 85025 COMPLETE CBC W/AUTO DIFF WBC: CPT

## 2021-03-11 RX ADMIN — ASPIRIN 81 MG: 81 TABLET, CHEWABLE ORAL at 09:03

## 2021-03-11 RX ADMIN — HEPARIN SODIUM 12.1 ML/HR: 10000 INJECTION, SOLUTION INTRAVENOUS at 16:35

## 2021-03-11 RX ADMIN — DOCUSATE SODIUM 50 MG AND SENNOSIDES 8.6 MG 2 TABLET: 8.6; 5 TABLET, FILM COATED ORAL at 09:03

## 2021-03-11 RX ADMIN — FAMOTIDINE 20 MG: 20 TABLET, FILM COATED ORAL at 20:50

## 2021-03-11 RX ADMIN — ACETAMINOPHEN 650 MG: 325 TABLET ORAL at 11:38

## 2021-03-11 RX ADMIN — BUPROPION HYDROCHLORIDE 150 MG: 150 TABLET, EXTENDED RELEASE ORAL at 09:03

## 2021-03-11 RX ADMIN — CARVEDILOL 6.25 MG: 6.25 TABLET, FILM COATED ORAL at 09:03

## 2021-03-11 RX ADMIN — Medication 10 ML: at 09:03

## 2021-03-11 RX ADMIN — ATORVASTATIN CALCIUM 80 MG: 80 TABLET, FILM COATED ORAL at 20:50

## 2021-03-11 RX ADMIN — BUPROPION HYDROCHLORIDE 150 MG: 150 TABLET, EXTENDED RELEASE ORAL at 20:50

## 2021-03-11 RX ADMIN — EPTIFIBATIDE 2 MCG/KG/MIN: 75 INJECTION INTRAVENOUS at 13:29

## 2021-03-11 RX ADMIN — FAMOTIDINE 20 MG: 20 TABLET, FILM COATED ORAL at 09:03

## 2021-03-11 RX ADMIN — ISOSORBIDE MONONITRATE 60 MG: 60 TABLET, EXTENDED RELEASE ORAL at 09:03

## 2021-03-11 RX ADMIN — CARVEDILOL 6.25 MG: 6.25 TABLET, FILM COATED ORAL at 16:58

## 2021-03-11 RX ADMIN — EPTIFIBATIDE 2 MCG/KG/MIN: 75 INJECTION INTRAVENOUS at 00:20

## 2021-03-11 RX ADMIN — EPTIFIBATIDE 2 MCG/KG/MIN: 75 INJECTION INTRAVENOUS at 06:43

## 2021-03-11 ASSESSMENT — PAIN DESCRIPTION - DESCRIPTORS: DESCRIPTORS: HEADACHE

## 2021-03-11 ASSESSMENT — PAIN SCALES - GENERAL
PAINLEVEL_OUTOF10: 0

## 2021-03-11 NOTE — PROGRESS NOTES
Cardiology - PROGRESS NOTE    Admit Date: 3/5/2021     Reason for follow up: chest pain     Emile Gagnon is a 41 y.o. male with significant history of CAD ( s/p PCI LCx/RCA), HTN, HL, tobacco abuse who presents with the above complaint. Episode of chest discomfort at night, while at rest, with no associated symptoms. No specific alleviating or exacerbating factors      Social History:   reports that he has been smoking cigarettes. He started smoking about 31 years ago. He has been smoking about 1.00 pack per day. He has never used smokeless tobacco. He reports current drug use. Drug: Marijuana. He reports that he does not drink alcohol. Family History: family history includes Diabetes in his brother and mother; Heart Disease in his maternal aunt, maternal cousin, and maternal uncle; High Blood Pressure in his maternal aunt; Other in his father and mother. Interval History:   Patient seen and examined   Doing well, no complaints   SX scheduled for Friday         Diet: DIET CARDIAC; Dietary Nutrition Supplements: Frozen Oral Supplement, Standard High Calorie Oral Supplement  Pain is:None  Nausea:None    In: 892.8 [P.O.:240;  I.V.:652.8]  Out: 1440    Wt Readings from Last 3 Encounters:   03/10/21 223 lb 15.8 oz (101.6 kg)   07/20/20 228 lb (103.4 kg)   06/12/20 227 lb 1.2 oz (103 kg)           Data:   Scheduled Meds:   Scheduled Meds:   lidocaine 1 % injection  5 mL Intradermal Once    sodium chloride flush  10 mL Intravenous 2 times per day    buPROPion  150 mg Oral BID    sennosides-docusate sodium  2 tablet Oral Daily    isosorbide mononitrate  60 mg Oral Daily    famotidine  20 mg Oral BID    carvedilol  6.25 mg Oral BID WC    atorvastatin  80 mg Oral Nightly    aspirin  81 mg Oral Daily     Continuous Infusions:   eptifibatide 2 mcg/kg/min (03/10/21 1812)    heparin (PORCINE) Infusion 12.1 mL/hr (03/09/21 2209)     PRN Meds:.sodium chloride flush, sodium chloride, nitroGLYCERIN, ondansetron, polyethylene glycol, acetaminophen **OR** acetaminophen  Continuous Infusions:   eptifibatide 2 mcg/kg/min (03/10/21 1812)    heparin (PORCINE) Infusion 12.1 mL/hr (03/09/21 2209)       Intake/Output Summary (Last 24 hours) at 3/10/2021 2136  Last data filed at 3/10/2021 1806  Gross per 24 hour   Intake 1610.84 ml   Output 2190 ml   Net -579.16 ml       CBC:   Recent Labs     03/10/21  0427   WBC 9.8   HGB 15.2        BMP:  Recent Labs     03/10/21  0427      K 4.0      CO2 26   BUN 10   CREATININE 0.7*   GLUCOSE 113*     ABGs:   Lab Results   Component Value Date    PHART 7.421 03/09/2021    PO2ART 147.0 03/09/2021    RRB7JLX 37.8 03/09/2021     INR: No results for input(s): INR in the last 72 hours. CARDIAC LABS     ENZYMES:  No results for input(s): CKMB, CKMBINDEX, TROPONINI in the last 72 hours. Invalid input(s): CKTOTAL;3  FASTING LIPID PANEL:  Lab Results   Component Value Date    HDL 30 03/07/2021    LDLDIRECT 229 02/06/2020    LDLCALC 93 03/07/2021    TRIG 244 03/07/2021     LIVER PROFILE:  No results for input(s): AST, ALT, ALB in the last 72 hours. -----------------------------------------------------------------  Telemetry: personally reviewed   RAD:     CXR:  FINDINGS:   The lungs are underinflated, resulting in vascular crowding and subsegmental   atelectasis.  There is no focal consolidation, pleural effusion or   pneumothorax.  The heart and mediastinal contours are stable.  No acute bony   findings are identified.           Impression   Low lung volumes with bibasilar atelectasis. EKG: 3/5/2021  Echo:   7/2020  Summary   Left ventricular cavity size is normal.   There is moderate concentric left ventricular hypertrophy. Ejection fraction is visually estimated to be 45-50%. There is moderate hypokinesis of the mid to apical inferoseptal and inferior   walls.    Diastolic filling parameters suggest grade I diastolic dysfunction. Normal right ventricular size. Right ventricular systolic function is moderately reduced. TAPSE 1.2 cm    GXT:   3/5/2021  Summary    moderate size moderate severity inferior lateral wall defect consistent with    ischemia     Cath: 5/5/2020  ANGIOGRAM/CORONARY ARTERIOGRAM:        The left main coronary artery is patent   The left anterior descending artery is patent, D1 stent is widely patent .  The left circumflex artery is patent, mid stent is widely patent. The right coronary artery is diffusely diseased, prox 80%, distal 80%      INTERVENTION  1. JR 4 guide   2. runthrough wire used to cross the lesion   3. Distal RCA predilated w/ 2.5/3.5 regular balloons  4. IVUS catheter passed to distal RCA, distal reference diameter 4.5 mm   5. 4.0 X 18 mm [place] distal RCA  6. 4.0 X 26 / 4.5 X 12 prox RCA  7. Post dilation distal/prox  RCA w/ 4.0 NC balloon to 18 keyur     ANGIOGRAM/CORONARY ARTERIOGRAM:        The left main coronary artery is patent .     The left anterior descending artery has a mid 90% lesion,               D1 stent is widely patent .     The left circumflex artery has an ostial 90% lesion, mid ISR 90% .    The right coronary artery has a mid and distal 99% lesion with left to right collaterals of the PDA               Ostial and distal stents are patent         INTERVENTION  8. JR 4 guide  9. Runthrought wire used to corss RCA lesion   10.  POBA of the mid and distal RCA w/ 3.0 X 12 balloon      SUMMARY:   Severe native 3V CAD   S/p POBA mid and distal RCA         RECOMMENDATION:       - heparin gtt  - Integrilin gtt   - CTSx consultation for CABG   - Transfer to CVU    Objective:   Vitals: /66   Pulse 59   Temp 97.8 °F (36.6 °C) (Oral)   Resp 14   Ht 5' 9\" (1.753 m)   Wt 223 lb 15.8 oz (101.6 kg)   SpO2 96%   BMI 33.08 kg/m²   General appearance: alert, appears stated age and cooperative, No acute distress   Skin: Skin color, texture, turgor normal. No rashes or ecchymosis. HEENT: Head: Normocephalic, no lesions, without obvious abnormality. Neck: no carotid bruit  Lungs: clear to auscultation bilaterally, no accessory muscle use, no respiratory distress, on RA  Heart: regular rate and rhythm, S1, S2 normal, no murmur, click, rub or gallop  Abdomen: soft, non-tender; bowel sounds normal; no masses,  no organomegaly  Extremities: extremities normal, atraumatic, no cyanosis or edema, pulses: DP +2/+2, PT +2/+2  Neurologic: Mental status: Alert, oriented, thought content appropriate, no tremors, no gross sensory motor deficit,   Psychiatric: normal insight and affect      Assessment & Plan:    Patient Active Problem List:     Essential hypertension     Other hyperlipidemia     Tobacco abuse counseling     Acute coronary syndrome (HCC)     SIRS (systemic inflammatory response syndrome) (HCC)     NSTEMI (non-ST elevated myocardial infarction) ([facility] [address]     Coronary artery disease     Tobacco use        Plan:  1. NSTEMI   - heparin gtt   - Integrilin gtt    - asa/statin/beta blockade    - CABG Friday    2. Tobacco abuse   Patient counseled  3. Dyslipidemia    On statin therapy    Continue   4.  Subclavian stenosis    Seen by Dr. Mayte Barba MD [address], Interventional Cardiology, and Peripheral Vascular Disease   [address]   (C): [phone]  [facility]): [phone]

## 2021-03-11 NOTE — PROGRESS NOTES
Patient alert and oriented throughout shift. Afebrile. VS stable on room airSite continually assessed with no issues noted. Patient with only one complaint of pain - headache, resolved with Tylenol. No complaints of chest pain, SOB, or heart palpitations. Patient ambulating independently to bathroom and has urinated 4 times during the shift into the toilet without a capture to log volume. He tolerates his diet and fluid intake well - will not drink diet soda. Patient's right forearm is still swollen and warm d/t infiltrated IV's - patient using ice bags for comfort. Left forearm also with swelling from infiltrated IV, but mild in comparison to the left. Pre-surgery education provided, patient grateful for the information.

## 2021-03-11 NOTE — PROGRESS NOTES
VASCULAR    Seen for L subclavian stenosis pre-CABG. No issues since last seen  Will discuss Dr. Susanne Winston for CABG with her today. If planned LIMA graft will stent L subclavian Friday. Pt informed of these plans and is agreement. All questions answered.     Ruel Petty

## 2021-03-11 NOTE — PROGRESS NOTES
Clinical Pharmacy Note  Heparin Dosing       Lab Results   Component Value Date    APTT 56.9 03/11/2021     Lab Results   Component Value Date    HGB 16.1 03/11/2021    HCT 47.9 03/11/2021     03/11/2021       Current Infusion Rate: 12.1 mL/hr    Plan:  Rate: continue at 12.1 mL/hr  Next aPTT: 0600  3/12/21    Pharmacy will continue to monitor and adjust based on aPTT results.   Anne Haley, PharmD

## 2021-03-12 ENCOUNTER — APPOINTMENT (OUTPATIENT)
Dept: INTERVENTIONAL RADIOLOGY/VASCULAR | Age: 41
DRG: 165 | End: 2021-03-12
Payer: MEDICARE

## 2021-03-12 LAB
ANION GAP SERPL CALCULATED.3IONS-SCNC: 13 MMOL/L (ref 3–16)
APTT: 30.7 SEC (ref 24.2–36.2)
APTT: 50.6 SEC (ref 24.2–36.2)
BASOPHILS ABSOLUTE: 0.1 K/UL (ref 0–0.2)
BASOPHILS RELATIVE PERCENT: 0.8 %
BUN BLDV-MCNC: 13 MG/DL (ref 7–20)
CALCIUM SERPL-MCNC: 9.7 MG/DL (ref 8.3–10.6)
CHLORIDE BLD-SCNC: 99 MMOL/L (ref 99–110)
CO2: 22 MMOL/L (ref 21–32)
CREAT SERPL-MCNC: 0.7 MG/DL (ref 0.9–1.3)
EOSINOPHILS ABSOLUTE: 0.2 K/UL (ref 0–0.6)
EOSINOPHILS RELATIVE PERCENT: 1.8 %
GFR AFRICAN AMERICAN: >60
GFR NON-AFRICAN AMERICAN: >60
GLUCOSE BLD-MCNC: 105 MG/DL (ref 70–99)
HCT VFR BLD CALC: 49.5 % (ref 40.5–52.5)
HEMOGLOBIN: 16.5 G/DL (ref 13.5–17.5)
LYMPHOCYTES ABSOLUTE: 3.2 K/UL (ref 1–5.1)
LYMPHOCYTES RELATIVE PERCENT: 25 %
MCH RBC QN AUTO: 29.8 PG (ref 26–34)
MCHC RBC AUTO-ENTMCNC: 33.4 G/DL (ref 31–36)
MCV RBC AUTO: 89.3 FL (ref 80–100)
MONOCYTES ABSOLUTE: 1.4 K/UL (ref 0–1.3)
MONOCYTES RELATIVE PERCENT: 10.8 %
NEUTROPHILS ABSOLUTE: 8 K/UL (ref 1.7–7.7)
NEUTROPHILS RELATIVE PERCENT: 61.6 %
PDW BLD-RTO: 14 % (ref 12.4–15.4)
PLATELET # BLD: 244 K/UL (ref 135–450)
PMV BLD AUTO: 9.4 FL (ref 5–10.5)
POTASSIUM REFLEX MAGNESIUM: 4.7 MMOL/L (ref 3.5–5.1)
RBC # BLD: 5.55 M/UL (ref 4.2–5.9)
SARS-COV-2, NAAT: NOT DETECTED
SODIUM BLD-SCNC: 134 MMOL/L (ref 136–145)
WBC # BLD: 13 K/UL (ref 4–11)

## 2021-03-12 PROCEDURE — 87635 SARS-COV-2 COVID-19 AMP PRB: CPT

## 2021-03-12 PROCEDURE — 36215 PLACE CATHETER IN ARTERY: CPT | Performed by: SURGERY

## 2021-03-12 PROCEDURE — 6370000000 HC RX 637 (ALT 250 FOR IP): Performed by: FAMILY MEDICINE

## 2021-03-12 PROCEDURE — 2580000003 HC RX 258: Performed by: NURSE PRACTITIONER

## 2021-03-12 PROCEDURE — 6370000000 HC RX 637 (ALT 250 FOR IP): Performed by: INTERNAL MEDICINE

## 2021-03-12 PROCEDURE — 2580000003 HC RX 258: Performed by: SURGERY

## 2021-03-12 PROCEDURE — 4A023N7 MEASUREMENT OF CARDIAC SAMPLING AND PRESSURE, LEFT HEART, PERCUTANEOUS APPROACH: ICD-10-PCS | Performed by: SURGERY

## 2021-03-12 PROCEDURE — 76937 US GUIDE VASCULAR ACCESS: CPT | Performed by: SURGERY

## 2021-03-12 PROCEDURE — 85730 THROMBOPLASTIN TIME PARTIAL: CPT

## 2021-03-12 PROCEDURE — 99231 SBSQ HOSP IP/OBS SF/LOW 25: CPT | Performed by: THORACIC SURGERY (CARDIOTHORACIC VASCULAR SURGERY)

## 2021-03-12 PROCEDURE — 36215 PLACE CATHETER IN ARTERY: CPT

## 2021-03-12 PROCEDURE — 94761 N-INVAS EAR/PLS OXIMETRY MLT: CPT

## 2021-03-12 PROCEDURE — 2140000000 HC CCU INTERMEDIATE R&B

## 2021-03-12 PROCEDURE — 99232 SBSQ HOSP IP/OBS MODERATE 35: CPT | Performed by: INTERNAL MEDICINE

## 2021-03-12 PROCEDURE — 6370000000 HC RX 637 (ALT 250 FOR IP): Performed by: NURSE PRACTITIONER

## 2021-03-12 PROCEDURE — 02L70ZK OCCLUSION OF LEFT ATRIAL APPENDAGE, OPEN APPROACH: ICD-10-PCS | Performed by: SURGERY

## 2021-03-12 PROCEDURE — 37236 OPEN/PERQ PLACE STENT 1ST: CPT

## 2021-03-12 PROCEDURE — 03743DZ DILATION OF LEFT SUBCLAVIAN ARTERY WITH INTRALUMINAL DEVICE, PERCUTANEOUS APPROACH: ICD-10-PCS | Performed by: SURGERY

## 2021-03-12 PROCEDURE — 36415 COLL VENOUS BLD VENIPUNCTURE: CPT

## 2021-03-12 PROCEDURE — 75710 ARTERY X-RAYS ARM/LEG: CPT | Performed by: SURGERY

## 2021-03-12 PROCEDURE — 99152 MOD SED SAME PHYS/QHP 5/>YRS: CPT

## 2021-03-12 PROCEDURE — 85025 COMPLETE CBC W/AUTO DIFF WBC: CPT

## 2021-03-12 PROCEDURE — 6360000002 HC RX W HCPCS: Performed by: SURGERY

## 2021-03-12 PROCEDURE — 99153 MOD SED SAME PHYS/QHP EA: CPT

## 2021-03-12 PROCEDURE — 6360000004 HC RX CONTRAST MEDICATION: Performed by: SURGERY

## 2021-03-12 PROCEDURE — 80048 BASIC METABOLIC PNL TOTAL CA: CPT

## 2021-03-12 PROCEDURE — C1760 CLOSURE DEV, VASC: HCPCS

## 2021-03-12 PROCEDURE — 37236 OPEN/PERQ PLACE STENT 1ST: CPT | Performed by: SURGERY

## 2021-03-12 RX ORDER — EPTIFIBATIDE 0.75 MG/ML
2 INJECTION, SOLUTION INTRAVENOUS CONTINUOUS
Status: DISCONTINUED | OUTPATIENT
Start: 2021-03-12 | End: 2021-03-15

## 2021-03-12 RX ORDER — FENTANYL CITRATE 50 UG/ML
INJECTION, SOLUTION INTRAMUSCULAR; INTRAVENOUS DAILY PRN
Status: COMPLETED | OUTPATIENT
Start: 2021-03-12 | End: 2021-03-12

## 2021-03-12 RX ORDER — MIDAZOLAM HYDROCHLORIDE 1 MG/ML
INJECTION INTRAMUSCULAR; INTRAVENOUS DAILY PRN
Status: COMPLETED | OUTPATIENT
Start: 2021-03-12 | End: 2021-03-12

## 2021-03-12 RX ORDER — HEPARIN SODIUM AND DEXTROSE 10000; 5 [USP'U]/100ML; G/100ML
12.1 INJECTION INTRAVENOUS CONTINUOUS
Status: DISCONTINUED | OUTPATIENT
Start: 2021-03-12 | End: 2021-03-15

## 2021-03-12 RX ORDER — SODIUM CHLORIDE 9 MG/ML
INJECTION, SOLUTION INTRAVENOUS CONTINUOUS
Status: DISCONTINUED | OUTPATIENT
Start: 2021-03-12 | End: 2021-03-13

## 2021-03-12 RX ORDER — HEPARIN SODIUM 1000 [USP'U]/ML
INJECTION, SOLUTION INTRAVENOUS; SUBCUTANEOUS DAILY PRN
Status: COMPLETED | OUTPATIENT
Start: 2021-03-12 | End: 2021-03-12

## 2021-03-12 RX ADMIN — FAMOTIDINE 20 MG: 20 TABLET, FILM COATED ORAL at 20:23

## 2021-03-12 RX ADMIN — HEPARIN SODIUM 2000 UNITS: 1000 INJECTION INTRAVENOUS; SUBCUTANEOUS at 14:47

## 2021-03-12 RX ADMIN — DOCUSATE SODIUM 50 MG AND SENNOSIDES 8.6 MG 2 TABLET: 8.6; 5 TABLET, FILM COATED ORAL at 08:20

## 2021-03-12 RX ADMIN — FENTANYL CITRATE 50 MCG: 50 INJECTION INTRAMUSCULAR; INTRAVENOUS at 15:13

## 2021-03-12 RX ADMIN — IOPAMIDOL 180 ML: 612 INJECTION, SOLUTION INTRAVENOUS at 15:32

## 2021-03-12 RX ADMIN — EPTIFIBATIDE 2 MCG/KG/MIN: 75 INJECTION INTRAVENOUS at 07:14

## 2021-03-12 RX ADMIN — ASPIRIN 81 MG: 81 TABLET, CHEWABLE ORAL at 08:20

## 2021-03-12 RX ADMIN — CEFAZOLIN SODIUM 2000 MG: 10 INJECTION, POWDER, FOR SOLUTION INTRAVENOUS at 14:52

## 2021-03-12 RX ADMIN — SODIUM CHLORIDE: 9 INJECTION, SOLUTION INTRAVENOUS at 16:49

## 2021-03-12 RX ADMIN — Medication 10 ML: at 08:21

## 2021-03-12 RX ADMIN — CARVEDILOL 6.25 MG: 6.25 TABLET, FILM COATED ORAL at 08:20

## 2021-03-12 RX ADMIN — MIDAZOLAM 1 MG: 1 INJECTION INTRAMUSCULAR; INTRAVENOUS at 14:24

## 2021-03-12 RX ADMIN — FENTANYL CITRATE 50 MCG: 50 INJECTION INTRAMUSCULAR; INTRAVENOUS at 14:24

## 2021-03-12 RX ADMIN — FAMOTIDINE 20 MG: 20 TABLET, FILM COATED ORAL at 08:20

## 2021-03-12 RX ADMIN — BUPROPION HYDROCHLORIDE 150 MG: 150 TABLET, EXTENDED RELEASE ORAL at 20:22

## 2021-03-12 RX ADMIN — CARVEDILOL 6.25 MG: 6.25 TABLET, FILM COATED ORAL at 16:50

## 2021-03-12 RX ADMIN — ACETAMINOPHEN 650 MG: 325 TABLET ORAL at 09:59

## 2021-03-12 RX ADMIN — ISOSORBIDE MONONITRATE 60 MG: 60 TABLET, EXTENDED RELEASE ORAL at 08:20

## 2021-03-12 RX ADMIN — EPTIFIBATIDE 2 MCG/KG/MIN: 75 INJECTION INTRAVENOUS at 18:34

## 2021-03-12 RX ADMIN — BUPROPION HYDROCHLORIDE 150 MG: 150 TABLET, EXTENDED RELEASE ORAL at 08:20

## 2021-03-12 RX ADMIN — ATORVASTATIN CALCIUM 80 MG: 80 TABLET, FILM COATED ORAL at 20:23

## 2021-03-12 RX ADMIN — MIDAZOLAM 1 MG: 1 INJECTION INTRAMUSCULAR; INTRAVENOUS at 14:42

## 2021-03-12 ASSESSMENT — PAIN SCALES - GENERAL
PAINLEVEL_OUTOF10: 0
PAINLEVEL_OUTOF10: 0

## 2021-03-12 NOTE — PROGRESS NOTES
Patient returned from IR after procedure. Connected to all CVU monitors. Pt alert and oriented x4. Instructed on movement and bedrest restrictions, pt verbalizes understanding. Vital signs stable. Denies pain. R groin puncture site assessed. No complications-free of hematoma, oozing, and bleeding. Tender to palpation. Palpable pedal pulses noted. PO fluids given per pt request.  Tolerates well. No further needs or complaints. Call light placed within pt reach. Bed in low and locked position with alarm activated. Will continue to monitor.     Electronically signed by Chapis Burch RN on 3/12/2021 at 3:52 PM

## 2021-03-12 NOTE — PROGRESS NOTES
Hospitalist Progress Note      PCP: Roel Valles DO    Date of Admission: 3/5/2021    Subjective: to IR to get subclavian stent placement, no acute events overnight    Medications:  Reviewed    Infusion Medications    eptifibatide      heparin (PORCINE) Infusion      sodium chloride       Scheduled Medications    lidocaine 1 % injection  5 mL Intradermal Once    sodium chloride flush  10 mL Intravenous 2 times per day    buPROPion  150 mg Oral BID    sennosides-docusate sodium  2 tablet Oral Daily    isosorbide mononitrate  60 mg Oral Daily    famotidine  20 mg Oral BID    carvedilol  6.25 mg Oral BID WC    atorvastatin  80 mg Oral Nightly    aspirin  81 mg Oral Daily     PRN Meds: sodium chloride flush, sodium chloride, nitroGLYCERIN, ondansetron, polyethylene glycol, acetaminophen **OR** acetaminophen      Intake/Output Summary (Last 24 hours) at 3/12/2021 1645  Last data filed at 3/12/2021 1634  Gross per 24 hour   Intake 1338.9 ml   Output 1050 ml   Net 288.9 ml       Physical Exam Performed:    /74   Pulse 65   Temp 97.4 °F (36.3 °C) (Oral)   Resp 16   Ht 5' 9\" (1.753 m)   Wt 224 lb 6.9 oz (101.8 kg)   SpO2 93%   BMI 33.14 kg/m²     Gen: NAD  HEENT: NC/AT, moist mucous membranes  Neck: supple, trachea midline  Heart: Normal s1/s2, RRR, no murmurs, gallops, or rubs. Lungs: clear bilaterally, no wheezing, no rales, no rhonchi, no use of accessory muscles  Abd: bowel sounds present, soft, nontender, nondistended, no masses  Extrem: No clubbing, cyanosis, no edema  Skin: no rashes or lesions  Psych: Alert, oriented x3, affect appropriate  Neuro: grossly intact, moves all four extremities spontaneously.  No focal deficits   Cap refill: +2 sec    Labs:   Recent Labs     03/10/21  0427 03/11/21  0427 03/12/21  0514   WBC 9.8 10.2 13.0*   HGB 15.2 16.1 16.5   HCT 44.6 47.9 49.5    244 244     Recent Labs     03/10/21  0427 03/11/21  0427 03/12/21  0514    139 134*   K 4.0 4.5 4.7    102 99   CO2 26 23 22   BUN 10 11 13   CREATININE 0.7* 0.8* 0.7*   CALCIUM 9.2 9.7 9.7     No results for input(s): AST, ALT, BILIDIR, BILITOT, ALKPHOS in the last 72 hours. No results for input(s): INR in the last 72 hours. No results for input(s): Anna Foster in the last 72 hours. Urinalysis:      Lab Results   Component Value Date    NITRU Negative 03/10/2021    BLOODU Negative 03/10/2021    SPECGRAV 1.008 03/10/2021    GLUCOSEU Negative 03/10/2021       Radiology:  IR ANGIOGRAM EXTREMITY LEFT   Final Result      CTA CHEST W CONTRAST   Final Result   1. Unchanged moderate hemodynamic stenosis involving the proximal left   subclavian artery. VL DUP CAROTID LEFT   Final Result      VL PRE OP VEIN MAPPING   Final Result      VL DUP CAROTID BILATERAL   Final Result      VL DUP LOWER EXTREMITY ARTERIES LEFT   Final Result      NM Cardiac Stress Test Nuclear Imaging   Final Result      XR CHEST PORTABLE   Final Result   Low lung volumes with bibasilar atelectasis. Assessment/Plan:    Active Hospital Problems    Diagnosis    Subclavian artery stenosis, left (HCC) [I77.1]    Dyslipidemia [E78.5]    Left leg numbness [R20.0]    Tobacco abuse [Z72.0]    NSTEMI (non-ST elevated myocardial infarction) [facility]) [I21.4]         Hospital course: a 41 y. o. male with cad s/p yaz to rca x3 (5/2020), s/d chf, htn, hld, admitted with chest pain, got some relief with nitro at home. States it feels like his previous MI. When I saw the Pt later he denies chest pain. ED workup  Vitals stable, on room air  Pertinent labs: troponins . 04, wbc 12  Imaging: no infiltrates or consolidation     Plan:  Chest pain, nstemi  Known cad  - stress test with ischemia   - s/p c 3/8/21, with 90% lesion in the lad, d1 stent is patent, lcx 90% ostial lesion, mid isr 90% lesion, rca 2 lesions 99%  - s/p yaz to the rca x3 in 5/2020  - on effient, ASA, statin, bb  - on heparin/integrilin gtt  - cardiology consulted  - ct surgery consulted for consideration of cabg - plan CABG surgery on monday     Epistaxis - poss 2/2 heparin gtt, resolved     Proximal left subclavian artery moderate stenosis - reviewed on CTA chest, vascular surgery consulted for input prior to CABG, plan stenting today     HTN  - controlled  - on coreg     tobacco dependence  - counseled on cessation  - nicotine patch  - started wellbutrin: 150 mg daily x 3 days, then increase to bid.  Stop smoking/nicotine patch 5-7 days after starting       Diet: DIET CARDIAC;  Code Status: Full Code    PT/OT Eval Status: ordered    Dispo - cont care, CABG Lio Chiu MD Posterior Auricular Interpolation Flap Division And Inset Text: Division and inset of the posterior auricular interpolation flap was performed to achieve optimal aesthetic result, restore normal anatomic appearance and avoid distortion of normal anatomy, expedite and facilitate wound healing, achieve optimal functional result and because linear closure either not possible or would produce suboptimal result. The patient was prepped and draped in the usual manner. The pedicle was infiltrated with local anesthesia. The pedicle was sectioned with a #15 blade. The pedicle was de-bulked and trimmed to match the shape of the defect. Hemostasis was achieved. The flap donor site and free margin of the flap were secured with deep buried sutures and the wound edges were re-approximated.

## 2021-03-12 NOTE — PROGRESS NOTES
Clinical Pharmacy Note  Heparin Dosing       Lab Results   Component Value Date    APTT 30.7 03/12/2021     Lab Results   Component Value Date    HGB 16.5 03/12/2021    HCT 49.5 03/12/2021     03/12/2021       Current Infusion Rate: 0 mL/hr    Plan:  Heparin restarted post-procedure at previously established rate of 12.1 mL/hr. Rate: 12.1 mL/hr  Next aPTT: 2300 3/12/21    Pharmacy will continue to monitor and adjust based on aPTT results.     Andrei Eng PharmD, BCPS  3/12/2021 5:23 PM

## 2021-03-12 NOTE — BRIEF OP NOTE
Brief Postoperative Note      Patient: Alfa Allen  YOB: 1980  MRN: [id number]    Date of Procedure: 3/12/2021    Pre-Op Diagnosis: L subclavian stenosis with planned LIMA graft for CABG    Post-Op Diagnosis: Same       Procedure: Arch aortogram; Proximal L subclavian angioplasty/stent placement (VisiPro 8x27 mm)    Surgeon: Shayy Chou    Anesthesia: local/sedation    Estimated Blood Loss (mL): Minimal    Complications: None    Specimens:   * No specimens in log *    Implants:  * No surgical log found *      Drains: * No LDAs found *    Findings: 60% L subclavian stenosis proximal to vertebral origin - successful stent placement    Electronically signed by Hassan Lefort, MD on 3/12/2021 at 3:56 PM

## 2021-03-12 NOTE — PROGRESS NOTES
Nutrition Assessment     Type and Reason for Visit: Initial(LOS)    Nutrition Recommendations/Plan:   Resume diet after procedure. Needs diet education after CABG on Monday. Nutrition Assessment:  LOS. Pt presented with chest pain, NSTEMI. S/p LHC on 3/8 and currently NPO for stent placement today. Pt planned for CABG on Monday. Noted pt eating well since admission >75% of meals and wt stable for the past year. No nutrition interventions at this time. Will need diet education post-CABG surgery.     Malnutrition Assessment:  Malnutrition Status: No malnutrition    Nutrition Related Findings: Non pititng BUE edema; BM 3/9; Na 134      Current Nutrition Therapies:    Diet NPO, After Midnight Exceptions are: Sips of Water with Meds    Anthropometric Measures:  · Height: 5' 9\" (175.3 cm)  · Current Body Wt: 224 lb (101.6 kg)   · BMI: 33.1    Nutrition Diagnosis:   No nutrition diagnosis at this time     Nutrition Interventions:   Food and/or Nutrient Delivery:  (Resume diet after procedure)  Nutrition Education/Counseling:  No recommendation at this time   Coordination of Nutrition Care:  Continue to monitor while inpatient    Discharge Planning:    No discharge needs at this time     Electronically signed by Marbella Spears RD, LD on 3/12/21 at 8:00 AM EST    Contact: [phone]

## 2021-03-12 NOTE — PROGRESS NOTES
Progress Note    NSTEMI  CAD; s/p POBA to RCA; Effient 3/8    Vital Signs:                                                 /82   Pulse 65   Temp 98 °F (36.7 °C) (Oral)   Resp 16   Ht 5' 9\" (1.753 m)   Wt 224 lb 6.9 oz (101.8 kg)   SpO2 92%   BMI 33.14 kg/m²  O2 Flow Rate (L/min): 0 L/min   NSR   Admission Weight: 235 lb 14.3 oz (107 kg)      Drips:    Integrilin  Heparin    I/O:      Intake/Output Summary (Last 24 hours) at 3/12/2021 0656  Last data filed at 3/11/2021 2300  Gross per 24 hour   Intake 1628.9 ml   Output --   Net 1628.9 ml     General: awake, alert and oriented  CV: regular  Pulm: decreased  Abd: soft  Ext: warm, 1+ edema  Neuro: No focal deficits, moves all extremities with equal strength bilat    Data Review:  CBC:   Recent Labs     03/10/21  0427 03/11/21  0427 03/12/21  0514   WBC 9.8 10.2 13.0*   HGB 15.2 16.1 16.5   HCT 44.6 47.9 49.5   MCV 88.9 89.3 89.3    244 244     BMP:   Recent Labs     03/10/21  0427 03/11/21  0427 03/12/21  0514    139 134*   K 4.0 4.5 4.7    102 99   CO2 26 23 22   BUN 10 11 13   CREATININE 0.7* 0.8* 0.7*   CALCIUM 9.2 9.7 9.7     Cardiac Enzymes: No results for input(s): CKTOTAL, CKMB, CKMBINDEX, TROPONINI in the last 72 hours. PT/INR: No results for input(s): PROTIME, INR in the last 72 hours.   APTT:   Recent Labs     03/10/21  0427 03/11/21  0428 03/12/21  0514   APTT 55.5* 56.9* 30.7       Assessment/Plan:  CV - SR              - BB, statin              - No ACE/ARB prior to CABG  pulm - RA, pulmonary toilet  Renal - cr nr  Heme - WBC elevated, afebrile    Left subclavian stent per Dr. Anya Lebron today  CABG planned for Monday after Effient metabolism       LACHELLE Grace - CNP  3/12/2021  6:56 AM     Dakota Qureshi MD  3/12/2021  7:41 AM

## 2021-03-12 NOTE — PROGRESS NOTES
VASCULAR    No changes. Plan L subclavian stent this afternoon. Heparin and Integrelin orders written. Pt understands and agrees to proceed.     Roshni Lora

## 2021-03-12 NOTE — PRE SEDATION
Sedation Pre-Procedure Note    Patient Name: Speedy Callahan   YOB: 1980  Room/Bed: O8F-9655/1303-01  Medical Record Number: [id number]  Date: 3/12/2021   Time: 10:23 AM       Indication:  L subclavian stenosis - palnned CABG with LIMA    Consent: I have discussed with the patient and/or the patient representative the indication, alternatives, and the possible risks and/or complications of the planned procedure and the anesthesia methods. The patient and/or patient representative appear to understand and agree to proceed. Vital Signs:   Vitals:    03/12/21 0813   BP:    Pulse:    Resp:    Temp:    SpO2: 95%       Past Medical History:   has a past medical history of Coronary artery disease, Essential hypertension, GERD (gastroesophageal reflux disease), Headache, NSTEMI (non-ST elevated myocardial infarction) ([facility] [address] Obesity, Other hyperlipidemia, and Tobacco abuse counseling. Past Surgical History:   has a past surgical history that includes Mouth surgery; Coronary angioplasty with stent (05/05/2020); Coronary angioplasty with stent (04/25/2020); and Coronary angioplasty (03/08/2021). Medications:   Scheduled Meds:    lidocaine 1 % injection  5 mL Intradermal Once    sodium chloride flush  10 mL Intravenous 2 times per day    buPROPion  150 mg Oral BID    sennosides-docusate sodium  2 tablet Oral Daily    isosorbide mononitrate  60 mg Oral Daily    famotidine  20 mg Oral BID    carvedilol  6.25 mg Oral BID WC    atorvastatin  80 mg Oral Nightly    aspirin  81 mg Oral Daily     Continuous Infusions:    heparin (PORCINE) Infusion 12.1 mL/hr (03/12/21 0714)     PRN Meds: sodium chloride flush, sodium chloride, nitroGLYCERIN, ondansetron, polyethylene glycol, acetaminophen **OR** acetaminophen  Home Meds:   Prior to Admission medications    Medication Sig Start Date End Date Taking?  Authorizing Provider   isosorbide mononitrate (IMDUR) 60 MG extended release tablet Take 1 tablet by mouth daily 2/15/21  Yes Anette Antunez MD   aspirin 81 MG chewable tablet Take 1 tablet by mouth daily 2/15/21  Yes Anette Antunez MD   nitroGLYCERIN (NITROSTAT) 0.4 MG SL tablet up to max of 3 total doses. If no relief after 1 dose, call 911. 2/15/21  Yes Anette Antunez MD   carvedilol (COREG) 6.25 MG tablet Take 1 tablet by mouth 2 times daily (with meals) 1/6/21  Yes Mary Orellana MD   famotidine (PEPCID) 20 MG tablet Take 1 tablet by mouth 2 times daily 1/6/21  Yes Mary Orellana MD   atorvastatin (LIPITOR) 80 MG tablet Take 1 tablet by mouth nightly 1/6/21  Yes Mary Orellana MD   prasugrel (EFFIENT) 10 MG TABS Take 1 tablet by mouth daily 1/6/21  Yes Mary Orellana MD   lisinopril (PRINIVIL;ZESTRIL) 20 MG tablet Take 1 tablet by mouth daily 12/14/20  Yes Mary Orellana MD     Coumadin Use Last 7 Days:  no  Antiplatelet drug therapy use last 7 days: yes - ASA  Other anticoagulant use last 7 days: yes - Integrelin + heparin  Additional Medication Information:  Se above      Pre-Sedation Documentation and Exam:   I have personally completed a history, physical exam & review of systems for this patient (see notes).     Mallampati Airway Assessment:  Mallampati Class II - (soft palate, fauces & uvula are visible)    Prior History of Anesthesia Complications:   none    ASA Classification:  Class 3 - A patient with severe systemic disease that limits activity but is not incapacitating    Sedation/ Anesthesia Plan:   intravenous sedation    Medications Planned:   midazolam (Versed) intravenously and fentanyl intravenously    Patient is an appropriate candidate for plan of sedation: yes    Electronically signed by Essence Olivo MD on 3/12/2021 at 10:23 AM

## 2021-03-12 NOTE — PROGRESS NOTES
Hospitalist Progress Note      PCP: David Garcia DO    Date of Admission: 3/5/2021     Subjective: no acute events overnight    Medications:  Reviewed    Infusion Medications    eptifibatide 2 mcg/kg/min (03/11/21 1329)    heparin (PORCINE) Infusion 12.1 mL/hr (03/11/21 1635)     Scheduled Medications    lidocaine 1 % injection  5 mL Intradermal Once    sodium chloride flush  10 mL Intravenous 2 times per day    buPROPion  150 mg Oral BID    sennosides-docusate sodium  2 tablet Oral Daily    isosorbide mononitrate  60 mg Oral Daily    famotidine  20 mg Oral BID    carvedilol  6.25 mg Oral BID WC    atorvastatin  80 mg Oral Nightly    aspirin  81 mg Oral Daily     PRN Meds: sodium chloride flush, sodium chloride, nitroGLYCERIN, ondansetron, polyethylene glycol, acetaminophen **OR** acetaminophen      Intake/Output Summary (Last 24 hours) at 3/11/2021 1902  Last data filed at 3/11/2021 1800  Gross per 24 hour   Intake 1200 ml   Output --   Net 1200 ml       Physical Exam Performed:    /89   Pulse 75   Temp 98 °F (36.7 °C) (Oral)   Resp 18   Ht 5' 9\" (1.753 m)   Wt 224 lb 3.3 oz (101.7 kg)   SpO2 95%   BMI 33.11 kg/m²     Gen: NAD  HEENT: NC/AT, moist mucous membranes  Neck: supple, trachea midline  Heart: Normal s1/s2, RRR, no murmurs, gallops, or rubs. Lungs: clear bilaterally, no wheezing, no rales, no rhonchi, no use of accessory muscles  Abd: bowel sounds present, soft, nontender, nondistended, no masses  Extrem: No clubbing, cyanosis, no edema  Skin: no rashes or lesions  Psych: Alert, oriented x3, affect appropriate  Neuro: grossly intact, moves all four extremities spontaneously.  No focal deficits   Cap refill: +2 sec    Labs:   Recent Labs     03/09/21  0417 03/10/21  0427 03/11/21  0427   WBC 9.6 9.8 10.2   HGB 15.4 15.2 16.1   HCT 45.4 44.6 47.9    238 244     Recent Labs     03/09/21  0417 03/10/21  0427 03/11/21  0427    138 139   K 4.1 4.0 4.5    102 102   CO2 25 26 23   BUN 12 10 11   CREATININE 0.7* 0.7* 0.8*   CALCIUM 9.6 9.2 9.7     No results for input(s): AST, ALT, BILIDIR, BILITOT, ALKPHOS in the last 72 hours. No results for input(s): INR in the last 72 hours. No results for input(s): Geofm Squibb in the last 72 hours. Urinalysis:      Lab Results   Component Value Date    NITRU Negative 03/10/2021    BLOODU Negative 03/10/2021    SPECGRAV 1.008 03/10/2021    GLUCOSEU Negative 03/10/2021       Radiology:  CTA CHEST W CONTRAST   Final Result   1. Unchanged moderate hemodynamic stenosis involving the proximal left   subclavian artery. VL DUP CAROTID LEFT   Final Result      VL PRE OP VEIN MAPPING   Final Result      VL DUP CAROTID BILATERAL   Final Result      VL DUP LOWER EXTREMITY ARTERIES LEFT   Final Result      NM Cardiac Stress Test Nuclear Imaging   Final Result      XR CHEST PORTABLE   Final Result   Low lung volumes with bibasilar atelectasis. IR ANGIOGRAM EXTREMITY LEFT    (Results Pending)           Assessment/Plan:    Active Hospital Problems    Diagnosis    Dyslipidemia [E78.5]    Left leg numbness [R20.0]    Tobacco abuse [Z72.0]    NSTEMI (non-ST elevated myocardial infarction) [facility]) [I21.4]         Hospital course: a 41 y. o. male with cad s/p yaz to rca x3 (5/2020), s/d chf, htn, hld, admitted with chest pain, got some relief with nitro at home. States it feels like his previous MI. When I saw the Pt later he denies chest pain. ED workup  Vitals stable, on room air  Pertinent labs: troponins . 04, wbc 12  Imaging: no infiltrates or consolidation     Plan:  Chest pain, nstemi  Known cad  - stress test with ischemia   - s/p c 3/8/21, with 90% lesion in the lad, d1 stent is patent, lcx 90% ostial lesion, mid isr 90% lesion, rca 2 lesions 99%  - s/p yaz to the rca x3 in 5/2020  - on effient, ASA, statin, bb  - on heparin/integrilin gtt  - cardiology consulted  - ct surgery consulted for consideration of cabg - plan CABG surgery on monday     Epistaxis - poss 2/2 heparin gtt, resolved     Proximal left subclavian artery moderate stenosis - reviewed on CTA chest, vascular surgery consulted for input prior to CABG, plan stenting tomorrow     HTN  - controlled  - on coreg     tobacco dependence  - counseled on cessation  - nicotine patch  - started wellbutrin: 150 mg daily x 3 days, then increase to bid. Stop smoking/nicotine patch 5-7 days after starting      Diet: DIET CARDIAC;   Dietary Nutrition Supplements: Frozen Oral Supplement, Standard High Calorie Oral Supplement  Diet NPO, After Midnight Exceptions are: Sips of Water with Meds  Code Status: Full Code    PT/OT Eval Status: ordered    Senthil Hale MD

## 2021-03-13 LAB
ANION GAP SERPL CALCULATED.3IONS-SCNC: 9 MMOL/L (ref 3–16)
APTT: 53.1 SEC (ref 24.2–36.2)
BASOPHILS ABSOLUTE: 0.1 K/UL (ref 0–0.2)
BASOPHILS RELATIVE PERCENT: 1.2 %
BUN BLDV-MCNC: 16 MG/DL (ref 7–20)
CALCIUM SERPL-MCNC: 9.3 MG/DL (ref 8.3–10.6)
CHLORIDE BLD-SCNC: 103 MMOL/L (ref 99–110)
CO2: 26 MMOL/L (ref 21–32)
CREAT SERPL-MCNC: 0.8 MG/DL (ref 0.9–1.3)
EOSINOPHILS ABSOLUTE: 0.2 K/UL (ref 0–0.6)
EOSINOPHILS RELATIVE PERCENT: 1.7 %
GFR AFRICAN AMERICAN: >60
GFR NON-AFRICAN AMERICAN: >60
GLUCOSE BLD-MCNC: 109 MG/DL (ref 70–99)
HCT VFR BLD CALC: 43.3 % (ref 40.5–52.5)
HEMOGLOBIN: 14.8 G/DL (ref 13.5–17.5)
LYMPHOCYTES ABSOLUTE: 3.3 K/UL (ref 1–5.1)
LYMPHOCYTES RELATIVE PERCENT: 28.6 %
MCH RBC QN AUTO: 30.1 PG (ref 26–34)
MCHC RBC AUTO-ENTMCNC: 34.2 G/DL (ref 31–36)
MCV RBC AUTO: 88.2 FL (ref 80–100)
MONOCYTES ABSOLUTE: 1 K/UL (ref 0–1.3)
MONOCYTES RELATIVE PERCENT: 8.8 %
NEUTROPHILS ABSOLUTE: 6.9 K/UL (ref 1.7–7.7)
NEUTROPHILS RELATIVE PERCENT: 59.7 %
PDW BLD-RTO: 13.8 % (ref 12.4–15.4)
PLATELET # BLD: 210 K/UL (ref 135–450)
PMV BLD AUTO: 9.1 FL (ref 5–10.5)
POTASSIUM REFLEX MAGNESIUM: 4 MMOL/L (ref 3.5–5.1)
RBC # BLD: 4.91 M/UL (ref 4.2–5.9)
SODIUM BLD-SCNC: 138 MMOL/L (ref 136–145)
WBC # BLD: 11.5 K/UL (ref 4–11)

## 2021-03-13 PROCEDURE — 6370000000 HC RX 637 (ALT 250 FOR IP): Performed by: INTERNAL MEDICINE

## 2021-03-13 PROCEDURE — 6370000000 HC RX 637 (ALT 250 FOR IP): Performed by: FAMILY MEDICINE

## 2021-03-13 PROCEDURE — 6360000002 HC RX W HCPCS: Performed by: SURGERY

## 2021-03-13 PROCEDURE — 2140000000 HC CCU INTERMEDIATE R&B

## 2021-03-13 PROCEDURE — 85730 THROMBOPLASTIN TIME PARTIAL: CPT

## 2021-03-13 PROCEDURE — 36415 COLL VENOUS BLD VENIPUNCTURE: CPT

## 2021-03-13 PROCEDURE — 80048 BASIC METABOLIC PNL TOTAL CA: CPT

## 2021-03-13 PROCEDURE — 85025 COMPLETE CBC W/AUTO DIFF WBC: CPT

## 2021-03-13 PROCEDURE — 99231 SBSQ HOSP IP/OBS SF/LOW 25: CPT | Performed by: THORACIC SURGERY (CARDIOTHORACIC VASCULAR SURGERY)

## 2021-03-13 PROCEDURE — 2580000003 HC RX 258: Performed by: NURSE PRACTITIONER

## 2021-03-13 PROCEDURE — 94760 N-INVAS EAR/PLS OXIMETRY 1: CPT

## 2021-03-13 PROCEDURE — 6370000000 HC RX 637 (ALT 250 FOR IP): Performed by: NURSE PRACTITIONER

## 2021-03-13 RX ADMIN — FAMOTIDINE 20 MG: 20 TABLET, FILM COATED ORAL at 10:18

## 2021-03-13 RX ADMIN — EPTIFIBATIDE 2 MCG/KG/MIN: 75 INJECTION INTRAVENOUS at 00:06

## 2021-03-13 RX ADMIN — EPTIFIBATIDE 2 MCG/KG/MIN: 75 INJECTION INTRAVENOUS at 19:46

## 2021-03-13 RX ADMIN — HEPARIN SODIUM 12.1 ML/HR: 10000 INJECTION, SOLUTION INTRAVENOUS at 03:47

## 2021-03-13 RX ADMIN — BUPROPION HYDROCHLORIDE 150 MG: 150 TABLET, EXTENDED RELEASE ORAL at 10:18

## 2021-03-13 RX ADMIN — ACETAMINOPHEN 650 MG: 325 TABLET ORAL at 12:34

## 2021-03-13 RX ADMIN — POLYETHYLENE GLYCOL 3350 17 G: 17 POWDER, FOR SOLUTION ORAL at 10:17

## 2021-03-13 RX ADMIN — ATORVASTATIN CALCIUM 80 MG: 80 TABLET, FILM COATED ORAL at 20:17

## 2021-03-13 RX ADMIN — EPTIFIBATIDE 2 MCG/KG/MIN: 75 INJECTION INTRAVENOUS at 06:04

## 2021-03-13 RX ADMIN — Medication 10 ML: at 10:18

## 2021-03-13 RX ADMIN — CARVEDILOL 6.25 MG: 6.25 TABLET, FILM COATED ORAL at 10:18

## 2021-03-13 RX ADMIN — FAMOTIDINE 20 MG: 20 TABLET, FILM COATED ORAL at 20:17

## 2021-03-13 RX ADMIN — Medication 10 ML: at 20:17

## 2021-03-13 RX ADMIN — DOCUSATE SODIUM 50 MG AND SENNOSIDES 8.6 MG 2 TABLET: 8.6; 5 TABLET, FILM COATED ORAL at 10:18

## 2021-03-13 RX ADMIN — ACETAMINOPHEN 650 MG: 325 TABLET ORAL at 22:44

## 2021-03-13 RX ADMIN — CARVEDILOL 6.25 MG: 6.25 TABLET, FILM COATED ORAL at 17:27

## 2021-03-13 RX ADMIN — EPTIFIBATIDE 2 MCG/KG/MIN: 75 INJECTION INTRAVENOUS at 12:31

## 2021-03-13 RX ADMIN — ISOSORBIDE MONONITRATE 60 MG: 60 TABLET, EXTENDED RELEASE ORAL at 10:18

## 2021-03-13 RX ADMIN — BUPROPION HYDROCHLORIDE 150 MG: 150 TABLET, EXTENDED RELEASE ORAL at 20:17

## 2021-03-13 RX ADMIN — ACETAMINOPHEN 650 MG: 325 TABLET ORAL at 03:46

## 2021-03-13 RX ADMIN — ASPIRIN 81 MG: 81 TABLET, CHEWABLE ORAL at 10:18

## 2021-03-13 ASSESSMENT — PAIN SCALES - GENERAL
PAINLEVEL_OUTOF10: 3
PAINLEVEL_OUTOF10: 0

## 2021-03-13 ASSESSMENT — PAIN DESCRIPTION - PROGRESSION: CLINICAL_PROGRESSION: NOT CHANGED

## 2021-03-13 ASSESSMENT — PAIN DESCRIPTION - DESCRIPTORS: DESCRIPTORS: HEADACHE

## 2021-03-13 ASSESSMENT — PAIN DESCRIPTION - ONSET: ONSET: ON-GOING

## 2021-03-13 ASSESSMENT — PAIN DESCRIPTION - FREQUENCY: FREQUENCY: INTERMITTENT

## 2021-03-13 ASSESSMENT — PAIN DESCRIPTION - PAIN TYPE: TYPE: ACUTE PAIN

## 2021-03-13 NOTE — PROGRESS NOTES
Cardiology - PROGRESS NOTE    Admit Date: 3/5/2021     Reason for follow up: chest pain     Fiona Baugh is a 41 y.o. male with significant history of CAD ( s/p PCI LCx/RCA), HTN, HL, tobacco abuse who presents with the above complaint. Episode of chest discomfort at night, while at rest, with no associated symptoms. No specific alleviating or exacerbating factors      Social History:   reports that he has been smoking cigarettes. He started smoking about 31 years ago. He has been smoking about 1.00 pack per day. He has never used smokeless tobacco. He reports current drug use. Drug: Marijuana. He reports that he does not drink alcohol. Family History: family history includes Diabetes in his brother and mother; Heart Disease in his maternal aunt, maternal cousin, and maternal uncle; High Blood Pressure in his maternal aunt; Other in his father and mother.        Interval History:   Patient seen and examined   Doing well, no complaints   For subclavian stenting today   CABG Monday         Diet: DIET CARDIAC;  Pain is:None  Nausea:None    In: 79 [P.O.:60; I.V.:10]  Out: 1050    Wt Readings from Last 3 Encounters:   03/12/21 224 lb 6.9 oz (101.8 kg)   07/20/20 228 lb (103.4 kg)   06/12/20 227 lb 1.2 oz (103 kg)           Data:   Scheduled Meds:   Scheduled Meds:   lidocaine 1 % injection  5 mL Intradermal Once    sodium chloride flush  10 mL Intravenous 2 times per day    buPROPion  150 mg Oral BID    sennosides-docusate sodium  2 tablet Oral Daily    isosorbide mononitrate  60 mg Oral Daily    famotidine  20 mg Oral BID    carvedilol  6.25 mg Oral BID WC    atorvastatin  80 mg Oral Nightly    aspirin  81 mg Oral Daily     Continuous Infusions:   eptifibatide 2 mcg/kg/min (03/12/21 1834)    heparin (PORCINE) Infusion 12.1 mL/hr (03/12/21 1650)    sodium chloride 50 mL/hr at 03/12/21 1649     PRN Meds:.sodium chloride flush, sodium chloride, nitroGLYCERIN, ondansetron, polyethylene glycol, acetaminophen **OR** acetaminophen  Continuous Infusions:   eptifibatide 2 mcg/kg/min (03/12/21 1834)    heparin (PORCINE) Infusion 12.1 mL/hr (03/12/21 1650)    sodium chloride 50 mL/hr at 03/12/21 1649       Intake/Output Summary (Last 24 hours) at 3/12/2021 2236  Last data filed at 3/12/2021 1634  Gross per 24 hour   Intake 858.9 ml   Output 1050 ml   Net -191.1 ml       CBC:   Recent Labs     03/12/21  0514   WBC 13.0*   HGB 16.5        BMP:  Recent Labs     03/12/21  0514   *   K 4.7   CL 99   CO2 22   BUN 13   CREATININE 0.7*   GLUCOSE 105*     ABGs:   Lab Results   Component Value Date    PHART 7.421 03/09/2021    PO2ART 147.0 03/09/2021    CCR1OGF 37.8 03/09/2021     INR: No results for input(s): INR in the last 72 hours. CARDIAC LABS     ENZYMES:  No results for input(s): CKMB, CKMBINDEX, TROPONINI in the last 72 hours. Invalid input(s): CKTOTAL;3  FASTING LIPID PANEL:  Lab Results   Component Value Date    HDL 30 03/07/2021    LDLDIRECT 229 02/06/2020    LDLCALC 93 03/07/2021    TRIG 244 03/07/2021     LIVER PROFILE:  No results for input(s): AST, ALT, ALB in the last 72 hours. -----------------------------------------------------------------  Telemetry: personally reviewed   RAD:     CXR:  FINDINGS:   The lungs are underinflated, resulting in vascular crowding and subsegmental   atelectasis.  There is no focal consolidation, pleural effusion or   pneumothorax.  The heart and mediastinal contours are stable.  No acute bony   findings are identified.           Impression   Low lung volumes with bibasilar atelectasis. EKG: 3/5/2021  Echo:   7/2020  Summary   Left ventricular cavity size is normal.   There is moderate concentric left ventricular hypertrophy. Ejection fraction is visually estimated to be 45-50%. There is moderate hypokinesis of the mid to apical inferoseptal and inferior   walls.    Diastolic filling parameters suggest grade I diastolic dysfunction. Normal right ventricular size. Right ventricular systolic function is moderately reduced. TAPSE 1.2 cm    GXT:   3/5/2021  Summary    moderate size moderate severity inferior lateral wall defect consistent with    ischemia     Cath: 5/5/2020  ANGIOGRAM/CORONARY ARTERIOGRAM:        The left main coronary artery is patent   The left anterior descending artery is patent, D1 stent is widely patent .  The left circumflex artery is patent, mid stent is widely patent. The right coronary artery is diffusely diseased, prox 80%, distal 80%      INTERVENTION  1. JR 4 guide   2. runthrough wire used to cross the lesion   3. Distal RCA predilated w/ 2.5/3.5 regular balloons  4. IVUS catheter passed to distal RCA, distal reference diameter 4.5 mm   5. 4.0 X 18 mm Ari MI distal RCA  6. 4.0 X 26 / 4.5 X 12 prox RCA  7. Post dilation distal/prox  RCA w/ 4.0 NC balloon to 18 keyur     ANGIOGRAM/CORONARY ARTERIOGRAM:        The left main coronary artery is patent .     The left anterior descending artery has a mid 90% lesion,               D1 stent is widely patent .     The left circumflex artery has an ostial 90% lesion, mid ISR 90% .    The right coronary artery has a mid and distal 99% lesion with left to right collaterals of the PDA               Ostial and distal stents are patent         INTERVENTION  8. JR 4 guide  9. Runthrought wire used to corss RCA lesion   10.  POBA of the mid and distal RCA w/ 3.0 X 12 balloon      SUMMARY:   Severe native 3V CAD   S/p POBA mid and distal RCA         RECOMMENDATION:       - heparin gtt  - Integrilin gtt   - CTSx consultation for CABG   - Transfer to CVU    Objective:   Vitals: /76   Pulse 84   Temp 97.6 °F (36.4 °C) (Oral)   Resp 18   Ht 5' 9\" (1.753 m)   Wt 224 lb 6.9 oz (101.8 kg)   SpO2 94%   BMI 33.14 kg/m²   General appearance: alert, appears stated age and cooperative, No acute distress   Skin: Skin color, texture, turgor normal. No rashes or ecchymosis. HEENT: Head: Normocephalic, no lesions, without obvious abnormality. Neck: no carotid bruit  Lungs: clear to auscultation bilaterally, no accessory muscle use, no respiratory distress, on RA  Heart: regular rate and rhythm, S1, S2 normal, no murmur, click, rub or gallop  Abdomen: soft, non-tender; bowel sounds normal; no masses,  no organomegaly  Extremities: extremities normal, atraumatic, no cyanosis or edema, pulses: DP +2/+2, PT +2/+2  Neurologic: Mental status: Alert, oriented, thought content appropriate, no tremors, no gross sensory motor deficit,   Psychiatric: normal insight and affect      Assessment & Plan:    Patient Active Problem List:     Essential hypertension     Other hyperlipidemia     Tobacco abuse counseling     Acute coronary syndrome (HCC)     SIRS (systemic inflammatory response syndrome) (HCC)     NSTEMI (non-ST elevated myocardial infarction) ([facility] [address]     Coronary artery disease     Tobacco use        Plan:  1. NSTEMI   - heparin gtt   - Integrilin gtt    - asa/statin/beta blockade    - CABG Monday     2. Tobacco abuse   Patient counseled  3. Dyslipidemia    On statin therapy    Continue   4.  Subclavian stenosis    Seen by Dr. Austin Milner    For stenting today     Nerissa Hester MD [address], Interventional Cardiology, and Peripheral Vascular [address] [facility]   (C): [phone]  Desiree Stapleton): [phone]

## 2021-03-13 NOTE — PLAN OF CARE
Problem: Infection:  Goal: Will remain free from infection  Description: Will remain free from infection  Outcome: Met This Shift     Problem: Safety:  Goal: Free from accidental physical injury  Description: Free from accidental physical injury  Outcome: Met This Shift  Goal: Free from intentional harm  Description: Free from intentional harm  Outcome: Met This Shift     Problem: Daily Care:  Goal: Daily care needs are met  Description: Daily care needs are met  Outcome: Met This Shift     Problem: Skin Integrity:  Goal: Skin integrity will stabilize  Description: Skin integrity will stabilize  Outcome: Met This Shift     Problem: Falls - Risk of:  Goal: Will remain free from falls  Description: Will remain free from falls  Outcome: Met This Shift  Goal: Absence of physical injury  Description: Absence of physical injury  Outcome: Met This Shift     Problem: Pain:  Description: Pain management should include both nonpharmacologic and pharmacologic interventions.   Goal: Pain level will decrease  Description: Pain level will decrease  Outcome: Ongoing  Goal: Control of acute pain  Description: Control of acute pain  Outcome: Ongoing  Goal: Control of chronic pain  Description: Control of chronic pain  Outcome: Ongoing  Goal: Patient's pain/discomfort is manageable  Description: Patient's pain/discomfort is manageable  Outcome: Ongoing     Problem: Discharge Planning:  Goal: Patients continuum of care needs are met  Description: Patients continuum of care needs are met  Outcome: Ongoing     Problem: Cardiac:  Goal: Ability to maintain vital signs within normal range will improve  Description: Ability to maintain vital signs within normal range will improve  Outcome: Ongoing  Goal: Cardiovascular alteration will improve  Description: Cardiovascular alteration will improve  Outcome: Ongoing     Problem: Health Behavior:  Goal: Will modify at least one risk factor affecting health status  Description: Will modify at least one risk factor affecting health status  Outcome: Ongoing  Goal: Identification of resources available to assist in meeting health care needs will improve  Description: Identification of resources available to assist in meeting health care needs will improve  Outcome: Ongoing     Problem: Physical Regulation:  Goal: Complications related to the disease process, condition or treatment will be avoided or minimized  Description: Complications related to the disease process, condition or treatment will be avoided or minimized  Outcome: Ongoing     Problem: Tobacco Use:  Goal: Inpatient tobacco use cessation counseling participation  Description: Inpatient tobacco use cessation counseling participation  Outcome: Ongoing

## 2021-03-13 NOTE — PROGRESS NOTES
@9222- Patient called RN into room due to increased pain at R groin site. Patient informed RN that he had been bending his leg to stretch, and demonstrated a lunging movement at the right hip. Right groin site with mild oozing and small hematoma. Manual pressure held for 10 minutes, groin site remains puffy but soft, pedal pulses palpable bilaterally. Patient re-educated on puncture site precautions, pt verbalizes understanding. @5205- R groin site remains soft, pedal pulse palpable. Patient verbalizes pain at right groin site has subsided.

## 2021-03-13 NOTE — PROGRESS NOTES
Clinical Pharmacy Note  Heparin Dosing       Lab Results   Component Value Date    APTT 53.1 03/13/2021     Lab Results   Component Value Date    HGB 14.8 03/13/2021    HCT 43.3 03/13/2021     03/13/2021       Current Infusion Rate: 12.1 mL/hr    Plan:  Rate: continue at 12.1 mL/hr  Next aPTT: 0600 3/14/21    Pharmacy will continue to monitor and adjust based on aPTT results.   Danielle Ellington, PharmD

## 2021-03-13 NOTE — PROGRESS NOTES
Clinical Pharmacy Note  Heparin Dosing       Lab Results   Component Value Date    APTT 50.6 03/12/2021     Lab Results   Component Value Date    HGB 16.5 03/12/2021    HCT 49.5 03/12/2021     03/12/2021       Current Infusion Rate: 12.1 mL/hr    Plan:  Rate: continue at 12.1 mL/hr  Next aPTT: 0600 3/13/21    Pharmacy will continue to monitor and adjust based on aPTT results.   Raven Beard, LiliaD

## 2021-03-13 NOTE — OP NOTE
[address] [place] [address]                                OPERATIVE REPORT    PATIENT NAME: Clau Michael                      :        1980  MED REC NO:   [id number]                          ROOM:       0  ACCOUNT NO:   [de-identified]                           ADMIT DATE: 2021  PROVIDER:     Fiona Hong MD      ANGIOGRAM    DATE OF PROCEDURE:  2021    PREOPERATIVE DIAGNOSES:  Significant left subclavian artery stenosis  with planned coronary artery bypass grafting with use of in-situ left  internal mammary artery graft. POSTOPERATIVE DIAGNOSES:  Significant left subclavian artery stenosis  with planned coronary artery bypass grafting with use of in-situ left  internal mammary artery graft. OPERATION PERFORMED:  1. Ultrasound-guided right femoral catheterization. 2.  Aortic arch angiogram via catheter positioned in the ascending  aortic arch. 3.  Selective left subclavian angiogram.  4.  Left subclavian balloon angioplasty with stent placement (Visi-Pro 8  mm x 27 mm). SURGEON:  Fiona Hong MD    ANESTHESIA:  Lidocaine 1% with conscious sedation. ESTIMATED BLOOD LOSS:  Less than 50 mL. INDICATIONS:  The patient is a 25-year-old gentleman who presented with  coronary ischemia and has plans for a coronary artery bypass graft using  a left internal mammary artery graft. He was noted to have abnormal  flow in his left subclavian with elevated velocity and distal dampening  of his waveforms. There was also asymmetric blood pressure depression  and it was recommended that he undergo treatment of an identified  subclavian artery stenosis to maximize inflow through the LIMA graft. He agreed to proceed understanding the risks, benefits and other  options. OPERATIVE PROCEDURE:  The patient was brought to the Angiogram  Department and placed on the table in supine position.   After adequate Perclose device was  appropriate, especially since the patient was going back on Integrilin  and heparin. The device was advanced over the wire. The wire was  removed and the device was deployed in standard fashion with no  difficulty and with good excellent hemostasis. With the retrograde  angiogram, it did appear to be an identifiable right external iliac  artery stenosis incidentally. The patient tolerated the procedure well. FINDINGS:  1.  A 60% left subclavian artery stenosis located proximal to the origin  of the left vertebral artery. 2.  Incidentally noted right external iliac artery stenosis of less than  50%. 3.  Successful left subclavian artery balloon angioplasty and stent  placement.         Noah Holman MD    D: 03/12/2021 16:22:31       T: 03/12/2021 19:48:07     [id number]  Job#: [id number]     Doc#: [id number]    CC:

## 2021-03-13 NOTE — PROGRESS NOTES
Cardiology - PROGRESS NOTE    Admit Date: 3/5/2021     Reason for follow up: chest pain     Maggie Tobin is a 41 y.o. male with significant history of CAD ( s/p PCI LCx/RCA), HTN, HL, tobacco abuse who presents with the above complaint. Episode of chest discomfort at night, while at rest, with no associated symptoms. No specific alleviating or exacerbating factors      Social History:   reports that he has been smoking cigarettes. He started smoking about 31 years ago. He has been smoking about 1.00 pack per day. He has never used smokeless tobacco. He reports current drug use. Drug: Marijuana. He reports that he does not drink alcohol. Family History: family history includes Diabetes in his brother and mother; Heart Disease in his maternal aunt, maternal cousin, and maternal uncle; High Blood Pressure in his maternal aunt; Other in his father and mother. Interval History:   Doing well, no complaints   S/p subclavian stenting   CABG Monday         Diet: DIET CARDIAC;  Pain is:None  Nausea:None    In: 1929 [P.O.:720;  I.V.:1209]  Out: 1050    Wt Readings from Last 3 Encounters:   03/13/21 227 lb 4.7 oz (103.1 kg)   07/20/20 228 lb (103.4 kg)   06/12/20 227 lb 1.2 oz (103 kg)           Data:   Scheduled Meds:   Scheduled Meds:   lidocaine 1 % injection  5 mL Intradermal Once    sodium chloride flush  10 mL Intravenous 2 times per day    buPROPion  150 mg Oral BID    sennosides-docusate sodium  2 tablet Oral Daily    isosorbide mononitrate  60 mg Oral Daily    famotidine  20 mg Oral BID    carvedilol  6.25 mg Oral BID WC    atorvastatin  80 mg Oral Nightly    aspirin  81 mg Oral Daily     Continuous Infusions:   eptifibatide 2 mcg/kg/min (03/13/21 1231)    heparin (PORCINE) Infusion 12.1 mL/hr (03/13/21 1457)     PRN Meds:.sodium chloride flush, sodium chloride, nitroGLYCERIN, ondansetron, polyethylene glycol, acetaminophen **OR** acetaminophen  Continuous Infusions:   eptifibatide 2 mcg/kg/min (03/13/21 1231)    heparin (PORCINE) Infusion 12.1 mL/hr (03/13/21 0347)       Intake/Output Summary (Last 24 hours) at 3/13/2021 1252  Last data filed at 3/13/2021 1233  Gross per 24 hour   Intake 1929 ml   Output 1050 ml   Net 879 ml       CBC:   Recent Labs     03/13/21 0621   WBC 11.5*   HGB 14.8        BMP:  Recent Labs     03/13/21 0621      K 4.0      CO2 26   BUN 16   CREATININE 0.8*   GLUCOSE 109*     ABGs:   Lab Results   Component Value Date    PHART 7.421 03/09/2021    PO2ART 147.0 03/09/2021    RIN6CXT 37.8 03/09/2021     INR: No results for input(s): INR in the last 72 hours. CARDIAC LABS     ENZYMES:  No results for input(s): CKMB, CKMBINDEX, TROPONINI in the last 72 hours. Invalid input(s): CKTOTAL;3  FASTING LIPID PANEL:  Lab Results   Component Value Date    HDL 30 03/07/2021    LDLDIRECT 229 02/06/2020    LDLCALC 93 03/07/2021    TRIG 244 03/07/2021     LIVER PROFILE:  No results for input(s): AST, ALT, ALB in the last 72 hours. -----------------------------------------------------------------  Telemetry: personally reviewed   RAD:     CXR:  FINDINGS:   The lungs are underinflated, resulting in vascular crowding and subsegmental   atelectasis.  There is no focal consolidation, pleural effusion or   pneumothorax.  The heart and mediastinal contours are stable.  No acute bony   findings are identified.           Impression   Low lung volumes with bibasilar atelectasis. EKG: 3/5/2021  Echo:   7/2020  Summary   Left ventricular cavity size is normal.   There is moderate concentric left ventricular hypertrophy. Ejection fraction is visually estimated to be 45-50%. There is moderate hypokinesis of the mid to apical inferoseptal and inferior   walls. Diastolic filling parameters suggest grade I diastolic dysfunction. Normal right ventricular size.    Right ventricular systolic function is moderately reduced. TAPSE 1.2 cm    GXT:   3/5/2021  Summary    moderate size moderate severity inferior lateral wall defect consistent with    ischemia     Cath: 5/5/2020  ANGIOGRAM/CORONARY ARTERIOGRAM:        The left main coronary artery is patent   The left anterior descending artery is patent, D1 stent is widely patent .  The left circumflex artery is patent, mid stent is widely patent. The right coronary artery is diffusely diseased, prox 80%, distal 80%      INTERVENTION  1. JR 4 guide   2. runthrough wire used to cross the lesion   3. Distal RCA predilated w/ 2.5/3.5 regular balloons  4. IVUS catheter passed to distal RCA, distal reference diameter 4.5 mm   5. 4.0 X 18 mm Ari MI distal RCA  6. 4.0 X 26 / 4.5 X 12 prox RCA  7. Post dilation distal/prox  RCA w/ 4.0 NC balloon to 18 keyur     ANGIOGRAM/CORONARY ARTERIOGRAM:        The left main coronary artery is patent .     The left anterior descending artery has a mid 90% lesion,               D1 stent is widely patent .     The left circumflex artery has an ostial 90% lesion, mid ISR 90% .    The right coronary artery has a mid and distal 99% lesion with left to right collaterals of the PDA               Ostial and distal stents are patent         INTERVENTION  8. JR 4 guide  9. Runthrought wire used to corss RCA lesion   10. POBA of the mid and distal RCA w/ 3.0 X 12 balloon      SUMMARY:   Severe native 3V CAD   S/p POBA mid and distal RCA         RECOMMENDATION:       - heparin gtt  - Integrilin gtt   - CTSx consultation for CABG   - Transfer to CVU    Objective:   Vitals: /66   Pulse 79   Temp 98.4 °F (36.9 °C) (Oral)   Resp 16   Ht 5' 9\" (1.753 m)   Wt 227 lb 4.7 oz (103.1 kg)   SpO2 95%   BMI 33.57 kg/m²   General appearance: alert, appears stated age and cooperative, No acute distress   Skin: Skin color, texture, turgor normal. No rashes or ecchymosis. HEENT: Head: Normocephalic, no lesions, without obvious abnormality.   Neck: no carotid bruit  Lungs: clear to auscultation bilaterally, no accessory muscle use, no respiratory distress, on RA  Heart: regular rate and rhythm, S1, S2 normal, no murmur, click, rub or gallop  Abdomen: soft, non-tender; bowel sounds normal; no masses,  no organomegaly  Extremities: extremities normal, atraumatic, no cyanosis or edema, pulses: DP +2/+2, PT +2/+2  Neurologic: Mental status: Alert, oriented, thought content appropriate, no tremors, no gross sensory motor deficit,   Psychiatric: normal insight and affect      Assessment & Plan:    Patient Active Problem List:     Essential hypertension     Other hyperlipidemia     Tobacco abuse counseling     Acute coronary syndrome (HCC)     SIRS (systemic inflammatory response syndrome) ([facility])     NSTEMI (non-ST elevated myocardial infarction) ([facility] [address]     Coronary artery disease     Tobacco use        Plan:  1. NSTEMI   - heparin gtt   - Integrilin gtt    - asa/statin/beta blockade    - CABG Monday     2. Tobacco abuse   Patient counseled  3. Dyslipidemia    On statin therapy    Continue   4.  Subclavian stenosis    S/p stenting     Osiris Alexandra MD [address] [place] [address], Interventional Cardiology, and Peripheral Vascular Disease   [employer]   (C): [phone]  (O): [phone]

## 2021-03-14 LAB
ABO/RH: NORMAL
ALBUMIN SERPL-MCNC: 4.2 G/DL (ref 3.4–5)
ALP BLD-CCNC: 114 U/L (ref 40–129)
ALT SERPL-CCNC: 44 U/L (ref 10–40)
ANION GAP SERPL CALCULATED.3IONS-SCNC: 11 MMOL/L (ref 3–16)
ANTIBODY SCREEN: NORMAL
APTT: 52.9 SEC (ref 24.2–36.2)
AST SERPL-CCNC: 36 U/L (ref 15–37)
BASOPHILS ABSOLUTE: 0.1 K/UL (ref 0–0.2)
BASOPHILS RELATIVE PERCENT: 0.7 %
BILIRUB SERPL-MCNC: 0.4 MG/DL (ref 0–1)
BILIRUBIN DIRECT: <0.2 MG/DL (ref 0–0.3)
BILIRUBIN, INDIRECT: ABNORMAL MG/DL (ref 0–1)
BUN BLDV-MCNC: 13 MG/DL (ref 7–20)
CALCIUM SERPL-MCNC: 9.2 MG/DL (ref 8.3–10.6)
CHLORIDE BLD-SCNC: 100 MMOL/L (ref 99–110)
CO2: 24 MMOL/L (ref 21–32)
CREAT SERPL-MCNC: 0.7 MG/DL (ref 0.9–1.3)
EOSINOPHILS ABSOLUTE: 0.2 K/UL (ref 0–0.6)
EOSINOPHILS RELATIVE PERCENT: 1.8 %
GFR AFRICAN AMERICAN: >60
GFR NON-AFRICAN AMERICAN: >60
GLUCOSE BLD-MCNC: 106 MG/DL (ref 70–99)
HCT VFR BLD CALC: 44.6 % (ref 40.5–52.5)
HEMOGLOBIN: 15.2 G/DL (ref 13.5–17.5)
LYMPHOCYTES ABSOLUTE: 3.5 K/UL (ref 1–5.1)
LYMPHOCYTES RELATIVE PERCENT: 31.3 %
MCH RBC QN AUTO: 30.2 PG (ref 26–34)
MCHC RBC AUTO-ENTMCNC: 34 G/DL (ref 31–36)
MCV RBC AUTO: 89 FL (ref 80–100)
MONOCYTES ABSOLUTE: 1.1 K/UL (ref 0–1.3)
MONOCYTES RELATIVE PERCENT: 9.6 %
NEUTROPHILS ABSOLUTE: 6.3 K/UL (ref 1.7–7.7)
NEUTROPHILS RELATIVE PERCENT: 56.6 %
PDW BLD-RTO: 14 % (ref 12.4–15.4)
PLATELET # BLD: 215 K/UL (ref 135–450)
PMV BLD AUTO: 9.4 FL (ref 5–10.5)
POTASSIUM REFLEX MAGNESIUM: 4.1 MMOL/L (ref 3.5–5.1)
PREALBUMIN: 24.8 MG/DL (ref 20–40)
RBC # BLD: 5.02 M/UL (ref 4.2–5.9)
SODIUM BLD-SCNC: 135 MMOL/L (ref 136–145)
TOTAL PROTEIN: 7.2 G/DL (ref 6.4–8.2)
WBC # BLD: 11.1 K/UL (ref 4–11)

## 2021-03-14 PROCEDURE — 2100000000 HC CCU R&B

## 2021-03-14 PROCEDURE — 2580000003 HC RX 258: Performed by: NURSE PRACTITIONER

## 2021-03-14 PROCEDURE — 80076 HEPATIC FUNCTION PANEL: CPT

## 2021-03-14 PROCEDURE — 85025 COMPLETE CBC W/AUTO DIFF WBC: CPT

## 2021-03-14 PROCEDURE — 80048 BASIC METABOLIC PNL TOTAL CA: CPT

## 2021-03-14 PROCEDURE — 99232 SBSQ HOSP IP/OBS MODERATE 35: CPT | Performed by: NURSE PRACTITIONER

## 2021-03-14 PROCEDURE — 6370000000 HC RX 637 (ALT 250 FOR IP): Performed by: FAMILY MEDICINE

## 2021-03-14 PROCEDURE — 6370000000 HC RX 637 (ALT 250 FOR IP): Performed by: INTERNAL MEDICINE

## 2021-03-14 PROCEDURE — 94760 N-INVAS EAR/PLS OXIMETRY 1: CPT

## 2021-03-14 PROCEDURE — 36415 COLL VENOUS BLD VENIPUNCTURE: CPT

## 2021-03-14 PROCEDURE — 99231 SBSQ HOSP IP/OBS SF/LOW 25: CPT | Performed by: THORACIC SURGERY (CARDIOTHORACIC VASCULAR SURGERY)

## 2021-03-14 PROCEDURE — 86923 COMPATIBILITY TEST ELECTRIC: CPT

## 2021-03-14 PROCEDURE — 6370000000 HC RX 637 (ALT 250 FOR IP): Performed by: THORACIC SURGERY (CARDIOTHORACIC VASCULAR SURGERY)

## 2021-03-14 PROCEDURE — 6360000002 HC RX W HCPCS: Performed by: THORACIC SURGERY (CARDIOTHORACIC VASCULAR SURGERY)

## 2021-03-14 PROCEDURE — 86850 RBC ANTIBODY SCREEN: CPT

## 2021-03-14 PROCEDURE — 86901 BLOOD TYPING SEROLOGIC RH(D): CPT

## 2021-03-14 PROCEDURE — 85730 THROMBOPLASTIN TIME PARTIAL: CPT

## 2021-03-14 PROCEDURE — 6370000000 HC RX 637 (ALT 250 FOR IP): Performed by: NURSE PRACTITIONER

## 2021-03-14 PROCEDURE — 84134 ASSAY OF PREALBUMIN: CPT

## 2021-03-14 PROCEDURE — 86900 BLOOD TYPING SEROLOGIC ABO: CPT

## 2021-03-14 PROCEDURE — 6360000002 HC RX W HCPCS: Performed by: SURGERY

## 2021-03-14 RX ORDER — CHLORHEXIDINE GLUCONATE 4 G/100ML
SOLUTION TOPICAL SEE ADMIN INSTRUCTIONS
Status: DISCONTINUED | OUTPATIENT
Start: 2021-03-14 | End: 2021-03-15

## 2021-03-14 RX ORDER — SODIUM CHLORIDE 0.9 % (FLUSH) 0.9 %
10 SYRINGE (ML) INJECTION EVERY 12 HOURS SCHEDULED
Status: DISCONTINUED | OUTPATIENT
Start: 2021-03-14 | End: 2021-03-15

## 2021-03-14 RX ORDER — METOCLOPRAMIDE HYDROCHLORIDE 5 MG/ML
10 INJECTION INTRAMUSCULAR; INTRAVENOUS ONCE
Status: COMPLETED | OUTPATIENT
Start: 2021-03-15 | End: 2021-03-15

## 2021-03-14 RX ORDER — LACTULOSE 10 G/15ML
20 SOLUTION ORAL 2 TIMES DAILY PRN
Status: DISCONTINUED | OUTPATIENT
Start: 2021-03-14 | End: 2021-03-19 | Stop reason: HOSPADM

## 2021-03-14 RX ORDER — CHLORHEXIDINE GLUCONATE 0.12 MG/ML
15 RINSE ORAL ONCE
Status: COMPLETED | OUTPATIENT
Start: 2021-03-15 | End: 2021-03-15

## 2021-03-14 RX ORDER — SODIUM CHLORIDE 0.9 % (FLUSH) 0.9 %
10 SYRINGE (ML) INJECTION PRN
Status: DISCONTINUED | OUTPATIENT
Start: 2021-03-14 | End: 2021-03-15

## 2021-03-14 RX ORDER — SODIUM CHLORIDE 9 MG/ML
INJECTION, SOLUTION INTRAVENOUS CONTINUOUS
Status: DISCONTINUED | OUTPATIENT
Start: 2021-03-15 | End: 2021-03-15

## 2021-03-14 RX ADMIN — EPTIFIBATIDE 2 MCG/KG/MIN: 75 INJECTION INTRAVENOUS at 03:41

## 2021-03-14 RX ADMIN — ANTISEPTIC SURGICAL SCRUB: 0.04 SOLUTION TOPICAL at 22:18

## 2021-03-14 RX ADMIN — BUPROPION HYDROCHLORIDE 150 MG: 150 TABLET, EXTENDED RELEASE ORAL at 20:59

## 2021-03-14 RX ADMIN — DOCUSATE SODIUM 50 MG AND SENNOSIDES 8.6 MG 2 TABLET: 8.6; 5 TABLET, FILM COATED ORAL at 08:30

## 2021-03-14 RX ADMIN — MUPIROCIN: 20 OINTMENT TOPICAL at 20:59

## 2021-03-14 RX ADMIN — FAMOTIDINE 20 MG: 20 TABLET, FILM COATED ORAL at 08:30

## 2021-03-14 RX ADMIN — EPTIFIBATIDE 2 MCG/KG/MIN: 75 INJECTION INTRAVENOUS at 10:49

## 2021-03-14 RX ADMIN — ISOSORBIDE MONONITRATE 60 MG: 60 TABLET, EXTENDED RELEASE ORAL at 08:30

## 2021-03-14 RX ADMIN — LACTULOSE 20 G: 20 SOLUTION ORAL at 09:21

## 2021-03-14 RX ADMIN — EPTIFIBATIDE 2 MCG/KG/MIN: 75 INJECTION INTRAVENOUS at 17:29

## 2021-03-14 RX ADMIN — BUPROPION HYDROCHLORIDE 150 MG: 150 TABLET, EXTENDED RELEASE ORAL at 08:30

## 2021-03-14 RX ADMIN — Medication 10 ML: at 08:30

## 2021-03-14 RX ADMIN — ATORVASTATIN CALCIUM 80 MG: 80 TABLET, FILM COATED ORAL at 20:59

## 2021-03-14 RX ADMIN — HEPARIN SODIUM 12.1 ML/HR: 10000 INJECTION, SOLUTION INTRAVENOUS at 03:41

## 2021-03-14 RX ADMIN — FAMOTIDINE 20 MG: 20 TABLET, FILM COATED ORAL at 20:59

## 2021-03-14 RX ADMIN — CARVEDILOL 6.25 MG: 6.25 TABLET, FILM COATED ORAL at 08:30

## 2021-03-14 RX ADMIN — ASPIRIN 81 MG: 81 TABLET, CHEWABLE ORAL at 08:30

## 2021-03-14 RX ADMIN — ACETAMINOPHEN 650 MG: 325 TABLET ORAL at 09:21

## 2021-03-14 RX ADMIN — CARVEDILOL 6.25 MG: 6.25 TABLET, FILM COATED ORAL at 17:29

## 2021-03-14 ASSESSMENT — PAIN SCALES - GENERAL: PAINLEVEL_OUTOF10: 0

## 2021-03-14 NOTE — PROGRESS NOTES
Progress Note    CAD -  NSTEMI. PTCA RCA 3/8/21  LSC stenosis. S/P LSC stent 3/12/21    Vital Signs:                                                 BP (!) 146/85   Pulse 60   Temp 98 °F (36.7 °C) (Oral)   Resp 16   Ht 5' 9\" (1.753 m)   Wt 230 lb 6.1 oz (104.5 kg)   SpO2 98%   BMI 34.02 kg/m²  O2 Flow Rate (L/min): 0 L/min    NSR  Admission Weight: 235 lb 14.3 oz (107 kg)      Drips:  Hep, integ    I/O:      Intake/Output Summary (Last 24 hours) at 3/14/2021 0820  Last data filed at 3/14/2021 0551  Gross per 24 hour   Intake 1585.1 ml   Output 850 ml   Net 735.1 ml     Data Review:  CBC:   Recent Labs     03/12/21  0514 03/13/21  0621 03/14/21  0433   WBC 13.0* 11.5* 11.1*   HGB 16.5 14.8 15.2   HCT 49.5 43.3 44.6   MCV 89.3 88.2 89.0    210 215     BMP:   Recent Labs     03/12/21  0514 03/13/21  0621 03/14/21  0433   * 138 135*   K 4.7 4.0 4.1   CL 99 103 100   CO2 22 26 24   BUN 13 16 13   CREATININE 0.7* 0.8* 0.7*   CALCIUM 9.7 9.3 9.2     Cardiac Enzymes: No results for input(s): CKTOTAL, CKMB, CKMBINDEX, TROPONINI in the last 72 hours. PT/INR: No results for input(s): PROTIME, INR in the last 72 hours. APTT:   Recent Labs     03/12/21 2253 03/13/21 0621 03/14/21  0433   APTT 50.6* 53.1* 52.9*     PFTs 3/9 FEV1 68% predicted  L radial 3/9 patent, palmar arch patent  Carotid duplex 3/9 LSC stenosis <70. No carotid stenosis. Echo 3/9 EF 65  Vein mapping 3/9 adequate conduit  CTA chest 3/10 LSC stenosis  UA 3/10 neg  covid neg 3/12    Assessment/Plan:  CV - CAD. Plan OR 3/15.   - ASA, statin, BB, Imdur  Heme - last effient 3/8. Cont metabolism. - hep, integ gtts  [facility] PCI 3/12, cont hep/integ until OR 3/15, then plavix 1 month.   /83 (98), JILLIANE 130/83 (95)      Antonio Avendano MD  3/14/2021  8:20 AM

## 2021-03-14 NOTE — PROGRESS NOTES
Hospitalist Progress Note      PCP: Ole Chua DO    Date of Admission: 3/5/2021    Subjective: denies any complaints, had stent placed yesterday    Medications:  Reviewed    Infusion Medications    eptifibatide 2 mcg/kg/min (03/13/21 1946)    heparin (PORCINE) Infusion 12.1 mL/hr (03/13/21 0347)     Scheduled Medications    lidocaine 1 % injection  5 mL Intradermal Once    sodium chloride flush  10 mL Intravenous 2 times per day    buPROPion  150 mg Oral BID    sennosides-docusate sodium  2 tablet Oral Daily    isosorbide mononitrate  60 mg Oral Daily    famotidine  20 mg Oral BID    carvedilol  6.25 mg Oral BID WC    atorvastatin  80 mg Oral Nightly    aspirin  81 mg Oral Daily     PRN Meds: sodium chloride flush, sodium chloride, nitroGLYCERIN, ondansetron, polyethylene glycol, acetaminophen **OR** acetaminophen      Intake/Output Summary (Last 24 hours) at 3/13/2021 2303  Last data filed at 3/13/2021 1726  Gross per 24 hour   Intake 2707.1 ml   Output 850 ml   Net 1857.1 ml       Physical Exam Performed:    /78   Pulse 72   Temp 97.7 °F (36.5 °C) (Oral)   Resp 16   Ht 5' 9\" (1.753 m)   Wt 227 lb 4.7 oz (103.1 kg)   SpO2 95%   BMI 33.57 kg/m²     Gen: NAD  HEENT: NC/AT, moist mucous membranes  Neck: supple, trachea midline  Heart: Normal s1/s2, RRR, no murmurs, gallops, or rubs. Lungs: clear bilaterally, no wheezing, no rales, no rhonchi, no use of accessory muscles  Abd: bowel sounds present, soft, nontender, nondistended, no masses  Extrem: No clubbing, cyanosis, no edema  Skin: no rashes or lesions  Psych: Alert, oriented x3, affect appropriate  Neuro: grossly intact, moves all four extremities spontaneously.  No focal deficits   Cap refill: +2 sec    Labs:   Recent Labs     03/11/21 0427 03/12/21  0514 03/13/21  0621   WBC 10.2 13.0* 11.5*   HGB 16.1 16.5 14.8   HCT 47.9 49.5 43.3    244 210     Recent Labs     03/11/21 0427 03/12/21  0514 03/13/21  0621    134* 138   K 4.5 4.7 4.0    99 103   CO2 23 22 26   BUN 11 13 16   CREATININE 0.8* 0.7* 0.8*   CALCIUM 9.7 9.7 9.3     No results for input(s): AST, ALT, BILIDIR, BILITOT, ALKPHOS in the last 72 hours. No results for input(s): INR in the last 72 hours. No results for input(s): Venu eCleste in the last 72 hours. Urinalysis:      Lab Results   Component Value Date    NITRU Negative 03/10/2021    BLOODU Negative 03/10/2021    SPECGRAV 1.008 03/10/2021    GLUCOSEU Negative 03/10/2021       Radiology:  IR ANGIOGRAM EXTREMITY LEFT   Final Result      CTA CHEST W CONTRAST   Final Result   1. Unchanged moderate hemodynamic stenosis involving the proximal left   subclavian artery. VL DUP CAROTID LEFT   Final Result      VL PRE OP VEIN MAPPING   Final Result      VL DUP CAROTID BILATERAL   Final Result      VL DUP LOWER EXTREMITY ARTERIES LEFT   Final Result      NM Cardiac Stress Test Nuclear Imaging   Final Result      XR CHEST PORTABLE   Final Result   Low lung volumes with bibasilar atelectasis. Assessment/Plan:    Active Hospital Problems    Diagnosis    Subclavian artery stenosis, left (HCC) [I77.1]    Dyslipidemia [E78.5]    Left leg numbness [R20.0]    Tobacco abuse [Z72.0]    NSTEMI (non-ST elevated myocardial infarction) [facility]) [I21.4]         Hospital course: a 41 y. o. male with cad s/p yaz to rca x3 (5/2020), s/d chf, htn, hld, admitted with chest pain, got some relief with nitro at home. States it feels like his previous MI. When I saw the Pt later he denies chest pain. ED workup  Vitals stable, on room air  Pertinent labs: troponins . 04, wbc 12  Imaging: no infiltrates or consolidation     Plan:  Chest pain, nstemi  Known cad  - stress test with ischemia   - s/p [facility] 3/8/21, with 90% lesion in the lad, d1 stent is patent, lcx 90% ostial lesion, mid isr 90% lesion, rca 2 lesions 99%  - s/p yza to the rca x3 in 5/2020  - on effient, ASA, statin, bb  - on heparin/integrilin gtt  - cardiology consulted  - ct surgery consulted for consideration of cabg - plan CABG surgery on monday     Epistaxis - poss 2/2 heparin gtt, resolved     Proximal left subclavian artery moderate stenosis - reviewed on CTA chest, vascular surgery consulted for input prior to CABG, s/p stenting 3/12     HTN  - controlled  - on coreg     tobacco dependence  - counseled on cessation  - nicotine patch  - started wellbutrin: 150 mg daily x 3 days, then increase to bid.  Stop smoking/nicotine patch 5-7 days after starting       Diet: DIET CARDIAC;  Code Status: Full Code    PT/OT Eval Status: ordered    Kevin Isaacs MD

## 2021-03-14 NOTE — PROGRESS NOTES
Cardiology - PROGRESS NOTE    Admit Date: 3/5/2021     Reason for follow up: CAD    [facility] 41 [facility] with significant history of CAD ( s/p PCI LCx/RCA), HTN, HL, tobacco abuse who presents with the above complaint. Episode of chest discomfort at night, while at rest, with no associated symptoms. No specific alleviating or exacerbating factors         Social History:   reports that he has been smoking cigarettes. He started smoking about 31 years ago. He has been smoking about 1.00 pack per day. He has never used smokeless tobacco. He reports current drug use. Drug: Marijuana. He reports that he does not drink alcohol. Family History: family history includes Diabetes in his brother and mother; Heart Disease in his maternal aunt, maternal cousin, and maternal uncle; High Blood Pressure in his maternal aunt; Other in his father and mother. Interval History:   Patient seen and examined and notes reviewed   Sleepy   No acute events   No chest pain   Remains on heparin and Integrilin gtt   All other systems reviewed and negative except as above. Diet: DIET CARDIAC;  Pain is:None  Nausea:None    In: 1465.1 [P.O.:840;  I.V.:625.1]  Out: -    Wt Readings from Last 3 Encounters:   03/14/21 230 lb 6.1 oz (104.5 kg)   07/20/20 228 lb (103.4 kg)   06/12/20 227 lb 1.2 oz (103 kg)           Data:   Scheduled Meds:   Scheduled Meds:   lidocaine 1 % injection  5 mL Intradermal Once    sodium chloride flush  10 mL Intravenous 2 times per day    buPROPion  150 mg Oral BID    sennosides-docusate sodium  2 tablet Oral Daily    isosorbide mononitrate  60 mg Oral Daily    famotidine  20 mg Oral BID    carvedilol  6.25 mg Oral BID WC    atorvastatin  80 mg Oral Nightly    aspirin  81 mg Oral Daily     Continuous Infusions:   eptifibatide 2 mcg/kg/min (03/14/21 034)    heparin (PORCINE) Infusion 12.1 mL/hr (03/14/21 9024)     PRN Meds:.lactulose, sodium chloride flush, sodium chloride, nitroGLYCERIN, ondansetron, polyethylene glycol, acetaminophen **OR** acetaminophen  Continuous Infusions:   eptifibatide 2 mcg/kg/min (03/14/21 0341)    heparin (PORCINE) Infusion 12.1 mL/hr (03/14/21 0535)       Intake/Output Summary (Last 24 hours) at 3/14/2021 1020  Last data filed at 3/14/2021 0922  Gross per 24 hour   Intake 1825.1 ml   Output 850 ml   Net 975.1 ml       CBC:   Recent Labs     03/14/21 0433   WBC 11.1*   HGB 15.2        BMP:  Recent Labs     03/14/21 0433   *   K 4.1      CO2 24   BUN 13   CREATININE 0.7*   GLUCOSE 106*     ABGs:   Lab Results   Component Value Date    PHART 7.421 03/09/2021    PO2ART 147.0 03/09/2021    ISJ6GHL 37.8 03/09/2021     INR: No results for input(s): INR in the last 72 hours. CARDIAC LABS     ENZYMES:No results for input(s): CKMB, CKMBINDEX, TROPONINI in the last 72 hours.     Invalid input(s): CKTOTAL;3  FASTING LIPID PANEL:  Lab Results   Component Value Date    HDL 30 03/07/2021    LDLDIRECT 229 02/06/2020    [address] 93 03/07/2021    TRIG 244 03/07/2021     LIVER PROFILE:  Recent Labs     03/14/21 0433   AST 36   ALT 44*       -----------------------------------------------------------------  Telemetry: personally reviewed   SR     Echo:  7/2020  Summary   Left ventricular cavity size is normal.   There is moderate concentric left ventricular hypertrophy.   Ejection fraction is visually estimated to be 45-50%.   There is moderate hypokinesis of the mid to apical inferoseptal and inferior   walls.   Diastolic filling parameters suggest grade I diastolic dysfunction.   Normal right ventricular size.   Right ventricular systolic function is moderately reduced.   TAPSE 1.2 cm     GXT:   3/5/2021  Summary    moderate size moderate severity inferior lateral wall defect consistent with    ischemia       ANGIOGRAM/CORONARY ARTERIOGRAM:        The left main coronary artery is patent   The left anterior descending artery is patent, D1 stent is widely patent .  The left circumflex artery is patent, mid stent is widely patent. The right coronary artery is diffusely diseased, prox 80%, distal 80%      INTERVENTION  1. JR 4 guide   2. runthrough wire used to cross the lesion   3. Distal RCA predilated w/ 2.5/3.5 regular balloons  4. IVUS catheter passed to distal RCA, distal reference diameter 4.5 mm   5. 4.0 X 18 mm Ari MI distal RCA  6. 4.0 X 26 / 4.5 X 12 prox RCA  7. Post dilation distal/prox  RCA w/ 4.0 NC balloon to 18 keyur      ANGIOGRAM/CORONARY ARTERIOGRAM:        The left main coronary artery is patent .     The left anterior descending artery has a mid 90% lesion,               D1 stent is widely patent .     The left circumflex artery has an ostial 90% lesion, mid ISR 90% .     The right coronary artery has a mid and distal 99% lesion with left to right collaterals of the PDA               Ostial and distal stents are patent         INTERVENTION  8. JR 4 guide  9. Runthrought wire used to corss RCA lesion   10. POBA of the mid and distal RCA w/ 3.0 X 12 balloon      SUMMARY:   Severe native 3V CAD   S/p POBA mid and distal RCA       Objective:   Vitals: /82   Pulse 77   Temp 97.5 °F (36.4 °C) (Oral)   Resp 18   Ht 5' 9\" (1.753 m)   Wt 230 lb 6.1 oz (104.5 kg)   SpO2 94%   BMI 34.02 kg/m²   General appearance: sleepy, appears stated age and cooperative, No acute distress   Skin: Skin color, texture, turgor normal. No rashes or ecchymosis. HEENT: Head: Normocephalic, no lesions, without obvious abnormality.   Neck: no JVD  Lungs: clear to auscultation bilaterally, no accessory muscle use, no respiratory distress  Heart: regular rate and rhythm, S1, S2 normal, no murmur, click, rub or gallop  Abdomen: soft, non-tender; bowel sounds normal; no masses,  no organomegaly  Extremities: extremities normal, atraumatic, no cyanosis or edema, pulses: DP +2/+2, PT +2/+2  Neurologic: Mental status: Alert, oriented, thought content appropriate, no tremors, no gross sensory motor deficit,   Psychiatric: normal insight and affect      Assessment & Plan:    Patient Active Problem List:    Plan:  1. CAD/NSTEMI    Plan for CABG per CTS   Hx of PTCA to RCA on 3/8   On heparin and integrillin gtt     Continue    Continue ASA, statin and beta-blocker therapy      2. Tobacco abuse    Patient counseled  3. Dyslipidemia    On statin    Liver fx on 3/14  4. Subclavian stenosis   Left   S/p stenting    Risk factor modification  5.  HTN   Stable     Lisbeth Johnson, APRN-CNP   [address]  Cardiology   3/14/2021  10:20 AM

## 2021-03-14 NOTE — PLAN OF CARE
Problem: Pain:  Description: Pain management should include both nonpharmacologic and pharmacologic interventions.   Goal: Pain level will decrease  Description: Pain level will decrease  Outcome: Met This Shift  Goal: Control of acute pain  Description: Control of acute pain  Outcome: Met This Shift  Goal: Control of chronic pain  Description: Control of chronic pain  Outcome: Met This Shift  Goal: Patient's pain/discomfort is manageable  Description: Patient's pain/discomfort is manageable  Outcome: Met This Shift     Problem: Infection:  Goal: Will remain free from infection  Description: Will remain free from infection  Outcome: Met This Shift     Problem: Safety:  Goal: Free from accidental physical injury  Description: Free from accidental physical injury  Outcome: Met This Shift  Goal: Free from intentional harm  Description: Free from intentional harm  Outcome: Met This Shift     Problem: Daily Care:  Goal: Daily care needs are met  Description: Daily care needs are met  Outcome: Met This Shift     Problem: Cardiac:  Goal: Ability to maintain vital signs within normal range will improve  Description: Ability to maintain vital signs within normal range will improve  Outcome: Met This Shift     Problem: Falls - Risk of:  Goal: Will remain free from falls  Description: Will remain free from falls  Outcome: Met This Shift  Goal: Absence of physical injury  Description: Absence of physical injury  Outcome: Met This Shift     Problem: Skin Integrity:  Goal: Skin integrity will stabilize  Description: Skin integrity will stabilize  Outcome: Ongoing     Problem: Discharge Planning:  Goal: Patients continuum of care needs are met  Description: Patients continuum of care needs are met  Outcome: Ongoing     Problem: Cardiac:  Goal: Cardiovascular alteration will improve  Description: Cardiovascular alteration will improve  Outcome: Ongoing     Problem: Health Behavior:  Goal: Will modify at least one risk factor affecting health status  Description: Will modify at least one risk factor affecting health status  Outcome: Ongoing  Goal: Identification of resources available to assist in meeting health care needs will improve  Description: Identification of resources available to assist in meeting health care needs will improve  Outcome: Ongoing     Problem: Physical Regulation:  Goal: Complications related to the disease process, condition or treatment will be avoided or minimized  Description: Complications related to the disease process, condition or treatment will be avoided or minimized  Outcome: Ongoing     Problem: Tobacco Use:  Goal: Inpatient tobacco use cessation counseling participation  Description: Inpatient tobacco use cessation counseling participation  Outcome: Ongoing

## 2021-03-14 NOTE — PROGRESS NOTES
Clinical Pharmacy Note  Heparin Dosing       Lab Results   Component Value Date    APTT 52.9 03/14/2021     Lab Results   Component Value Date    HGB 15.2 03/14/2021    HCT 44.6 03/14/2021     03/14/2021       Current Infusion Rate: 12.1 mL/hr    Plan:  Rate: continue at 12.1 mL/hr  Next aPTT: 0600 3/15/21    Pharmacy will continue to monitor and adjust based on aPTT results.   Reema Sumner, PharmD

## 2021-03-15 ENCOUNTER — APPOINTMENT (OUTPATIENT)
Dept: GENERAL RADIOLOGY | Age: 41
DRG: 165 | End: 2021-03-15
Payer: MEDICARE

## 2021-03-15 ENCOUNTER — ANESTHESIA (OUTPATIENT)
Dept: OPERATING ROOM | Age: 41
DRG: 165 | End: 2021-03-15
Payer: MEDICARE

## 2021-03-15 ENCOUNTER — ANESTHESIA EVENT (OUTPATIENT)
Dept: OPERATING ROOM | Age: 41
DRG: 165 | End: 2021-03-15
Payer: MEDICARE

## 2021-03-15 VITALS — RESPIRATION RATE: 15 BRPM | OXYGEN SATURATION: 99 % | TEMPERATURE: 98.2 F

## 2021-03-15 LAB
ACTIVATED CLOTTING TIME: 115 SEC (ref 99–130)
ACTIVATED CLOTTING TIME: 123 SEC (ref 99–130)
ACTIVATED CLOTTING TIME: 458 SEC (ref 99–130)
ACTIVATED CLOTTING TIME: 461 SEC (ref 99–130)
ACTIVATED CLOTTING TIME: 468 SEC (ref 99–130)
ACTIVATED CLOTTING TIME: 485 SEC (ref 99–130)
ACTIVATED CLOTTING TIME: 511 SEC (ref 99–130)
ACTIVATED CLOTTING TIME: 553 SEC (ref 99–130)
ACTIVATED CLOTTING TIME: 553 SEC (ref 99–130)
ACTIVATED CLOTTING TIME: 632 SEC (ref 99–130)
ANION GAP SERPL CALCULATED.3IONS-SCNC: 11 MMOL/L (ref 3–16)
ANION GAP SERPL CALCULATED.3IONS-SCNC: 6 MMOL/L (ref 3–16)
APTT: 29.3 SEC (ref 24.2–36.2)
APTT: 57 SEC (ref 24.2–36.2)
BASE EXCESS ARTERIAL: -1 (ref -3–3)
BASE EXCESS ARTERIAL: -1 (ref -3–3)
BASE EXCESS ARTERIAL: -2 (ref -3–3)
BASE EXCESS ARTERIAL: -2 (ref -3–3)
BASE EXCESS ARTERIAL: -3 (ref -3–3)
BASE EXCESS ARTERIAL: -4 (ref -3–3)
BASE EXCESS ARTERIAL: -5 (ref -3–3)
BASE EXCESS ARTERIAL: 0 (ref -3–3)
BASE EXCESS ARTERIAL: 1 (ref -3–3)
BASE EXCESS ARTERIAL: 1 (ref -3–3)
BASOPHILS ABSOLUTE: 0.1 K/UL (ref 0–0.2)
BASOPHILS RELATIVE PERCENT: 0.6 %
BLOOD BANK DISPENSE STATUS: NORMAL
BLOOD BANK PRODUCT CODE: NORMAL
BPU ID: NORMAL
BUN BLDV-MCNC: 10 MG/DL (ref 7–20)
BUN BLDV-MCNC: 13 MG/DL (ref 7–20)
CALCIUM IONIZED: 1.1 MMOL/L (ref 1.12–1.32)
CALCIUM IONIZED: 1.11 MMOL/L (ref 1.12–1.32)
CALCIUM IONIZED: 1.13 MMOL/L (ref 1.12–1.32)
CALCIUM IONIZED: 1.14 MMOL/L (ref 1.12–1.32)
CALCIUM IONIZED: 1.15 MMOL/L (ref 1.12–1.32)
CALCIUM IONIZED: 1.21 MMOL/L (ref 1.12–1.32)
CALCIUM IONIZED: 1.23 MMOL/L (ref 1.12–1.32)
CALCIUM IONIZED: 1.35 MMOL/L (ref 1.12–1.32)
CALCIUM SERPL-MCNC: 8.3 MG/DL (ref 8.3–10.6)
CALCIUM SERPL-MCNC: 9.4 MG/DL (ref 8.3–10.6)
CHLORIDE BLD-SCNC: 103 MMOL/L (ref 99–110)
CHLORIDE BLD-SCNC: 110 MMOL/L (ref 99–110)
CO2: 24 MMOL/L (ref 21–32)
CO2: 25 MMOL/L (ref 21–32)
CREAT SERPL-MCNC: 0.7 MG/DL (ref 0.9–1.3)
CREAT SERPL-MCNC: 0.8 MG/DL (ref 0.9–1.3)
DESCRIPTION BLOOD BANK: NORMAL
EOSINOPHILS ABSOLUTE: 0.2 K/UL (ref 0–0.6)
EOSINOPHILS RELATIVE PERCENT: 1.6 %
GFR AFRICAN AMERICAN: >60
GFR AFRICAN AMERICAN: >60
GFR NON-AFRICAN AMERICAN: >60
GFR NON-AFRICAN AMERICAN: >60
GLUCOSE BLD-MCNC: 115 MG/DL (ref 70–99)
GLUCOSE BLD-MCNC: 122 MG/DL (ref 70–99)
GLUCOSE BLD-MCNC: 124 MG/DL (ref 70–99)
GLUCOSE BLD-MCNC: 125 MG/DL (ref 70–99)
GLUCOSE BLD-MCNC: 136 MG/DL (ref 70–99)
GLUCOSE BLD-MCNC: 139 MG/DL (ref 70–99)
GLUCOSE BLD-MCNC: 140 MG/DL (ref 70–99)
GLUCOSE BLD-MCNC: 141 MG/DL (ref 70–99)
GLUCOSE BLD-MCNC: 142 MG/DL (ref 70–99)
GLUCOSE BLD-MCNC: 144 MG/DL (ref 70–99)
GLUCOSE BLD-MCNC: 144 MG/DL (ref 70–99)
GLUCOSE BLD-MCNC: 145 MG/DL (ref 70–99)
GLUCOSE BLD-MCNC: 151 MG/DL (ref 70–99)
GLUCOSE BLD-MCNC: 154 MG/DL (ref 70–99)
GLUCOSE BLD-MCNC: 158 MG/DL (ref 70–99)
GLUCOSE BLD-MCNC: 177 MG/DL (ref 70–99)
GLUCOSE BLD-MCNC: 179 MG/DL (ref 70–99)
GLUCOSE BLD-MCNC: 184 MG/DL (ref 70–99)
GLUCOSE BLD-MCNC: 190 MG/DL (ref 70–99)
GLUCOSE BLD-MCNC: 88 MG/DL (ref 70–99)
HCO3 ARTERIAL: 21.9 MMOL/L (ref 21–29)
HCO3 ARTERIAL: 22 MMOL/L (ref 21–29)
HCO3 ARTERIAL: 22.5 MMOL/L (ref 21–29)
HCO3 ARTERIAL: 23.3 MMOL/L (ref 21–29)
HCO3 ARTERIAL: 23.5 MMOL/L (ref 21–29)
HCO3 ARTERIAL: 23.6 MMOL/L (ref 21–29)
HCO3 ARTERIAL: 24.1 MMOL/L (ref 21–29)
HCO3 ARTERIAL: 24.2 MMOL/L (ref 21–29)
HCO3 ARTERIAL: 24.8 MMOL/L (ref 21–29)
HCO3 ARTERIAL: 24.9 MMOL/L (ref 21–29)
HCO3 ARTERIAL: 24.9 MMOL/L (ref 21–29)
HCO3 ARTERIAL: 25.4 MMOL/L (ref 21–29)
HCO3 ARTERIAL: 26.4 MMOL/L (ref 21–29)
HCO3 ARTERIAL: 26.7 MMOL/L (ref 21–29)
HCT VFR BLD CALC: 35.2 % (ref 40.5–52.5)
HCT VFR BLD CALC: 44.8 % (ref 40.5–52.5)
HEMOGLOBIN: 10.3 GM/DL (ref 13.5–17.5)
HEMOGLOBIN: 10.9 GM/DL (ref 13.5–17.5)
HEMOGLOBIN: 11.2 GM/DL (ref 13.5–17.5)
HEMOGLOBIN: 11.3 GM/DL (ref 13.5–17.5)
HEMOGLOBIN: 11.5 GM/DL (ref 13.5–17.5)
HEMOGLOBIN: 11.7 GM/DL (ref 13.5–17.5)
HEMOGLOBIN: 11.8 GM/DL (ref 13.5–17.5)
HEMOGLOBIN: 11.9 G/DL (ref 13.5–17.5)
HEMOGLOBIN: 12.1 GM/DL (ref 13.5–17.5)
HEMOGLOBIN: 14.2 GM/DL (ref 13.5–17.5)
HEMOGLOBIN: 15 G/DL (ref 13.5–17.5)
HEMOGLOBIN: 15.7 GM/DL (ref 13.5–17.5)
HEMOGLOBIN: 9.8 GM/DL (ref 13.5–17.5)
INR BLD: 1.37 (ref 0.86–1.14)
LACTATE: 0.46 MMOL/L (ref 0.4–2)
LACTATE: 0.52 MMOL/L (ref 0.4–2)
LACTATE: 0.74 MMOL/L (ref 0.4–2)
LACTATE: 1.18 MMOL/L (ref 0.4–2)
LACTATE: 1.21 MMOL/L (ref 0.4–2)
LACTATE: 1.29 MMOL/L (ref 0.4–2)
LACTATE: 1.32 MMOL/L (ref 0.4–2)
LACTATE: 1.39 MMOL/L (ref 0.4–2)
LACTATE: 1.5 MMOL/L (ref 0.4–2)
LACTATE: 1.84 MMOL/L (ref 0.4–2)
LACTATE: 1.93 MMOL/L (ref 0.4–2)
LYMPHOCYTES ABSOLUTE: 3.4 K/UL (ref 1–5.1)
LYMPHOCYTES RELATIVE PERCENT: 27.9 %
MAGNESIUM: 2.7 MG/DL (ref 1.8–2.4)
MCH RBC QN AUTO: 29.8 PG (ref 26–34)
MCH RBC QN AUTO: 30.1 PG (ref 26–34)
MCHC RBC AUTO-ENTMCNC: 33.6 G/DL (ref 31–36)
MCHC RBC AUTO-ENTMCNC: 33.8 G/DL (ref 31–36)
MCV RBC AUTO: 88.6 FL (ref 80–100)
MCV RBC AUTO: 89.1 FL (ref 80–100)
MONOCYTES ABSOLUTE: 0.9 K/UL (ref 0–1.3)
MONOCYTES RELATIVE PERCENT: 7.2 %
NEUTROPHILS ABSOLUTE: 7.6 K/UL (ref 1.7–7.7)
NEUTROPHILS RELATIVE PERCENT: 62.7 %
O2 SAT, ARTERIAL: 100 % (ref 93–100)
O2 SAT, ARTERIAL: 96 % (ref 93–100)
O2 SAT, ARTERIAL: 97 % (ref 93–100)
O2 SAT, ARTERIAL: 98 % (ref 93–100)
O2 SAT, ARTERIAL: 99 % (ref 93–100)
PCO2 ARTERIAL: 36.1 MM HG (ref 35–45)
PCO2 ARTERIAL: 40.8 MM HG (ref 35–45)
PCO2 ARTERIAL: 40.8 MM HG (ref 35–45)
PCO2 ARTERIAL: 42.3 MM HG (ref 35–45)
PCO2 ARTERIAL: 43.2 MM HG (ref 35–45)
PCO2 ARTERIAL: 43.3 MM HG (ref 35–45)
PCO2 ARTERIAL: 43.5 MM HG (ref 35–45)
PCO2 ARTERIAL: 44.2 MM HG (ref 35–45)
PCO2 ARTERIAL: 45.4 MM HG (ref 35–45)
PCO2 ARTERIAL: 46 MM HG (ref 35–45)
PCO2 ARTERIAL: 46.4 MM HG (ref 35–45)
PCO2 ARTERIAL: 46.5 MM HG (ref 35–45)
PCO2 ARTERIAL: 48.2 MM HG (ref 35–45)
PCO2 ARTERIAL: 50 MM HG (ref 35–45)
PDW BLD-RTO: 13.7 % (ref 12.4–15.4)
PDW BLD-RTO: 13.7 % (ref 12.4–15.4)
PERFORMED ON: ABNORMAL
PH ARTERIAL: 7.29 (ref 7.35–7.45)
PH ARTERIAL: 7.31 (ref 7.35–7.45)
PH ARTERIAL: 7.31 (ref 7.35–7.45)
PH ARTERIAL: 7.32 (ref 7.35–7.45)
PH ARTERIAL: 7.33 (ref 7.35–7.45)
PH ARTERIAL: 7.34 (ref 7.35–7.45)
PH ARTERIAL: 7.35 (ref 7.35–7.45)
PH ARTERIAL: 7.35 (ref 7.35–7.45)
PH ARTERIAL: 7.37 (ref 7.35–7.45)
PH ARTERIAL: 7.37 (ref 7.35–7.45)
PH ARTERIAL: 7.38 (ref 7.35–7.45)
PH ARTERIAL: 7.39 (ref 7.35–7.45)
PLATELET # BLD: 105 K/UL (ref 135–450)
PLATELET # BLD: 223 K/UL (ref 135–450)
PMV BLD AUTO: 8.7 FL (ref 5–10.5)
PMV BLD AUTO: 9.7 FL (ref 5–10.5)
PO2 ARTERIAL: 105.6 MM HG (ref 75–108)
PO2 ARTERIAL: 131.9 MM HG (ref 75–108)
PO2 ARTERIAL: 142.2 MM HG (ref 75–108)
PO2 ARTERIAL: 142.6 MM HG (ref 75–108)
PO2 ARTERIAL: 197.4 MM HG (ref 75–108)
PO2 ARTERIAL: 282.8 MM HG (ref 75–108)
PO2 ARTERIAL: 299.3 MM HG (ref 75–108)
PO2 ARTERIAL: 319.7 MM HG (ref 75–108)
PO2 ARTERIAL: 367.5 MM HG (ref 75–108)
PO2 ARTERIAL: 400.9 MM HG (ref 75–108)
PO2 ARTERIAL: 438.7 MM HG (ref 75–108)
PO2 ARTERIAL: 569.4 MM HG (ref 75–108)
PO2 ARTERIAL: 86.4 MM HG (ref 75–108)
PO2 ARTERIAL: 98.3 MM HG (ref 75–108)
POC HEMATOCRIT: 29 % (ref 40.5–52.5)
POC HEMATOCRIT: 30 % (ref 40.5–52.5)
POC HEMATOCRIT: 32 % (ref 40.5–52.5)
POC HEMATOCRIT: 33 % (ref 40.5–52.5)
POC HEMATOCRIT: 33 % (ref 40.5–52.5)
POC HEMATOCRIT: 34 % (ref 40.5–52.5)
POC HEMATOCRIT: 34 % (ref 40.5–52.5)
POC HEMATOCRIT: 35 % (ref 40.5–52.5)
POC HEMATOCRIT: 36 % (ref 40.5–52.5)
POC HEMATOCRIT: 42 % (ref 40.5–52.5)
POC HEMATOCRIT: 46 % (ref 40.5–52.5)
POC POTASSIUM: 3.9 MMOL/L (ref 3.5–5.1)
POC POTASSIUM: 4.1 MMOL/L (ref 3.5–5.1)
POC POTASSIUM: 4.1 MMOL/L (ref 3.5–5.1)
POC POTASSIUM: 4.2 MMOL/L (ref 3.5–5.1)
POC POTASSIUM: 4.2 MMOL/L (ref 3.5–5.1)
POC POTASSIUM: 4.3 MMOL/L (ref 3.5–5.1)
POC POTASSIUM: 4.4 MMOL/L (ref 3.5–5.1)
POC POTASSIUM: 4.5 MMOL/L (ref 3.5–5.1)
POC POTASSIUM: 4.7 MMOL/L (ref 3.5–5.1)
POC POTASSIUM: 4.7 MMOL/L (ref 3.5–5.1)
POC POTASSIUM: 4.8 MMOL/L (ref 3.5–5.1)
POC SAMPLE TYPE: ABNORMAL
POC SODIUM: 136 MMOL/L (ref 136–145)
POC SODIUM: 137 MMOL/L (ref 136–145)
POC SODIUM: 138 MMOL/L (ref 136–145)
POC SODIUM: 138 MMOL/L (ref 136–145)
POC SODIUM: 139 MMOL/L (ref 136–145)
POC SODIUM: 141 MMOL/L (ref 136–145)
POC SODIUM: 143 MMOL/L (ref 136–145)
POC SODIUM: 144 MMOL/L (ref 136–145)
POC SODIUM: 145 MMOL/L (ref 136–145)
POTASSIUM REFLEX MAGNESIUM: 4.6 MMOL/L (ref 3.5–5.1)
POTASSIUM SERPL-SCNC: 4 MMOL/L (ref 3.5–5.1)
POTASSIUM SERPL-SCNC: 4.3 MMOL/L (ref 3.5–5.1)
PROTHROMBIN TIME: 15.9 SEC (ref 10–13.2)
RBC # BLD: 3.95 M/UL (ref 4.2–5.9)
RBC # BLD: 5.05 M/UL (ref 4.2–5.9)
SODIUM BLD-SCNC: 138 MMOL/L (ref 136–145)
SODIUM BLD-SCNC: 141 MMOL/L (ref 136–145)
TCO2 ARTERIAL: 23 MMOL/L
TCO2 ARTERIAL: 23 MMOL/L
TCO2 ARTERIAL: 24 MMOL/L
TCO2 ARTERIAL: 25 MMOL/L
TCO2 ARTERIAL: 26 MMOL/L
TCO2 ARTERIAL: 27 MMOL/L
TCO2 ARTERIAL: 28 MMOL/L
TCO2 ARTERIAL: 28 MMOL/L
WBC # BLD: 12.1 K/UL (ref 4–11)
WBC # BLD: 14.2 K/UL (ref 4–11)

## 2021-03-15 PROCEDURE — 85610 PROTHROMBIN TIME: CPT

## 2021-03-15 PROCEDURE — 6370000000 HC RX 637 (ALT 250 FOR IP): Performed by: THORACIC SURGERY (CARDIOTHORACIC VASCULAR SURGERY)

## 2021-03-15 PROCEDURE — 80048 BASIC METABOLIC PNL TOTAL CA: CPT

## 2021-03-15 PROCEDURE — 02110Z9 BYPASS CORONARY ARTERY, TWO ARTERIES FROM LEFT INTERNAL MAMMARY, OPEN APPROACH: ICD-10-PCS | Performed by: THORACIC SURGERY (CARDIOTHORACIC VASCULAR SURGERY)

## 2021-03-15 PROCEDURE — 7100000000 HC PACU RECOVERY - FIRST 15 MIN

## 2021-03-15 PROCEDURE — C1729 CATH, DRAINAGE: HCPCS | Performed by: THORACIC SURGERY (CARDIOTHORACIC VASCULAR SURGERY)

## 2021-03-15 PROCEDURE — 2580000003 HC RX 258: Performed by: NURSE ANESTHETIST, CERTIFIED REGISTERED

## 2021-03-15 PROCEDURE — 82803 BLOOD GASES ANY COMBINATION: CPT

## 2021-03-15 PROCEDURE — P9041 ALBUMIN (HUMAN),5%, 50ML: HCPCS | Performed by: ANESTHESIOLOGY

## 2021-03-15 PROCEDURE — 6360000002 HC RX W HCPCS: Performed by: NURSE ANESTHETIST, CERTIFIED REGISTERED

## 2021-03-15 PROCEDURE — 3700000001 HC ADD 15 MINUTES (ANESTHESIA): Performed by: THORACIC SURGERY (CARDIOTHORACIC VASCULAR SURGERY)

## 2021-03-15 PROCEDURE — 85347 COAGULATION TIME ACTIVATED: CPT

## 2021-03-15 PROCEDURE — 85730 THROMBOPLASTIN TIME PARTIAL: CPT

## 2021-03-15 PROCEDURE — 85027 COMPLETE CBC AUTOMATED: CPT

## 2021-03-15 PROCEDURE — 36415 COLL VENOUS BLD VENIPUNCTURE: CPT

## 2021-03-15 PROCEDURE — 2580000003 HC RX 258: Performed by: NURSE PRACTITIONER

## 2021-03-15 PROCEDURE — 33508 ENDOSCOPIC VEIN HARVEST: CPT | Performed by: THORACIC SURGERY (CARDIOTHORACIC VASCULAR SURGERY)

## 2021-03-15 PROCEDURE — 84295 ASSAY OF SERUM SODIUM: CPT

## 2021-03-15 PROCEDURE — 6360000002 HC RX W HCPCS: Performed by: NURSE PRACTITIONER

## 2021-03-15 PROCEDURE — 83735 ASSAY OF MAGNESIUM: CPT

## 2021-03-15 PROCEDURE — 3600000008 HC SURGERY OHS BASE: Performed by: THORACIC SURGERY (CARDIOTHORACIC VASCULAR SURGERY)

## 2021-03-15 PROCEDURE — 2580000003 HC RX 258: Performed by: THORACIC SURGERY (CARDIOTHORACIC VASCULAR SURGERY)

## 2021-03-15 PROCEDURE — 6370000000 HC RX 637 (ALT 250 FOR IP): Performed by: ANESTHESIOLOGY

## 2021-03-15 PROCEDURE — 37799 UNLISTED PX VASCULAR SURGERY: CPT

## 2021-03-15 PROCEDURE — 2709999900 HC NON-CHARGEABLE SUPPLY: Performed by: THORACIC SURGERY (CARDIOTHORACIC VASCULAR SURGERY)

## 2021-03-15 PROCEDURE — 2500000003 HC RX 250 WO HCPCS: Performed by: NURSE ANESTHETIST, CERTIFIED REGISTERED

## 2021-03-15 PROCEDURE — 2580000003 HC RX 258: Performed by: ANESTHESIOLOGY

## 2021-03-15 PROCEDURE — C1713 ANCHOR/SCREW BN/BN,TIS/BN: HCPCS | Performed by: THORACIC SURGERY (CARDIOTHORACIC VASCULAR SURGERY)

## 2021-03-15 PROCEDURE — 33534 CABG ARTERIAL TWO: CPT | Performed by: THORACIC SURGERY (CARDIOTHORACIC VASCULAR SURGERY)

## 2021-03-15 PROCEDURE — 83605 ASSAY OF LACTIC ACID: CPT

## 2021-03-15 PROCEDURE — 82330 ASSAY OF CALCIUM: CPT

## 2021-03-15 PROCEDURE — 6360000002 HC RX W HCPCS: Performed by: ANESTHESIOLOGY

## 2021-03-15 PROCEDURE — 2500000003 HC RX 250 WO HCPCS: Performed by: NURSE PRACTITIONER

## 2021-03-15 PROCEDURE — 03BC3ZZ EXCISION OF LEFT RADIAL ARTERY, PERCUTANEOUS APPROACH: ICD-10-PCS | Performed by: THORACIC SURGERY (CARDIOTHORACIC VASCULAR SURGERY)

## 2021-03-15 PROCEDURE — 6360000002 HC RX W HCPCS: Performed by: THORACIC SURGERY (CARDIOTHORACIC VASCULAR SURGERY)

## 2021-03-15 PROCEDURE — P9041 ALBUMIN (HUMAN),5%, 50ML: HCPCS | Performed by: THORACIC SURGERY (CARDIOTHORACIC VASCULAR SURGERY)

## 2021-03-15 PROCEDURE — 2720000010 HC SURG SUPPLY STERILE: Performed by: THORACIC SURGERY (CARDIOTHORACIC VASCULAR SURGERY)

## 2021-03-15 PROCEDURE — 3700000000 HC ANESTHESIA ATTENDED CARE: Performed by: THORACIC SURGERY (CARDIOTHORACIC VASCULAR SURGERY)

## 2021-03-15 PROCEDURE — 35600 OPEN HRV UXTR ART 1 SGM CAB: CPT | Performed by: THORACIC SURGERY (CARDIOTHORACIC VASCULAR SURGERY)

## 2021-03-15 PROCEDURE — 021109W BYPASS CORONARY ARTERY, TWO ARTERIES FROM AORTA WITH AUTOLOGOUS VENOUS TISSUE, OPEN APPROACH: ICD-10-PCS | Performed by: THORACIC SURGERY (CARDIOTHORACIC VASCULAR SURGERY)

## 2021-03-15 PROCEDURE — 6370000000 HC RX 637 (ALT 250 FOR IP): Performed by: NURSE PRACTITIONER

## 2021-03-15 PROCEDURE — 71045 X-RAY EXAM CHEST 1 VIEW: CPT

## 2021-03-15 PROCEDURE — 84132 ASSAY OF SERUM POTASSIUM: CPT

## 2021-03-15 PROCEDURE — 85025 COMPLETE CBC W/AUTO DIFF WBC: CPT

## 2021-03-15 PROCEDURE — 7100000001 HC PACU RECOVERY - ADDTL 15 MIN

## 2021-03-15 PROCEDURE — 2500000003 HC RX 250 WO HCPCS: Performed by: ANESTHESIOLOGY

## 2021-03-15 PROCEDURE — 33518 CABG ARTERY-VEIN TWO: CPT | Performed by: THORACIC SURGERY (CARDIOTHORACIC VASCULAR SURGERY)

## 2021-03-15 PROCEDURE — 06BP4ZZ EXCISION OF RIGHT SAPHENOUS VEIN, PERCUTANEOUS ENDOSCOPIC APPROACH: ICD-10-PCS | Performed by: THORACIC SURGERY (CARDIOTHORACIC VASCULAR SURGERY)

## 2021-03-15 PROCEDURE — 85014 HEMATOCRIT: CPT

## 2021-03-15 PROCEDURE — C1751 CATH, INF, PER/CENT/MIDLINE: HCPCS | Performed by: THORACIC SURGERY (CARDIOTHORACIC VASCULAR SURGERY)

## 2021-03-15 PROCEDURE — 3600000018 HC SURGERY OHS ADDTL 15MIN: Performed by: THORACIC SURGERY (CARDIOTHORACIC VASCULAR SURGERY)

## 2021-03-15 PROCEDURE — 2700000000 HC OXYGEN THERAPY PER DAY

## 2021-03-15 PROCEDURE — 6370000000 HC RX 637 (ALT 250 FOR IP): Performed by: INTERNAL MEDICINE

## 2021-03-15 PROCEDURE — 94002 VENT MGMT INPAT INIT DAY: CPT

## 2021-03-15 PROCEDURE — 2500000003 HC RX 250 WO HCPCS: Performed by: THORACIC SURGERY (CARDIOTHORACIC VASCULAR SURGERY)

## 2021-03-15 PROCEDURE — 94060 EVALUATION OF WHEEZING: CPT | Performed by: INTERNAL MEDICINE

## 2021-03-15 PROCEDURE — 2100000000 HC CCU R&B

## 2021-03-15 PROCEDURE — 82947 ASSAY GLUCOSE BLOOD QUANT: CPT

## 2021-03-15 DEVICE — MATERIAL IMPL BNE HEMSTAS 3.5 GM ALKYLENE STRL OSTENE: Type: IMPLANTABLE DEVICE | Site: STERNUM | Status: FUNCTIONAL

## 2021-03-15 DEVICE — PLATE BNE 100DEG 4 H L SHP STERNALOCK BLU PRI CLSR SYS: Type: IMPLANTABLE DEVICE | Site: STERNUM | Status: FUNCTIONAL

## 2021-03-15 DEVICE — DEVICE OCCL CLP L35MM PLUNG GRP FLX SHFT FOR GILLINOV: Type: IMPLANTABLE DEVICE | Site: HEART | Status: FUNCTIONAL

## 2021-03-15 DEVICE — IMPLANTABLE DEVICE: Type: IMPLANTABLE DEVICE | Site: STERNUM | Status: FUNCTIONAL

## 2021-03-15 DEVICE — PLATE BNE 8 H X SHP STERNALOCK BLU PRI CLSR SYS: Type: IMPLANTABLE DEVICE | Site: STERNUM | Status: FUNCTIONAL

## 2021-03-15 RX ORDER — PANTOPRAZOLE SODIUM 40 MG/10ML
40 INJECTION, POWDER, LYOPHILIZED, FOR SOLUTION INTRAVENOUS DAILY
Status: DISCONTINUED | OUTPATIENT
Start: 2021-03-16 | End: 2021-03-16

## 2021-03-15 RX ORDER — POTASSIUM CHLORIDE 29.8 MG/ML
20 INJECTION INTRAVENOUS PRN
Status: DISCONTINUED | OUTPATIENT
Start: 2021-03-15 | End: 2021-03-18

## 2021-03-15 RX ORDER — CHLORHEXIDINE GLUCONATE 0.12 MG/ML
15 RINSE ORAL 2 TIMES DAILY
Status: DISCONTINUED | OUTPATIENT
Start: 2021-03-15 | End: 2021-03-19 | Stop reason: HOSPADM

## 2021-03-15 RX ORDER — SODIUM CHLORIDE 9 MG/ML
INJECTION, SOLUTION INTRAVENOUS CONTINUOUS
Status: DISCONTINUED | OUTPATIENT
Start: 2021-03-15 | End: 2021-03-16

## 2021-03-15 RX ORDER — MIDAZOLAM HYDROCHLORIDE 1 MG/ML
1 INJECTION INTRAMUSCULAR; INTRAVENOUS
Status: DISCONTINUED | OUTPATIENT
Start: 2021-03-15 | End: 2021-03-16

## 2021-03-15 RX ORDER — ATORVASTATIN CALCIUM 40 MG/1
40 TABLET, FILM COATED ORAL NIGHTLY
Status: DISCONTINUED | OUTPATIENT
Start: 2021-03-16 | End: 2021-03-19 | Stop reason: HOSPADM

## 2021-03-15 RX ORDER — MILRINONE LACTATE 0.2 MG/ML
0.38 INJECTION, SOLUTION INTRAVENOUS CONTINUOUS PRN
Status: DISCONTINUED | OUTPATIENT
Start: 2021-03-15 | End: 2021-03-15

## 2021-03-15 RX ORDER — HEPARIN SODIUM 1000 [USP'U]/ML
INJECTION, SOLUTION INTRAVENOUS; SUBCUTANEOUS PRN
Status: DISCONTINUED | OUTPATIENT
Start: 2021-03-15 | End: 2021-03-15 | Stop reason: SDUPTHER

## 2021-03-15 RX ORDER — MAGNESIUM HYDROXIDE 1200 MG/15ML
LIQUID ORAL CONTINUOUS PRN
Status: COMPLETED | OUTPATIENT
Start: 2021-03-15 | End: 2021-03-15

## 2021-03-15 RX ORDER — 0.9 % SODIUM CHLORIDE 0.9 %
500 INTRAVENOUS SOLUTION INTRAVENOUS CONTINUOUS PRN
Status: DISCONTINUED | OUTPATIENT
Start: 2021-03-15 | End: 2021-03-19 | Stop reason: HOSPADM

## 2021-03-15 RX ORDER — SODIUM CHLORIDE 9 MG/ML
10 INJECTION INTRAVENOUS DAILY
Status: DISCONTINUED | OUTPATIENT
Start: 2021-03-16 | End: 2021-03-16

## 2021-03-15 RX ORDER — LISINOPRIL 5 MG/1
2.5 TABLET ORAL
Status: DISCONTINUED | OUTPATIENT
Start: 2021-03-17 | End: 2021-03-18

## 2021-03-15 RX ORDER — ALBUMIN, HUMAN INJ 5% 5 %
SOLUTION INTRAVENOUS
Status: DISPENSED
Start: 2021-03-15 | End: 2021-03-15

## 2021-03-15 RX ORDER — KETOROLAC TROMETHAMINE 15 MG/ML
15 INJECTION, SOLUTION INTRAMUSCULAR; INTRAVENOUS EVERY 6 HOURS PRN
Status: DISCONTINUED | OUTPATIENT
Start: 2021-03-16 | End: 2021-03-19 | Stop reason: HOSPADM

## 2021-03-15 RX ORDER — CALCIUM CHLORIDE 100 MG/ML
INJECTION INTRAVENOUS; INTRAVENTRICULAR PRN
Status: DISCONTINUED | OUTPATIENT
Start: 2021-03-15 | End: 2021-03-15 | Stop reason: SDUPTHER

## 2021-03-15 RX ORDER — DIPHENHYDRAMINE HCL 25 MG
25 TABLET ORAL NIGHTLY PRN
Status: DISCONTINUED | OUTPATIENT
Start: 2021-03-16 | End: 2021-03-19 | Stop reason: HOSPADM

## 2021-03-15 RX ORDER — FUROSEMIDE 10 MG/ML
40 INJECTION INTRAMUSCULAR; INTRAVENOUS 2 TIMES DAILY
Status: DISCONTINUED | OUTPATIENT
Start: 2021-03-16 | End: 2021-03-18

## 2021-03-15 RX ORDER — ACETAMINOPHEN 325 MG/1
650 TABLET ORAL EVERY 4 HOURS PRN
Status: DISCONTINUED | OUTPATIENT
Start: 2021-03-15 | End: 2021-03-18

## 2021-03-15 RX ORDER — ROCURONIUM BROMIDE 10 MG/ML
INJECTION, SOLUTION INTRAVENOUS PRN
Status: DISCONTINUED | OUTPATIENT
Start: 2021-03-15 | End: 2021-03-15 | Stop reason: SDUPTHER

## 2021-03-15 RX ORDER — PROTAMINE SULFATE 10 MG/ML
50 INJECTION, SOLUTION INTRAVENOUS
Status: ACTIVE | OUTPATIENT
Start: 2021-03-15 | End: 2021-03-15

## 2021-03-15 RX ORDER — DEXTROSE MONOHYDRATE 50 MG/ML
100 INJECTION, SOLUTION INTRAVENOUS PRN
Status: DISCONTINUED | OUTPATIENT
Start: 2021-03-15 | End: 2021-03-19 | Stop reason: HOSPADM

## 2021-03-15 RX ORDER — NITROGLYCERIN 40 MG/1
1 PATCH TRANSDERMAL DAILY
Status: COMPLETED | OUTPATIENT
Start: 2021-03-16 | End: 2021-03-18

## 2021-03-15 RX ORDER — SUCCINYLCHOLINE/SOD CL,ISO/PF 200MG/10ML
SYRINGE (ML) INTRAVENOUS PRN
Status: DISCONTINUED | OUTPATIENT
Start: 2021-03-15 | End: 2021-03-15 | Stop reason: SDUPTHER

## 2021-03-15 RX ORDER — ACETAMINOPHEN 10 MG/ML
INJECTION, SOLUTION INTRAVENOUS PRN
Status: DISCONTINUED | OUTPATIENT
Start: 2021-03-15 | End: 2021-03-15 | Stop reason: SDUPTHER

## 2021-03-15 RX ORDER — DEXAMETHASONE SODIUM PHOSPHATE 4 MG/ML
INJECTION, SOLUTION INTRA-ARTICULAR; INTRALESIONAL; INTRAMUSCULAR; INTRAVENOUS; SOFT TISSUE PRN
Status: DISCONTINUED | OUTPATIENT
Start: 2021-03-15 | End: 2021-03-15 | Stop reason: SDUPTHER

## 2021-03-15 RX ORDER — MILRINONE LACTATE 0.2 MG/ML
0.38 INJECTION, SOLUTION INTRAVENOUS CONTINUOUS PRN
Status: DISCONTINUED | OUTPATIENT
Start: 2021-03-15 | End: 2021-03-16

## 2021-03-15 RX ORDER — SODIUM CHLORIDE 0.9 % (FLUSH) 0.9 %
10 SYRINGE (ML) INJECTION PRN
Status: DISCONTINUED | OUTPATIENT
Start: 2021-03-15 | End: 2021-03-19 | Stop reason: HOSPADM

## 2021-03-15 RX ORDER — NITROGLYCERIN 20 MG/100ML
10 INJECTION INTRAVENOUS CONTINUOUS PRN
Status: DISCONTINUED | OUTPATIENT
Start: 2021-03-15 | End: 2021-03-18

## 2021-03-15 RX ORDER — KETAMINE HCL IN NACL, ISO-OSM 100MG/10ML
SYRINGE (ML) INJECTION PRN
Status: DISCONTINUED | OUTPATIENT
Start: 2021-03-15 | End: 2021-03-15 | Stop reason: SDUPTHER

## 2021-03-15 RX ORDER — REMIFENTANIL HYDROCHLORIDE 1 MG/ML
INJECTION, POWDER, LYOPHILIZED, FOR SOLUTION INTRAVENOUS CONTINUOUS PRN
Status: DISCONTINUED | OUTPATIENT
Start: 2021-03-15 | End: 2021-03-15 | Stop reason: SDUPTHER

## 2021-03-15 RX ORDER — ESMOLOL HYDROCHLORIDE 10 MG/ML
INJECTION INTRAVENOUS PRN
Status: DISCONTINUED | OUTPATIENT
Start: 2021-03-15 | End: 2021-03-15 | Stop reason: SDUPTHER

## 2021-03-15 RX ORDER — OXYCODONE HYDROCHLORIDE 10 MG/1
10 TABLET ORAL EVERY 4 HOURS PRN
Status: DISCONTINUED | OUTPATIENT
Start: 2021-03-15 | End: 2021-03-18

## 2021-03-15 RX ORDER — NITROGLYCERIN 20 MG/100ML
INJECTION INTRAVENOUS CONTINUOUS PRN
Status: DISCONTINUED | OUTPATIENT
Start: 2021-03-15 | End: 2021-03-15 | Stop reason: SDUPTHER

## 2021-03-15 RX ORDER — INSULIN GLARGINE 100 [IU]/ML
0.15 INJECTION, SOLUTION SUBCUTANEOUS NIGHTLY
Status: DISCONTINUED | OUTPATIENT
Start: 2021-03-16 | End: 2021-03-18

## 2021-03-15 RX ORDER — NICOTINE POLACRILEX 4 MG
15 LOZENGE BUCCAL PRN
Status: DISCONTINUED | OUTPATIENT
Start: 2021-03-15 | End: 2021-03-19 | Stop reason: HOSPADM

## 2021-03-15 RX ORDER — SENNA AND DOCUSATE SODIUM 50; 8.6 MG/1; MG/1
1 TABLET, FILM COATED ORAL 2 TIMES DAILY
Status: DISCONTINUED | OUTPATIENT
Start: 2021-03-16 | End: 2021-03-19 | Stop reason: HOSPADM

## 2021-03-15 RX ORDER — PROPOFOL 10 MG/ML
INJECTION, EMULSION INTRAVENOUS PRN
Status: DISCONTINUED | OUTPATIENT
Start: 2021-03-15 | End: 2021-03-15 | Stop reason: SDUPTHER

## 2021-03-15 RX ORDER — MILRINONE LACTATE 0.2 MG/ML
INJECTION, SOLUTION INTRAVENOUS CONTINUOUS PRN
Status: DISCONTINUED | OUTPATIENT
Start: 2021-03-15 | End: 2021-03-15 | Stop reason: SDUPTHER

## 2021-03-15 RX ORDER — PROTAMINE SULFATE 10 MG/ML
INJECTION, SOLUTION INTRAVENOUS PRN
Status: DISCONTINUED | OUTPATIENT
Start: 2021-03-15 | End: 2021-03-15 | Stop reason: SDUPTHER

## 2021-03-15 RX ORDER — METOCLOPRAMIDE HYDROCHLORIDE 5 MG/ML
10 INJECTION INTRAMUSCULAR; INTRAVENOUS EVERY 6 HOURS PRN
Status: DISCONTINUED | OUTPATIENT
Start: 2021-03-15 | End: 2021-03-19 | Stop reason: HOSPADM

## 2021-03-15 RX ORDER — ALBUMIN, HUMAN INJ 5% 5 %
25 SOLUTION INTRAVENOUS PRN
Status: DISCONTINUED | OUTPATIENT
Start: 2021-03-15 | End: 2021-03-19 | Stop reason: HOSPADM

## 2021-03-15 RX ORDER — KETOROLAC TROMETHAMINE 30 MG/ML
30 INJECTION, SOLUTION INTRAMUSCULAR; INTRAVENOUS ONCE
Status: COMPLETED | OUTPATIENT
Start: 2021-03-15 | End: 2021-03-15

## 2021-03-15 RX ORDER — FUROSEMIDE 10 MG/ML
40 INJECTION INTRAMUSCULAR; INTRAVENOUS PRN
Status: DISCONTINUED | OUTPATIENT
Start: 2021-03-15 | End: 2021-03-19 | Stop reason: HOSPADM

## 2021-03-15 RX ORDER — FONDAPARINUX SODIUM 2.5 MG/.5ML
2.5 INJECTION SUBCUTANEOUS DAILY
Status: DISCONTINUED | OUTPATIENT
Start: 2021-03-16 | End: 2021-03-19 | Stop reason: HOSPADM

## 2021-03-15 RX ORDER — DEXTROSE MONOHYDRATE 25 G/50ML
12.5 INJECTION, SOLUTION INTRAVENOUS PRN
Status: DISCONTINUED | OUTPATIENT
Start: 2021-03-15 | End: 2021-03-19 | Stop reason: HOSPADM

## 2021-03-15 RX ORDER — SODIUM CHLORIDE 0.9 % (FLUSH) 0.9 %
10 SYRINGE (ML) INJECTION EVERY 12 HOURS SCHEDULED
Status: DISCONTINUED | OUTPATIENT
Start: 2021-03-15 | End: 2021-03-19 | Stop reason: HOSPADM

## 2021-03-15 RX ORDER — FENTANYL CITRATE 50 UG/ML
25 INJECTION, SOLUTION INTRAMUSCULAR; INTRAVENOUS
Status: DISCONTINUED | OUTPATIENT
Start: 2021-03-15 | End: 2021-03-18

## 2021-03-15 RX ORDER — SUFENTANIL CITRATE 50 UG/ML
INJECTION EPIDURAL; INTRAVENOUS PRN
Status: DISCONTINUED | OUTPATIENT
Start: 2021-03-15 | End: 2021-03-15 | Stop reason: SDUPTHER

## 2021-03-15 RX ORDER — SODIUM CHLORIDE 9 MG/ML
INJECTION, SOLUTION INTRAVENOUS CONTINUOUS PRN
Status: DISCONTINUED | OUTPATIENT
Start: 2021-03-15 | End: 2021-03-15 | Stop reason: SDUPTHER

## 2021-03-15 RX ORDER — POTASSIUM CHLORIDE 750 MG/1
10 TABLET, FILM COATED, EXTENDED RELEASE ORAL
Status: DISCONTINUED | OUTPATIENT
Start: 2021-03-17 | End: 2021-03-18

## 2021-03-15 RX ORDER — MAGNESIUM SULFATE IN WATER 40 MG/ML
2000 INJECTION, SOLUTION INTRAVENOUS PRN
Status: DISCONTINUED | OUTPATIENT
Start: 2021-03-15 | End: 2021-03-19 | Stop reason: HOSPADM

## 2021-03-15 RX ORDER — ALBUMIN, HUMAN INJ 5% 5 %
SOLUTION INTRAVENOUS PRN
Status: DISCONTINUED | OUTPATIENT
Start: 2021-03-15 | End: 2021-03-15 | Stop reason: SDUPTHER

## 2021-03-15 RX ORDER — ONDANSETRON 2 MG/ML
4 INJECTION INTRAMUSCULAR; INTRAVENOUS EVERY 8 HOURS PRN
Status: DISCONTINUED | OUTPATIENT
Start: 2021-03-15 | End: 2021-03-19 | Stop reason: HOSPADM

## 2021-03-15 RX ORDER — ACETAMINOPHEN 500 MG
1000 TABLET ORAL EVERY 6 HOURS
Status: DISCONTINUED | OUTPATIENT
Start: 2021-03-15 | End: 2021-03-19 | Stop reason: HOSPADM

## 2021-03-15 RX ORDER — ONDANSETRON 2 MG/ML
INJECTION INTRAMUSCULAR; INTRAVENOUS PRN
Status: DISCONTINUED | OUTPATIENT
Start: 2021-03-15 | End: 2021-03-15 | Stop reason: SDUPTHER

## 2021-03-15 RX ORDER — OXYCODONE HYDROCHLORIDE 5 MG/1
5 TABLET ORAL EVERY 4 HOURS PRN
Status: DISCONTINUED | OUTPATIENT
Start: 2021-03-15 | End: 2021-03-18

## 2021-03-15 RX ORDER — MIDAZOLAM HYDROCHLORIDE 1 MG/ML
INJECTION INTRAMUSCULAR; INTRAVENOUS PRN
Status: DISCONTINUED | OUTPATIENT
Start: 2021-03-15 | End: 2021-03-15 | Stop reason: SDUPTHER

## 2021-03-15 RX ORDER — LANOLIN ALCOHOL/MO/W.PET/CERES
400 CREAM (GRAM) TOPICAL 2 TIMES DAILY
Status: DISCONTINUED | OUTPATIENT
Start: 2021-03-16 | End: 2021-03-18

## 2021-03-15 RX ORDER — MEPERIDINE HYDROCHLORIDE 50 MG/ML
25 INJECTION INTRAMUSCULAR; INTRAVENOUS; SUBCUTANEOUS
Status: ACTIVE | OUTPATIENT
Start: 2021-03-15 | End: 2021-03-15

## 2021-03-15 RX ORDER — AMINOCAPROIC ACID 250 MG/ML
INJECTION, SOLUTION INTRAVENOUS PRN
Status: DISCONTINUED | OUTPATIENT
Start: 2021-03-15 | End: 2021-03-15 | Stop reason: SDUPTHER

## 2021-03-15 RX ORDER — HYDRALAZINE HYDROCHLORIDE 20 MG/ML
5 INJECTION INTRAMUSCULAR; INTRAVENOUS
Status: DISCONTINUED | OUTPATIENT
Start: 2021-03-15 | End: 2021-03-19 | Stop reason: HOSPADM

## 2021-03-15 RX ADMIN — Medication 30 MG: at 07:06

## 2021-03-15 RX ADMIN — MILRINONE LACTATE IN DEXTROSE 0.38 MCG/KG/MIN: 200 INJECTION, SOLUTION INTRAVENOUS at 11:27

## 2021-03-15 RX ADMIN — FENTANYL CITRATE 25 MCG: 50 INJECTION INTRAMUSCULAR; INTRAVENOUS at 19:32

## 2021-03-15 RX ADMIN — Medication 200 MCG: at 12:17

## 2021-03-15 RX ADMIN — Medication 100 MCG: at 12:06

## 2021-03-15 RX ADMIN — Medication 100 MCG: at 08:45

## 2021-03-15 RX ADMIN — PROPOFOL 50 MG: 10 INJECTION, EMULSION INTRAVENOUS at 08:13

## 2021-03-15 RX ADMIN — Medication 100 MCG: at 12:13

## 2021-03-15 RX ADMIN — FENTANYL CITRATE 25 MCG: 50 INJECTION INTRAMUSCULAR; INTRAVENOUS at 18:32

## 2021-03-15 RX ADMIN — ROCURONIUM BROMIDE 100 MG: 10 INJECTION, SOLUTION INTRAVENOUS at 08:33

## 2021-03-15 RX ADMIN — SODIUM BICARBONATE 50 MEQ: 84 INJECTION, SOLUTION INTRAVENOUS at 16:41

## 2021-03-15 RX ADMIN — DEXAMETHASONE SODIUM PHOSPHATE 8 MG: 4 INJECTION, SOLUTION INTRAMUSCULAR; INTRAVENOUS at 12:03

## 2021-03-15 RX ADMIN — METOCLOPRAMIDE HYDROCHLORIDE 10 MG: 5 INJECTION INTRAMUSCULAR; INTRAVENOUS at 05:05

## 2021-03-15 RX ADMIN — ESMOLOL HYDROCHLORIDE 40 MG: 100 INJECTION, SOLUTION INTRAVENOUS at 12:14

## 2021-03-15 RX ADMIN — AMINOCAPROIC ACID 5000 MG: 250 INJECTION, SOLUTION INTRAVENOUS at 07:30

## 2021-03-15 RX ADMIN — FAMOTIDINE 20 MG: 10 INJECTION, SOLUTION INTRAVENOUS at 05:05

## 2021-03-15 RX ADMIN — ESMOLOL HYDROCHLORIDE 40 MG: 100 INJECTION, SOLUTION INTRAVENOUS at 12:04

## 2021-03-15 RX ADMIN — PROTAMINE SULFATE 400 MG: 10 INJECTION, SOLUTION INTRAVENOUS at 12:00

## 2021-03-15 RX ADMIN — Medication 3 UNITS/HR: at 10:38

## 2021-03-15 RX ADMIN — Medication 30 MG: at 11:27

## 2021-03-15 RX ADMIN — ALBUMIN (HUMAN) 25 G: 12.5 INJECTION, SOLUTION INTRAVENOUS at 15:17

## 2021-03-15 RX ADMIN — SODIUM BICARBONATE 25 MEQ: 84 INJECTION, SOLUTION INTRAVENOUS at 12:35

## 2021-03-15 RX ADMIN — POTASSIUM CHLORIDE 20 MEQ: 29.8 INJECTION, SOLUTION INTRAVENOUS at 14:42

## 2021-03-15 RX ADMIN — ALBUMIN (HUMAN) 500 ML: 12.5 INJECTION, SOLUTION INTRAVENOUS at 12:20

## 2021-03-15 RX ADMIN — PROPOFOL 100 MG: 10 INJECTION, EMULSION INTRAVENOUS at 08:50

## 2021-03-15 RX ADMIN — ONDANSETRON 4 MG: 2 INJECTION INTRAMUSCULAR; INTRAVENOUS at 12:03

## 2021-03-15 RX ADMIN — CEFAZOLIN 2000 MG: 10 INJECTION, POWDER, FOR SOLUTION INTRAVENOUS at 18:46

## 2021-03-15 RX ADMIN — Medication 100 MCG: at 12:10

## 2021-03-15 RX ADMIN — REMIFENTANIL HYDROCHLORIDE 0.15 MCG/KG/MIN: 1 INJECTION, POWDER, LYOPHILIZED, FOR SOLUTION INTRAVENOUS at 11:27

## 2021-03-15 RX ADMIN — PROPOFOL 50 MG: 10 INJECTION, EMULSION INTRAVENOUS at 12:35

## 2021-03-15 RX ADMIN — SODIUM BICARBONATE 25 MEQ: 84 INJECTION, SOLUTION INTRAVENOUS at 12:14

## 2021-03-15 RX ADMIN — SODIUM CHLORIDE 50 ML/HR: 9 INJECTION, SOLUTION INTRAVENOUS at 13:52

## 2021-03-15 RX ADMIN — SODIUM BICARBONATE 25 MEQ: 84 INJECTION, SOLUTION INTRAVENOUS at 12:10

## 2021-03-15 RX ADMIN — SODIUM BICARBONATE 50 MEQ: 84 INJECTION, SOLUTION INTRAVENOUS at 17:24

## 2021-03-15 RX ADMIN — Medication 160 MG: at 07:07

## 2021-03-15 RX ADMIN — SODIUM CHLORIDE 500 ML: 9 INJECTION, SOLUTION INTRAVENOUS at 17:31

## 2021-03-15 RX ADMIN — PROPOFOL 50 MG: 10 INJECTION, EMULSION INTRAVENOUS at 11:53

## 2021-03-15 RX ADMIN — DILTIAZEM HYDROCHLORIDE 5 MG/HR: 5 INJECTION INTRAVENOUS at 12:01

## 2021-03-15 RX ADMIN — PROPOFOL 50 MG: 10 INJECTION, EMULSION INTRAVENOUS at 07:53

## 2021-03-15 RX ADMIN — NITROGLYCERIN 45 MCG/MIN: 20 INJECTION INTRAVENOUS at 08:26

## 2021-03-15 RX ADMIN — PROPOFOL 50 MG: 10 INJECTION, EMULSION INTRAVENOUS at 12:40

## 2021-03-15 RX ADMIN — ALBUMIN (HUMAN) 25 G: 12.5 INJECTION, SOLUTION INTRAVENOUS at 13:57

## 2021-03-15 RX ADMIN — SODIUM BICARBONATE 25 MEQ: 84 INJECTION, SOLUTION INTRAVENOUS at 07:25

## 2021-03-15 RX ADMIN — BUPROPION HYDROCHLORIDE 150 MG: 150 TABLET, EXTENDED RELEASE ORAL at 19:52

## 2021-03-15 RX ADMIN — SODIUM CHLORIDE: 9 INJECTION, SOLUTION INTRAVENOUS at 06:59

## 2021-03-15 RX ADMIN — INSULIN HUMAN 3 UNITS: 100 INJECTION, SOLUTION PARENTERAL at 10:38

## 2021-03-15 RX ADMIN — Medication 100 MCG: at 12:04

## 2021-03-15 RX ADMIN — SODIUM BICARBONATE 25 MEQ: 84 INJECTION, SOLUTION INTRAVENOUS at 12:38

## 2021-03-15 RX ADMIN — ESMOLOL HYDROCHLORIDE 40 MG: 100 INJECTION, SOLUTION INTRAVENOUS at 08:38

## 2021-03-15 RX ADMIN — PROTAMINE SULFATE 50 MG: 10 INJECTION, SOLUTION INTRAVENOUS at 12:13

## 2021-03-15 RX ADMIN — AMINOCAPROIC ACID 5000 MG: 250 INJECTION, SOLUTION INTRAVENOUS at 11:59

## 2021-03-15 RX ADMIN — HEPARIN SODIUM 10000 UNITS: 1000 INJECTION INTRAVENOUS; SUBCUTANEOUS at 08:55

## 2021-03-15 RX ADMIN — CALCIUM CHLORIDE 0.5 G: 100 INJECTION INTRAVENOUS; INTRAVENTRICULAR at 11:57

## 2021-03-15 RX ADMIN — ROCURONIUM BROMIDE 100 MG: 10 INJECTION, SOLUTION INTRAVENOUS at 07:12

## 2021-03-15 RX ADMIN — SUFENTANIL CITRATE 50 MCG: 50 INJECTION EPIDURAL; INTRAVENOUS at 07:25

## 2021-03-15 RX ADMIN — Medication 100 MCG: at 08:50

## 2021-03-15 RX ADMIN — PROPOFOL 50 MG: 10 INJECTION, EMULSION INTRAVENOUS at 08:24

## 2021-03-15 RX ADMIN — KETOROLAC TROMETHAMINE 30 MG: 30 INJECTION, SOLUTION INTRAMUSCULAR at 21:13

## 2021-03-15 RX ADMIN — Medication 1500 MG: at 06:45

## 2021-03-15 RX ADMIN — HEPARIN SODIUM 37500 UNITS: 1000 INJECTION INTRAVENOUS; SUBCUTANEOUS at 08:26

## 2021-03-15 RX ADMIN — CHLORHEXIDINE GLUCONATE 15 ML: 1.2 RINSE ORAL at 05:05

## 2021-03-15 RX ADMIN — POTASSIUM CHLORIDE 20 MEQ: 29.8 INJECTION, SOLUTION INTRAVENOUS at 20:23

## 2021-03-15 RX ADMIN — OXYCODONE HYDROCHLORIDE 10 MG: 10 TABLET ORAL at 19:50

## 2021-03-15 RX ADMIN — MILRINONE LACTATE IN DEXTROSE 0.28 MCG/KG/MIN: 200 INJECTION, SOLUTION INTRAVENOUS at 19:08

## 2021-03-15 RX ADMIN — SUFENTANIL CITRATE 100 MCG: 50 INJECTION EPIDURAL; INTRAVENOUS at 07:03

## 2021-03-15 RX ADMIN — MIDAZOLAM 5 MG: 1 INJECTION INTRAMUSCULAR; INTRAVENOUS at 07:00

## 2021-03-15 RX ADMIN — ACETAMINOPHEN 1000 MG: 10 INJECTION, SOLUTION INTRAVENOUS at 12:05

## 2021-03-15 RX ADMIN — CALCIUM CHLORIDE 0.5 G: 100 INJECTION INTRAVENOUS; INTRAVENTRICULAR at 11:48

## 2021-03-15 RX ADMIN — POTASSIUM CHLORIDE 20 MEQ: 29.8 INJECTION, SOLUTION INTRAVENOUS at 13:53

## 2021-03-15 RX ADMIN — CEFAZOLIN 2 G: 10 INJECTION, POWDER, FOR SOLUTION INTRAVENOUS at 07:05

## 2021-03-15 RX ADMIN — PROPOFOL 100 MG: 10 INJECTION, EMULSION INTRAVENOUS at 12:18

## 2021-03-15 RX ADMIN — Medication 10 ML: at 20:14

## 2021-03-15 RX ADMIN — HEPARIN SODIUM 2500 UNITS: 1000 INJECTION INTRAVENOUS; SUBCUTANEOUS at 07:30

## 2021-03-15 RX ADMIN — SODIUM CHLORIDE 500 ML: 9 INJECTION, SOLUTION INTRAVENOUS at 17:00

## 2021-03-15 RX ADMIN — MIDAZOLAM 5 MG: 1 INJECTION INTRAMUSCULAR; INTRAVENOUS at 11:27

## 2021-03-15 RX ADMIN — ESMOLOL HYDROCHLORIDE 40 MG: 100 INJECTION, SOLUTION INTRAVENOUS at 08:34

## 2021-03-15 RX ADMIN — ACETAMINOPHEN 650 MG: 325 TABLET ORAL at 00:19

## 2021-03-15 RX ADMIN — SODIUM CHLORIDE: 9 INJECTION, SOLUTION INTRAVENOUS at 06:04

## 2021-03-15 RX ADMIN — ESMOLOL HYDROCHLORIDE 40 MG: 100 INJECTION, SOLUTION INTRAVENOUS at 12:10

## 2021-03-15 RX ADMIN — PROPOFOL 100 MG: 10 INJECTION, EMULSION INTRAVENOUS at 12:08

## 2021-03-15 RX ADMIN — PROPOFOL 50 MG: 10 INJECTION, EMULSION INTRAVENOUS at 08:20

## 2021-03-15 RX ADMIN — ALBUMIN (HUMAN) 500 ML: 12.5 INJECTION, SOLUTION INTRAVENOUS at 12:01

## 2021-03-15 RX ADMIN — Medication 50 MCG: at 08:24

## 2021-03-15 RX ADMIN — CEFAZOLIN 2 G: 10 INJECTION, POWDER, FOR SOLUTION INTRAVENOUS at 11:01

## 2021-03-15 RX ADMIN — PROPOFOL 50 MG: 10 INJECTION, EMULSION INTRAVENOUS at 12:01

## 2021-03-15 RX ADMIN — Medication 50 MCG: at 08:42

## 2021-03-15 RX ADMIN — ROCURONIUM BROMIDE 100 MG: 10 INJECTION, SOLUTION INTRAVENOUS at 11:27

## 2021-03-15 RX ADMIN — Medication 100 MCG: at 08:34

## 2021-03-15 RX ADMIN — MUPIROCIN: 20 OINTMENT TOPICAL at 19:52

## 2021-03-15 RX ADMIN — VANCOMYCIN HYDROCHLORIDE 1500 MG: 5 INJECTION, POWDER, LYOPHILIZED, FOR SOLUTION INTRAVENOUS at 19:30

## 2021-03-15 RX ADMIN — POTASSIUM CHLORIDE 20 MEQ: 29.8 INJECTION, SOLUTION INTRAVENOUS at 16:48

## 2021-03-15 RX ADMIN — CHLORHEXIDINE GLUCONATE 0.12% ORAL RINSE 15 ML: 1.2 LIQUID ORAL at 19:52

## 2021-03-15 RX ADMIN — ACETAMINOPHEN 1000 MG: 500 TABLET ORAL at 21:14

## 2021-03-15 RX ADMIN — DEXMEDETOMIDINE HYDROCHLORIDE 0.72 MCG/KG/HR: 100 INJECTION, SOLUTION INTRAVENOUS at 07:07

## 2021-03-15 RX ADMIN — PROPOFOL 100 MG: 10 INJECTION, EMULSION INTRAVENOUS at 07:34

## 2021-03-15 ASSESSMENT — PULMONARY FUNCTION TESTS
PIF_VALUE: 3
PIF_VALUE: 21
PIF_VALUE: 17
PIF_VALUE: 17
PIF_VALUE: 1
PIF_VALUE: 17
PIF_VALUE: 22
PIF_VALUE: 1
PIF_VALUE: 1
PIF_VALUE: 19
PIF_VALUE: 1
PIF_VALUE: 24
PIF_VALUE: 1
PIF_VALUE: 17
PIF_VALUE: 1
PIF_VALUE: 1
PIF_VALUE: 19
PIF_VALUE: 11
PIF_VALUE: 17
PIF_VALUE: 17
PIF_VALUE: 27
PIF_VALUE: 11
PIF_VALUE: 1
PIF_VALUE: 1
PIF_VALUE: 19
PIF_VALUE: 18
PIF_VALUE: 1
PIF_VALUE: 18
PIF_VALUE: 1
PIF_VALUE: 17
PIF_VALUE: 1
PIF_VALUE: 1
PIF_VALUE: 22
PIF_VALUE: 19
PIF_VALUE: 1
PIF_VALUE: 27
PIF_VALUE: 2
PIF_VALUE: 17
PIF_VALUE: 17
PIF_VALUE: 18
PIF_VALUE: 2
PIF_VALUE: 1
PIF_VALUE: 19
PIF_VALUE: 25
PIF_VALUE: 1
PIF_VALUE: 1
PIF_VALUE: 27
PIF_VALUE: 1
PIF_VALUE: 17
PIF_VALUE: 18
PIF_VALUE: 27
PIF_VALUE: 18
PIF_VALUE: 26
PIF_VALUE: 19
PIF_VALUE: 1
PIF_VALUE: 19
PIF_VALUE: 1
PIF_VALUE: 19
PIF_VALUE: 11
PIF_VALUE: 17
PIF_VALUE: 1
PIF_VALUE: 1
PIF_VALUE: 19
PIF_VALUE: 19
PIF_VALUE: 18
PIF_VALUE: 2
PIF_VALUE: 17
PIF_VALUE: 26
PIF_VALUE: 19
PIF_VALUE: 1
PIF_VALUE: 26
PIF_VALUE: 17
PIF_VALUE: 21
PIF_VALUE: 1
PIF_VALUE: 27
PIF_VALUE: 1
PIF_VALUE: 27
PIF_VALUE: 11
PIF_VALUE: 19
PIF_VALUE: 25
PIF_VALUE: 18
PIF_VALUE: 1
PIF_VALUE: 19
PIF_VALUE: 19
PIF_VALUE: 17
PIF_VALUE: 27
PIF_VALUE: 25
PIF_VALUE: 1
PIF_VALUE: 31
PIF_VALUE: 25
PIF_VALUE: 19
PIF_VALUE: 22
PIF_VALUE: 27
PIF_VALUE: 21
PIF_VALUE: 28
PIF_VALUE: 27
PIF_VALUE: 2
PIF_VALUE: 26
PIF_VALUE: 1
PIF_VALUE: 17
PIF_VALUE: 18
PIF_VALUE: 1
PIF_VALUE: 17
PIF_VALUE: 1
PIF_VALUE: 1
PIF_VALUE: 27
PIF_VALUE: 17
PIF_VALUE: 17
PIF_VALUE: 11
PIF_VALUE: 21
PIF_VALUE: 18
PIF_VALUE: 27
PIF_VALUE: 18
PIF_VALUE: 18
PIF_VALUE: 26
PIF_VALUE: 17
PIF_VALUE: 27
PIF_VALUE: 21
PIF_VALUE: 1
PIF_VALUE: 21
PIF_VALUE: 19
PIF_VALUE: 19
PIF_VALUE: 1
PIF_VALUE: 26
PIF_VALUE: 27
PIF_VALUE: 18
PIF_VALUE: 21
PIF_VALUE: 1
PIF_VALUE: 25
PIF_VALUE: 1
PIF_VALUE: 1
PIF_VALUE: 19
PIF_VALUE: 1
PIF_VALUE: 2
PIF_VALUE: 1
PIF_VALUE: 22
PIF_VALUE: 18
PIF_VALUE: 19
PIF_VALUE: 16
PIF_VALUE: 27
PIF_VALUE: 1
PIF_VALUE: 18
PIF_VALUE: 1
PIF_VALUE: 17
PIF_VALUE: 27
PIF_VALUE: 25
PIF_VALUE: 22
PIF_VALUE: 17
PIF_VALUE: 1
PIF_VALUE: 1
PIF_VALUE: 20
PIF_VALUE: 1
PIF_VALUE: 19
PIF_VALUE: 2
PIF_VALUE: 18
PIF_VALUE: 26
PIF_VALUE: 17
PIF_VALUE: 2
PIF_VALUE: 1
PIF_VALUE: 1
PIF_VALUE: 22
PIF_VALUE: 26
PIF_VALUE: 21
PIF_VALUE: 18
PIF_VALUE: 27
PIF_VALUE: 1
PIF_VALUE: 1
PIF_VALUE: 22
PIF_VALUE: 3
PIF_VALUE: 27
PIF_VALUE: 17
PIF_VALUE: 21
PIF_VALUE: 1
PIF_VALUE: 21
PIF_VALUE: 24
PIF_VALUE: 1
PIF_VALUE: 2
PIF_VALUE: 18
PIF_VALUE: 1
PIF_VALUE: 17
PIF_VALUE: 1
PIF_VALUE: 22
PIF_VALUE: 1
PIF_VALUE: 1
PIF_VALUE: 20
PIF_VALUE: 19
PIF_VALUE: 24
PIF_VALUE: 2
PIF_VALUE: 1
PIF_VALUE: 1
PIF_VALUE: 2
PIF_VALUE: 18
PIF_VALUE: 17
PIF_VALUE: 1
PIF_VALUE: 19
PIF_VALUE: 1
PIF_VALUE: 1
PIF_VALUE: 18
PIF_VALUE: 1
PIF_VALUE: 19
PIF_VALUE: 18
PIF_VALUE: 18
PIF_VALUE: 1
PIF_VALUE: 21
PIF_VALUE: 1
PIF_VALUE: 17
PIF_VALUE: 20
PIF_VALUE: 26
PIF_VALUE: 28
PIF_VALUE: 21
PIF_VALUE: 1
PIF_VALUE: 1
PIF_VALUE: 2
PIF_VALUE: 2
PIF_VALUE: 19
PIF_VALUE: 2
PIF_VALUE: 18
PIF_VALUE: 17
PIF_VALUE: 1
PIF_VALUE: 17
PIF_VALUE: 1
PIF_VALUE: 1
PIF_VALUE: 27
PIF_VALUE: 17
PIF_VALUE: 19
PIF_VALUE: 17
PIF_VALUE: 2
PIF_VALUE: 22
PIF_VALUE: 2
PIF_VALUE: 1

## 2021-03-15 ASSESSMENT — PAIN SCALES - GENERAL
PAINLEVEL_OUTOF10: 0
PAINLEVEL_OUTOF10: 0
PAINLEVEL_OUTOF10: 10
PAINLEVEL_OUTOF10: 0
PAINLEVEL_OUTOF10: 7
PAINLEVEL_OUTOF10: 0
PAINLEVEL_OUTOF10: 8
PAINLEVEL_OUTOF10: 6
PAINLEVEL_OUTOF10: 0
PAINLEVEL_OUTOF10: 0

## 2021-03-15 ASSESSMENT — PAIN DESCRIPTION - PROGRESSION: CLINICAL_PROGRESSION: GRADUALLY WORSENING

## 2021-03-15 ASSESSMENT — PAIN DESCRIPTION - FREQUENCY: FREQUENCY: CONTINUOUS

## 2021-03-15 ASSESSMENT — PAIN DESCRIPTION - PAIN TYPE
TYPE: SURGICAL PAIN
TYPE: SURGICAL PAIN

## 2021-03-15 NOTE — PROGRESS NOTES
@0259 Shift report & bedside handoff with Vitaliy Thompson RN.     @1368 Shift assessment performed. Patient resting in  Bed quietly, rouses easily to voice, answers questions, follows commands. RIJ w [place] at 48cm w appropriate wave forms, right brachial arterial line oozy w dampened wave forms, CT x 2 to -20 H2O suction with dark red drainage approx 300ml since OR), EC wires in place & connected to pacer, catalan catheter draining clear yellow urine. Left radial & rSVG harvest sites ACE wrapped w color, movement, sensation intact. O2 via NC @ 4LPM. Current infusions per eMAR are Milrinone, Cardizem, Levophed & Insulin. Milrinone wean 1ml/q2H in progress. All HD goals currently met; CO 7.5, CI 3.4, PAD 14, CVP 10. Afebrile. @1765 Post extubation ABG analyzed on EPOC; PCO2 slightly elevated at 50, will work on incentive spirometry to clear CO2. Blood sent to lab to analyze potassium level. @2567 Noted again that brachial arterial line oozy/leaky; dressing removed and hub connection tightened a half turn, site cleansed w chloroprep and dressed using bio-patch & transparent film. No further leak, wave forms now appropriate. IS performed x 10, achieved 250ml. Patient complains of 10/10 pain; Oxycodone given per eMAR.    @2015 Pulse oximetry checked on left hand post radial artery graft removal; strong pulsatility wave form present. @2020 Potassium result 4.3; replacement commenced per protocol. @2025 CI 2.9; Milrinone weaned 1ml per wean protocol. @1456 PA catheter oximetry Hgb updated & calibrated with post-operative values. @4272 Phone conversation with Dr Farrah Nuñez for updates. Give 1 time dose Toradol 30mg ZACHARY & add q6H PRN 15mg ZACHARY for pain management. Continues IS when awake. Add Tylenol 1g q6H scheduled. Increase Milrinone wean to 2ml every 2 hours to keep CI >2.0.    @2150 Levophed gtt stopped.     @2200 Toradol dose achieved reducing pain experience to patient pain goal of 4.    @0000 Reassessment completed, VSS, afebrile, HD goals met at this time. Milrinone weaning per orders, CI remains >2.0. IS 250ml. Cardizem gtt continues per eMAR as ordered. @0100 CI >2.0, Milrinone weaned per orders. @3444 IS achieved 500ml with average of 250ml, acapella x 15.    @0155 Blood sent to lab for potassium analysis. @0330 CI >2.0, Milrinone weaned per orders    @0335 Potassium result 4.1; replacement commenced. @0400  Reassessment completed, VSS, afebrile, HD goals met at this time. Milrinone weaning per orders as CI remains >2.0. Cardizem gtt continues per eMAR as ordered. CHG bath performed per protocol. @0500 Sternal, CT, left radial and RLE ACE wraps & dressings removed, all areas cleansed with chloroprep. RLE pre-tibial wound oozing therefore dry dressing applied. MSI left open to air. DANIEL hose & SCD's applied to BLE. POD#1 weight 109kg using bed scale. @0505 Blood drawn off arterial line and sent to lab for analysis. @0519 CI >2.0, Milrinone stopped. @7713 Review of morning lab results; potassium replacement commenced per protocol. @0759 Shift report & bedside handoff with Isidro Antunez RN.

## 2021-03-15 NOTE — ANESTHESIA PRE PROCEDURE
Department of Anesthesiology  Preprocedure Note       Name:  Maggie Tobin   Age:  41 y.o.  :  1980                                          MRN:  [id number]         Date:  3/15/2021      Surgeon: Vinay Spangler):  Ardyth Seip, MD    Procedure: Procedure(s):  CORONARY ARTERY BYPASS GRAFT WITH TRANSESOPHAGEAL ECHOCARDIOGRAPHY    Medications prior to admission:   Prior to Admission medications    Medication Sig Start Date End Date Taking? Authorizing Provider   isosorbide mononitrate (IMDUR) 60 MG extended release tablet Take 1 tablet by mouth daily 2/15/21  Yes Musa Luis MD   aspirin 81 MG chewable tablet Take 1 tablet by mouth daily 2/15/21  Yes Musa Luis MD   nitroGLYCERIN (NITROSTAT) 0.4 MG SL tablet up to max of 3 total doses.  If no relief after 1 dose, call 911. 2/15/21  Yes Musa Luis MD   carvedilol (COREG) 6.25 MG tablet Take 1 tablet by mouth 2 times daily (with meals) 21  Yes Orlando Knight MD   famotidine (PEPCID) 20 MG tablet Take 1 tablet by mouth 2 times daily 21  Yes Orlando Knight MD   atorvastatin (LIPITOR) 80 MG tablet Take 1 tablet by mouth nightly 21  Yes Orlando Knight MD   prasugrel (EFFIENT) 10 MG TABS Take 1 tablet by mouth daily 21  Yes Orlando Knight MD   lisinopril (PRINIVIL;ZESTRIL) 20 MG tablet Take 1 tablet by mouth daily 20  Yes Orlando Knight MD       Current medications:    Current Facility-Administered Medications   Medication Dose Route Frequency Provider Last Rate Last Admin    albumin human 5 % IV solution             sodium chloride flush 0.9 % injection 10 mL  10 mL Intravenous 2 times per day LACHELLE Pillai - CNP        sodium chloride flush 0.9 % injection 10 mL  10 mL Intravenous PRN LACHELLE Pillai - CNP        vancomycin (VANCOCIN) 1500 mg in dextrose 5 % 250 mL IVPB  1,500 mg Intravenous On Call to Sussy Cartagena, APRRENZO - CNP        mupirocin (BACTROBAN) 2 % ointment   Nasal BID LACHELLE Pillai CNP   Given at 03/14/21 2059    chlorhexidine (HIBICLENS) 4 % liquid   Topical See Admin Instructions Larariellane Beatriz APRN - CNP   Given at 03/14/21 2218    0.9 % sodium chloride infusion   Intravenous Continuous Larayne Beatriz APRN - CNP        ceFAZolin (ANCEF) 2000 mg in dextrose 5 % 100 mL IVPB  2,000 mg Intravenous On Call to [facility], APRN - CNP        lactulose (CHRONULAC) 10 GM/15ML solution 20 g  20 g Oral BID PRN Nilda Barnes MD   20 g at 03/14/21 0921    heparin 25,000 units in dextrose 5 % 250 mL infusion (rate based)  12.1 mL/hr Intravenous Continuous Heaven Winkler MD 12.1 mL/hr at 03/14/21 0535 12.1 mL/hr at 03/14/21 0535    lidocaine PF 1 % injection 5 mL  5 mL Intradermal Once Larayne Beatriz APRN - CNP        sodium chloride (OCEAN, BABY AYR) 0.65 % nasal spray 1 spray  1 spray Each Nostril Q6H PRN Magalys Bennett MD   1 spray at 03/09/21 2008    buPROPion [facility]) extended release tablet 150 mg  150 mg Oral BID Kiara Boyd DO   150 mg at 03/14/21 2059    sennosides-docusate sodium (SENOKOT-S) 8.6-50 MG tablet 2 tablet  2 tablet Oral Daily Larayne Beatriz APRN - CNP   2 tablet at 03/14/21 0830    nitroGLYCERIN (NITROSTAT) SL tablet 0.4 mg  0.4 mg Sublingual Q5 Min PRN Sarmad Ruiz MD        isosorbide mononitrate (IMDUR) extended release tablet 60 mg  60 mg Oral Daily Sarmad Ruiz MD   60 mg at 03/14/21 0830    famotidine (PEPCID) tablet 20 mg  20 mg Oral BID Sarmad Ruiz MD   20 mg at 03/14/21 2059    carvedilol (COREG) tablet 6.25 mg  6.25 mg Oral BID  Sarmad Ruiz MD   6.25 mg at 03/14/21 1729    atorvastatin (LIPITOR) tablet 80 mg  80 mg Oral Nightly Sarmad Ruzi MD   80 mg at 03/14/21 2059    aspirin chewable tablet 81 mg  81 mg Oral Daily Sarmad Ruiz MD   81 mg at 03/14/21 0830    ondansetron (ZOFRAN) injection 4 mg  4 mg Intravenous Q4H PRN Sarmad Ruiz MD        polyethylene glycol [facility]) packet 17 g  17 g Oral Daily PRN Allison Torres MD   17 g at 03/13/21 1017    acetaminophen (TYLENOL) tablet 650 mg  650 mg Oral Q4H PRN Allison Torres MD   650 mg at 03/15/21 0019    Or    acetaminophen (TYLENOL) suppository 650 mg  650 mg Rectal Q4H PRN Allison Torres MD           Allergies:  No Known Allergies    Problem List:    Patient Active Problem List   Diagnosis Code    Essential hypertension I10    Other hyperlipidemia E78.49    Tobacco abuse counseling Z71.6    Acute coronary syndrome (HCC) I24.9    SIRS (systemic inflammatory response syndrome) ([facility]) R65.10    NSTEMI (non-ST elevated myocardial infarction) ([facility] [address] I21.4    Coronary artery disease I25.10    Tobacco abuse Z72.0    Left leg numbness R20.0    Dyslipidemia E78.5    Subclavian artery stenosis, left ([facility]) I77.1       Past Medical History:        Diagnosis Date    Coronary artery disease     Essential hypertension 04/07/2020    GERD (gastroesophageal reflux disease)     Headache     NSTEMI (non-ST elevated myocardial infarction) ([facility] [address]     4/25/20, 5/5/20, 3/5/21    Obesity     Other hyperlipidemia 04/07/2020    Tobacco abuse counseling 04/07/2020       Past Surgical History:        Procedure Laterality Date    CORONARY ANGIOPLASTY  03/08/2021    PTCA RCA    CORONARY ANGIOPLASTY WITH STENT PLACEMENT  05/05/2020    MI RCA    CORONARY ANGIOPLASTY WITH STENT PLACEMENT  04/25/2020    MI Diag, LCx    MOUTH SURGERY      wisdom teeth removed       Social History:    Social History     Tobacco Use    Smoking status: Current Every Day Smoker     Packs/day: 1.00     Types: Cigarettes     Start date: 12/19/1989    Smokeless tobacco: Never Used    Tobacco comment: started age 13. tried vaping age 25 when trying to quit smoking   Substance Use Topics    Alcohol use: No                                Ready to quit: Not Answered  Counseling given: Not Answered  Comment: started age 13.  tried vaping age 25 when trying to quit smoking      Vital Signs (Current):   Vitals:    03/15/21 0616 03/15/21 0620 03/15/21 0625 03/15/21 0630   BP:       Pulse: 62 62 63 75   Resp: 19 22 16 12   Temp:       TempSrc:       SpO2: 98% 97% 99% 93%   Weight:       Height:                                                  BP Readings from Last 3 Encounters:   03/15/21 125/71   12/29/20 110/73   07/20/20 101/63       NPO Status: Time of last liquid consumption: 0019(only sip of water with med since MN)                        Time of last solid consumption: 1700                        Date of last liquid consumption: 03/15/21                        Date of last solid food consumption: 03/14/21    BMI:   Wt Readings from Last 3 Encounters:   03/14/21 226 lb 3.1 oz (102.6 kg)   07/20/20 228 lb (103.4 kg)   06/12/20 227 lb 1.2 oz (103 kg)     Body mass index is 33.4 kg/m². CBC:   Lab Results   Component Value Date    WBC 12.1 03/15/2021    RBC 5.05 03/15/2021    HGB 15.0 03/15/2021    HCT 44.8 03/15/2021    MCV 88.6 03/15/2021    RDW 13.7 03/15/2021     03/15/2021       CMP:   Lab Results   Component Value Date     03/15/2021    K 4.6 03/15/2021     03/15/2021    CO2 24 03/15/2021    BUN 13 03/15/2021    CREATININE 0.8 03/15/2021    GFRAA >60 03/15/2021    AGRATIO 1.2 05/05/2020    LABGLOM >60 03/15/2021    GLUCOSE 88 03/15/2021    PROT 7.2 03/14/2021    CALCIUM 9.4 03/15/2021    BILITOT 0.4 03/14/2021    ALKPHOS 114 03/14/2021    AST 36 03/14/2021    ALT 44 03/14/2021       POC Tests: No results for input(s): POCGLU, POCNA, POCK, POCCL, POCBUN, POCHEMO, POCHCT in the last 72 hours.     Coags:   Lab Results   Component Value Date    APTT 57.0 03/15/2021       HCG (If Applicable): No results found for: PREGTESTUR, PREGSERUM, HCG, HCGQUANT     ABGs:   Lab Results   Component Value Date    PHART 7.421 03/09/2021    PO2ART 147.0 03/09/2021    TPO4TKW 37.8 03/09/2021    CQI1LGM 24.6 03/09/2021    BEART 0.3 03/09/2021    M1GLMLYB 97.6 03/09/2021        Type & Screen (If Applicable):  No results found for: LABABO, LABRH    Drug/Infectious Status (If Applicable):  No results found for: HIV, HEPCAB    COVID-19 Screening (If Applicable):   Lab Results   Component Value Date    COVID19 Not Detected 03/12/2021    COVID19 Not Detected 07/06/2020           Anesthesia Evaluation  Patient summary reviewed and Nursing notes reviewed no history of anesthetic complications:   Airway: Mallampati: II  TM distance: >3 FB   Neck ROM: full  Mouth opening: > = 3 FB Dental:      Comment: No loose teeth    Pulmonary:Negative Pulmonary ROS breath sounds clear to auscultation                             Cardiovascular:  Exercise tolerance: good (>4 METS),   (+) hypertension:, angina:, past MI:, CAD:, CABG/stent (2021 CABG):, hyperlipidemia    (-) pacemaker, valvular problems/murmurs, dysrhythmias,  CHF, orthopnea, PND and  STEWARD    ECG reviewed  Rhythm: regular    Echocardiogram reviewed    Cleared by cardiology           ROS comment: 2021 echo: Mild conc. LVH with normal LV size & wall motion. EF is    65%. Normal   diastolic function. The right ventricle is normal in size and function. The left atrium is normal in size. Neuro/Psych:   (+) headaches:,    (-) seizures, neuromuscular disease, TIA, CVA, psychiatric history and depression/anxiety            GI/Hepatic/Renal:   (+) GERD:,      (-) hiatal hernia, PUD, hepatitis, liver disease, no renal disease, bowel prep and no morbid obesity       Endo/Other:    (+) no malignancy/cancer. (-) diabetes mellitus, hypothyroidism, arthritis, no electrolyte abnormalities, no malignancy/cancer               Abdominal:           Vascular:     - DVT and PE. Anesthesia Plan      general     ASA 4       Induction: intravenous. arterial line, BIS, central line, CVP, PA catheter and GARDENIA  MIPS: Postoperative opioids intended and Prophylactic antiemetics administered. Anesthetic plan and risks discussed with patient.     Use of blood products discussed with patient whom consented to blood products. Plan discussed with CRNA. Claudia Orozco MD   3/15/2021    This pre-anesthesia assessment may be used as a history and physical.    DOS STAFF ADDENDUM:    Pt seen and examined, chart reviewed (including anesthesia, drug and allergy history). No interval changes to history and physical examination. Anesthetic plan, risks, benefits, alternatives, and personnel involved discussed with patient. Patient verbalized an understanding and agrees to proceed.       Claudia Orozco MD  March 15, 2021  6:33 AM

## 2021-03-15 NOTE — ANESTHESIA POSTPROCEDURE EVALUATION
Department of Anesthesiology  Postprocedure Note    Patient: Maggie Loser  MRN: [id number]  YOB: 1980  Date of evaluation: 3/15/2021  Time:  4:09 PM     Procedure Summary     Date: 03/15/21 Room / Location: [place] [address] / [facility]    Anesthesia Start: 0700 Anesthesia Stop: 6763    Procedure: TRANSESOPHAGEAL ECHOCARDIOGRAM, TOTAL CARDIO PULMONARY BYPASS, CORONARY ARTERY BYPASS GRAFTING X 4 (VEIN X 1, ARTERY X 4 ) USING LEFT INTERNAL MAMMARY ARTERY, LEFT ENDOSCOPICALLY HARVESTED RADIAL ARTERY, ENDOSCOPICALLY HARVESTED RIGHT SAPHENOUS VEIN. LEFT ATRIAL APPENDAGE CLIPPING USING SIZE 35 ATRICLIP (N/A Chest) Diagnosis: (CORONARY ARTERY DISEASE)    Surgeons: Ardyth Seip, MD Responsible Provider: Kayla Rendon MD    Anesthesia Type: General ASA Status: 4          Anesthesia Type: General    Darya Phase I:      Darya Phase II:      Last vitals: Reviewed and per EMR flowsheets.        Anesthesia Post Evaluation    Patient location during evaluation: ICU  Patient participation: complete - patient cannot participate  Level of consciousness: sedated and ventilated  Pain score: 0  Airway patency: patent  Nausea & Vomiting: no nausea and no vomiting  Cardiovascular status: hemodynamically stable  Respiratory status: ventilator  Hydration status: euvolemic

## 2021-03-15 NOTE — DISCHARGE SUMMARY
Hospital Medicine Discharge Summary    Patient ID: Asia Grimes      Patient's PCP: Huan Abdul DO    Admit Date: 3/5/2021     Discharge Date: 3/15/21, transfer of care to CTsurgery    Admitting Physician: Moira Coburn MD     Discharge Physician: Tisha Roberson MD     Discharge Diagnoses: Active Hospital Problems    Diagnosis    S/P CABG x 4 [Z95.1]    S/P left atrial appendage ligation [Z98.890]    Subclavian artery stenosis, left (HCC) [I77.1]    Dyslipidemia [E78.5]    Left leg numbness [R20.0]    Tobacco abuse [Z72.0]    CAD in native artery [I25.10]    NSTEMI (non-ST elevated myocardial infarction) ([facility] [address] [I21.4]       The patient was seen and examined on day of discharge and this discharge summary is in conjunction with any daily progress note from day of discharge. Hospital course: a 41 y. o. male with cad s/p yaz to rca x3 (5/2020), s/d chf, htn, hld, admitted with chest pain, got some relief with nitro at home. States it feels like his previous MI. When I saw the Pt later he denies chest pain. ED workup  Vitals stable, on room air  Pertinent labs: troponins . 04, wbc 12  Imaging: no infiltrates or consolidation     Plan:  Chest pain, nstemi  Known cad  - stress test with ischemia   - s/p [facility] 3/8/21, with 90% lesion in the lad, d1 stent is patent, lcx 90% ostial lesion, mid isr 90% lesion, rca 2 lesions 99%  - s/p yaz to the rca x3 in 5/2020  - on effient, ASA, statin, bb  - on heparin/integrilin gtt  - cardiology consulted  - ct surgery consulted for consideration of cabg - plan CABG surgery today  - will dc from medicine service and transfer care over to CTsurgery     Epistaxis - poss 2/2 heparin gtt, resolved     Proximal left subclavian artery moderate stenosis - reviewed on CTA chest, vascular surgery consulted for input prior to CABG, s/p stenting 3/12     HTN  - controlled  - on coreg     tobacco dependence  - counseled on cessation  - nicotine patch  - started wellbutrin: 150 mg bid        Physical Exam Performed:     BP (!) 115/59   Pulse 74   Temp 98.2 °F (36.8 °C) (Oral)   Resp 19   Ht 5' 9\" (1.753 m)   Wt 226 lb 3.1 oz (102.6 kg)   SpO2 97%   BMI 33.40 kg/m²     Gen: NAD  HEENT: NC/AT, moist mucous membranes  Neck: supple, trachea midline  Heart: Normal s1/s2, RRR, no murmurs, gallops, or rubs. Lungs: clear bilaterally, no wheezing, no rales, no rhonchi, no use of accessory muscles  Abd: bowel sounds present, soft, nontender, nondistended, no masses  Extrem: No clubbing, cyanosis, no edema  Skin: no rashes or lesions  Psych: Alert, oriented x3, affect appropriate  Neuro: grossly intact, moves all four extremities spontaneously. No focal deficits   Cap refill: +2 sec    Labs: For convenience and continuity at follow-up the following most recent labs are provided:      CBC:    Lab Results   Component Value Date    WBC 14.2 03/15/2021    HGB 11.9 03/15/2021    HCT 35.2 03/15/2021     03/15/2021       Renal:    Lab Results   Component Value Date     03/15/2021    K 4.0 03/15/2021    K 4.6 03/15/2021     03/15/2021    CO2 25 03/15/2021    BUN 10 03/15/2021    CREATININE 0.7 03/15/2021    CALCIUM 8.3 03/15/2021         Significant Diagnostic Studies    Radiology:   XR CHEST PORTABLE   Final Result   Postoperative appearance as above. IR ANGIOGRAM EXTREMITY LEFT   Final Result      CTA CHEST W CONTRAST   Final Result   1. Unchanged moderate hemodynamic stenosis involving the proximal left   subclavian artery. VL DUP CAROTID LEFT   Final Result      VL PRE OP VEIN MAPPING   Final Result      VL DUP CAROTID BILATERAL   Final Result      VL DUP LOWER EXTREMITY ARTERIES LEFT   Final Result      NM Cardiac Stress Test Nuclear Imaging   Final Result      XR CHEST PORTABLE   Final Result   Low lung volumes with bibasilar atelectasis.                 Consults:     IP CONSULT TO CARDIOLOGY  IP CONSULT TO PHARMACY  IP CONSULT TO CARDIAC REHAB  IP CONSULT TO CASE MANAGEMENT  IP CONSULT TO SOCIAL WORK  IP CONSULT TO DIETITIAN    Disposition:  Transferred care over to CTsurgery     Condition at Discharge: Stable    Discharge Instructions/Follow-up:  Per CT surgery    Code Status:  Full Code     Activity: activity as tolerated    Diet: cardiac diet      Discharge Medications:     Current Discharge Medication List           Details   isosorbide mononitrate (IMDUR) 60 MG extended release tablet Take 1 tablet by mouth daily  Qty: 30 tablet, Refills: 5      aspirin 81 MG chewable tablet Take 1 tablet by mouth daily  Qty: 90 tablet, Refills: 3      nitroGLYCERIN (NITROSTAT) 0.4 MG SL tablet up to max of 3 total doses. If no relief after 1 dose, call 911. Qty: 25 tablet, Refills: 3      carvedilol (COREG) 6.25 MG tablet Take 1 tablet by mouth 2 times daily (with meals)  Qty: 180 tablet, Refills: 3      famotidine (PEPCID) 20 MG tablet Take 1 tablet by mouth 2 times daily  Qty: 180 tablet, Refills: 2      atorvastatin (LIPITOR) 80 MG tablet Take 1 tablet by mouth nightly  Qty: 90 tablet, Refills: 3      prasugrel (EFFIENT) 10 MG TABS Take 1 tablet by mouth daily  Qty: 90 tablet, Refills: 3      lisinopril (PRINIVIL;ZESTRIL) 20 MG tablet Take 1 tablet by mouth daily  Qty: 90 tablet, Refills: 3             Time Spent on discharge is more than 30 minutes in the examination, evaluation, counseling and review of medications and discharge plan. Signed:    Vinnie Villalpando MD   3/15/2021      Thank you Dennis Sutherland DO for the opportunity to be involved in this patient's care. If you have any questions or concerns please feel free to contact me at [phone].

## 2021-03-15 NOTE — H&P
PCP:  Jory Farrar,        Cards: Ojeda Yenni Nova    CAD -  Previous H+P on chart 3/8/21:  Ean Fajardo is a 41 y.o. male with hx CAD/NSTEMI - 4/25/20 MI diag, LCx; 5/5/20 MI RCA. On ASA, statin, BB, Imdur, ACE, Effient. Presented to ED 3/5/21 - chest pain, 3/10, pressure type, similar to prior, took ntg with partial relief,   bp 137/94. Trop 0.04, probnp 151  Admitted. NSTEMI. Hep gtt. Peak trop 0.13 3/5. Lexiscan 3/5/21 inferolat ischemia  Cath 3/8/21, PTCA RCA. Integrillin. \"  S/p 100 [facility] stenting 3/12/21      Past Medical History:  Past Medical History:   Diagnosis Date    Coronary artery disease     Essential hypertension 04/07/2020    GERD (gastroesophageal reflux disease)     Headache     NSTEMI (non-ST elevated myocardial infarction) ([facility] [address]     4/25/20, 5/5/20, 3/5/21    Obesity     Other hyperlipidemia 04/07/2020    Tobacco abuse counseling 04/07/2020       Past Surgical History:  Past Surgical History:   Procedure Laterality Date    CORONARY ANGIOPLASTY  03/08/2021    PTCA RCA    CORONARY ANGIOPLASTY WITH STENT PLACEMENT  05/05/2020    MI RCA    CORONARY ANGIOPLASTY WITH STENT PLACEMENT  04/25/2020    MI Diag, LCx    MOUTH SURGERY      wisdom teeth removed       Home Medications:   Prior to Admission medications    Medication Sig Start Date End Date Taking? Authorizing Provider   isosorbide mononitrate (IMDUR) 60 MG extended release tablet Take 1 tablet by mouth daily 2/15/21  Yes Tonette Mcardle, MD   aspirin 81 MG chewable tablet Take 1 tablet by mouth daily 2/15/21  Yes Tonette Mcardle, MD   nitroGLYCERIN (NITROSTAT) 0.4 MG SL tablet up to max of 3 total doses.  If no relief after 1 dose, call 911. 2/15/21  Yes Tonette Mcardle, MD   carvedilol (COREG) 6.25 MG tablet Take 1 tablet by mouth 2 times daily (with meals) 1/6/21  Yes Geri Pineda MD   famotidine (PEPCID) 20 MG tablet Take 1 tablet by mouth 2 times daily 1/6/21  Yes Geri Pineda MD   atorvastatin (LIPITOR) 80 MG tablet Take 03/15/2021     Liver Profile:  Lab Results   Component Value Date    AST 36 03/14/2021    ALT 44 03/14/2021    BILIDIR <0.2 03/14/2021    BILITOT 0.4 03/14/2021    ALKPHOS 114 03/14/2021    LABALBU 4.2 03/14/2021     Lab Results   Component Value Date    CHOL 172 03/07/2021    HDL 30 03/07/2021    TRIG 244 03/07/2021     HgbA1c:  Lab Results   Component Value Date    LABA1C 5.7 03/08/2021     UA:   Lab Results   Component Value Date    COLORU YELLOW 03/10/2021    PHUR 7.0 03/10/2021    CLARITYU Clear 03/10/2021    SPECGRAV 1.008 03/10/2021    LEUKOCYTESUR Negative 03/10/2021    UROBILINOGEN 0.2 03/10/2021    BILIRUBINUR Negative 03/10/2021    BLOODU Negative 03/10/2021    GLUCOSEU Negative 03/10/2021       Reviewed above, reason for surgery, diagnosis and treatment plan including risks, benefits and alternatives. Will proceed with Yossi Garcia MD  3/15/2021  6:34 AM     [id number] Score for Atrial Fibrillation Stroke Risk   Risk   Factors  Component Value   C CHF Yes 1   H HTN Yes 1   A2 Age >= 76 No,  (41 y.o.) 0   D DM No 0   S2 Prior Stroke/TIA No 0   V Vascular Disease Yes 1   A Age 74-69 No,  (41 y.o.) 0   Sc Sex male 0    [id number]  Score  3   Score last updated 3/15/21 7:15 AM EDT    The patient was counseled about the risks of frieda Covid-19 during the perioperative period and any recovery window from the procedure. The patient was made aware that frieda Covid-19  may worsen the prognosis for recovering from the procedure  and lend to a higher morbidity and/or mortality risk. All material risks, benefits, and reasonable alternatives including postponing the procedure were discussed. The patient does wish to proceed with the procedure at this time.

## 2021-03-15 NOTE — BRIEF OP NOTE
Date: 3/15/2021  Patient:  Vincent Randhawa    Brief Op Note    Pre-op Diagnosis:  cad    Post-op Diagnosis:  same    Procedure:    1. GARDENIA  2. Left endoscopic radial harvest  3. Right greater saphenous EVH  4. JAYDE exclusion (35mm AtriClip)  5. Urgent cabgx4 (LIMA-LAD, SVG-D1, L radial off LIMA-OM, SVG-PDA)  6. Sternal stabilization (Biomet SternaLock)    Surgeon: Deb Peters    Assistant(s):  Catrina Vogel    Anesthesia:  General    EBL: n/a    XC:  138       CPB:  159    Specimens:  none    Findings:  Well preserved LV function    Drains:  L pleural, mediastinal    Drips:  Milrinone, cardizem    Complications:  none    Disposition: To CVU in stable condition    Post-Op Note:  Dictated.      Fatoumata Ramirez MD  3/15/2021  12:55 PM

## 2021-03-15 NOTE — PROGRESS NOTES
Pt verified information regarding surgery, name, birth date, surgeon, procedure and allergies: NKDA. Consent and labs reviewed. 4 units of RBC's on call to OR. Patient transferred to CV preop for surgery. Appropriate antibiotics ordered: Ancef 2g and Vancomycin 1.5g. Beta blocker:Coreg on 3/14/21 at 1729. DVT prophylaxis: Heparin drip stopped at 0659. Hair clipped, pt washed with 2% chlorhexidine gluconate skin prep and alcohol wipe down. Mepilex sacral border applied. Patient and family educated about surgery and pain management. Arterial line placed in right brachial at 0616 and TLC introducer in right internal jugular with swan at 0624 by Dr. Ana Gomez. VSS. Tolerated well. To surgery per bed at 0659.

## 2021-03-15 NOTE — PROCEDURES
Spirometry was acceptable and reproducible by ATS standards    Spirometry  1. There is no demonstrable obstructive defect. 2. The FVC is diminished suggesting a possible restrictive defect; suggest lung volumes if clinically indicated. Overall Interpretation  Reduction in FEV1 68% and FVC to 73% without obstructive ratio. Advised for pulmonary function test to assess for obstruction as an outpatient.     Pulmonary Function Testing Results:    No results found for: [id number], [id number], BGR9NUPKKGS, WMV2TUWLRAXK, TLCPCTPREPRE, DLCOPCTPREPR, DLCOVAPCTR, TLCPCTPREPRE          OBSTRUCTION % Predicted FEV1   MILD >70%   MODERATE 60-69%   MODERATELY-SEVERE 50-59%   SEVERE 35-49%   VERY SEVERE <35%         RESTRICTION % Predicted TLC   MILD Less than LLN but > than 70%   MODERATE 60-69%   MODERATELY-SEVERE <60%                 DIFFUSION CAPACITY DL,CO % Pred   MILD >60% AND < LLN   MODERATE 40-60%   SEVERE <40%       PFT data will be scanned into the procedure tab in epic under this encounter    Jonathan Kapoor MD  [facility] Pulmonology

## 2021-03-15 NOTE — PROGRESS NOTES
Patient extubated to non re breather at 6:13 pm. Nurse bedside. Sats 99%.   Electronically signed by Canadce Marrero RCP on 3/15/2021 at 6:19 PM

## 2021-03-15 NOTE — PROCEDURES
[facility] Department of Anesthesiology  Procedure Note - Pulmonary Artery Catheter Insertion       Name:  Vickie Bolivar                                         Age:  41 y.o. MRN:  [id number]     ALLERGIES: Patient has no known allergies. Indication: Perioperative evaluation of cardiac function via cardiac output/index monitoring and evaluation of pulmonary artery and central venous pressures. Central administration of vasoactive medications. Time-Out: A time-out was completed verifying correct patient, procedure, site, positioning, and special equipment if applicable. Procedure: Patient supine. Landmarks identified. Sterile prep and drape. Lidocaine 1% skin wheal over right internal jugular vein approximately 4 cm cephalad of the clavicle. Ultrasound used to locate the IJ vein; an 18G needle was then directed in the same plane into the right internal jugular vein. A guidewire was then passed easily via the needle and the needle was removed. A 9Fr introducer was then passed easily over the wire and the wire and dilator introducer were then removed. The introducer was secured after verifying good blood return and injectability. A pulmonary artery catheter was then introduced and passed easily through the introducer; a pulmonary artery waveform was obtained and the catheter was secured at 50 cm after being withdrawn 2 cm once the catheter balloon was deflated. There were no apparent complications and the procedure was tolerated well. Estimated Blood Loss: minimal    Mesfin Martell MD  March 15, 2021  6:34 AM      [facility] Department of Anesthesiology  Procedure Note - Arterial Line Placement       Name:  Vickie Bolivar                                         Age:  41 y.o. MRN:  [id number]     ALLERGIES: Patient has no known allergies. Indication: Perioperative hemodynamic monitoring and access.     Time-Out: A time-out was completed verifying correct patient, procedure, site, positioning, and special equipment if applicable. Procedure: Right brachial artery prepped with ChloraPrep and draped aseptically. 1% lidocaine SW. 20g hollow tip needle used at antecubital region under ultrasound guidance with sterile sleeve, then guide wire applied using Seldinger technique for placement of 5 inch angiocath. Line was secured in place and sterile dressing applied. An appropriate arterial waveform was seen on the monitor and the line withdrew bright red blood and flushed without difficulty.  Perfusion to the extremity distal to the point of catheter insertion was checked and found to be adequate    Estimated Blood Loss: minimal    Complications: none      Phi Smith MD  March 15, 2021  6:34 AM

## 2021-03-15 NOTE — PROGRESS NOTES
@4689 Patient admitted to CVU from [place] [address] and attached to ventilator and monitors. Report received from anesthesiologist.  Chest x-ray ordered. Labs drawn and sent. Assessment complete. Hemodymanics stable and will continue to monitor. Does have chest tubes x2 one pleural, one mediastinal connected to one atrium with no air leak noted. Pena with clear yellow output. Does have left forearm and hand wrapped with ace wrap and did elevated on pillows. Right lower extremity wrapped with ace wrap and did elevated this as well. ET tube at 24 lip OG at 56 lip. [place] at 50. All lines re-zero and connected to vigilance. Pacer check and capturing appropriately. Did place sequential to the left lower extremity. RECOVERY PERIOD BEGINS  1315   @1400 did receive order from Dr Cricket Hernandez may wean Milrinone 1cc q 2hr if meets criteria. @6041 Mother let back to see patient. Visiting hours reviewed and all questions answered. @5067 did start to arouse. Able to follow commands. Will move all extremities to command. Did open his eyes. Was able to left his feet off the bed. Wiggle his toes and give me the thumbs up with the right hand. Still slightly drowsy. @1600 was placed on spont. Mode on the vent per MEREDITH Friedman will obtain ABG in 30 min  @1635 ABG obtained. Results given to Dr Cricket Hernandez and Dahiana Garza will place on 40% on the vent. Give one amp of sodium bicarb and obtain another ABG in 30 min. @4533 1 amp sodium bicarb given per order. @1723 ABG results given to Dahiana Garza will have one amp bicarb given and repeat ABG at 1800.   @1730 1 amp bicarb given for BE -3.7  @ 1813 did extubate per order to 100% NRB. Tolerated well. @1820 did place on 4-L NC with humidity. @3253 did have him do the incentive spirometer reached 300. Also the acapella x6. Is painful and requesting something for pain. @1241 did medicate with fentanyl 25 mcg for pain level #8/10  @1850 tolerating wet sponges to the mouth.  Using the acapella x 10  @1910 bedside handoff report with [facility]

## 2021-03-15 NOTE — PROGRESS NOTES
Hospitalist Progress Note      PCP: Marzena Colbert DO    Date of Admission: 3/5/2021    Subjective:     Medications:  Reviewed    Infusion Medications    [START ON 3/15/2021] sodium chloride      eptifibatide 2 mcg/kg/min (03/14/21 1729)    heparin (PORCINE) Infusion 12.1 mL/hr (03/14/21 0535)     Scheduled Medications    sodium chloride flush  10 mL Intravenous 2 times per day    [START ON 3/15/2021] vancomycin (VANCOCIN) IV  1,500 mg Intravenous On Call to OR    mupirocin   Nasal BID    [START ON 3/15/2021] chlorhexidine  15 mL Mouth/Throat Once    chlorhexidine   Topical See Admin Instructions    [START ON 3/15/2021] ceFAZolin (ANCEF) IVPB  2,000 mg Intravenous On Call to OR    [START ON 3/15/2021] metoclopramide  10 mg Intravenous Once    [START ON 3/15/2021] famotidine (PEPCID) injection  20 mg Intravenous Once    lidocaine 1 % injection  5 mL Intradermal Once    buPROPion  150 mg Oral BID    sennosides-docusate sodium  2 tablet Oral Daily    isosorbide mononitrate  60 mg Oral Daily    famotidine  20 mg Oral BID    carvedilol  6.25 mg Oral BID WC    atorvastatin  80 mg Oral Nightly    aspirin  81 mg Oral Daily     PRN Meds: sodium chloride flush, lactulose, sodium chloride, nitroGLYCERIN, ondansetron, polyethylene glycol, acetaminophen **OR** acetaminophen      Intake/Output Summary (Last 24 hours) at 3/14/2021 2244  Last data filed at 3/14/2021 2229  Gross per 24 hour   Intake 2239.5 ml   Output --   Net 2239.5 ml       Physical Exam Performed:    /76   Pulse 69   Temp 98 °F (36.7 °C) (Oral)   Resp 16   Ht 5' 9\" (1.753 m)   Wt 226 lb 3.1 oz (102.6 kg)   SpO2 94%   BMI 33.40 kg/m²     Gen: NAD  HEENT: NC/AT, moist mucous membranes  Neck: supple, trachea midline  Heart: Normal s1/s2, RRR, no murmurs, gallops, or rubs.    Lungs: clear bilaterally, no wheezing, no rales, no rhonchi, no use of accessory muscles  Abd: bowel sounds present, soft, nontender, nondistended, no masses  Extrem: No clubbing, cyanosis, no edema  Skin: no rashes or lesions  Psych: Alert, oriented x3, affect appropriate  Neuro: grossly intact, moves all four extremities spontaneously. No focal deficits   Cap refill: +2 sec    Labs:   Recent Labs     03/12/21  0514 03/13/21  0621 03/14/21  0433   WBC 13.0* 11.5* 11.1*   HGB 16.5 14.8 15.2   HCT 49.5 43.3 44.6    210 215     Recent Labs     03/12/21  0514 03/13/21  0621 03/14/21  0433   * 138 135*   K 4.7 4.0 4.1   CL 99 103 100   CO2 22 26 24   BUN 13 16 13   CREATININE 0.7* 0.8* 0.7*   CALCIUM 9.7 9.3 9.2     Recent Labs     03/14/21 0433   AST 36   ALT 44*   BILIDIR <0.2   BILITOT 0.4   ALKPHOS 114     No results for input(s): INR in the last 72 hours. No results for input(s): Mark Diamante in the last 72 hours. Urinalysis:      Lab Results   Component Value Date    NITRU Negative 03/10/2021    BLOODU Negative 03/10/2021    SPECGRAV 1.008 03/10/2021    GLUCOSEU Negative 03/10/2021       Radiology:  IR ANGIOGRAM EXTREMITY LEFT   Final Result      CTA CHEST W CONTRAST   Final Result   1. Unchanged moderate hemodynamic stenosis involving the proximal left   subclavian artery. VL DUP CAROTID LEFT   Final Result      VL PRE OP VEIN MAPPING   Final Result      VL DUP CAROTID BILATERAL   Final Result      VL DUP LOWER EXTREMITY ARTERIES LEFT   Final Result      NM Cardiac Stress Test Nuclear Imaging   Final Result      XR CHEST PORTABLE   Final Result   Low lung volumes with bibasilar atelectasis. Assessment/Plan:    Active Hospital Problems    Diagnosis    Subclavian artery stenosis, left (HCC) [I77.1]    Dyslipidemia [E78.5]    Left leg numbness [R20.0]    Tobacco abuse [Z72.0]    NSTEMI (non-ST elevated myocardial infarction) [facility]) [I21.4]         Hospital course: a 41 y. o. male with cad s/p yaz to rca x3 (5/2020), s/d chf, htn, hld, admitted with chest pain, got some relief with nitro at home.  States it feels

## 2021-03-15 NOTE — PROGRESS NOTES
Clinical Pharmacy Note  Heparin Dosing       Lab Results   Component Value Date    APTT 57.0 03/15/2021     Lab Results   Component Value Date    HGB 15.0 03/15/2021    HCT 44.8 03/15/2021     03/15/2021       Current Infusion Rate: 12.1 mL/hr    Plan:  Rate: continue at 12.1 mL/hr  Next aPTT: 0600 3/16/21    Pharmacy will continue to monitor and adjust based on aPTT results.   Dyana Bellamy, PharmD

## 2021-03-15 NOTE — PROGRESS NOTES
REPORT CALLED TO MANDY LUU RECEIVING CVU NURSE AT 1205. NO FURTHER QUESTIONS AT THIS TIME. PATIENT TO BED 1301 POSTOP.

## 2021-03-15 NOTE — PROGRESS NOTES
@9991: Assessment completed. Consents signed for CABG in the am. Pt up ad robert. Explained plan for the evening, CHG bath, NPO at midnight. Pt verbalized understanding. @8938: CHG bath complete per order. Pt weighed and entered in chart. Pt transferred to 1301 in order to clean lining room pre-op  @7211: Reassessment completed. Integrilin gtt stopped. Pt instructed to be NPO at this time. Verbalized understanding. @8085: Reassessment completed. @0500: Pt transferred to 130 for lining pre-op. Pt given pepcid, reglan, and chlorhexidine mouthwash per order.

## 2021-03-15 NOTE — PLAN OF CARE
Problem: Falls - Risk of:  Goal: Will remain free from falls  Description: Will remain free from falls  3/15/2021 0429 by Ten Smiley RN  Outcome: Met This Shift  3/14/2021 1749 by Manjeet Muniz RN  Outcome: Met This Shift  Goal: Absence of physical injury  Description: Absence of physical injury  3/15/2021 0429 by Ten Smiley RN  Outcome: Met This Shift  3/14/2021 1749 by Manjeet Muniz RN  Outcome: Met This Shift     Problem: Pain:  Goal: Pain level will decrease  Description: Pain level will decrease  3/15/2021 0429 by Ten Smiley RN  Outcome: Ongoing  3/14/2021 1749 by Manjeet Muniz RN  Outcome: Met This Shift  Goal: Control of acute pain  Description: Control of acute pain  3/15/2021 0429 by Ten Smiley RN  Outcome: Ongoing  3/14/2021 1749 by Manjeet Muniz RN  Outcome: Met This Shift  Goal: Control of chronic pain  Description: Control of chronic pain  3/15/2021 0429 by Ten Smiley RN  Outcome: Ongoing  3/14/2021 1749 by Manjeet Muniz RN  Outcome: Met This Shift  Goal: Patient's pain/discomfort is manageable  Description: Patient's pain/discomfort is manageable  3/15/2021 0429 by Ten Smiley RN  Outcome: Ongoing  3/14/2021 1749 by Manjeet Muniz RN  Outcome: Met This Shift     Problem: Infection:  Goal: Will remain free from infection  Description: Will remain free from infection  3/15/2021 0429 by Ten Smiley RN  Outcome: Ongoing  3/14/2021 1749 by Manjeet Muniz RN  Outcome: Met This Shift     Problem: Safety:  Goal: Free from accidental physical injury  Description: Free from accidental physical injury  3/15/2021 0429 by Ten Smiley RN  Outcome: Ongoing  3/14/2021 1749 by Manjeet Muniz RN  Outcome: Met This Shift  Goal: Free from intentional harm  Description: Free from intentional harm  3/15/2021 0429 by Ten Smiley RN  Outcome: Ongoing  3/14/2021 1749 by Manjeet Muniz RN  Outcome: Met This Shift     Problem: Daily Care:  Goal: Daily care needs are met  Description: Daily care needs are met  3/15/2021 0429 by Curtis Squires RN  Outcome: Ongoing  3/14/2021 1749 by Sarah Knight RN  Outcome: Met This Shift     Problem: Skin Integrity:  Goal: Skin integrity will stabilize  Description: Skin integrity will stabilize  3/15/2021 0429 by Curtis Squires RN  Outcome: Ongoing  3/14/2021 1749 by Sarah Knight RN  Outcome: Ongoing     Problem: Discharge Planning:  Goal: Patients continuum of care needs are met  Description: Patients continuum of care needs are met  3/15/2021 0429 by Curtis Squires RN  Outcome: Ongoing  3/14/2021 1749 by Sarah Knight RN  Outcome: Ongoing     Problem: Cardiac:  Goal: Ability to maintain vital signs within normal range will improve  Description: Ability to maintain vital signs within normal range will improve  3/15/2021 0429 by Curtis Squires RN  Outcome: Ongoing  3/14/2021 1749 by Sarah Knight RN  Outcome: Met This Shift  Goal: Cardiovascular alteration will improve  Description: Cardiovascular alteration will improve  3/15/2021 0429 by Curtis Squires RN  Outcome: Ongoing  3/14/2021 1749 by Sarah Knight RN  Outcome: Ongoing     Problem: Health Behavior:  Goal: Will modify at least one risk factor affecting health status  Description: Will modify at least one risk factor affecting health status  3/14/2021 1749 by Sarah Knight RN  Outcome: Ongoing  Goal: Identification of resources available to assist in meeting health care needs will improve  Description: Identification of resources available to assist in meeting health care needs will improve  3/14/2021 1749 by Sarah Knight RN  Outcome: Ongoing     Problem: Physical Regulation:  Goal: Complications related to the disease process, condition or treatment will be avoided or minimized  Description: Complications related to the disease process, condition or treatment will be avoided or minimized  3/14/2021 1749 by Chapis Burch RN  Outcome: Ongoing     Problem: Tobacco Use:  Goal: Inpatient tobacco use cessation counseling participation  Description: Inpatient tobacco use cessation counseling participation  3/15/2021 0429 by Mechelle Pond RN  Outcome: Ongoing  3/14/2021 1749 by Chapis Burch RN  Outcome: Ongoing

## 2021-03-16 LAB
ANION GAP SERPL CALCULATED.3IONS-SCNC: 10 MMOL/L (ref 3–16)
APTT: 29.4 SEC (ref 24.2–36.2)
BASOPHILS ABSOLUTE: 0 K/UL (ref 0–0.2)
BASOPHILS RELATIVE PERCENT: 0.2 %
BLOOD BANK DISPENSE STATUS: NORMAL
BLOOD BANK DISPENSE STATUS: NORMAL
BLOOD BANK PRODUCT CODE: NORMAL
BLOOD BANK PRODUCT CODE: NORMAL
BPU ID: NORMAL
BPU ID: NORMAL
BUN BLDV-MCNC: 11 MG/DL (ref 7–20)
CALCIUM SERPL-MCNC: 8.1 MG/DL (ref 8.3–10.6)
CALCIUM SERPL-MCNC: 8.6 MG/DL (ref 8.3–10.6)
CHLORIDE BLD-SCNC: 106 MMOL/L (ref 99–110)
CO2: 23 MMOL/L (ref 21–32)
CREAT SERPL-MCNC: 0.7 MG/DL (ref 0.9–1.3)
DESCRIPTION BLOOD BANK: NORMAL
DESCRIPTION BLOOD BANK: NORMAL
EOSINOPHILS ABSOLUTE: 0 K/UL (ref 0–0.6)
EOSINOPHILS RELATIVE PERCENT: 0 %
GFR AFRICAN AMERICAN: >60
GFR NON-AFRICAN AMERICAN: >60
GLUCOSE BLD-MCNC: 123 MG/DL (ref 70–99)
GLUCOSE BLD-MCNC: 123 MG/DL (ref 70–99)
GLUCOSE BLD-MCNC: 125 MG/DL (ref 70–99)
GLUCOSE BLD-MCNC: 125 MG/DL (ref 70–99)
GLUCOSE BLD-MCNC: 129 MG/DL (ref 70–99)
GLUCOSE BLD-MCNC: 130 MG/DL (ref 70–99)
GLUCOSE BLD-MCNC: 130 MG/DL (ref 70–99)
GLUCOSE BLD-MCNC: 132 MG/DL (ref 70–99)
GLUCOSE BLD-MCNC: 132 MG/DL (ref 70–99)
GLUCOSE BLD-MCNC: 135 MG/DL (ref 70–99)
GLUCOSE BLD-MCNC: 158 MG/DL (ref 70–99)
GLUCOSE BLD-MCNC: 170 MG/DL (ref 70–99)
GLUCOSE BLD-MCNC: 92 MG/DL (ref 70–99)
GLUCOSE BLD-MCNC: 94 MG/DL (ref 70–99)
GLUCOSE BLD-MCNC: 94 MG/DL (ref 70–99)
GLUCOSE BLD-MCNC: 95 MG/DL (ref 70–99)
GLUCOSE BLD-MCNC: 98 MG/DL (ref 70–99)
HCT VFR BLD CALC: 32.6 % (ref 40.5–52.5)
HEMOGLOBIN: 10.9 G/DL (ref 13.5–17.5)
INR BLD: 1.14 (ref 0.86–1.14)
LYMPHOCYTES ABSOLUTE: 1.1 K/UL (ref 1–5.1)
LYMPHOCYTES RELATIVE PERCENT: 7.5 %
MAGNESIUM: 2.1 MG/DL (ref 1.8–2.4)
MCH RBC QN AUTO: 29.8 PG (ref 26–34)
MCHC RBC AUTO-ENTMCNC: 33.5 G/DL (ref 31–36)
MCV RBC AUTO: 89.1 FL (ref 80–100)
MONOCYTES ABSOLUTE: 1.5 K/UL (ref 0–1.3)
MONOCYTES RELATIVE PERCENT: 10.3 %
NEUTROPHILS ABSOLUTE: 11.9 K/UL (ref 1.7–7.7)
NEUTROPHILS RELATIVE PERCENT: 82 %
PDW BLD-RTO: 13.9 % (ref 12.4–15.4)
PERFORMED ON: ABNORMAL
PERFORMED ON: NORMAL
PLATELET # BLD: 138 K/UL (ref 135–450)
PMV BLD AUTO: 9.3 FL (ref 5–10.5)
POC POTASSIUM: 4.1 MMOL/L (ref 3.5–5.1)
POC SAMPLE TYPE: ABNORMAL
POTASSIUM REFLEX MAGNESIUM: 4.4 MMOL/L (ref 3.5–5.1)
POTASSIUM SERPL-SCNC: 4.1 MMOL/L (ref 3.5–5.1)
POTASSIUM SERPL-SCNC: 4.1 MMOL/L (ref 3.5–5.1)
POTASSIUM SERPL-SCNC: 4.4 MMOL/L (ref 3.5–5.1)
PROTHROMBIN TIME: 13.3 SEC (ref 10–13.2)
RBC # BLD: 3.65 M/UL (ref 4.2–5.9)
SODIUM BLD-SCNC: 139 MMOL/L (ref 136–145)
WBC # BLD: 14.4 K/UL (ref 4–11)

## 2021-03-16 PROCEDURE — 94761 N-INVAS EAR/PLS OXIMETRY MLT: CPT

## 2021-03-16 PROCEDURE — 84132 ASSAY OF SERUM POTASSIUM: CPT

## 2021-03-16 PROCEDURE — 2580000003 HC RX 258: Performed by: THORACIC SURGERY (CARDIOTHORACIC VASCULAR SURGERY)

## 2021-03-16 PROCEDURE — 83735 ASSAY OF MAGNESIUM: CPT

## 2021-03-16 PROCEDURE — 2500000003 HC RX 250 WO HCPCS: Performed by: NURSE PRACTITIONER

## 2021-03-16 PROCEDURE — 85025 COMPLETE CBC W/AUTO DIFF WBC: CPT

## 2021-03-16 PROCEDURE — 6370000000 HC RX 637 (ALT 250 FOR IP): Performed by: THORACIC SURGERY (CARDIOTHORACIC VASCULAR SURGERY)

## 2021-03-16 PROCEDURE — 85730 THROMBOPLASTIN TIME PARTIAL: CPT

## 2021-03-16 PROCEDURE — 2100000000 HC CCU R&B

## 2021-03-16 PROCEDURE — 97110 THERAPEUTIC EXERCISES: CPT

## 2021-03-16 PROCEDURE — 97164 PT RE-EVAL EST PLAN CARE: CPT

## 2021-03-16 PROCEDURE — C9113 INJ PANTOPRAZOLE SODIUM, VIA: HCPCS | Performed by: THORACIC SURGERY (CARDIOTHORACIC VASCULAR SURGERY)

## 2021-03-16 PROCEDURE — 99024 POSTOP FOLLOW-UP VISIT: CPT | Performed by: THORACIC SURGERY (CARDIOTHORACIC VASCULAR SURGERY)

## 2021-03-16 PROCEDURE — 97530 THERAPEUTIC ACTIVITIES: CPT

## 2021-03-16 PROCEDURE — 82947 ASSAY GLUCOSE BLOOD QUANT: CPT

## 2021-03-16 PROCEDURE — 82310 ASSAY OF CALCIUM: CPT

## 2021-03-16 PROCEDURE — 2700000000 HC OXYGEN THERAPY PER DAY

## 2021-03-16 PROCEDURE — 80048 BASIC METABOLIC PNL TOTAL CA: CPT

## 2021-03-16 PROCEDURE — 85610 PROTHROMBIN TIME: CPT

## 2021-03-16 PROCEDURE — 6370000000 HC RX 637 (ALT 250 FOR IP): Performed by: NURSE PRACTITIONER

## 2021-03-16 PROCEDURE — 6360000002 HC RX W HCPCS: Performed by: THORACIC SURGERY (CARDIOTHORACIC VASCULAR SURGERY)

## 2021-03-16 RX ORDER — PANTOPRAZOLE SODIUM 40 MG/1
40 TABLET, DELAYED RELEASE ORAL
Status: DISCONTINUED | OUTPATIENT
Start: 2021-03-17 | End: 2021-03-19 | Stop reason: HOSPADM

## 2021-03-16 RX ORDER — CALCIUM GLUCONATE 20 MG/ML
1000 INJECTION, SOLUTION INTRAVENOUS ONCE
Status: COMPLETED | OUTPATIENT
Start: 2021-03-16 | End: 2021-03-16

## 2021-03-16 RX ADMIN — FONDAPARINUX SODIUM 2.5 MG: 2.5 INJECTION, SOLUTION SUBCUTANEOUS at 09:01

## 2021-03-16 RX ADMIN — DILTIAZEM HYDROCHLORIDE 30 MG: 30 TABLET, FILM COATED ORAL at 18:21

## 2021-03-16 RX ADMIN — OXYCODONE HYDROCHLORIDE 10 MG: 10 TABLET ORAL at 21:12

## 2021-03-16 RX ADMIN — FENTANYL CITRATE 25 MCG: 50 INJECTION INTRAMUSCULAR; INTRAVENOUS at 22:47

## 2021-03-16 RX ADMIN — CEFAZOLIN 2000 MG: 10 INJECTION, POWDER, FOR SOLUTION INTRAVENOUS at 11:11

## 2021-03-16 RX ADMIN — ACETAMINOPHEN 1000 MG: 500 TABLET ORAL at 16:08

## 2021-03-16 RX ADMIN — CEFAZOLIN 2000 MG: 10 INJECTION, POWDER, FOR SOLUTION INTRAVENOUS at 18:20

## 2021-03-16 RX ADMIN — BUPROPION HYDROCHLORIDE 150 MG: 150 TABLET, EXTENDED RELEASE ORAL at 21:12

## 2021-03-16 RX ADMIN — FENTANYL CITRATE 25 MCG: 50 INJECTION INTRAMUSCULAR; INTRAVENOUS at 00:11

## 2021-03-16 RX ADMIN — VANCOMYCIN HYDROCHLORIDE 1500 MG: 5 INJECTION, POWDER, LYOPHILIZED, FOR SOLUTION INTRAVENOUS at 09:37

## 2021-03-16 RX ADMIN — KETOROLAC TROMETHAMINE 15 MG: 15 INJECTION, SOLUTION INTRAMUSCULAR; INTRAVENOUS at 04:12

## 2021-03-16 RX ADMIN — ATORVASTATIN CALCIUM 40 MG: 40 TABLET, FILM COATED ORAL at 21:12

## 2021-03-16 RX ADMIN — DILTIAZEM HYDROCHLORIDE 30 MG: 30 TABLET, FILM COATED ORAL at 23:54

## 2021-03-16 RX ADMIN — MUPIROCIN: 20 OINTMENT TOPICAL at 22:45

## 2021-03-16 RX ADMIN — CEFAZOLIN 2000 MG: 10 INJECTION, POWDER, FOR SOLUTION INTRAVENOUS at 01:26

## 2021-03-16 RX ADMIN — FENTANYL CITRATE 25 MCG: 50 INJECTION INTRAMUSCULAR; INTRAVENOUS at 23:55

## 2021-03-16 RX ADMIN — ACETAMINOPHEN 1000 MG: 500 TABLET ORAL at 03:36

## 2021-03-16 RX ADMIN — Medication 400 MG: at 09:01

## 2021-03-16 RX ADMIN — VANCOMYCIN HYDROCHLORIDE 1500 MG: 5 INJECTION, POWDER, LYOPHILIZED, FOR SOLUTION INTRAVENOUS at 21:13

## 2021-03-16 RX ADMIN — POTASSIUM CHLORIDE 20 MEQ: 29.8 INJECTION, SOLUTION INTRAVENOUS at 13:05

## 2021-03-16 RX ADMIN — ACETAMINOPHEN 1000 MG: 500 TABLET ORAL at 10:11

## 2021-03-16 RX ADMIN — OXYCODONE HYDROCHLORIDE 10 MG: 10 TABLET ORAL at 05:56

## 2021-03-16 RX ADMIN — ACETAMINOPHEN 1000 MG: 500 TABLET ORAL at 22:45

## 2021-03-16 RX ADMIN — CHLORHEXIDINE GLUCONATE 0.12% ORAL RINSE 15 ML: 1.2 LIQUID ORAL at 21:12

## 2021-03-16 RX ADMIN — FUROSEMIDE 40 MG: 10 INJECTION, SOLUTION INTRAMUSCULAR; INTRAVENOUS at 09:01

## 2021-03-16 RX ADMIN — FENTANYL CITRATE 25 MCG: 50 INJECTION INTRAMUSCULAR; INTRAVENOUS at 19:42

## 2021-03-16 RX ADMIN — KETOROLAC TROMETHAMINE 15 MG: 15 INJECTION, SOLUTION INTRAMUSCULAR; INTRAVENOUS at 16:08

## 2021-03-16 RX ADMIN — FENTANYL CITRATE 25 MCG: 50 INJECTION INTRAMUSCULAR; INTRAVENOUS at 02:37

## 2021-03-16 RX ADMIN — DOCUSATE SODIUM 50 MG AND SENNOSIDES 8.6 MG 1 TABLET: 8.6; 5 TABLET, FILM COATED ORAL at 09:01

## 2021-03-16 RX ADMIN — POTASSIUM CHLORIDE 20 MEQ: 29.8 INJECTION, SOLUTION INTRAVENOUS at 05:56

## 2021-03-16 RX ADMIN — POTASSIUM CHLORIDE 20 MEQ: 29.8 INJECTION, SOLUTION INTRAVENOUS at 23:53

## 2021-03-16 RX ADMIN — OXYCODONE HYDROCHLORIDE 10 MG: 10 TABLET ORAL at 13:04

## 2021-03-16 RX ADMIN — Medication 10 ML: at 09:39

## 2021-03-16 RX ADMIN — BUPROPION HYDROCHLORIDE 150 MG: 150 TABLET, EXTENDED RELEASE ORAL at 09:01

## 2021-03-16 RX ADMIN — CHLORHEXIDINE GLUCONATE 0.12% ORAL RINSE 15 ML: 1.2 LIQUID ORAL at 09:00

## 2021-03-16 RX ADMIN — SODIUM CHLORIDE 5.04 UNITS/HR: 9 INJECTION, SOLUTION INTRAVENOUS at 09:33

## 2021-03-16 RX ADMIN — FENTANYL CITRATE 25 MCG: 50 INJECTION INTRAMUSCULAR; INTRAVENOUS at 01:23

## 2021-03-16 RX ADMIN — ASPIRIN 325 MG: 325 TABLET, COATED ORAL at 09:01

## 2021-03-16 RX ADMIN — POTASSIUM CHLORIDE 20 MEQ: 29.8 INJECTION, SOLUTION INTRAVENOUS at 03:36

## 2021-03-16 RX ADMIN — DILTIAZEM HYDROCHLORIDE 30 MG: 30 TABLET, FILM COATED ORAL at 11:59

## 2021-03-16 RX ADMIN — DOCUSATE SODIUM 50 MG AND SENNOSIDES 8.6 MG 1 TABLET: 8.6; 5 TABLET, FILM COATED ORAL at 21:13

## 2021-03-16 RX ADMIN — MUPIROCIN: 20 OINTMENT TOPICAL at 09:02

## 2021-03-16 RX ADMIN — FUROSEMIDE 40 MG: 10 INJECTION, SOLUTION INTRAMUSCULAR; INTRAVENOUS at 18:20

## 2021-03-16 RX ADMIN — Medication 400 MG: at 21:12

## 2021-03-16 RX ADMIN — CALCIUM GLUCONATE 1000 MG: 20 INJECTION, SOLUTION INTRAVENOUS at 09:35

## 2021-03-16 RX ADMIN — INSULIN GLARGINE 15 UNITS: 100 INJECTION, SOLUTION SUBCUTANEOUS at 21:15

## 2021-03-16 RX ADMIN — PANTOPRAZOLE SODIUM 40 MG: 40 INJECTION, POWDER, FOR SOLUTION INTRAVENOUS at 09:00

## 2021-03-16 ASSESSMENT — PAIN DESCRIPTION - PAIN TYPE
TYPE: SURGICAL PAIN
TYPE: SURGICAL PAIN

## 2021-03-16 ASSESSMENT — PAIN DESCRIPTION - DESCRIPTORS
DESCRIPTORS: SORE;ACHING
DESCRIPTORS: SORE;ACHING

## 2021-03-16 ASSESSMENT — PAIN SCALES - GENERAL
PAINLEVEL_OUTOF10: 8
PAINLEVEL_OUTOF10: 7
PAINLEVEL_OUTOF10: 7
PAINLEVEL_OUTOF10: 6
PAINLEVEL_OUTOF10: 0
PAINLEVEL_OUTOF10: 0
PAINLEVEL_OUTOF10: 7
PAINLEVEL_OUTOF10: 3
PAINLEVEL_OUTOF10: 5
PAINLEVEL_OUTOF10: 8
PAINLEVEL_OUTOF10: 0
PAINLEVEL_OUTOF10: 6

## 2021-03-16 ASSESSMENT — PAIN DESCRIPTION - ORIENTATION
ORIENTATION: MID
ORIENTATION: MID

## 2021-03-16 ASSESSMENT — PAIN DESCRIPTION - LOCATION
LOCATION: STERNUM

## 2021-03-16 ASSESSMENT — PAIN DESCRIPTION - FREQUENCY
FREQUENCY: CONTINUOUS
FREQUENCY: CONTINUOUS

## 2021-03-16 NOTE — PLAN OF CARE
Problem: Pain:  Goal: Pain level will decrease  Description: Pain level will decrease  Outcome: Ongoing  Goal: Control of acute pain  Description: Control of acute pain  Outcome: Ongoing     Problem: Infection:  Goal: Will remain free from infection  Description: Will remain free from infection  Outcome: Ongoing     Problem: Safety:  Goal: Free from accidental physical injury  Description: Free from accidental physical injury  Outcome: Ongoing  Goal: Free from intentional harm  Description: Free from intentional harm  Outcome: Ongoing     Problem: Daily Care:  Goal: Daily care needs are met  Description: Daily care needs are met  Outcome: Ongoing     Problem: Skin Integrity:  Goal: Skin integrity will stabilize  Description: Skin integrity will stabilize  Outcome: Ongoing     Problem: Cardiac:  Goal: Ability to maintain vital signs within normal range will improve  Description: Ability to maintain vital signs within normal range will improve  Outcome: Ongoing     Problem: Physical Regulation:  Goal: Complications related to the disease process, condition or treatment will be avoided or minimized  Description: Complications related to the disease process, condition or treatment will be avoided or minimized  Outcome: Ongoing

## 2021-03-16 NOTE — PROGRESS NOTES
Physical Therapy    Facility/Department: [address] CVICU  Initial Assessment    NAME: Mckinley Mail  : 1980  MRN: [id number]    Date of Service: 3/16/2021    Discharge Recommendations:  Continue to assess pending progress   PT Equipment Recommendations  Other: Will monitor for potential equipt needs. Assessment   Body structures, Functions, Activity limitations: Decreased functional mobility ; Decreased endurance  Assessment: 40 y/o male admit 3/5/2021 with CAD, NSTEMI.  3/8/2021 Cardiac Cath : Stent RCA. Pre-op Diagnostics reveal Stenosis L Subclavian Artery. 3/12/2021 Stent L Subclavian Artery. 3/15/2021 S/P L Radial Vein [place], R Greater Saphenous Vein [place], AtriClip, Urgent CABG x 4, Sternal Plating. PMH as noted including CAD, NSTEMI, Angio with Stent. Pt reports that he will be going to his mother's home (1 story with basement) upon d/c; few steps to enter and resides lower level (8-10 steps to access). Will monitor pt's progress for home d/c. Prognosis: Good  Decision Making: Medium Complexity  History: 40 y/o male admit 3/5/2021 with CAD, NSTEMI.  3/8/2021 Cardiac Cath : Stent RCA. Pre-op Diagnostics reveal Stenosis L Subclavian Artery. 3/12/2021 Stent L Subclavian Artery. 3/15/2021 S/P L Radial Vein [place], R Greater Saphenous Vein [place], AtriClip, Urgent CABG x 4, Sternal Plating. PMH as noted including CAD, NSTEMI, Angio with Stent. Exam: See above. Clinical Presentation: See above. Patient Education: Role of PT, POC, Need to call for assist, Sternal Precautions/Use of Cardiac Pillow. Barriers to Learning: Alittle forgetful to cues given/needs repetition/clarification and times. REQUIRES PT FOLLOW UP: Yes  Activity Tolerance  Activity Tolerance: Patient Tolerated treatment well;Patient limited by endurance       Patient Diagnosis(es): The encounter diagnosis was NSTEMI (non-ST elevated myocardial infarction) ([facility] [address]      has a past medical history of Coronary artery disease, Essential hypertension, GERD (gastroesophageal reflux disease), Headache, NSTEMI (non-ST elevated myocardial infarction) ([facility] [address] Obesity, Other hyperlipidemia, Stenosis of left subclavian artery ([facility] [address] and Tobacco abuse counseling.   has a past surgical history that includes Mouth surgery; Coronary angioplasty with stent (05/05/2020); Coronary angioplasty with stent (04/25/2020); Coronary angioplasty (03/08/2021); vascular surgery (03/12/2021); Coronary artery bypass graft (03/15/2021); and Coronary artery bypass graft (N/A, 3/15/2021). Restrictions  Restrictions/Precautions  Restrictions/Precautions: Fall Risk  Position Activity Restriction  Sternal Precautions: 3/15/2021 S/P AtriClip, Urgent CABG x 4, Sternal Plating. Other position/activity restrictions: Arterial LIne, Pacing Wires, Chest Tube x 2, O2 4L via NC. Vision/Hearing  Vision: Impaired(Pt reports that he needs glasses.)  Hearing: Within functional limits     Subjective  General  Chart Reviewed: Yes  Patient assessed for rehabilitation services?: Yes  Additional Pertinent Hx: 42 y/o male admit 3/5/2021 with CAD, NSTEMI.  3/8/2021 Cardiac Cath : Stent RCA. Pre-op Diagnostics reveal Stenosis L Subclavian Artery. 3/12/2021 Stent L Subclavian Artery. 3/15/2021 S/P L Radial Vein [place], R Greater Saphenous Vein [place], AtriClip, Urgent CABG x 4, Sternal Plating. PMH as noted including CAD, NSTEMI, Angio with Stent. Family / Caregiver Present: No  Referring Practitioner: Dr. Gerald Boyd  Diagnosis: S/P AtriClip, Urgent CABG x 4, Sternal Plating. Follows Commands: Within Functional Limits  Subjective  Subjective: Pt agreeable to PT Eval/Rx.   Pain Screening  Patient Currently in Pain: Yes          Orientation  Orientation  Overall Orientation Status: Within Functional Limits(Alittle distractable at times.)  Social/Functional History  Social/Functional History  Lives With: Family(Pt will be staying with his mother.)  Type of Home: House  Home Layout: Two level, Laundry in basement(1 story with basement. Pt reports bed/bath on lower level where he will reside.)  Home Access: Stairs to enter without rails(1+2+1 step to enter.)  Entrance Stairs - Number of Steps: 3  Bathroom Shower/Tub: Tub/Shower unit, Walk-in shower  Bathroom Toilet: Standard  Bathroom Accessibility: Accessible  Home Equipment: (No DME pta.)  ADL Assistance: Independent  Homemaking Assistance: Independent  Ambulation Assistance: Independent  Transfer Assistance: Independent  Active : No(Pt's mother drives.)  Patient's  Info: mother drives  Occupation: Part time employment(Works for [employer].)  Additional Comments: Pt reports that he is homeless although will be going to mother's home upon d/c. Cognition   Cognition  Cognition Comment: Pt alittle forgetful cues given; needs repetition/clarification at times. Objective          AROM RLE (degrees)  RLE AROM: WFL  AROM LLE (degrees)  LLE AROM : WFL  AROM RUE (degrees)  RUE AROM : WFL  RUE General AROM: Adhere to Sternal Precautions. AROM LUE (degrees)  LUE AROM : WFL  LUE General AROM: Adhere to Sternal  Precautions. Strength RLE  Strength RLE: WFL  Strength LLE  Strength LLE: WFL     Sensation  Overall Sensation Status: WFL  Bed mobility  Supine to Sit: Unable to assess(Bed converted to chair position.)  Transfers  Sit to Stand: Minimal Assistance;2 Person Assistance(With Walker. Use of Cardiac Pillow.)  Stand to sit: Minimal Assistance;2 Person Assistance(With Walker. Use of Cardiac Pillow.)  Ambulation  Ambulation?: Yes  Ambulation 1  Surface: level tile  Device: Rolling Walker  Distance: 5-6 steps bed to chair with Moreland Vann assist x 2. No LE buckling/giving way. Guarded; fatigued following. Exercises  Quad Sets: 10x  Gluteal Sets: 10x  Ankle Pumps: 10x     Plan   Plan  Times per week: 5-6x week while in acute care setting. Specific instructions for Next Treatment: TBD based on post-op orders if needed.   Current Treatment Recommendations: Functional Mobility Training, Transfer Training, Gait Training, Stair training, Safety Education & Training, Patient/Caregiver Education & Training  Safety Devices  Type of devices: Call light within reach, Left in chair, Nurse notified      Goals  Short term goals  Time Frame for Short term goals: Upon d/c acute care setting. Short term goal 1: Bed Mob Min assist, adhere to Sternal Precautions. Short term goal 2: Transfers with/without assist device SBA/Supervision, adhere to Sternal Precautions. Short term goal 3: Amb with/without assist device 200' SBA. Short term goal 4: Stair Negotiation SBA/CGA. Patient Goals   Patient goals : D/C to parent's home. Get better.        Therapy Time   Individual Concurrent Group Co-treatment   Time In 0740         Time Out 0820         Minutes [facility], PT

## 2021-03-16 NOTE — OP NOTE
[address] [place] [address] [facility]                                OPERATIVE REPORT    PATIENT NAME: Otilio Hester                      :        1980  MED REC NO:   [id number]                          ROOM:       1301  ACCOUNT NO:   [de-identified]                           ADMIT DATE: 2021  PROVIDER:     Christopher Menendez MD      DATE OF PROCEDURE:  03/15/2021    PREOPERATIVE DIAGNOSIS:  Coronary artery disease. POSTOPERATIVE DIAGNOSIS:  Coronary artery disease. OPERATION PERFORMED:  1.  GARDENIA. 2.  Left radial endoscopic vein harvesting. 3.  Right greater saphenous endoscopic vein harvesting. 4.  Left atrial appendage exclusion. 5.  Urgent coronary artery bypass grafting x4 (LIMA to LAD, SVG to D1,  left radial of the LIMA to OM, SVG to PDA). 6.  Sternal plating ([employer]). SURGEON:  Christopher Menendez MD    ASSISTANTS:  Latricia Hobbs SA and Sourav Lenz    ANESTHESIA:  General.    INDICATIONS:  The patient is a 57-year-old gentleman with history of  coronary disease having undergone angioplasty with stent placement to  the diagonal and circumflex in 2020 and the right coronary artery in  . He presented with anginal symptoms and ruled in for  non-ST-elevation myocardial infarction. He was found to have  three-vessel coronary artery disease. He was also found to have left  subclavian stenosis and underwent stenting on 2021. Effient has  been allowed to metabolize. The patient presents today for coronary  artery bypass grafting. He is aware of the risks and possible  complications of the procedure and wishes to proceed. OPERATIVE FINDINGS:  The preprocedural GARDENIA confirmed the presence of  well-preserved left ventricular function with an ejection fraction of  50% and absence of any valvular abnormalities.   The saphenous vein  conduit was taken from the right thigh and was of good caliber and quality. The left radial artery was moderate in size and quite  substantial.  The ascending aorta was free of any palpable  calcification. The lungs were diffusely hyperinflated in keeping with  smoking history. The left internal mammary artery was a good-size  vessel with excellent flow. The coronary targets were heavily and  diffusely diseased. On the inferior wall, the right coronary artery had  palpable stents fairly distally. Therefore, the proximal posterior  descending artery was selected for grafting. The tissue planes were  very difficult to delineate. The vessel was about 1.5 to 2 mm in  diameter. On the anterior wall, the LAD was grafted in the mid portion  in one of the few soft areas available. This was about 2.5 mm in  diameter. There was patchy calcification distally. The first diagonal  was grafted just beyond a palpable and visible stent. The tissue plane  was somewhat obliterated presumably due to being in proximity to the  stent. The vessel was about 1.5 to 2 mm in diameter. On the lateral  wall, the obtuse marginal branch again had difficult to identify tissue  planes. The vessel was about 2 mm in diameter. The completion GARDENIA  showed continued good ventricular function with ejection fraction 65%. OPERATIVE PROCEDURE:  The patient was brought to the operating room and  placed supine on the operating table. General anesthesia was induced  without complication. The patient was prepped and draped in the usual  sterile fashion. After assuring that preoperative antibiotics had been  given, the left radial artery was harvested endoscopically. The  tourniquet time was 55 minutes. Simultaneously, the right greater  saphenous vein was harvested endoscopically. The wounds were closed  with 0-Vicryl and 2-0 Vicryl suture and 4-0 Monocryl running  subcuticular closure. Also simultaneously, a median sternotomy was performed.   The left  internal mammary artery was dissected off the anterior chest wall and  placed in a papaverine-soaked sponge. The pericardium was opened and a  cradle raised; 3-0 [place] ascending aortic cannulation stitches were  placed followed by a 2-0 [place] atrial cannulation stitch. After  assuring an adequate ACT, a Soft-Flow aortic cannula was introduced  followed by a triple-stage venous cannula. A retrograde cardioplegia  cannula was placed into the coronary sinus. After examining the procured conduit, the patient was placed on  cardiopulmonary bypass and the temperature cooled to 33 degrees. An  ascending aortic cardioplegia root vent was inserted. The left atrial  appendage was sized and a 35-mm AtriClip deployed. An aortic  cross-clamp was applied along with topical iced saline. 500 mL of cold  blood antegrade cardioplegia was given with prompt arrest.  This was  followed by an equivalent amount of retrograde cardioplegia. Attention was turned to the inferior wall, where the posterior  descending artery was dissected out with some difficulty and opened  longitudinally. The saphenous vein conduit was reversed and anastomosed  to it in an end-to-side fashion. The proximal anastomosis was performed  to the right side of the ascending aorta and an additional dose of  retrograde cardioplegia given. Attention was then turned to the lateral wall where again with some  difficulty, the obtuse marginal branch was dissected out and opened  longitudinally. The left radial artery conduit was spatulated  appropriately and anastomosed to it in an end-to-side fashion. An  additional dose of retrograde cardioplegia was given. Attention was turned to the diagonal branch, which was dissected out and  opened longitudinally. The remaining piece of saphenous vein was  reversed and anastomosed to it in an end-to-side fashion.   The proximal  anastomosis was performed to the left side of the ascending aorta and  then an additional dose of retrograde cardioplegia given.    The left internal mammary artery was brought through the lateral  pericardium and trimmed and spatulated appropriately. It was  anastomosed to the mid left anterior descending artery in an end-to-side  fashion. The left radial artery conduit was then trimmed and  spatulated. As the patient was systemically rewarmed, it was  anastomosed to the mammary pedicle as a T-graft. A hot shot of  cardioplegia was given, first retrograde and then antegrade. The aortic  cross-clamp was removed and there was spontaneous return of sinus  rhythm. After an appropriate recovery period, the patient was weaned  from cardiopulmonary bypass on milrinone and diltiazem drips. Protamine  sulfate was administered without complication and all cannulas were  removed. Cannulation sites were oversewn with 3-0 Prolene suture. After assuring adequate hemostasis, left pleural and mediastinal chest  tubes were placed in addition to a ventricular pacing wire. The  pericardium was closed. Platelet-poor gel was applied to the mammary  bed. The sternum was approximated with #7 sternal wires after  application of platelet-rich gel and FloSeal to the oozing bony edges. The wound was irrigated with warm antibiotic solution. 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 An  H-plate was affixed to the manubrium with 10-mm screws. An L-plate was  affixed to the superior aspect of the sternum with 10-mm screws and an  additional L-plate to the inferior sternum with 8-mm screws. The wound  was again irrigated with warm antibiotic solution and then closed in  layers with 0-Vicryl and 2-0 Vicryl suture and 4-0 Monocryl running  subcuticular closure. Sterile dressings were applied. The patient tolerated the procedure well without any complications. Cross-clamp time was 138 minutes. Cardiopulmonary bypass time was 159  minutes.  EBL N/A. The patient was transferred intubated in stable condition on  milrinone and Cardizem drips to the cardiovascular unit. Sponge and  needle count was correct at the end of the case.         Isaura Hough MD    D: 03/15/2021 13:56:49       T: 03/15/2021 14:35:24     [email or web address]  Job#: [id number]     Doc#: [id number]    CC:

## 2021-03-16 NOTE — PROGRESS NOTES
Assessment/Plan:  CV - stable in SR. Replete Ca. Transition. - cardizem 8 weeks for radial graft   - BB as bp/hr tolerates. No ACE for now since EF nl.   - statin  pulm - pulm toilet   - bupropion for smoking cessation  Renal - Cr nl. Diurese.    - retain Pena for accurate I+O  Heme - acute blood loss anemia, dilutional.   -    - arixtra  Pain - tylenol, toradol prn, oxy prn      Gonzalo Santiago MD  3/16/2021  7:30 AM

## 2021-03-16 NOTE — PROGRESS NOTES
@0078 handoff report with BELL Guillory currently on insulin drip and cardizem drip at 4. Catalan in place. Chest tube x2 to one atrium with no air leak noted. Left arm elevated on pillows. Does have decreased sensation to the under side by the wrist. Has been exercising this arm/hand. Is swollen/bruised. Is on 4-l N/C with sats 97% has been using the incentive spirometer and acapella. @1442 Dr Mely Banks rounding. New orders placed. @0800 transitioned per order. Sterile cap in place. Physical Therapy here and did get up to the chair with walker. Tolerated well. @9869 his breakfast tray here and is eating independently. @1200 remains in the chair, has been using the spirometer and acapella independently. Using the pillow for splinting. catalan draining large amounts of urine. Chest tube with appox 10cc hr of serosang drainage. @4227 did request to return to the bed. Did use 2 staff and walker. Did tolerate well. @1800 did finish his meal. Has been doing well with food and following the fluid restrictions. Feels the swelling has come down in the left hand. Has been exercising. States the feeling is better as well. @1845 resting in bed. States pain better following the toradol.   @0338 handoff report with BELL Gonzales

## 2021-03-17 LAB
ANION GAP SERPL CALCULATED.3IONS-SCNC: 9 MMOL/L (ref 3–16)
BASOPHILS ABSOLUTE: 0.1 K/UL (ref 0–0.2)
BASOPHILS RELATIVE PERCENT: 0.6 %
BUN BLDV-MCNC: 12 MG/DL (ref 7–20)
CALCIUM SERPL-MCNC: 8.4 MG/DL (ref 8.3–10.6)
CHLORIDE BLD-SCNC: 102 MMOL/L (ref 99–110)
CO2: 25 MMOL/L (ref 21–32)
CREAT SERPL-MCNC: 0.7 MG/DL (ref 0.9–1.3)
EOSINOPHILS ABSOLUTE: 0.1 K/UL (ref 0–0.6)
EOSINOPHILS RELATIVE PERCENT: 0.4 %
GFR AFRICAN AMERICAN: >60
GFR NON-AFRICAN AMERICAN: >60
GLUCOSE BLD-MCNC: 101 MG/DL (ref 70–99)
GLUCOSE BLD-MCNC: 156 MG/DL (ref 70–99)
GLUCOSE BLD-MCNC: 161 MG/DL (ref 70–99)
GLUCOSE BLD-MCNC: 247 MG/DL (ref 70–99)
GLUCOSE BLD-MCNC: 82 MG/DL (ref 70–99)
GLUCOSE BLD-MCNC: 86 MG/DL (ref 70–99)
GLUCOSE BLD-MCNC: 96 MG/DL (ref 70–99)
HCT VFR BLD CALC: 32.3 % (ref 40.5–52.5)
HEMOGLOBIN: 10.8 G/DL (ref 13.5–17.5)
INR BLD: 1.13 (ref 0.86–1.14)
LYMPHOCYTES ABSOLUTE: 2.8 K/UL (ref 1–5.1)
LYMPHOCYTES RELATIVE PERCENT: 19.5 %
MAGNESIUM: 1.8 MG/DL (ref 1.8–2.4)
MCH RBC QN AUTO: 29.9 PG (ref 26–34)
MCHC RBC AUTO-ENTMCNC: 33.4 G/DL (ref 31–36)
MCV RBC AUTO: 89.4 FL (ref 80–100)
MONOCYTES ABSOLUTE: 1.5 K/UL (ref 0–1.3)
MONOCYTES RELATIVE PERCENT: 10.4 %
NEUTROPHILS ABSOLUTE: 9.9 K/UL (ref 1.7–7.7)
NEUTROPHILS RELATIVE PERCENT: 69.1 %
PDW BLD-RTO: 14.1 % (ref 12.4–15.4)
PERFORMED ON: ABNORMAL
PERFORMED ON: NORMAL
PERFORMED ON: NORMAL
PLATELET # BLD: 143 K/UL (ref 135–450)
PMV BLD AUTO: 9.7 FL (ref 5–10.5)
POTASSIUM REFLEX MAGNESIUM: 4.4 MMOL/L (ref 3.5–5.1)
POTASSIUM SERPL-SCNC: 4.4 MMOL/L (ref 3.5–5.1)
PROTHROMBIN TIME: 13.1 SEC (ref 10–13.2)
RBC # BLD: 3.61 M/UL (ref 4.2–5.9)
SODIUM BLD-SCNC: 136 MMOL/L (ref 136–145)
WBC # BLD: 14.3 K/UL (ref 4–11)

## 2021-03-17 PROCEDURE — 94669 MECHANICAL CHEST WALL OSCILL: CPT

## 2021-03-17 PROCEDURE — 37799 UNLISTED PX VASCULAR SURGERY: CPT

## 2021-03-17 PROCEDURE — 6370000000 HC RX 637 (ALT 250 FOR IP): Performed by: NURSE PRACTITIONER

## 2021-03-17 PROCEDURE — 85025 COMPLETE CBC W/AUTO DIFF WBC: CPT

## 2021-03-17 PROCEDURE — 6360000002 HC RX W HCPCS: Performed by: THORACIC SURGERY (CARDIOTHORACIC VASCULAR SURGERY)

## 2021-03-17 PROCEDURE — 2100000000 HC CCU R&B

## 2021-03-17 PROCEDURE — 80048 BASIC METABOLIC PNL TOTAL CA: CPT

## 2021-03-17 PROCEDURE — 97110 THERAPEUTIC EXERCISES: CPT

## 2021-03-17 PROCEDURE — 97116 GAIT TRAINING THERAPY: CPT

## 2021-03-17 PROCEDURE — 83735 ASSAY OF MAGNESIUM: CPT

## 2021-03-17 PROCEDURE — 2580000003 HC RX 258: Performed by: THORACIC SURGERY (CARDIOTHORACIC VASCULAR SURGERY)

## 2021-03-17 PROCEDURE — 99024 POSTOP FOLLOW-UP VISIT: CPT | Performed by: THORACIC SURGERY (CARDIOTHORACIC VASCULAR SURGERY)

## 2021-03-17 PROCEDURE — 94761 N-INVAS EAR/PLS OXIMETRY MLT: CPT

## 2021-03-17 PROCEDURE — 2700000000 HC OXYGEN THERAPY PER DAY

## 2021-03-17 PROCEDURE — 85610 PROTHROMBIN TIME: CPT

## 2021-03-17 PROCEDURE — 6370000000 HC RX 637 (ALT 250 FOR IP): Performed by: THORACIC SURGERY (CARDIOTHORACIC VASCULAR SURGERY)

## 2021-03-17 PROCEDURE — 97530 THERAPEUTIC ACTIVITIES: CPT

## 2021-03-17 RX ORDER — POLYETHYLENE GLYCOL 3350 17 G/17G
17 POWDER, FOR SOLUTION ORAL DAILY
Status: DISCONTINUED | OUTPATIENT
Start: 2021-03-17 | End: 2021-03-19 | Stop reason: HOSPADM

## 2021-03-17 RX ADMIN — DOCUSATE SODIUM 50 MG AND SENNOSIDES 8.6 MG 1 TABLET: 8.6; 5 TABLET, FILM COATED ORAL at 21:51

## 2021-03-17 RX ADMIN — Medication 400 MG: at 21:51

## 2021-03-17 RX ADMIN — FUROSEMIDE 40 MG: 10 INJECTION, SOLUTION INTRAMUSCULAR; INTRAVENOUS at 08:14

## 2021-03-17 RX ADMIN — DOCUSATE SODIUM 50 MG AND SENNOSIDES 8.6 MG 1 TABLET: 8.6; 5 TABLET, FILM COATED ORAL at 08:14

## 2021-03-17 RX ADMIN — OXYCODONE HYDROCHLORIDE 10 MG: 10 TABLET ORAL at 10:08

## 2021-03-17 RX ADMIN — PANTOPRAZOLE SODIUM 40 MG: 40 TABLET, DELAYED RELEASE ORAL at 06:06

## 2021-03-17 RX ADMIN — ACETAMINOPHEN 1000 MG: 500 TABLET ORAL at 05:10

## 2021-03-17 RX ADMIN — INSULIN GLARGINE 15 UNITS: 100 INJECTION, SOLUTION SUBCUTANEOUS at 21:59

## 2021-03-17 RX ADMIN — OXYCODONE HYDROCHLORIDE 10 MG: 10 TABLET ORAL at 01:12

## 2021-03-17 RX ADMIN — INSULIN LISPRO 1 UNITS: 100 INJECTION, SOLUTION INTRAVENOUS; SUBCUTANEOUS at 21:59

## 2021-03-17 RX ADMIN — DILTIAZEM HYDROCHLORIDE 30 MG: 30 TABLET, FILM COATED ORAL at 12:08

## 2021-03-17 RX ADMIN — CHLORHEXIDINE GLUCONATE 0.12% ORAL RINSE 15 ML: 1.2 LIQUID ORAL at 21:51

## 2021-03-17 RX ADMIN — OXYCODONE HYDROCHLORIDE 10 MG: 10 TABLET ORAL at 06:07

## 2021-03-17 RX ADMIN — CHLORHEXIDINE GLUCONATE 0.12% ORAL RINSE 15 ML: 1.2 LIQUID ORAL at 08:14

## 2021-03-17 RX ADMIN — POTASSIUM CHLORIDE 10 MEQ: 750 TABLET, FILM COATED, EXTENDED RELEASE ORAL at 15:59

## 2021-03-17 RX ADMIN — FONDAPARINUX SODIUM 2.5 MG: 2.5 INJECTION, SOLUTION SUBCUTANEOUS at 15:59

## 2021-03-17 RX ADMIN — FENTANYL CITRATE 25 MCG: 50 INJECTION INTRAMUSCULAR; INTRAVENOUS at 03:27

## 2021-03-17 RX ADMIN — DILTIAZEM HYDROCHLORIDE 30 MG: 30 TABLET, FILM COATED ORAL at 18:03

## 2021-03-17 RX ADMIN — MUPIROCIN: 20 OINTMENT TOPICAL at 21:51

## 2021-03-17 RX ADMIN — OXYCODONE HYDROCHLORIDE 10 MG: 10 TABLET ORAL at 20:20

## 2021-03-17 RX ADMIN — SODIUM CHLORIDE 3.38 UNITS/HR: 9 INJECTION, SOLUTION INTRAVENOUS at 02:03

## 2021-03-17 RX ADMIN — FENTANYL CITRATE 25 MCG: 50 INJECTION INTRAMUSCULAR; INTRAVENOUS at 02:15

## 2021-03-17 RX ADMIN — Medication 400 MG: at 08:14

## 2021-03-17 RX ADMIN — BUPROPION HYDROCHLORIDE 150 MG: 150 TABLET, EXTENDED RELEASE ORAL at 21:51

## 2021-03-17 RX ADMIN — DILTIAZEM HYDROCHLORIDE 30 MG: 30 TABLET, FILM COATED ORAL at 06:06

## 2021-03-17 RX ADMIN — Medication 10 ML: at 08:14

## 2021-03-17 RX ADMIN — CEFAZOLIN 2000 MG: 10 INJECTION, POWDER, FOR SOLUTION INTRAVENOUS at 02:04

## 2021-03-17 RX ADMIN — Medication 10 ML: at 21:53

## 2021-03-17 RX ADMIN — POTASSIUM CHLORIDE 10 MEQ: 750 TABLET, FILM COATED, EXTENDED RELEASE ORAL at 12:08

## 2021-03-17 RX ADMIN — FUROSEMIDE 40 MG: 10 INJECTION, SOLUTION INTRAMUSCULAR; INTRAVENOUS at 18:03

## 2021-03-17 RX ADMIN — POLYETHYLENE GLYCOL 3350 17 G: 17 POWDER, FOR SOLUTION ORAL at 10:08

## 2021-03-17 RX ADMIN — ATORVASTATIN CALCIUM 40 MG: 40 TABLET, FILM COATED ORAL at 21:51

## 2021-03-17 RX ADMIN — FENTANYL CITRATE 25 MCG: 50 INJECTION INTRAMUSCULAR; INTRAVENOUS at 07:13

## 2021-03-17 RX ADMIN — BUPROPION HYDROCHLORIDE 150 MG: 150 TABLET, EXTENDED RELEASE ORAL at 08:14

## 2021-03-17 RX ADMIN — ASPIRIN 325 MG: 325 TABLET, COATED ORAL at 08:14

## 2021-03-17 RX ADMIN — FENTANYL CITRATE 25 MCG: 50 INJECTION INTRAMUSCULAR; INTRAVENOUS at 21:51

## 2021-03-17 RX ADMIN — METOPROLOL TARTRATE 12.5 MG: 25 TABLET, FILM COATED ORAL at 21:51

## 2021-03-17 RX ADMIN — INSULIN LISPRO 4 UNITS: 100 INJECTION, SOLUTION INTRAVENOUS; SUBCUTANEOUS at 12:08

## 2021-03-17 RX ADMIN — ACETAMINOPHEN 1000 MG: 500 TABLET ORAL at 10:08

## 2021-03-17 RX ADMIN — MUPIROCIN: 20 OINTMENT TOPICAL at 08:14

## 2021-03-17 RX ADMIN — METOPROLOL TARTRATE 12.5 MG: 25 TABLET, FILM COATED ORAL at 08:14

## 2021-03-17 RX ADMIN — POTASSIUM CHLORIDE 10 MEQ: 750 TABLET, FILM COATED, EXTENDED RELEASE ORAL at 08:14

## 2021-03-17 RX ADMIN — ACETAMINOPHEN 1000 MG: 500 TABLET ORAL at 15:59

## 2021-03-17 RX ADMIN — INSULIN LISPRO 2 UNITS: 100 INJECTION, SOLUTION INTRAVENOUS; SUBCUTANEOUS at 18:03

## 2021-03-17 ASSESSMENT — PAIN DESCRIPTION - PAIN TYPE: TYPE: SURGICAL PAIN

## 2021-03-17 ASSESSMENT — PAIN SCALES - GENERAL
PAINLEVEL_OUTOF10: 0
PAINLEVEL_OUTOF10: 7
PAINLEVEL_OUTOF10: 0
PAINLEVEL_OUTOF10: 7
PAINLEVEL_OUTOF10: 8
PAINLEVEL_OUTOF10: 7
PAINLEVEL_OUTOF10: 0
PAINLEVEL_OUTOF10: 0
PAINLEVEL_OUTOF10: 8
PAINLEVEL_OUTOF10: 0

## 2021-03-17 ASSESSMENT — PAIN DESCRIPTION - PROGRESSION: CLINICAL_PROGRESSION: GRADUALLY IMPROVING

## 2021-03-17 ASSESSMENT — PAIN DESCRIPTION - ORIENTATION
ORIENTATION: MID
ORIENTATION: MID

## 2021-03-17 ASSESSMENT — PAIN - FUNCTIONAL ASSESSMENT: PAIN_FUNCTIONAL_ASSESSMENT: PREVENTS OR INTERFERES SOME ACTIVE ACTIVITIES AND ADLS

## 2021-03-17 ASSESSMENT — PAIN DESCRIPTION - DESCRIPTORS: DESCRIPTORS: ACHING;DISCOMFORT;SORE

## 2021-03-17 ASSESSMENT — PAIN DESCRIPTION - LOCATION
LOCATION: STERNUM
LOCATION: STERNUM

## 2021-03-17 NOTE — CONSULTS
[address] [place] [address]  CARDIOPULMONARY PHASE I CONSULT        NAME:  Saurabh Mayen  MEDICAL RECORD NUMBER:  [id number]  AGE: 41 y.o. GENDER: male  : 1980  TODAY'S DATE:  3/17/2021    Subjective:     VISIT TYPE: evaluation     ADMITTING PHYSICIAN:  Hali Salgado MD     PAST MEDICAL HISTORY        Diagnosis Date    Coronary artery disease     Essential hypertension 2020    GERD (gastroesophageal reflux disease)     Headache     NSTEMI (non-ST elevated myocardial infarction) ([facility] [address]     20, 20, 3/5/21    Obesity     Other hyperlipidemia 2020    Stenosis of left subclavian artery ([facility] [address] 2021    stented 3/12/21    Tobacco abuse counseling 2020       SOCIAL HISTORY    Social History     Tobacco Use    Smoking status: Current Every Day Smoker     Packs/day: 1.00     Types: Cigarettes     Start date: 1989    Smokeless tobacco: Never Used    Tobacco comment: started age 13.  tried vaping age 25 when trying to quit smoking   Substance Use Topics    Alcohol use: No    Drug use: Yes     Types: Marijuana     Comment: did use age 22, last time age 26       ALLERGIES    No Known Allergies    MEDICATIONS  Scheduled Meds:   polyethylene glycol  17 g Oral Daily    dilTIAZem  30 mg Oral 4 times per day    pantoprazole  40 mg Oral QAM AC    sodium chloride flush  10 mL Intravenous 2 times per day    fondaparinux  2.5 mg Subcutaneous Daily    aspirin  325 mg Oral Daily    chlorhexidine  15 mL Mouth/Throat BID    furosemide  40 mg Intravenous BID    magnesium oxide  400 mg Oral BID    mupirocin   Nasal BID    nitroGLYCERIN  1 patch Transdermal Daily    potassium chloride  10 mEq Oral TID WC    sennosides-docusate sodium  1 tablet Oral BID    metoprolol tartrate  12.5 mg Oral BID    [Held by provider] lisinopril  2.5 mg Oral Lunch    atorvastatin  40 mg Oral Nightly    insulin glargine  0.15 Units/kg Subcutaneous Nightly    insulin lispro  0-12 Units Subcutaneous TID WC    insulin lispro  0-6 Units Subcutaneous Nightly    acetaminophen  1,000 mg Oral Q6H    buPROPion  150 mg Oral BID       ADMIT DATE: 3/5/2021      Objective:     ADMISSION DIAGNOSIS:   NSTEMI (non-ST elevated myocardial infarction) ([facility]) [I21.4]     BP (!) 113/52   Pulse 71   Temp 98.8 °F (37.1 °C) (Oral)   Resp 17   Ht 5' 9\" (1.753 m)   Wt 237 lb 14 oz (107.9 kg)   SpO2 94%   BMI 35.13 kg/m²     ADMIT:  Weight: 235 lb 14.3 oz (107 kg)    TODAY: Weight: 237 lb 14 oz (107.9 kg)    Wt Readings from Last 3 Encounters:   03/17/21 237 lb 14 oz (107.9 kg)   07/20/20 228 lb (103.4 kg)   06/12/20 227 lb 1.2 oz (103 kg)        ECHOCARDIOGRAM:     HgBA1c:  No components found for: HGBA1C  LIPID PANEL:    Lab Results   Component Value Date    CHOL 172 03/07/2021    HDL 30 03/07/2021    TRIG 244 03/07/2021        Assessment:     CONSULTS:   IP CONSULT TO CARDIOLOGY  IP CONSULT TO PHARMACY  IP CONSULT TO CARDIAC REHAB  IP CONSULT TO CASE MANAGEMENT  IP CONSULT TO SOCIAL WORK  IP CONSULT TO DIETITIAN    Patient has a CARDIOLOGY CONSULT: Yes        EDUCATION STATUS: Patient's   [x]  Provided both written and verbal education on Cardiopulmonary Rehabilitation. []  Provided instructions for smoking cessation programs. [x]  Provided education of CAD risk factors. []  Provided recommendations on activity and exercise. []  Provided education on medications. []  Provided education on coronary anatomy and coronary interventions. []  Other:    CURRENT DIET: DIET CARDIAC; Daily Fluid Restriction: 2000 ml  Dietary Nutrition Supplements: Standard High Calorie Oral Supplement, Frozen Oral Supplement    EDUCATIONAL PACKETS PROVIDED- PRINTED FROM [employer].     Titles and material given:  Yes yes  [x]  Managing Heart Disease and Preventing Stroke  []  Heart Owner's Manual  []  Living Well with Heart Failure  []  Other:     PATIENT/CAREGIVER TEACHING:   Level of patient/caregiver understanding able to:   [x] Allergies    MEDICATIONS  Scheduled Meds:   polyethylene glycol  17 g Oral Daily    dilTIAZem  30 mg Oral 4 times per day    pantoprazole  40 mg Oral QAM AC    sodium chloride flush  10 mL Intravenous 2 times per day    fondaparinux  2.5 mg Subcutaneous Daily    aspirin  325 mg Oral Daily    chlorhexidine  15 mL Mouth/Throat BID    furosemide  40 mg Intravenous BID    magnesium oxide  400 mg Oral BID    mupirocin   Nasal BID    nitroGLYCERIN  1 patch Transdermal Daily    potassium chloride  10 mEq Oral TID WC    sennosides-docusate sodium  1 tablet Oral BID    metoprolol tartrate  12.5 mg Oral BID    [Held by provider] lisinopril  2.5 mg Oral Lunch    atorvastatin  40 mg Oral Nightly    insulin glargine  0.15 Units/kg Subcutaneous Nightly    insulin lispro  0-12 Units Subcutaneous TID WC    insulin lispro  0-6 Units Subcutaneous Nightly    acetaminophen  1,000 mg Oral Q6H    buPROPion  150 mg Oral BID       ADMIT DATE: 3/5/2021      Objective:     ADMISSION DIAGNOSIS:   NSTEMI (non-ST elevated myocardial infarction) ([facility]) [I21.4]     BP (!) 113/52   Pulse 71   Temp 98.8 °F (37.1 °C) (Oral)   Resp 17   Ht 5' 9\" (1.753 m)   Wt 237 lb 14 oz (107.9 kg)   SpO2 94%   BMI 35.13 kg/m²     ADMIT:  Weight: 235 lb 14.3 oz (107 kg)    TODAY: Weight: 237 lb 14 oz (107.9 kg)    Wt Readings from Last 3 Encounters:   03/17/21 237 lb 14 oz (107.9 kg)   07/20/20 228 lb (103.4 kg)   06/12/20 227 lb 1.2 oz (103 kg)        ECHOCARDIOGRAM:    HgBA1c:  No components found for: HGBA1C  LIPID PANEL:    Lab Results   Component Value Date    CHOL 172 03/07/2021    HDL 30 03/07/2021    TRIG 244 03/07/2021        Assessment:     CONSULTS:   IP CONSULT TO CARDIOLOGY  IP CONSULT TO PHARMACY  IP CONSULT TO CARDIAC REHAB  IP CONSULT TO CASE MANAGEMENT  IP CONSULT TO SOCIAL WORK  IP CONSULT TO DIETITIAN    Patient has a CARDIOLOGY CONSULT: Yes        EDUCATION STATUS: Patient's   [x]  Provided both written and verbal education on Cardiopulmonary Rehabilitation. []  Provided instructions for smoking cessation programs. [x]  Provided education of CAD risk factors. []  Provided recommendations on activity and exercise. []  Provided education on medications. []  Provided education on coronary anatomy and coronary interventions. []  Other:    CURRENT DIET: DIET CARDIAC; Daily Fluid Restriction: 2000 ml  Dietary Nutrition Supplements: Standard High Calorie Oral Supplement, Frozen Oral Supplement    EDUCATIONAL PACKETS PROVIDED- PRINTED FROM [facility]. Titles and material given:  Yes   [x]  Managing Heart Disease and Preventing Stroke  []  Heart Owner's Manual  []  Living Well with Heart Failure  []  Other:     PATIENT/CAREGIVER TEACHING:  Level of patient/caregiver understanding able to:   [x] Verbalize understanding   [] Demonstrate understanding       [] Teach back        [] Needs reinforcement     []  Other:       TEACHING TIME:  10 minutes       Plan:       DISCHARGE PLAN:  Placement for patient upon discharge: home with support   Hospice Care:  no  Code Status: Full Code  Discharge appointment scheduled: Yes     RECOMMENDATIONS:   [x]  Patient/Family instructed to call Cardiopulmonary Rehab ([phone]) upon discharge schedule the initial evaluation  []  Encourage to call Cardiopulmonary Rehabilitation with any questions. []  Referral to alternate Cardiopulmonary Rehabilitation facility.    []  Other:    [] Appointment scheduled for   [] Chooses to not schedule at this time          Electronically signed by Miguel Deleon on 3/17/2021 at 2:36 PM

## 2021-03-17 NOTE — PROGRESS NOTES
Physical Therapy  Facility/Department: [address] CVICU  Daily Treatment Note  NAME: Lily Hernandez  : 1980  MRN: [id number]    Date of Service: 3/17/2021    Discharge Recommendations:  Continue to assess pending progress   PT Equipment Recommendations  Other: Will monitor for potential equipt needs. Assessment   Body structures, Functions, Activity limitations: Decreased functional mobility ; Decreased endurance  Assessment: 42 y/o male admit 3/5/2021 with CAD, NSTEMI.  3/8/2021 Cardiac Cath : Stent RCA. Pre-op Diagnostics reveal Stenosis L Subclavian Artery. 3/12/2021 Stent L Subclavian Artery. 3/15/2021 S/P L Radial Vein [place], R Greater Saphenous Vein [place], AtriClip, Urgent CABG x 4, Sternal Plating. PMH as noted including CAD, NSTEMI, Angio with Stent. Pt reports that he will be going to his mother's home (1 story with basement) upon d/c; few steps to enter and resides lower level (8-10 steps to access). Will need to ensure adequate family assist/support at least briefly upon d/c. Anticipate good progress with functional activities. Will monitor. Prognosis: Good  Decision Making: Medium Complexity  History: 42 y/o male admit 3/5/2021 with CAD, NSTEMI.  3/8/2021 Cardiac Cath : Stent RCA. Pre-op Diagnostics reveal Stenosis L Subclavian Artery. 3/12/2021 Stent L Subclavian Artery. 3/15/2021 S/P L Radial Vein [place], R Greater Saphenous Vein [place], AtriClip, Urgent CABG x 4, Sternal Plating. PMH as noted including CAD, NSTEMI, Angio with Stent. Exam: See above. Clinical Presentation: See above. Patient Education: Role of PT, POC, Need to call for assist, Sternal Precautions/Use of Cardiac Pillow. Barriers to Learning: Alittle forgetful to cues given/needs repetition/clarification and times.   REQUIRES PT FOLLOW UP: Yes  Activity Tolerance  Activity Tolerance: Patient Tolerated treatment well;Patient limited by endurance     Patient Diagnosis(es): The encounter diagnosis was NSTEMI (non-ST elevated myocardial infarction) ([facility] [address] has a past medical history of Coronary artery disease, Essential hypertension, GERD (gastroesophageal reflux disease), Headache, NSTEMI (non-ST elevated myocardial infarction) ([facility] [address] Obesity, Other hyperlipidemia, Stenosis of left subclavian artery ([facility] [address] and Tobacco abuse counseling.   has a past surgical history that includes Mouth surgery; Coronary angioplasty with stent (05/05/2020); Coronary angioplasty with stent (04/25/2020); Coronary angioplasty (03/08/2021); vascular surgery (03/12/2021); Coronary artery bypass graft (03/15/2021); and Coronary artery bypass graft (N/A, 3/15/2021). Restrictions  Restrictions/Precautions  Restrictions/Precautions: Fall Risk  Position Activity Restriction  Sternal Precautions: 3/15/2021 S/P AtriClip, Urgent CABG x 4, Sternal Plating. Other position/activity restrictions: Pacing Wires, Chest Tube x 2, O2 2L via NC. Subjective   General  Chart Reviewed: Yes  Additional Pertinent Hx: 42 y/o male admit 3/5/2021 with CAD, NSTEMI.  3/8/2021 Cardiac Cath : Stent RCA. Pre-op Diagnostics reveal Stenosis L Subclavian Artery. 3/12/2021 Stent L Subclavian Artery. 3/15/2021 S/P L Radial Vein [place], R Greater Saphenous Vein [place], AtriClip, Urgent CABG x 4, Sternal Plating. PMH as noted including CAD, NSTEMI, Angio with Stent. Response To Previous Treatment: Patient with no complaints from previous session. Family / Caregiver Present: No  Referring Practitioner: Dr. Aleisha Banuelos  Subjective  Subjective: Pt agreeable to PT Rx. Orientation  Orientation  Overall Orientation Status: Within Functional Limits(Alittle distractable at times.)  Cognition   Cognition  Cognition Comment: Pt alittle forgetful cues given; needs repetition/clarification at times. Objective   Bed mobility  Supine to Sit: Moderate assistance(HOB elevated. Use of Cardiac Pillow.)  Transfers  Sit to Stand: Minimal Assistance(With Walker.   Use of Cardiac Pillow.)  Stand to sit: Minimal Assistance(With Walker. Use of Cardiac Pillow.)  Ambulation  Ambulation?: Yes  Ambulation 1  Surface: level tile  Distance: Pt amb 60' x 2 with Walker Close CGA. No LE buckling/giving way. Very slow/guarded. Exercises  Quad Sets: 10x  Heelslides: 10x  Gluteal Sets: 10x  Hip Abduction: 10x  Ankle Pumps: 10x         Comment: Ongoing pt ed re : Sternal Precautions/Use of Cardiac Pillow. Very slow/requiring frequent breaks for brief coughing episodes (using Cardiac Pillow approp). Goals  Short term goals  Time Frame for Short term goals: Upon d/c acute care setting. Short term goal 1: Bed Mob Min assist, adhere to Sternal Precautions. Short term goal 2: Transfers with/without assist device SBA/Supervision, adhere to Sternal Precautions. Short term goal 3: Amb with/without assist device 200' SBA. Short term goal 4: Stair Negotiation SBA/CGA. Patient Goals   Patient goals : D/C to parent's home. Get better. Plan    Plan  Times per week: 5-6x week while in acute care setting. Specific instructions for Next Treatment: TBD based on post-op orders if needed.   Current Treatment Recommendations: Functional Mobility Training, Transfer Training, Gait Training, Stair training, Safety Education & Training, Patient/Caregiver Education & Training  Safety Devices  Type of devices: Call light within reach, Left in chair, Nurse notified     Therapy Time   Individual Concurrent Group Co-treatment   Time In 0650         Time Out 0730         Minutes [facility], PT

## 2021-03-17 NOTE — PROGRESS NOTES
Removal of pacing wires performed. Sterile dressing applied, no erythema, edema. Patient tolerated procedure well.

## 2021-03-17 NOTE — PROGRESS NOTES
Nutrition Education    Consult for Heart Healthy Diet  Educated pt on how to make heart healthy choices. Pt had a decent understanding of sodium and what foods contained high sodium. Helped pt understand the impact of high fat foods on his health and the sources. Educated pt on how to make better choices while at the dining out. Explained to pt about portion sizes and planning his meals on days he knows he will have less control over his dinner because of his work schedule. Help pt understand how to properly read food labels and food packages while grocery shopping. Pt was very interested in learning about his heart healthy diet and applying the changes he's learned. Left pt w/ the dietary phone number, packets on heart healthy diets, food labels, and sodium and fat content of common foods. 20 minutes    · Verbally reviewed information with Patient  · Educated on Heart Healthy diet- low sodium, low fat  · Written educational materials provided. · Contact name and number provided. · Refer to Patient Education activity for more details.     Electronically signed by Fernando Hinkle on 3/17/21 at 2:00 PM EDT    Contact: [phone]

## 2021-03-17 NOTE — PROGRESS NOTES
Progress Note    S/P cabgx4, JAYDE exclusion, sternal plating 3/15/21    Vital Signs:                                                 /73   Pulse 90   Temp 98.4 °F (36.9 °C) (Oral)   Resp 24   Ht 5' 9\" (1.753 m)   Wt 237 lb 14 oz (107.9 kg)   SpO2 (!) 88%   BMI 35.13 kg/m²  O2 Flow Rate (L/min): (S) 2 L/min   NSR  Admission Weight: 235 lb 14.3 oz (107 kg)  .6 kg/226 lbs    I/O:      Intake/Output Summary (Last 24 hours) at 3/17/2021 0707  Last data filed at 3/17/2021 0556  Gross per 24 hour   Intake 2448.7 ml   Output 4335 ml   Net -1886.3 ml     Chest Tube: 170, 80, 60    CV: reg, wound c/d/i  Pulm: decreased at bases  Abd: soft  Ext: warm, 1+ edema. L forearm ecchymotic, hand neuro intact. Data Review:  CBC:   Recent Labs     03/15/21  1322 03/15/21  1634 03/16/21  0505 03/17/21  0440   WBC 14.2*  --  14.4* 14.3*   HGB 11.9* 10.9* 10.9* 10.8*   HCT 35.2*  --  32.6* 32.3*   MCV 89.1  --  89.1 89.4   *  --  138 143     BMP:   Recent Labs     03/15/21  1322 03/15/21  1322 03/16/21  0505 03/16/21  1225 03/17/21  0440     --  139  --  136   K 4.0   < > 4.4  4.4 4.1 4.4  4.4     --  106  --  102   CO2 25  --  23  --  25   BUN 10  --  11  --  12   CREATININE 0.7*  --  0.7*  --  0.7*   CALCIUM 8.3  --  8.1* 8.6 8.4   MG 2.70*  --  2.10  --  1.80    < > = values in this interval not displayed. Cardiac Enzymes: No results for input(s): CKTOTAL, CKMB, CKMBINDEX, TROPONINI in the last 72 hours. PT/INR:   Recent Labs     03/15/21  1322 03/16/21  0505 03/17/21  0440   PROTIME 15.9* 13.3* 13.1   INR 1.37* 1.14 1.13     APTT:   Recent Labs     03/15/21  0320 03/15/21  1322 03/16/21  0505   APTT 57.0* 29.3 29.4       Assessment/Plan:  CV - stable in SR.    - cardizem 8 weeks for radial graft   - BB as bp/hr tolerates. No ACE for now since EF nl.   - statin  pulm - pulm toilet, wean O2   - bupropion for smoking cessation  Renal - Cr nl. Diurese.    - retain Pena for accurate I+O  Heme - acute blood loss anemia. Stable.    -    - arixtra  Pain - tylenol, toradol prn, oxy prn      Moira Coburn MD  3/17/2021  7:07 AM

## 2021-03-17 NOTE — PLAN OF CARE
Problem: Pain:  Goal: Pain level will decrease  Description: Pain level will decrease  Outcome: Ongoing  Note: A: Assess for pain using appropriate scale. Reposition/non-pharmaceutical interventions and/or medicate as needed for pain. Reassess for effectiveness of intervention. Notify physician of unmanaged pain. Goal: Control of acute pain  Description: Control of acute pain  Outcome: Ongoing     Problem: Skin Integrity:  Goal: Skin integrity will stabilize  Description: Skin integrity will stabilize  Outcome: Ongoing  Note: A: Low air loss/alternating pressure specialty mattress, assist with turn/repositioning, incontinence care, Preventative Sacral Mepilex border, off loading pressure areas, Nutritional consult, daily weight, I/O, monitor meal intake       Problem: Falls - Risk of:  Goal: Will remain free from falls  Description: Will remain free from falls  Outcome: Ongoing  Note: A: Sound com dome light, fall risk arm band, bed/chair alarm, bed in lowest position, wheels of bed/chair locked, non skid footwear, call light in reach, fall precaution education, intentional rounding. PT/OT eval/treat    Goal: Absence of physical injury  Description: Absence of physical injury  Outcome: Ongoing     Problem: Tobacco Use:  Goal: Inpatient tobacco use cessation counseling participation  Description: Inpatient tobacco use cessation counseling participation  Outcome: Ongoing  Note: A: Smoking cessation education provided. Problem: Discharge Planning:  Goal: Discharged to appropriate level of care  Description: Discharged to appropriate level of care  Outcome: Ongoing  Note: A: SW following for discharge planning. Problem: Activity Intolerance:  Goal: Able to perform prescribed physical activity  Description: Able to perform prescribed physical activity  Outcome: Ongoing  Note: A: Educate benefits of progressive activity post-operatively.  Collaborate with patient, physician, and PT/OT for progressive activity plan. Assess tolerance of activity. Goal: Ability to tolerate increased activity will improve  Description: Ability to tolerate increased activity will improve  Outcome: Ongoing     Problem: Cardiac Output - Decreased:  Goal: Cardiac output within specified parameters  Description: Cardiac output within specified parameters  Outcome: Ongoing  Note: A: Continuous cardiac telemetry monitor. VS per routine. Administer treatments and medications as ordered. Monitor patient's weight. Repeat 12 EKGs as ordered for rhythm changes or chest pain. Notify physician of hemodynamic or rhythm changes. Problem: Fluid Volume - Imbalance:  Goal: Ability to achieve a balanced intake and output will improve  Description: Ability to achieve a balanced intake and output will improve  Outcome: Ongoing  Note: A: Strict I/O, daily weight. Assess for edema. Follow ordered fluid restriction. Educate patient and family. Goal: Chest tube drainage is within specified parameters  Description: Chest tube drainage is within specified parameters  Outcome: Ongoing  Note: A: CT to suction as ordered. Assess for air leak and crepitus. Assess quality and quantity of CT drainage. Notify physician if drainage greater then ordered parameters or meets criteria for removal.       Problem: Gas Exchange - Impaired:  Goal: Levels of oxygenation will improve  Description: Levels of oxygenation will improve  Outcome: Ongoing  Note: A: SpO2 monitoring, titrate O2 to keep SpO2 >90%. Encourage cough/deep breath, IS and Acapella. Collaborate with RT. Progressive activity plan. Goal: Ability to maintain adequate ventilation will improve  Description: Ability to maintain adequate ventilation will improve  Outcome: Ongoing  Note: A: Assess ability to cough and maintain clear airway. Assess respiratory rate, effort and pattern. Encourage IS, acapella and cough/deep breathing exercises. Assess for STEWARD, SOB and accessory muscle use. Problem: Tissue Perfusion - Cardiopulmonary, Altered:  Goal: Will show no evidence of cardiac arrhythmias  Description: Will show no evidence of cardiac arrhythmias  Outcome: Ongoing  Note: A: Continuous cardiac rhythm monitor. Temporary pacing wires in place as needed for bradycardia. Initiate CTS arrhythmia protocol orders for Amiodarone if patient converts to arterial fib. Problem: Urinary Retention:  Goal: Absence of urinary tract infection signs and symptoms  Description: Absence of urinary tract infection signs and symptoms  Outcome: Ongoing  Note: A: Assess need for continued use. Assess and educate for signs/symptoms of infection. Monitor quality and quantity of urine output. Stat lock in place, drainage bag hanging below level of bladder, routine catheter care. Problem: Bowel Function - Altered:  Goal: Bowel function will improve to within specified parameters  Description: Bowel function will improve to within specified parameters  Outcome: Ongoing  Goal: Active bowel sounds  Description: Active bowel sounds  Outcome: Ongoing  Note: A: Assess bowel sounds, bowel function and characteristic of stool. Administer stool softener/laxative as ordered. Problem: Infection - Risk of, Surgical Site:  Goal: Demonstration of wound healing without infection will improve  Description: Demonstration of wound healing without infection will improve  Outcome: Ongoing  Note: A: Routine surgical site care. Assess surgical site and educate for sign/symptoms of infection. Goal: Ability to maintain appropriate glucose levels will improve to within specified parameters  Description: Ability to maintain appropriate glucose levels will improve to within specified parameters  Outcome: Ongoing  Note: A: Monitor FSBS per order. Corrective dose coverage as ordered. Assess and education for signs/symptoms of hypo/hyperglycemia. Notify physician for hypo/hyperglycemia events.        Problem: Infection - Central Venous Catheter-Associated Bloodstream Infection:  Goal: Will show no infection signs and symptoms  Description: Will show no infection signs and symptoms  Outcome: Ongoing  Note: A: Assess need for continued use. Assess and educate for signs/symptoms of infection. Occlusive dressing with antimicrobial patch in place. Alcohol swab cap on unused port. Aseptic technique when accessing ports.

## 2021-03-18 LAB
ANION GAP SERPL CALCULATED.3IONS-SCNC: 10 MMOL/L (ref 3–16)
BASOPHILS ABSOLUTE: 0.1 K/UL (ref 0–0.2)
BASOPHILS RELATIVE PERCENT: 0.8 %
BUN BLDV-MCNC: 16 MG/DL (ref 7–20)
CALCIUM SERPL-MCNC: 8.7 MG/DL (ref 8.3–10.6)
CHLORIDE BLD-SCNC: 97 MMOL/L (ref 99–110)
CO2: 29 MMOL/L (ref 21–32)
CREAT SERPL-MCNC: 0.6 MG/DL (ref 0.9–1.3)
EKG ATRIAL RATE: 73 BPM
EKG DIAGNOSIS: NORMAL
EKG P AXIS: 56 DEGREES
EKG P-R INTERVAL: 174 MS
EKG Q-T INTERVAL: 402 MS
EKG QRS DURATION: 104 MS
EKG QTC CALCULATION (BAZETT): 442 MS
EKG R AXIS: 70 DEGREES
EKG T AXIS: 55 DEGREES
EKG VENTRICULAR RATE: 73 BPM
EOSINOPHILS ABSOLUTE: 0.1 K/UL (ref 0–0.6)
EOSINOPHILS RELATIVE PERCENT: 1.1 %
GFR AFRICAN AMERICAN: >60
GFR NON-AFRICAN AMERICAN: >60
GLUCOSE BLD-MCNC: 118 MG/DL (ref 70–99)
GLUCOSE BLD-MCNC: 132 MG/DL (ref 70–99)
GLUCOSE BLD-MCNC: 134 MG/DL (ref 70–99)
HCT VFR BLD CALC: 32.1 % (ref 40.5–52.5)
HEMOGLOBIN: 10.8 G/DL (ref 13.5–17.5)
INR BLD: 1.1 (ref 0.86–1.14)
LYMPHOCYTES ABSOLUTE: 2.7 K/UL (ref 1–5.1)
LYMPHOCYTES RELATIVE PERCENT: 24 %
MAGNESIUM: 1.9 MG/DL (ref 1.8–2.4)
MCH RBC QN AUTO: 30.1 PG (ref 26–34)
MCHC RBC AUTO-ENTMCNC: 33.8 G/DL (ref 31–36)
MCV RBC AUTO: 89.2 FL (ref 80–100)
MONOCYTES ABSOLUTE: 1 K/UL (ref 0–1.3)
MONOCYTES RELATIVE PERCENT: 9.1 %
NEUTROPHILS ABSOLUTE: 7.4 K/UL (ref 1.7–7.7)
NEUTROPHILS RELATIVE PERCENT: 65 %
PDW BLD-RTO: 13.9 % (ref 12.4–15.4)
PERFORMED ON: ABNORMAL
PERFORMED ON: ABNORMAL
PLATELET # BLD: 159 K/UL (ref 135–450)
PMV BLD AUTO: 9.6 FL (ref 5–10.5)
POTASSIUM REFLEX MAGNESIUM: 4 MMOL/L (ref 3.5–5.1)
POTASSIUM SERPL-SCNC: 4 MMOL/L (ref 3.5–5.1)
PROTHROMBIN TIME: 12.8 SEC (ref 10–13.2)
RBC # BLD: 3.59 M/UL (ref 4.2–5.9)
SODIUM BLD-SCNC: 136 MMOL/L (ref 136–145)
WBC # BLD: 11.4 K/UL (ref 4–11)

## 2021-03-18 PROCEDURE — 6370000000 HC RX 637 (ALT 250 FOR IP): Performed by: THORACIC SURGERY (CARDIOTHORACIC VASCULAR SURGERY)

## 2021-03-18 PROCEDURE — 80048 BASIC METABOLIC PNL TOTAL CA: CPT

## 2021-03-18 PROCEDURE — 99024 POSTOP FOLLOW-UP VISIT: CPT | Performed by: THORACIC SURGERY (CARDIOTHORACIC VASCULAR SURGERY)

## 2021-03-18 PROCEDURE — 6360000002 HC RX W HCPCS: Performed by: NURSE PRACTITIONER

## 2021-03-18 PROCEDURE — 85610 PROTHROMBIN TIME: CPT

## 2021-03-18 PROCEDURE — 2100000000 HC CCU R&B

## 2021-03-18 PROCEDURE — 32551 INSERTION OF CHEST TUBE: CPT

## 2021-03-18 PROCEDURE — 97116 GAIT TRAINING THERAPY: CPT

## 2021-03-18 PROCEDURE — 6370000000 HC RX 637 (ALT 250 FOR IP): Performed by: NURSE PRACTITIONER

## 2021-03-18 PROCEDURE — 93005 ELECTROCARDIOGRAM TRACING: CPT | Performed by: THORACIC SURGERY (CARDIOTHORACIC VASCULAR SURGERY)

## 2021-03-18 PROCEDURE — 2580000003 HC RX 258: Performed by: THORACIC SURGERY (CARDIOTHORACIC VASCULAR SURGERY)

## 2021-03-18 PROCEDURE — 85025 COMPLETE CBC W/AUTO DIFF WBC: CPT

## 2021-03-18 PROCEDURE — 93010 ELECTROCARDIOGRAM REPORT: CPT | Performed by: INTERNAL MEDICINE

## 2021-03-18 PROCEDURE — 6360000002 HC RX W HCPCS: Performed by: THORACIC SURGERY (CARDIOTHORACIC VASCULAR SURGERY)

## 2021-03-18 PROCEDURE — 97530 THERAPEUTIC ACTIVITIES: CPT

## 2021-03-18 PROCEDURE — 36592 COLLECT BLOOD FROM PICC: CPT

## 2021-03-18 PROCEDURE — 83735 ASSAY OF MAGNESIUM: CPT

## 2021-03-18 RX ORDER — OXYCODONE HYDROCHLORIDE 5 MG/1
2.5 TABLET ORAL EVERY 4 HOURS PRN
Status: DISCONTINUED | OUTPATIENT
Start: 2021-03-18 | End: 2021-03-19 | Stop reason: HOSPADM

## 2021-03-18 RX ORDER — LANOLIN ALCOHOL/MO/W.PET/CERES
400 CREAM (GRAM) TOPICAL 3 TIMES DAILY
Status: DISCONTINUED | OUTPATIENT
Start: 2021-03-18 | End: 2021-03-19 | Stop reason: HOSPADM

## 2021-03-18 RX ORDER — POTASSIUM CHLORIDE 750 MG/1
20 TABLET, FILM COATED, EXTENDED RELEASE ORAL
Status: DISCONTINUED | OUTPATIENT
Start: 2021-03-18 | End: 2021-03-19 | Stop reason: HOSPADM

## 2021-03-18 RX ORDER — DILTIAZEM HYDROCHLORIDE 120 MG/1
120 CAPSULE, COATED, EXTENDED RELEASE ORAL DAILY
Status: DISCONTINUED | OUTPATIENT
Start: 2021-03-19 | End: 2021-03-19 | Stop reason: HOSPADM

## 2021-03-18 RX ORDER — OXYCODONE HYDROCHLORIDE 5 MG/1
5 TABLET ORAL EVERY 4 HOURS PRN
Status: DISCONTINUED | OUTPATIENT
Start: 2021-03-18 | End: 2021-03-19 | Stop reason: HOSPADM

## 2021-03-18 RX ORDER — FUROSEMIDE 10 MG/ML
40 INJECTION INTRAMUSCULAR; INTRAVENOUS 2 TIMES DAILY
Status: DISCONTINUED | OUTPATIENT
Start: 2021-03-18 | End: 2021-03-19 | Stop reason: HOSPADM

## 2021-03-18 RX ORDER — FENTANYL CITRATE 50 UG/ML
12.5 INJECTION, SOLUTION INTRAMUSCULAR; INTRAVENOUS
Status: DISCONTINUED | OUTPATIENT
Start: 2021-03-18 | End: 2021-03-19 | Stop reason: HOSPADM

## 2021-03-18 RX ADMIN — MUPIROCIN: 20 OINTMENT TOPICAL at 08:07

## 2021-03-18 RX ADMIN — Medication 400 MG: at 08:06

## 2021-03-18 RX ADMIN — Medication 10 ML: at 08:08

## 2021-03-18 RX ADMIN — DILTIAZEM HYDROCHLORIDE 30 MG: 30 TABLET, FILM COATED ORAL at 17:40

## 2021-03-18 RX ADMIN — ACETAMINOPHEN 1000 MG: 500 TABLET ORAL at 09:10

## 2021-03-18 RX ADMIN — DILTIAZEM HYDROCHLORIDE 30 MG: 30 TABLET, FILM COATED ORAL at 05:39

## 2021-03-18 RX ADMIN — POTASSIUM CHLORIDE 10 MEQ: 750 TABLET, FILM COATED, EXTENDED RELEASE ORAL at 08:07

## 2021-03-18 RX ADMIN — DILTIAZEM HYDROCHLORIDE 30 MG: 30 TABLET, FILM COATED ORAL at 11:56

## 2021-03-18 RX ADMIN — Medication 400 MG: at 13:46

## 2021-03-18 RX ADMIN — CHLORHEXIDINE GLUCONATE 0.12% ORAL RINSE 15 ML: 1.2 LIQUID ORAL at 08:05

## 2021-03-18 RX ADMIN — ATORVASTATIN CALCIUM 40 MG: 40 TABLET, FILM COATED ORAL at 20:17

## 2021-03-18 RX ADMIN — FENTANYL CITRATE 25 MCG: 50 INJECTION INTRAMUSCULAR; INTRAVENOUS at 14:59

## 2021-03-18 RX ADMIN — METOPROLOL TARTRATE 6.25 MG: 25 TABLET, FILM COATED ORAL at 20:17

## 2021-03-18 RX ADMIN — Medication 10 ML: at 20:19

## 2021-03-18 RX ADMIN — POTASSIUM CHLORIDE 20 MEQ: 750 TABLET, FILM COATED, EXTENDED RELEASE ORAL at 16:55

## 2021-03-18 RX ADMIN — KETOROLAC TROMETHAMINE 15 MG: 15 INJECTION, SOLUTION INTRAMUSCULAR; INTRAVENOUS at 13:25

## 2021-03-18 RX ADMIN — CHLORHEXIDINE GLUCONATE 0.12% ORAL RINSE 15 ML: 1.2 LIQUID ORAL at 20:18

## 2021-03-18 RX ADMIN — KETOROLAC TROMETHAMINE 15 MG: 15 INJECTION, SOLUTION INTRAMUSCULAR; INTRAVENOUS at 20:11

## 2021-03-18 RX ADMIN — POTASSIUM CHLORIDE 20 MEQ: 750 TABLET, FILM COATED, EXTENDED RELEASE ORAL at 11:56

## 2021-03-18 RX ADMIN — ACETAMINOPHEN 1000 MG: 500 TABLET ORAL at 16:54

## 2021-03-18 RX ADMIN — BUPROPION HYDROCHLORIDE 150 MG: 150 TABLET, EXTENDED RELEASE ORAL at 08:06

## 2021-03-18 RX ADMIN — PANTOPRAZOLE SODIUM 40 MG: 40 TABLET, DELAYED RELEASE ORAL at 05:39

## 2021-03-18 RX ADMIN — DILTIAZEM HYDROCHLORIDE 30 MG: 30 TABLET, FILM COATED ORAL at 00:02

## 2021-03-18 RX ADMIN — FUROSEMIDE 40 MG: 10 INJECTION, SOLUTION INTRAMUSCULAR; INTRAVENOUS at 08:07

## 2021-03-18 RX ADMIN — OXYCODONE HYDROCHLORIDE 10 MG: 10 TABLET ORAL at 10:32

## 2021-03-18 RX ADMIN — ACETAMINOPHEN 1000 MG: 500 TABLET ORAL at 05:39

## 2021-03-18 RX ADMIN — FENTANYL CITRATE 25 MCG: 50 INJECTION INTRAMUSCULAR; INTRAVENOUS at 05:16

## 2021-03-18 RX ADMIN — ASPIRIN 325 MG: 325 TABLET, COATED ORAL at 08:06

## 2021-03-18 RX ADMIN — OXYCODONE HYDROCHLORIDE 10 MG: 10 TABLET ORAL at 00:25

## 2021-03-18 RX ADMIN — FONDAPARINUX SODIUM 2.5 MG: 2.5 INJECTION, SOLUTION SUBCUTANEOUS at 08:06

## 2021-03-18 RX ADMIN — MUPIROCIN: 20 OINTMENT TOPICAL at 20:18

## 2021-03-18 RX ADMIN — FUROSEMIDE 40 MG: 10 INJECTION, SOLUTION INTRAMUSCULAR; INTRAVENOUS at 16:55

## 2021-03-18 RX ADMIN — BUPROPION HYDROCHLORIDE 150 MG: 150 TABLET, EXTENDED RELEASE ORAL at 20:17

## 2021-03-18 RX ADMIN — FENTANYL CITRATE 25 MCG: 50 INJECTION INTRAMUSCULAR; INTRAVENOUS at 02:52

## 2021-03-18 RX ADMIN — Medication 400 MG: at 20:17

## 2021-03-18 RX ADMIN — METOPROLOL TARTRATE 6.25 MG: 25 TABLET, FILM COATED ORAL at 08:07

## 2021-03-18 ASSESSMENT — PAIN DESCRIPTION - PAIN TYPE: TYPE: SURGICAL PAIN

## 2021-03-18 ASSESSMENT — PAIN SCALES - GENERAL
PAINLEVEL_OUTOF10: 9
PAINLEVEL_OUTOF10: 7
PAINLEVEL_OUTOF10: 5
PAINLEVEL_OUTOF10: 9

## 2021-03-18 ASSESSMENT — PAIN DESCRIPTION - DESCRIPTORS
DESCRIPTORS: ACHING
DESCRIPTORS: ACHING;DISCOMFORT

## 2021-03-18 ASSESSMENT — PAIN - FUNCTIONAL ASSESSMENT: PAIN_FUNCTIONAL_ASSESSMENT: PREVENTS OR INTERFERES SOME ACTIVE ACTIVITIES AND ADLS

## 2021-03-18 ASSESSMENT — PAIN DESCRIPTION - LOCATION
LOCATION: STERNUM
LOCATION: STERNUM

## 2021-03-18 ASSESSMENT — PAIN DESCRIPTION - ONSET: ONSET: ON-GOING

## 2021-03-18 ASSESSMENT — PAIN DESCRIPTION - PROGRESSION
CLINICAL_PROGRESSION: GRADUALLY WORSENING
CLINICAL_PROGRESSION: GRADUALLY WORSENING

## 2021-03-18 ASSESSMENT — PAIN DESCRIPTION - ORIENTATION
ORIENTATION: MID

## 2021-03-18 NOTE — PLAN OF CARE
Problem: Pain:  Goal: Control of chronic pain  Description: Control of chronic pain  Outcome: Ongoing  Goal: Patient's pain/discomfort is manageable  Description: Patient's pain/discomfort is manageable  Outcome: Ongoing     Problem: Infection:  Goal: Will remain free from infection  Description: Will remain free from infection  Outcome: Ongoing     Problem: Safety:  Goal: Free from accidental physical injury  Description: Free from accidental physical injury  Outcome: Ongoing  Goal: Free from intentional harm  Description: Free from intentional harm  Outcome: Ongoing     Problem: Daily Care:  Goal: Daily care needs are met  Description: Daily care needs are met  Outcome: Ongoing     Problem: Skin Integrity:  Goal: Skin integrity will stabilize  Description: Skin integrity will stabilize  Outcome: Ongoing

## 2021-03-18 NOTE — PROGRESS NOTES
1015--Handoff report with Sona Saez RN. Pt A&Ox4 in bed. Pt C/O surgical pain in chest. States he would like pain medication. (See MAR). 1340--Pt walked length of pritchard x2 with RN. Pt tolerated fairly well. Returned to chair. 1510--CT removed by Nilton Carter. Pt tolerated well. Pena also removed. 1730--Pt walked in pritchard with RN. Pt tolerated well. Pt returned to chair. 1900--Handoff report given to Mildred Jin RN. Pt A&OX4 in chair.      Electronically signed by Juliana Huitron RN on 3/18/2021 at 7:13 PM

## 2021-03-18 NOTE — PROGRESS NOTES
@0700 handoff with BELL Gonzales  @0730 up to the bathroom did have small loose stool. Did refuse his stool softners this am.  @0800 did have his breakfast while up in the chair. @4003 requested to return to the bed.    @0900 right ij triple lumen removed per Kyra Escobar, CNP sterile dressing in place  @1015 handoff report with [facility]

## 2021-03-18 NOTE — DISCHARGE INSTR - COC
Continuity of Care Form    Patient Name: Chirag Livingston   :  1980  MRN:  [id number]    Admit date:  3/5/2021  Discharge date:  3-    Code Status Order: Full Code   Advance Directives:   Advance Care Flowsheet Documentation       Date/Time Healthcare Directive Type of Healthcare Directive Copy in [address] Agent's Name Healthcare Agent's Phone Number    03/15/21 0516  No, patient does not have an advance directive for healthcare treatment -- -- -- -- --    21 0625  No, patient does not have an advance directive for healthcare treatment -- -- -- -- --            Admitting Physician:  Antonio Avendano MD  PCP: Latha Boyer DO    Discharging Nurse: [facility] Unit/Room#: J6D-8642/2567-15  Discharging Unit Phone Number: [phone]    Emergency Contact:   Extended Emergency Contact Information  Primary Emergency Contact: Mara Gonzales  Address: [address] [place] [address] [place] [address] Phone: [phone]  Relation: Parent  Secondary Emergency Contact: Case Barrientos  [employer] Phone: [phone]  Relation: Aunt/Uncle  Preferred language: Tamiko Maldonado   needed? No    Past Surgical History:  Past Surgical History:   Procedure Laterality Date    CORONARY ANGIOPLASTY  2021    PTCA RCA    CORONARY ANGIOPLASTY WITH STENT PLACEMENT  2020    MI RCA    CORONARY ANGIOPLASTY WITH STENT PLACEMENT  2020    MI Diag, LCx    CORONARY ARTERY BYPASS GRAFT  03/15/2021    cabgx4 (LIMA-LAD, SVG-D1, L radial off LIMA-OM, SVG-PDA), JAYDE exclusion, sternal plating    CORONARY ARTERY BYPASS GRAFT N/A 3/15/2021    TRANSESOPHAGEAL ECHOCARDIOGRAM, TOTAL CARDIO PULMONARY BYPASS, CORONARY ARTERY BYPASS GRAFTING X 4 (VEIN X 1, ARTERY X 4 ) USING LEFT INTERNAL MAMMARY ARTERY, LEFT ENDOSCOPICALLY HARVESTED RADIAL ARTERY, ENDOSCOPICALLY HARVESTED RIGHT SAPHENOUS VEIN.  LEFT ATRIAL APPENDAGE CLIPPING USING SIZE 35 ATRICLIP performed by Gunjan Guzman MD at [address] [place] [address] Avenue      wisdom teeth removed    VASCULAR SURGERY  03/12/2021    L subclavian artery stenting       Immunization History:   Immunization History   Administered Date(s) Administered    Td, unspecified formulation 06/13/2011    Tdap (Boostrix, Adacel) 01/07/2020       Active Problems:  Patient Active Problem List   Diagnosis Code    Essential hypertension I10    Other hyperlipidemia E78.49    Tobacco abuse counseling Z71.6    Acute coronary syndrome (HCC) I24.9    SIRS (systemic inflammatory response syndrome) ([facility]) R65.10    NSTEMI (non-ST elevated myocardial infarction) ([facility] [address] I21.4    CAD in native artery I25.10    Tobacco abuse Z72.0    Left leg numbness R20.0    Dyslipidemia E78.5    Subclavian artery stenosis, left (HCC) I77.1    S/P CABG x 4 Z95.1    S/P left atrial appendage ligation Z98.890       Isolation/Infection:   Isolation            No Isolation          Patient Infection Status       Infection Onset Added Last Indicated Last Indicated By Review Planned Expiration Resolved Resolved By    None active    Resolved    COVID-19 Rule Out 03/12/21 03/12/21 03/12/21 COVID-19, Rapid (Ordered)   03/12/21 Rule-Out Test Resulted    COVID-19 Rule Out 03/08/21 03/08/21 03/08/21 COVID-19, Rapid (Ordered)   03/08/21 Rule-Out Test Resulted    COVID-19 Rule Out 06/30/20 06/30/20 06/30/20 COVID-19 Ambulatory (Ordered)   07/03/20 Rule-Out Test Resulted    COVID-19 Rule Out 04/25/20 04/25/20 04/25/20 COVID-19 (Ordered)   04/25/20 Rule-Out Test Resulted            Nurse Assessment:  Last Vital Signs: /65   Pulse 80   Temp 98.7 °F (37.1 °C) (Oral)   Resp 18   Ht 5' 9\" (1.753 m)   Wt 235 lb 7.2 oz (106.8 kg)   SpO2 94%   BMI 34.77 kg/m²     Last documented pain score (0-10 scale): Pain Level: 8  Last Weight:   Wt Readings from Last 1 Encounters:   03/18/21 235 lb 7.2 oz (106.8 kg)     Mental Status:  oriented and alert    IV Access:  - None    Nursing Mobility/ADLs:  Walking   Independent  Transfer  Independent  Bathing  Independent  Dressing  Independent  Toileting  Independent  Feeding  Independent  Med Admin  Independent  Med Delivery   whole    Wound Care Documentation and Therapy:  Puncture 03/12/21 Pelvis Anterior;Right (Active)   Number of days: 5        Elimination:  Continence:   · Bowel: Yes  · Bladder: Yes  Urinary Catheter: None   Colostomy/Ileostomy/Ileal Conduit: No       Date of Last BM: 3/18/2021    Intake/Output Summary (Last 24 hours) at 3/18/2021 1017  Last data filed at 3/18/2021 0841  Gross per 24 hour   Intake 1090 ml   Output 4210 ml   Net -3120 ml     I/O last 3 completed shifts: In: 1260 [P.O.:1260]  Out: 9848 [Urine:4725; Chest Tube:320]    Safety Concerns:     None    Impairments/Disabilities:      None    Nutrition Therapy:  Current Nutrition Therapy:   - Oral Diet:  Cardiac and Low Sodium (2gm)    Routes of Feeding: Oral  Liquids: No Restrictions  Daily Fluid Restriction: yes - amount 2000  Last Modified Barium Swallow with Video (Video Swallowing Test): not done    Treatments at the Time of Hospital Discharge:   Respiratory Treatments: None  Oxygen Therapy:  is not on home oxygen therapy.   Ventilator:    - No ventilator support    Rehab Therapies: None  Weight Bearing Status/Restrictions: No weight bearing restirctions  Other Medical Equipment (for information only, NOT a DME order):  None  Other Treatments: None    Patient's personal belongings (please select all that are sent with patient):  None    RN SIGNATURE:  Electronically signed by Oleksandr Ayala RN on 3/19/21 at 10:16 AM EDT    CASE MANAGEMENT/SOCIAL WORK SECTION    Inpatient Status Date: 3-5-2021    Readmission Risk Assessment Score:  Readmission Risk              Risk of Unplanned Readmission:        16           Discharging to Facility/ Agency   · Name:     [address] [place] [address]  · Address:  · Phone: [phone]  · Fax:          [phone]      / signature: [facility] [place]     Case Management   [phone]    3/19/2021  9:30 AM      PHYSICIAN SECTION    Prognosis: Good    Condition at Discharge: Stable    Rehab Potential (if transferring to Rehab):     Recommended Labs or Other Treatments After Discharge: Home health nurse    Physician Certification: I certify the above information and transfer of Asia Grimes  is necessary for the continuing treatment of the diagnosis listed and that he requires [address] for less 30 days.      Update Admission H&P: No change in H&P    PHYSICIAN SIGNATURE:  Electronically signed by LACHELLE Chavez CNP on 3/19/21 at 08:49 AM EDT

## 2021-03-18 NOTE — CARE COORDINATION
LIZAW reviewed chart. LSW met with patient to introduce  role and to discuss DC planning. LSW informed him that Dr Eliud Levy recommends 24 hour care for one week at home for monitoring and for emergent needs. He reports he is going to stay at his mother's address where she and her sister do not work and will be able to care for him. WHile in the room, he called his mother to verify address:  [place]  [place], [address]    His cell phone:  [phone]. The two of them were in a disagreement of where he would sleep as he wants to sleep in a recliner chair and she wants him to sleep in a bed. Discussion held regarding Recommendation for a visiting RN. LSW provided him with in network list of agencies under his [employer] as well as CMS star rated list of agencies with education given regarding CMS star rating. He chose the following agencies:    [employer], [employer]. He will have a ride home from his mother/aunt. He would like to have his medications filled at [facility]. He is interested in [facility] paperwork for his employer. LSW asked that he call his employer to obtain the paperwork that they are seeking from the MD and have them email to him. Call placed to Flakita Vega at [phone] who gave me fax number or email address for [facility] paperwork. LSW provided this information to the patient.   Fito Frank, [facility]     Case Management   [phone]    3/18/2021  12:29 PM

## 2021-03-18 NOTE — PROGRESS NOTES
Progress Note    S/P cabgx4, JAYDE exclusion, sternal plating 3/15/21    Vital Signs:                                                 BP (!) 106/56   Pulse 66   Temp 98.4 °F (36.9 °C) (Oral)   Resp 18   Ht 5' 9\" (1.753 m)   Wt 235 lb 7.2 oz (106.8 kg)   SpO2 96%   BMI 34.77 kg/m²  O2 Flow Rate (L/min): 2 L/min   NSR  Admission Weight: 235 lb 14.3 oz (107 kg)  .6 kg/226 lbs    I/O:      Intake/Output Summary (Last 24 hours) at 3/18/2021 0711  Last data filed at 3/18/2021 0600  Gross per 24 hour   Intake 1260 ml   Output 5045 ml   Net -3785 ml     Chest Tube: 210, 40, 70    CV: reg, wound c/d/i  Pulm: decreased at bases  Abd: soft  Ext: warm, 1+ edema. L forearm ecchymotic, hand neuro intact. Data Review:  CBC:   Recent Labs     03/16/21  0505 03/17/21  0440 03/18/21  0500   WBC 14.4* 14.3* 11.4*   HGB 10.9* 10.8* 10.8*   HCT 32.6* 32.3* 32.1*   MCV 89.1 89.4 89.2    143 159     BMP:   Recent Labs     03/16/21  0505 03/16/21  1225 03/17/21  0440 03/18/21  0500     --  136 136   K 4.4  4.4 4.1 4.4  4.4 4.0  4.0     --  102 97*   CO2 23  --  25 29   BUN 11  --  12 16   CREATININE 0.7*  --  0.7* 0.6*   CALCIUM 8.1* 8.6 8.4 8.7   MG 2.10  --  1.80 1.90     Cardiac Enzymes: No results for input(s): CKTOTAL, CKMB, CKMBINDEX, TROPONINI in the last 72 hours. PT/INR:   Recent Labs     03/16/21  0505 03/17/21  0440 03/18/21  0500   PROTIME 13.3* 13.1 12.8   INR 1.14 1.13 1.10     APTT:   Recent Labs     03/15/21  1322 03/16/21  0505   APTT 29.3 29.4       Assessment/Plan:  CV - stable in SR.    - cardizem 8 weeks for radial graft. Switch to CD 3/19.   - BB as bp/hr tolerates. No ACE for now since EF nl.   - statin  pulm - pulm toilet, wean O2   - bupropion for smoking cessation   - remove chest tubes if meet criteria  Renal - Cr nl. Diurese. - retain Pena for accurate I+O, remove when chest tubes removed  Heme - acute blood loss anemia. Stable.    -    - arixtra  Pain - tylenol, toradol prn, oxy prn  dispo - d/c planning      Mook Wilson MD  3/18/2021  7:11 AM

## 2021-03-18 NOTE — PLAN OF CARE
Problem: Pain:  Description: Pain management should include both nonpharmacologic and pharmacologic interventions. Goal: Pain level will decrease  Description: Pain level will decrease  Outcome: Ongoing     Problem: Safety:  Goal: Free from accidental physical injury  Description: Free from accidental physical injury  3/18/2021 1439 by Gayatri Ortiz RN  Outcome: Ongoing     Problem: Daily Care:  Goal: Daily care needs are met  Description: Daily care needs are met  3/18/2021 1439 by Gayatri Ortiz RN  Outcome: Ongoing     Problem: Skin Integrity:  Goal: Skin integrity will stabilize  Description: Skin integrity will stabilize  3/18/2021 1439 by Gayatri Ortiz RN  Outcome: Ongoing     Problem: Discharge Planning:  Goal: Patients continuum of care needs are met  Description: Patients continuum of care needs are met  Outcome: Ongoing     Problem: Cardiac:  Goal: Ability to maintain vital signs within normal range will improve  Description: Ability to maintain vital signs within normal range will improve  Outcome: Ongoing     Problem:  Activity Intolerance:  Goal: Able to perform prescribed physical activity  Description: Able to perform prescribed physical activity  Outcome: Ongoing  Note: Able to walk in pritchard today without having to sit to take a break     Problem: Gas Exchange - Impaired:  Goal: Levels of oxygenation will improve  Description: Levels of oxygenation will improve  Outcome: Ongoing     Problem: Infection - Central Venous Catheter-Associated Bloodstream Infection:  Goal: Will show no infection signs and symptoms  Description: Will show no infection signs and symptoms  Outcome: Ongoing

## 2021-03-18 NOTE — PROGRESS NOTES
Physical Therapy  Facility/Department: [address] CVICU  Daily Treatment Note  NAME: Trae Lewis  : 1980  MRN: [id number]    Date of Service: 3/18/2021    Discharge Recommendations:  Continue to assess pending progress   PT Equipment Recommendations  Other: Will monitor for potential equipt needs. Assessment   Body structures, Functions, Activity limitations: Decreased functional mobility ; Decreased endurance  Assessment: 42 y/o male admit 3/5/2021 with CAD, NSTEMI.  3/8/2021 Cardiac Cath : Stent RCA. Pre-op Diagnostics reveal Stenosis L Subclavian Artery. 3/12/2021 Stent L Subclavian Artery. 3/15/2021 S/P L Radial Vein [place], R Greater Saphenous Vein [place], AtriClip, Urgent CABG x 4, Sternal Plating. PMH as noted including CAD, NSTEMI, Angio with Stent. Pt reports that he will be going to his mother's home (1 story with basement) upon d/c; few steps to enter and resides lower level (8-10 steps to access). Pt reports adequate family assist/support upon d/c. Pt making good progress with PT Goals. Do not anticipate need cont PT Services upon d/c. Will monitor. Prognosis: Good  Decision Making: Medium Complexity  History: 42 y/o male admit 3/5/2021 with CAD, NSTEMI.  3/8/2021 Cardiac Cath : Stent RCA. Pre-op Diagnostics reveal Stenosis L Subclavian Artery. 3/12/2021 Stent L Subclavian Artery. 3/15/2021 S/P L Radial Vein [place], R Greater Saphenous Vein [place], AtriClip, Urgent CABG x 4, Sternal Plating. PMH as noted including CAD, NSTEMI, Angio with Stent. Exam: See above. Clinical Presentation: See above. Patient Education: Role of PT, POC, Need to call for assist, Sternal Precautions/Use of Cardiac Pillow. Barriers to Learning: None. REQUIRES PT FOLLOW UP: Yes  Activity Tolerance  Activity Tolerance: Patient Tolerated treatment well     Patient Diagnosis(es): The encounter diagnosis was NSTEMI (non-ST elevated myocardial infarction) ([facility] [address]      has a past medical history of Coronary artery disease, Essential hypertension, GERD (gastroesophageal reflux disease), Headache, NSTEMI (non-ST elevated myocardial infarction) ([facility] [address] Obesity, Other hyperlipidemia, Stenosis of left subclavian artery ([facility] [address] and Tobacco abuse counseling.   has a past surgical history that includes Mouth surgery; Coronary angioplasty with stent (05/05/2020); Coronary angioplasty with stent (04/25/2020); Coronary angioplasty (03/08/2021); vascular surgery (03/12/2021); Coronary artery bypass graft (03/15/2021); and Coronary artery bypass graft (N/A, 3/15/2021). Restrictions  Restrictions/Precautions  Restrictions/Precautions: Fall Risk  Position Activity Restriction  Sternal Precautions: 3/15/2021 S/P AtriClip, Urgent CABG x 4, Sternal Plating. Other position/activity restrictions: Chest Tube x 2, O2 2L via NC. Subjective   General  Chart Reviewed: Yes  Additional Pertinent Hx: 40 y/o male admit 3/5/2021 with CAD, NSTEMI.  3/8/2021 Cardiac Cath : Stent RCA. Pre-op Diagnostics reveal Stenosis L Subclavian Artery. 3/12/2021 Stent L Subclavian Artery. 3/15/2021 S/P L Radial Vein [place], R Greater Saphenous Vein [place], AtriClip, Urgent CABG x 4, Sternal Plating. PMH as noted including CAD, NSTEMI, Angio with Stent. Response To Previous Treatment: Patient with no complaints from previous session. Family / Caregiver Present: No  Referring Practitioner: Dr. Michelle Reinoso  Subjective  Subjective: Pt agreeable to PT Rx. Orientation  Orientation  Overall Orientation Status: Within Functional Limits  Cognition   Cognition  Overall Cognitive Status: WFL  Objective   Bed mobility  Supine to Sit: Minimal assistance(HOB elevated. Use of Cardiac Pillow.)  Transfers  Sit to Stand: Contact guard assistance(With Walker. Use of Cardiac Pillow.)  Stand to sit: Contact guard assistance(With Walker.   Use of Cardiac Pillow.)  Ambulation  Ambulation?: Yes  Ambulation 1  Surface: level tile  Distance: Pt amb 150' x 2 with Walker SBA/Slight CGA initially. No LE buckling/giving way. Slow/guarded although improving as distance progressed. No specific LOB noted. Comment: Ongoing pt ed re : Sternal Precautions/Use of Cardiac Pillow. Good adhere to Sternal Precautions/Use of Cardiac Pillow. AM-PAC Score  AM-PAC Inpatient Mobility Raw Score : 18 (03/18/21 0745)  AM-PAC Inpatient T-Scale Score : 43.63 (03/18/21 0745)  Mobility Inpatient CMS 0-100% Score: 46.58 (03/18/21 0745)  Mobility Inpatient CMS G-Code Modifier : CK (03/18/21 0745)          Goals  Short term goals  Time Frame for Short term goals: Upon d/c acute care setting. Short term goal 1: Bed Mob Min assist, adhere to Sternal Precautions. Short term goal 2: Transfers with/without assist device SBA/Supervision, adhere to Sternal Precautions. Short term goal 3: Amb with/without assist device 200' SBA. Short term goal 4: Stair Negotiation SBA/CGA. Patient Goals   Patient goals : D/C to parent's home. Get better. Plan    Plan  Times per week: 5-6x week while in acute care setting. Specific instructions for Next Treatment: TBD based on post-op orders if needed.   Current Treatment Recommendations: Functional Mobility Training, Transfer Training, Gait Training, Stair training, Safety Education & Training, Patient/Caregiver Education & Training  Safety Devices  Type of devices: Call light within reach, Left in chair, Nurse notified     Therapy Time   Individual Concurrent Group Co-treatment   Time In 0645         Time Out 0715         Minutes [address], PT

## 2021-03-18 NOTE — CARE COORDINATION
Anjel Coleman from [facility] called to state they do not accept [place] MEDICAID. REferral sent to [facility].   [place]     Case Management   [phone]    3/18/2021  1:54 PM

## 2021-03-18 NOTE — CARE COORDINATION
LSW spoke with [facility] who reports their [employer] is actually [facility]. REferral made to [address] who reports they are not in network any longer with [place]. Referral sent to [facility] to [place]  [phone] who will call back.   [place], [place]     Case Management   [phone]    3/18/2021  1:25 PM

## 2021-03-19 ENCOUNTER — TELEPHONE (OUTPATIENT)
Dept: FAMILY MEDICINE CLINIC | Age: 41
End: 2021-03-19

## 2021-03-19 ENCOUNTER — TELEPHONE (OUTPATIENT)
Dept: CARDIOTHORACIC SURGERY | Age: 41
End: 2021-03-19

## 2021-03-19 VITALS
TEMPERATURE: 98.2 F | DIASTOLIC BLOOD PRESSURE: 58 MMHG | SYSTOLIC BLOOD PRESSURE: 108 MMHG | RESPIRATION RATE: 16 BRPM | BODY MASS INDEX: 34.22 KG/M2 | WEIGHT: 231.04 LBS | HEIGHT: 69 IN | OXYGEN SATURATION: 92 % | HEART RATE: 79 BPM

## 2021-03-19 LAB
ANION GAP SERPL CALCULATED.3IONS-SCNC: 13 MMOL/L (ref 3–16)
BUN BLDV-MCNC: 25 MG/DL (ref 7–20)
CALCIUM SERPL-MCNC: 9 MG/DL (ref 8.3–10.6)
CHLORIDE BLD-SCNC: 98 MMOL/L (ref 99–110)
CO2: 26 MMOL/L (ref 21–32)
CREAT SERPL-MCNC: 0.7 MG/DL (ref 0.9–1.3)
GFR AFRICAN AMERICAN: >60
GFR NON-AFRICAN AMERICAN: >60
GLUCOSE BLD-MCNC: 100 MG/DL (ref 70–99)
HCT VFR BLD CALC: 33.2 % (ref 40.5–52.5)
HEMOGLOBIN: 11.4 G/DL (ref 13.5–17.5)
INR BLD: 1.11 (ref 0.86–1.14)
MAGNESIUM: 2.2 MG/DL (ref 1.8–2.4)
MCH RBC QN AUTO: 30.6 PG (ref 26–34)
MCHC RBC AUTO-ENTMCNC: 34.4 G/DL (ref 31–36)
MCV RBC AUTO: 89 FL (ref 80–100)
PDW BLD-RTO: 13.8 % (ref 12.4–15.4)
PLATELET # BLD: 194 K/UL (ref 135–450)
PMV BLD AUTO: 9.1 FL (ref 5–10.5)
POTASSIUM SERPL-SCNC: 3.9 MMOL/L (ref 3.5–5.1)
PROTHROMBIN TIME: 12.9 SEC (ref 10–13.2)
RBC # BLD: 3.74 M/UL (ref 4.2–5.9)
SODIUM BLD-SCNC: 137 MMOL/L (ref 136–145)
WBC # BLD: 10.8 K/UL (ref 4–11)

## 2021-03-19 PROCEDURE — 6360000002 HC RX W HCPCS: Performed by: THORACIC SURGERY (CARDIOTHORACIC VASCULAR SURGERY)

## 2021-03-19 PROCEDURE — 94760 N-INVAS EAR/PLS OXIMETRY 1: CPT

## 2021-03-19 PROCEDURE — 99024 POSTOP FOLLOW-UP VISIT: CPT | Performed by: THORACIC SURGERY (CARDIOTHORACIC VASCULAR SURGERY)

## 2021-03-19 PROCEDURE — 97530 THERAPEUTIC ACTIVITIES: CPT

## 2021-03-19 PROCEDURE — 6370000000 HC RX 637 (ALT 250 FOR IP): Performed by: NURSE PRACTITIONER

## 2021-03-19 PROCEDURE — 83735 ASSAY OF MAGNESIUM: CPT

## 2021-03-19 PROCEDURE — 6370000000 HC RX 637 (ALT 250 FOR IP): Performed by: THORACIC SURGERY (CARDIOTHORACIC VASCULAR SURGERY)

## 2021-03-19 PROCEDURE — 2580000003 HC RX 258: Performed by: THORACIC SURGERY (CARDIOTHORACIC VASCULAR SURGERY)

## 2021-03-19 PROCEDURE — 85027 COMPLETE CBC AUTOMATED: CPT

## 2021-03-19 PROCEDURE — 36415 COLL VENOUS BLD VENIPUNCTURE: CPT

## 2021-03-19 PROCEDURE — 94669 MECHANICAL CHEST WALL OSCILL: CPT

## 2021-03-19 PROCEDURE — 6360000002 HC RX W HCPCS: Performed by: NURSE PRACTITIONER

## 2021-03-19 PROCEDURE — 85610 PROTHROMBIN TIME: CPT

## 2021-03-19 PROCEDURE — 80048 BASIC METABOLIC PNL TOTAL CA: CPT

## 2021-03-19 PROCEDURE — 97116 GAIT TRAINING THERAPY: CPT

## 2021-03-19 RX ORDER — OXYCODONE HYDROCHLORIDE 5 MG/1
2.5 TABLET ORAL EVERY 4 HOURS PRN
Qty: 15 TABLET | Refills: 0 | Status: SHIPPED | OUTPATIENT
Start: 2021-03-19 | End: 2021-03-19

## 2021-03-19 RX ORDER — BUPROPION HYDROCHLORIDE 150 MG/1
150 TABLET, EXTENDED RELEASE ORAL 2 TIMES DAILY
Qty: 60 TABLET | Refills: 0 | Status: SHIPPED | OUTPATIENT
Start: 2021-03-19

## 2021-03-19 RX ORDER — SENNA AND DOCUSATE SODIUM 50; 8.6 MG/1; MG/1
1 TABLET, FILM COATED ORAL 2 TIMES DAILY
Qty: 30 TABLET | Refills: 0 | Status: SHIPPED | OUTPATIENT
Start: 2021-03-19 | End: 2021-07-30 | Stop reason: SDUPTHER

## 2021-03-19 RX ORDER — OXYCODONE HYDROCHLORIDE 5 MG/1
2.5 TABLET ORAL EVERY 4 HOURS PRN
Qty: 15 TABLET | Refills: 0 | Status: SHIPPED | OUTPATIENT
Start: 2021-03-19 | End: 2021-03-24

## 2021-03-19 RX ORDER — OXYCODONE HYDROCHLORIDE 5 MG/1
2.5 TABLET ORAL EVERY 4 HOURS PRN
Qty: 28 TABLET | Refills: 0 | Status: SHIPPED | OUTPATIENT
Start: 2021-03-19 | End: 2021-03-19

## 2021-03-19 RX ORDER — DILTIAZEM HYDROCHLORIDE 120 MG/1
120 CAPSULE, COATED, EXTENDED RELEASE ORAL DAILY
Qty: 30 CAPSULE | Refills: 1 | Status: SHIPPED | OUTPATIENT
Start: 2021-03-19 | End: 2021-07-30 | Stop reason: SDUPTHER

## 2021-03-19 RX ADMIN — MUPIROCIN: 20 OINTMENT TOPICAL at 08:24

## 2021-03-19 RX ADMIN — OXYCODONE 5 MG: 5 TABLET ORAL at 08:25

## 2021-03-19 RX ADMIN — FONDAPARINUX SODIUM 2.5 MG: 2.5 INJECTION, SOLUTION SUBCUTANEOUS at 08:24

## 2021-03-19 RX ADMIN — PANTOPRAZOLE SODIUM 40 MG: 40 TABLET, DELAYED RELEASE ORAL at 06:06

## 2021-03-19 RX ADMIN — POLYETHYLENE GLYCOL 3350 17 G: 17 POWDER, FOR SOLUTION ORAL at 08:24

## 2021-03-19 RX ADMIN — BUPROPION HYDROCHLORIDE 150 MG: 150 TABLET, EXTENDED RELEASE ORAL at 08:25

## 2021-03-19 RX ADMIN — ACETAMINOPHEN 1000 MG: 500 TABLET ORAL at 00:41

## 2021-03-19 RX ADMIN — Medication 10 ML: at 08:31

## 2021-03-19 RX ADMIN — Medication 400 MG: at 08:25

## 2021-03-19 RX ADMIN — POTASSIUM CHLORIDE 20 MEQ: 750 TABLET, FILM COATED, EXTENDED RELEASE ORAL at 08:25

## 2021-03-19 RX ADMIN — ACETAMINOPHEN 1000 MG: 500 TABLET ORAL at 12:21

## 2021-03-19 RX ADMIN — FUROSEMIDE 40 MG: 10 INJECTION, SOLUTION INTRAMUSCULAR; INTRAVENOUS at 08:26

## 2021-03-19 RX ADMIN — DILTIAZEM HYDROCHLORIDE 120 MG: 120 CAPSULE, COATED, EXTENDED RELEASE ORAL at 08:25

## 2021-03-19 RX ADMIN — ACETAMINOPHEN 1000 MG: 500 TABLET ORAL at 06:06

## 2021-03-19 RX ADMIN — ASPIRIN 325 MG: 325 TABLET, COATED ORAL at 08:25

## 2021-03-19 RX ADMIN — OXYCODONE 5 MG: 5 TABLET ORAL at 02:35

## 2021-03-19 RX ADMIN — CHLORHEXIDINE GLUCONATE 0.12% ORAL RINSE 15 ML: 1.2 LIQUID ORAL at 08:25

## 2021-03-19 RX ADMIN — METOPROLOL TARTRATE 6.25 MG: 25 TABLET, FILM COATED ORAL at 08:26

## 2021-03-19 RX ADMIN — DOCUSATE SODIUM 50 MG AND SENNOSIDES 8.6 MG 1 TABLET: 8.6; 5 TABLET, FILM COATED ORAL at 08:25

## 2021-03-19 ASSESSMENT — PAIN SCALES - GENERAL
PAINLEVEL_OUTOF10: 4
PAINLEVEL_OUTOF10: 6
PAINLEVEL_OUTOF10: 8
PAINLEVEL_OUTOF10: 7
PAINLEVEL_OUTOF10: 5
PAINLEVEL_OUTOF10: 6

## 2021-03-19 ASSESSMENT — PAIN DESCRIPTION - ORIENTATION: ORIENTATION: MID

## 2021-03-19 ASSESSMENT — PAIN DESCRIPTION - ONSET: ONSET: ON-GOING

## 2021-03-19 ASSESSMENT — PAIN DESCRIPTION - LOCATION: LOCATION: STERNUM

## 2021-03-19 ASSESSMENT — PAIN DESCRIPTION - PAIN TYPE: TYPE: SURGICAL PAIN

## 2021-03-19 ASSESSMENT — PAIN DESCRIPTION - PROGRESSION: CLINICAL_PROGRESSION: GRADUALLY WORSENING

## 2021-03-19 NOTE — TELEPHONE ENCOUNTER
Harper from [facility] calling to confirm Dr. Era Richard will sign home health orders for skilled nursing following hospitalization for open heart surgery. The first they can get out for a Start of Care is Monday. Please return call and advise.

## 2021-03-19 NOTE — TELEPHONE ENCOUNTER
Confirmed w/Harper at [facility] dr Surya Gomes will sign home care orders directly related to CT Surgery. Confirmed w/Idalia byrd to have initial in home visit Monday 22April.

## 2021-03-19 NOTE — PLAN OF CARE
Problem: Pain:  Goal: Pain level will decrease  Description: Pain level will decrease  Outcome: Ongoing  Note: Assessed for pain utilizing appropriate scale. Reposition/non-pharmacological interventions and/or medicate as needed, notify physician for unmanaged pain. Reassess for effectiveness of medication.        Problem: Infection:  Goal: Will remain free from infection  Description: Will remain free from infection  Outcome: Ongoing     Problem: Safety:  Goal: Free from accidental physical injury  Description: Free from accidental physical injury  Outcome: Ongoing     Problem: Cardiac:  Goal: Ability to maintain vital signs within normal range will improve  Description: Ability to maintain vital signs within normal range will improve  Outcome: Ongoing

## 2021-03-19 NOTE — PROGRESS NOTES
Progress Note    S/P cabgx4, JAYDE exclusion, sternal plating 3/15/21    Vital Signs:                                                 /80   Pulse 79   Temp 98 °F (36.7 °C) (Oral)   Resp 16   Ht 5' 9\" (1.753 m)   Wt 231 lb 0.7 oz (104.8 kg)   SpO2 91%   BMI 34.12 kg/m²  O2 Flow Rate (L/min): 0 L/min   NSR  Admission Weight: 235 lb 14.3 oz (107 kg)  .6 kg/226 lbs    I/O:      Intake/Output Summary (Last 24 hours) at 3/19/2021 0714  Last data filed at 3/19/2021 0400  Gross per 24 hour   Intake 1500 ml   Output 2300 ml   Net -800 ml     CV: reg, wound c/d/i  Pulm: decreased at bases  Abd: soft  Ext: warm, 1+ edema. L forearm ecchymotic, hand neuro intact. Data Review:  CBC:   Recent Labs     03/17/21 0440 03/18/21  0500 03/19/21  0416   WBC 14.3* 11.4* 10.8   HGB 10.8* 10.8* 11.4*   HCT 32.3* 32.1* 33.2*   MCV 89.4 89.2 89.0    159 194     BMP:   Recent Labs     03/17/21 0440 03/18/21  0500 03/19/21  0416    136 137   K 4.4  4.4 4.0  4.0 3.9    97* 98*   CO2 25 29 26   BUN 12 16 25*   CREATININE 0.7* 0.6* 0.7*   CALCIUM 8.4 8.7 9.0   MG 1.80 1.90 2.20     Cardiac Enzymes: No results for input(s): CKTOTAL, CKMB, CKMBINDEX, TROPONINI in the last 72 hours. PT/INR:   Recent Labs     03/17/21 0440 03/18/21  0500 03/19/21  0416   PROTIME 13.1 12.8 12.9   INR 1.13 1.10 1.11     APTT:   No results for input(s): APTT in the last 72 hours. Assessment/Plan:  CV - stable in SR.    - cardizem 8 weeks for radial graft.   - BB as bp/hr tolerates. No ACE for now since EF nl.   - statin  pulm - pulm toilet, off O2   - bupropion for smoking cessation  Renal - Cr nl. Diurese. Getting close to preop weight. Heme - acute blood loss anemia. Stable.    -    - arixtra  Pain - tylenol, toradol prn, oxy prn  dispo - d/c home today      Leslie Aj MD  3/19/2021  7:14 AM

## 2021-03-19 NOTE — PROGRESS NOTES
(gastroesophageal reflux disease), Headache, NSTEMI (non-ST elevated myocardial infarction) ([facility] [address] Obesity, Other hyperlipidemia, Stenosis of left subclavian artery ([facility] [address] and Tobacco abuse counseling.   has a past surgical history that includes Mouth surgery; Coronary angioplasty with stent (05/05/2020); Coronary angioplasty with stent (04/25/2020); Coronary angioplasty (03/08/2021); vascular surgery (03/12/2021); Coronary artery bypass graft (03/15/2021); and Coronary artery bypass graft (N/A, 3/15/2021). Restrictions  Restrictions/Precautions  Restrictions/Precautions: Fall Risk  Position Activity Restriction  Sternal Precautions: 3/15/2021 S/P AtriClip, Urgent CABG x 4, Sternal Plating. Other position/activity restrictions: Chest Tube x 2, O2 2L via NC. Subjective   General  Chart Reviewed: Yes  Additional Pertinent Hx: 42 y/o male admit 3/5/2021 with CAD, NSTEMI.  3/8/2021 Cardiac Cath : Stent RCA. Pre-op Diagnostics reveal Stenosis L Subclavian Artery. 3/12/2021 Stent L Subclavian Artery. 3/15/2021 S/P L Radial Vein [place], R Greater Saphenous Vein [place], AtriClip, Urgent CABG x 4, Sternal Plating. PMH as noted including CAD, NSTEMI, Angio with Stent. Response To Previous Treatment: Patient with no complaints from previous session. Family / Caregiver Present: No  Referring Practitioner: Dr. Eliud Levy  Subjective  Subjective: Pt agreeable to PT Rx. Feeling better. Orientation  Orientation  Overall Orientation Status: Within Functional Limits  Cognition   Cognition  Overall Cognitive Status: WFL  Objective   Bed mobility  Supine to Sit: Independent  Sit to Supine: Independent  Comment: Bed Mob Independent adhere to Sternal Precautions/Use of Cardiac Pillow. Transfers  Sit to Stand: Independent  Stand to sit: Independent  Comment: Transfers Independent adhere to Sternal Precautions/Use of Cardiac Pillow.   Ambulation  Ambulation?: Yes  Ambulation 1  Surface: level tile  Device: No Device  Distance: Pt amb 500' without assist device Independently. No deviations or LOB noted. Good radha. Stairs/Curb  Stairs?: Yes(4)  Stairs  Rails: Right ascending  Assistance: Independent  Comment: Good adhere Sternal Precautions during use of handrail. Comment: Ongoing pt ed re : Sternal Precautions/Use of Cardiac Pillow. Good adhere to Sternal Precautions/Use of Cardiac Pillow. AM-PAC Score  AM-PAC Inpatient Mobility Raw Score : 24 (03/19/21 0716)  AM-PAC Inpatient T-Scale Score : 61.14 (03/19/21 0716)  Mobility Inpatient CMS 0-100% Score: 0 (03/19/21 0716)  Mobility Inpatient CMS G-Code Modifier : [facility] (03/19/21 0774)          Goals  Short term goals  Time Frame for Short term goals: Upon d/c acute care setting. Short term goal 1: Bed Mob Min assist, adhere to Sternal Precautions. 3/19 Goal met. Short term goal 2: Transfers with/without assist device SBA/Supervision, adhere to Sternal Precautions. 3/19 Goal met. Short term goal 3: Amb with/without assist device 200' SBA.  3/19 Goal met. Short term goal 4: Stair Negotiation SBA/CGA. 3/19 Goal met. Patient Goals   Patient goals : D/C to parent's home. Get better. Plan    Plan  Times per week: D/C Acute Care PT Services. Specific instructions for Next Treatment: TBD based on post-op orders if needed.   Current Treatment Recommendations: Functional Mobility Training, Transfer Training, Gait Training, Stair training, Safety Education & Training, Patient/Caregiver Education & Training  Safety Devices  Type of devices: Call light within reach, Left in bed, Nurse notified     Therapy Time   Individual Concurrent Group Co-treatment   Time In 0640         Time Out 0710         Minutes [address], PT

## 2021-03-19 NOTE — DISCHARGE SUMMARY
DISCHARGE SUMMARY    Admission Date:  3/5/2021  4:01 AM  Discharge Date:      PCP:  Huan Abdul DO      Cards:  Tamy    Principle Diagnosis:  NSTEMI, CAD    Past Medical History:  Past Medical History:   Diagnosis Date    Coronary artery disease     Essential hypertension 04/07/2020    GERD (gastroesophageal reflux disease)     Headache     NSTEMI (non-ST elevated myocardial infarction) ([facility] [address]     4/25/20, 5/5/20, 3/5/21    Obesity     Other hyperlipidemia 04/07/2020    Stenosis of left subclavian artery ([facility] [address] 03/2021    stented 3/12/21    Tobacco abuse counseling 04/07/2020       Past Surgical History:  Past Surgical History:   Procedure Laterality Date    CORONARY ANGIOPLASTY  03/08/2021    PTCA RCA    CORONARY ANGIOPLASTY WITH STENT PLACEMENT  05/05/2020    MI RCA    CORONARY ANGIOPLASTY WITH STENT PLACEMENT  04/25/2020    MI Diag, LCx    CORONARY ARTERY BYPASS GRAFT  03/15/2021    cabgx4 (LIMA-LAD, SVG-D1, L radial off LIMA-OM, SVG-PDA), JAYDE exclusion, sternal plating    CORONARY ARTERY BYPASS GRAFT N/A 3/15/2021    TRANSESOPHAGEAL ECHOCARDIOGRAM, TOTAL CARDIO PULMONARY BYPASS, CORONARY ARTERY BYPASS GRAFTING X 4 (VEIN X 1, ARTERY X 4 ) USING LEFT INTERNAL MAMMARY ARTERY, LEFT ENDOSCOPICALLY HARVESTED RADIAL ARTERY, ENDOSCOPICALLY HARVESTED RIGHT SAPHENOUS VEIN. LEFT ATRIAL APPENDAGE CLIPPING USING SIZE 35 ATRICLIP performed by oMira Coburn MD at [address] [place] [address] teeth removed    VASCULAR SURGERY  03/12/2021    L subclavian artery stenting       Procedure:  3/15/21  1. GARDENIA. 2.  Left radial endoscopic vein harvesting. 3.  Right greater saphenous endoscopic vein harvesting. 4.  Left atrial appendage exclusion. 5.  Urgent coronary artery bypass grafting x4 (LIMA to LAD, SVG to D1,  left radial of the LIMA to OM, SVG to PDA). 6.  Sternal plating ([employer] SternaLock).     History:  The patient is a 41 y.o. male with hx CAD/NSTEMI - 4/25/20 MI diag, LCx; 5/5/20 MI RCA. On ASA, statin, BB, Imdur, ACE, Effient. Presented to ED 3/5/21 - chest pain, 3/10, pressure type, similar to prior, took ntg with partial relief. Hospital Course: The patient underwent   1.  GARDENIA. 2.  Left radial endoscopic vein harvesting. 3.  Right greater saphenous endoscopic vein harvesting. 4.  Left atrial appendage exclusion. 5.  Urgent coronary artery bypass grafting x4 (LIMA to LAD, SVG to D1,  left radial of the LIMA to OM, SVG to PDA). 6.  Sternal plating (Biomet SternaLock) on 3/15/21    CV       - stable in SR.               - cardizem 8 weeks for radial graft.              - BB as bp/hr tolerates. No ACE for now since EF nl.              - statin  pulm    - pulm toilet, off O2              - bupropion for smoking cessation  Renal   - Cr nl. Diurese. Getting close to preop weight. Heme   - acute blood loss anemia. Stable.               -               - arixtra  Pain     - tylenol, toradol prn, oxy prn  dispo    - d/c home today      Weight:   Admission Weight: 235 lb 14.3 oz (107 kg)  Day before surgery: 226 lb 3.1 oz  Day of discharge Weight: 231 lb 0.7 oz (104.8 kg)    Recent Labs     03/17/21 0440 03/18/21  0500 03/19/21  0416   WBC 14.3* 11.4* 10.8   HGB 10.8* 10.8* 11.4*   HCT 32.3* 32.1* 33.2*   MCV 89.4 89.2 89.0    159 194     Recent Labs     03/17/21  0440 03/18/21  0500 03/19/21  0416    136 137   K 4.4  4.4 4.0  4.0 3.9    97* 98*   CO2 25 29 26   BUN 12 16 25*   CREATININE 0.7* 0.6* 0.7*   CALCIUM 8.4 8.7 9.0   MG 1.80 1.90 2.20     Recent Labs     03/17/21  0440 03/18/21  0500 03/19/21  0416   PROTIME 13.1 12.8 12.9   INR 1.13 1.10 1.11       Disposition: stable    Patient Instructions:   Chana Zepeda   Home Medication Instructions [id number]    Printed on:03/19/21 0836   Medication Information                      aspirin 325 MG EC tablet  Take 1 tablet by mouth daily             atorvastatin (LIPITOR) 80 MG tablet  Take 1

## 2021-03-19 NOTE — PROGRESS NOTES
0700: Report received from Dominick Simmons, rounding with CTS at this time, plan for discharge today, patient aware  0820: Shift assessment completed at this time. VSS on room air, patient with c/o sternal pain. Oxycodone given per prn orders. Patient eating breakfast, up ad robert in room, using sternal precautions appropriately, calling mother who will be his ride home for discharge  1100: Patient up ad robert, requesting to take shower, shower linens provided and education regarding sternal precautions while showering were given, patient verbalized understanding. 1230: Discharge instructions reviewed with patient and family member. Patient and family verbalized understanding. All home medications have been reviewed, questions answered and patient voiced understanding. All medication side effects reviewed and patient and family verbalized understanding. Follow up appointment(s) reviewed with patient and all attempts made to schedule within 7-10 days of discharge. Patient given prescriptions, discharge instructions, and appointment times. Patient discharged to home with family via private car. Taken to lobby via wheelchair.     Electronically signed by Oleksandr Ayala RN on 3/19/2021 at 12:51 PM

## 2021-03-19 NOTE — CARE COORDINATION
SPENCER spoke with Angelina Hough NP about DC for today. She report he will need an RN visit for follow up. Call placed to Francesco Lira at [facility] who reports they will see him on Monday. LIZAW spoke with patient to review. SPENCER informed him that [facility] is run by [email or web address] in [address] [place] [address]. He agrees with this plan. His mother is to transport him home. LIZAW informed him that his meds were sent to [facility] and will be delivered to his room. The Plan for Transition of Care is related to the following treatment goals: To continue with his follow up nursing visit in home setting. The Patient  was provided with a choice of provider and agrees   with the discharge plan. [x] Yes [] No    Freedom of choice list was provided with basic dialogue that supports the patient's individualized plan of care/goals, treatment preferences and shares the quality data associated with the providers.  [x] Yes [] No

## 2021-03-19 NOTE — PROGRESS NOTES
@3975- Handoff with Radha Rosen RN. Pt had uneventful shift, VSS, A&OX4. Pt ambulated length of pritchard x6 with RN using walker, tolerated well. Previous CT site dressing changed per protocol, incision clean, dry and intact. Surrounding skin pink and dry.

## 2021-03-20 ENCOUNTER — CARE COORDINATION (OUTPATIENT)
Dept: CASE MANAGEMENT | Age: 41
End: 2021-03-20

## 2021-03-20 NOTE — CARE COORDINATION
[facility] Transitions Initial Follow Up Call    Call within 2 business days of discharge: Yes    Patient: Ana Holloway Patient : 1980   MRN: [id number]  Reason for Admission: NSTEMI  Discharge Date: 3/19/21 RARS: Readmission Risk Score: 16      Last Discharge [facility]       Complaint Diagnosis Description Type Department Provider    3/5/21 Chest Pain NSTEMI (non-ST elevated myocardial infarction) ([facility] [address] . .. ED to Hosp-Admission (Discharged) (ADMITTED) JOCELIN Spangler MD; Layo Venegas. .. Spoke with: [facility] Transitions 24 Hour Call    Care Transitions Interventions       Initial attempt at [facility] discharge phone call. Unable to reach patient. Unable to leave message. No answer - no voicemail set up. Call placed to [address] [place] [address]. They have the patient referral and a start of care is planned for 2021.     Follow Up  Future Appointments   Date Time Provider Dee Nobles   2021  1:45 PM Ricardo Spangler MD [facility] [place]   2021  2:15 PM Phi Hastings MD [facility]       Tish Christopher RN

## 2021-03-22 ENCOUNTER — TELEPHONE (OUTPATIENT)
Dept: CARDIOTHORACIC SURGERY | Age: 41
End: 2021-03-22

## 2021-03-22 ENCOUNTER — CARE COORDINATION (OUTPATIENT)
Dept: CASE MANAGEMENT | Age: 41
End: 2021-03-22

## 2021-03-22 DIAGNOSIS — Z95.1 S/P CABG (CORONARY ARTERY BYPASS GRAFT): Primary | ICD-10-CM

## 2021-03-22 RX ORDER — OXYCODONE HYDROCHLORIDE 5 MG/1
5 TABLET ORAL EVERY 4 HOURS PRN
Qty: 18 TABLET | Refills: 0 | Status: SHIPPED | OUTPATIENT
Start: 2021-03-22 | End: 2021-03-25

## 2021-03-22 NOTE — TELEPHONE ENCOUNTER
Call received from [facility]  With [facility] [phone]. States pt declined C. Stated he has 24-7 support at home. Incisions healing well as stated.  MAGEN workman

## 2021-03-22 NOTE — CARE COORDINATION
[facility] Transitions Initial Follow Up Call    Call within 2 business days of discharge: Yes    Patient: Mathieu Edge Patient : 1980   MRN: [id number]  Reason for Admission: NSTEMI  Discharge Date: 3/19/21 RARS: Readmission Risk Score: 16      Last Discharge [facility]       Complaint Diagnosis Description Type Department Provider    3/5/21 Chest Pain NSTEMI (non-ST elevated myocardial infarction) ([facility] [address] . .. ED to Hosp-Admission (Discharged) (ADMITTED) [facility] Marie Chamberlain MD; Estrada Todd. .. Spoke with: Mathieu Edge - patient    Facility:[facility] Transitions 24 Hour Call    Care Transitions Interventions       2nd and final attempt at [facility] discharge phone call. Lima Ivey declines completing discharge phone call. He declines any additional CTN follow up calls. Episode resolved.     Follow Up  Future Appointments   Date Time Provider Dee Nobles   2021  1:45 PM Marie Chamberlain MD [facility]   2021  2:15 PM Jacinto Antonio MD [facility]       Caro Bull RN

## 2021-03-24 ENCOUNTER — TELEPHONE (OUTPATIENT)
Dept: CARDIOTHORACIC SURGERY | Age: 41
End: 2021-03-24

## 2021-03-24 NOTE — TELEPHONE ENCOUNTER
Contacted pt on 3/22/21 to discuss his recovery thus far. States he is doing well. Denies shortness of breath. Using his IS every hour with inspired volume 500 ml. Pt instructed to be diligent in raising his volume to 1500 ASAP. Cough productive of brown colored sputum. No longer smoking. States incisions are healing without drainage. Afebrile. States HR feels regular. Denies palpitations. Has swelling in his hands and legs. States is not doing the walking plan. Instructed him on the plan and where it is documented in his education binder. Appetite is poor but he is having BMs. Pain is not controlled and his is not getting enough rest. Explained how to alternate pain meds to help with coverage.  Has appt 4/1/21.  Liza Velazquez

## 2021-04-01 ENCOUNTER — OFFICE VISIT (OUTPATIENT)
Dept: CARDIOTHORACIC SURGERY | Age: 41
End: 2021-04-01

## 2021-04-01 VITALS
WEIGHT: 225 LBS | HEIGHT: 69 IN | BODY MASS INDEX: 33.33 KG/M2 | SYSTOLIC BLOOD PRESSURE: 100 MMHG | OXYGEN SATURATION: 98 % | TEMPERATURE: 97 F | DIASTOLIC BLOOD PRESSURE: 60 MMHG | HEART RATE: 67 BPM

## 2021-04-01 DIAGNOSIS — I25.10 CORONARY ARTERY DISEASE INVOLVING NATIVE CORONARY ARTERY OF NATIVE HEART WITHOUT ANGINA PECTORIS: Primary | ICD-10-CM

## 2021-04-01 PROCEDURE — 99024 POSTOP FOLLOW-UP VISIT: CPT | Performed by: THORACIC SURGERY (CARDIOTHORACIC VASCULAR SURGERY)

## 2021-04-01 RX ORDER — TRAMADOL HYDROCHLORIDE 50 MG/1
50 TABLET ORAL EVERY 6 HOURS PRN
Qty: 28 TABLET | Refills: 0 | Status: SHIPPED | OUTPATIENT
Start: 2021-04-01 | End: 2021-04-08

## 2021-04-01 RX ORDER — OXYCODONE HYDROCHLORIDE 5 MG/1
5 TABLET ORAL EVERY 4 HOURS PRN
COMMUNITY
End: 2021-04-23 | Stop reason: ALTCHOICE

## 2021-04-01 NOTE — PROGRESS NOTES
Progress Note    S/P urgent CABG x4, sternal plating, and JAYDE exclusion on 3/12/21. MSI and chest tube sites x2 are healing nicely. Well approximated. No redness or swelling. No drainage. 2 sutures removed. Patient tolerated well. No edema. Some numbness in left index and middle fingers. Pain controlled with Oxycodone and OTC ibuprofen right now. Currently taking about 3 oxycodone per day. Sees Dr. Catrina Fofana on 4/12/21. Vital Signs:                                                 /60 (Site: Right Upper Arm, Position: Sitting, Cuff Size: Medium Adult)   Pulse 67   Temp 97 °F (36.1 °C) (Temporal)   Ht 5' 9\" (1.753 m)   Wt 225 lb (102.1 kg)   SpO2 98%   BMI 33.23 kg/m²      Preop weight: 236 lb    CV: reg, wound c/d/i, sternum stable  Pulm: clear, decreased at bases  Abd: soft  Ext: warm. No edema. saph incision c/d/i. L radial site c/d/i, mod ecchymosis, mild numbness L thumb, 1st and 2nd fingers    Medications:  Prior to Admission medications    Medication Sig Start Date End Date Taking? Authorizing Provider   oxyCODONE (ROXICODONE) 5 MG immediate release tablet Take 5 mg by mouth every 4 hours as needed for Pain.    Yes Historical Provider, MD   aspirin 325 MG EC tablet Take 1 tablet by mouth daily 3/19/21  Yes Cindy Maher MD   buPROPion [facility] SR) 150 MG extended release tablet Take 1 tablet by mouth 2 times daily 3/19/21  Yes Cindy Maher MD   dilTIAZem (CARDIZEM CD) 120 MG extended release capsule Take 1 capsule by mouth daily 3/19/21  Yes Cindy Maher MD   sennosides-docusate sodium (SENOKOT-S) 8.6-50 MG tablet Take 1 tablet by mouth 2 times daily 3/19/21  Yes Cindy Maher MD   carvedilol (COREG) 6.25 MG tablet Take 1 tablet by mouth 2 times daily (with meals) 1/6/21  Yes Hiral Clarke MD   famotidine (PEPCID) 20 MG tablet Take 1 tablet by mouth 2 times daily 1/6/21  Yes Hiral Clarke MD   atorvastatin (LIPITOR) 80 MG tablet Take 1 tablet by mouth nightly 1/6/21  Yes Antony Gusman MD        Data Review:  CBC:   Lab Results   Component Value Date    WBC 10.8 03/19/2021    HGB 11.4 03/19/2021    HCT 33.2 03/19/2021    MCV 89.0 03/19/2021     03/19/2021     BMP:   Lab Results   Component Value Date     03/19/2021    K 3.9 03/19/2021    K 4.0 03/18/2021    CL 98 03/19/2021    CO2 26 03/19/2021    BUN 25 03/19/2021    CREATININE 0.7 03/19/2021    CALCIUM 9.0 03/19/2021    MG 2.20 03/19/2021     PT/INR:   Lab Results   Component Value Date    PROTIME 12.9 03/19/2021    INR 1.11 03/19/2021       Assessment/Plan:  - lifting limit 5 lbs until 4/12, then 10 lbs  - ok to resume driving when feels ready  - cont ASA, statin in keeping with AHA guidelines for postcabg pts  - cardizem for radial graft until at least 5/12  - cont coreg  - pepcid for gerd  - wellbutrin for smoking cessation  - follow up 3 weeks    Trini Ribera MD  4/1/2021  2:24 PM

## 2021-04-01 NOTE — LETTER
04/01/21        [address]  [facility]  [address]  [address] [place] [address]        RE:    Devaughn Madsen,   1980    Dear Dr. Denis Frey,    It was my pleasure to see your patient, Devaughn Madsen, in the office today in follow up of cabgx4 3/12/21 at [facility] [place].    I have attached a copy of the office evaluation. Thank you for allowing me to participate in the care of this patient.       Sincerely,     Linda Mandujano MD    CC: Otto Berkowitz DO  [address] [facility] [address]  [address] Shiloh Cole MD  [address] [facility] [address]  [address] [facility] [address]  [address]

## 2021-04-12 ENCOUNTER — OFFICE VISIT (OUTPATIENT)
Dept: CARDIOLOGY CLINIC | Age: 41
End: 2021-04-12
Payer: MEDICARE

## 2021-04-12 VITALS
OXYGEN SATURATION: 97 % | SYSTOLIC BLOOD PRESSURE: 118 MMHG | HEIGHT: 69 IN | DIASTOLIC BLOOD PRESSURE: 78 MMHG | BODY MASS INDEX: 33.23 KG/M2 | HEART RATE: 107 BPM

## 2021-04-12 DIAGNOSIS — E78.5 HYPERLIPIDEMIA LDL GOAL <70: ICD-10-CM

## 2021-04-12 DIAGNOSIS — I10 ESSENTIAL HYPERTENSION: ICD-10-CM

## 2021-04-12 DIAGNOSIS — Z95.1 S/P CABG X 4: ICD-10-CM

## 2021-04-12 DIAGNOSIS — I25.10 CORONARY ARTERY DISEASE INVOLVING NATIVE CORONARY ARTERY OF NATIVE HEART WITHOUT ANGINA PECTORIS: Primary | ICD-10-CM

## 2021-04-12 DIAGNOSIS — Z72.0 TOBACCO ABUSE: ICD-10-CM

## 2021-04-12 PROCEDURE — 99214 OFFICE O/P EST MOD 30 MIN: CPT | Performed by: INTERNAL MEDICINE

## 2021-04-12 PROCEDURE — G8427 DOCREV CUR MEDS BY ELIG CLIN: HCPCS | Performed by: INTERNAL MEDICINE

## 2021-04-12 PROCEDURE — G8417 CALC BMI ABV UP PARAM F/U: HCPCS | Performed by: INTERNAL MEDICINE

## 2021-04-12 PROCEDURE — 1111F DSCHRG MED/CURRENT MED MERGE: CPT | Performed by: INTERNAL MEDICINE

## 2021-04-12 PROCEDURE — 1036F TOBACCO NON-USER: CPT | Performed by: INTERNAL MEDICINE

## 2021-04-12 PROCEDURE — 93000 ELECTROCARDIOGRAM COMPLETE: CPT | Performed by: INTERNAL MEDICINE

## 2021-04-12 NOTE — PATIENT INSTRUCTIONS
A serving is 1 slice of bread, 1 ounce of dry cereal, or ½ cup of cooked rice, pasta, or cooked cereal. Try to choose whole-grain products as much as possible. · Limit lean meat, poultry, and fish to 2 servings each day. A serving is 3 ounces, about the size of a deck of cards. · Eat 4 to 5 servings of nuts, seeds, and legumes (cooked dried beans, lentils, and split peas) each week. A serving is 1/3 cup of nuts, 2 tablespoons of seeds, or ½ cup of cooked beans or peas. · Limit fats and oils to 2 to 3 servings each day. A serving is 1 teaspoon of vegetable oil or 2 tablespoons of salad dressing. · Limit sweets and added sugars to 5 servings or less a week. A serving is 1 tablespoon jelly or jam, ½ cup sorbet, or 1 cup of lemonade. · Eat less than 2,300 milligrams (mg) of sodium a day. If you limit your sodium to 1,500 mg a day, you can lower your blood pressure even more. · Be aware that all of these are the suggested number of servings for people who eat 1,800 to 2,000 calories a day. Your recommended number of servings may be different if you need more or fewer calories. Tips for success  · Start small. Do not try to make dramatic changes to your diet all at once. You might feel that you are missing out on your favorite foods and then be more likely to not follow the plan. Make small changes, and stick with them. Once those changes become habit, add a few more changes. · Try some of the following:  ? Make it a goal to eat a fruit or vegetable at every meal and at snacks. This will make it easy to get the recommended amount of fruits and vegetables each day. ? Try yogurt topped with fruit and nuts for a snack or healthy dessert. ? Add lettuce, tomato, cucumber, and onion to sandwiches. ? Combine a ready-made pizza crust with low-fat mozzarella cheese and lots of vegetable toppings. Try using tomatoes, squash, spinach, broccoli, carrots, cauliflower, and onions. ?  Have a variety of cut-up vegetables with

## 2021-04-12 NOTE — PROGRESS NOTES
Cardiology Consultation   Referring Physician: Jama Keane DO  Reason for Consultation: CAD s/p CABG   Chief Complaint:   Chief Complaint   Patient presents with    Follow-up     states it feels like \"plate in chest is loose\". left hand is numb/tingling     Subjective:   History of Present Illness:  Franchesca Barragan is a 41 y.o. male who presents with the above complaint. He was admitted to [facility] with complaints of sudden onset severe substernal chest pain. He underwent heart catheterization on 20 resulting in MI x 3 to RCA. He called the office with worsening chest pain and underwent repeat angiography 20 that showed patent stents. He presented to the ED 3/5/21 with an NSTEMI and underwent angiography. He was referred to [facility] and underwent CABG x4. Today, he states overall he is doing well and continues to recover. He reports sternal chest pains that he relates to his recent surgery. He denies any exertional chest pains or worsening shortness of breath. He reports compliance with his medications and tolerating. He denies any abnormal bleeding or bruising. Patient denies exertional chest pain/pressure, dyspnea at rest, STEWARD, PND, orthopnea, palpitations, lightheadedness, weight changes, changes in LE edema, and syncope. Prior cardiac testing:    EK20 reviewed. NSR     Echo: 2020  There is akinesis of the mid to apical/ lateral myocardium. Ejection fraction is visually estimated to be 40-45%. Grade I diastolic dysfunction with normal LV filling pressures. No evidence of left ventricular mass or thrombus noted. Echo: 7/15/20  Left ventricular cavity size is normal.  There is moderate concentric left ventricular hypertrophy. Ejection fraction is visually estimated to be 45-50%. There is moderate hypokinesis of the mid to apical inferoseptal and inferior walls.   Diastolic filling parameters suggest grade I of the mid and distal RCA w/ 3.0 X 12 balloon      SUMMARY:   Severe native 3V CAD   S/p POBA mid and distal RCA     3/15/21 cabgx4 (LIMA-LAD, SVG-D1, L radial off LIMA-OM, SVG-PDA)     Past Medical History:   has a past medical history of Coronary artery disease, Essential hypertension, GERD (gastroesophageal reflux disease), Headache, NSTEMI (non-ST elevated myocardial infarction) ([facility] [address] Obesity, Other hyperlipidemia, Stenosis of left subclavian artery ([facility] [address] and Tobacco abuse counseling. Surgical History:   has a past surgical history that includes Mouth surgery; Coronary angioplasty with stent (05/05/2020); Coronary angioplasty with stent (04/25/2020); Coronary angioplasty (03/08/2021); vascular surgery (03/12/2021); Coronary artery bypass graft (03/15/2021); and Coronary artery bypass graft (N/A, 3/15/2021). Social History:   reports that he quit smoking about 4 weeks ago. His smoking use included cigarettes. He started smoking about 31 years ago. He smoked 1.00 pack per day. He has never used smokeless tobacco. He reports current drug use. Drug: Marijuana. He reports that he does not drink alcohol. Family History:  family history includes Diabetes in his brother and mother; Heart Disease in his maternal aunt, maternal cousin, and maternal uncle; High Blood Pressure in his maternal aunt; Other in his father and mother. Home Medications:  Were reviewed and are listed in nursing record and/or below  Prior to Admission medications    Medication Sig Start Date End Date Taking? Authorizing Provider   oxyCODONE (ROXICODONE) 5 MG immediate release tablet Take 5 mg by mouth every 4 hours as needed for Pain.    Yes Historical Provider, MD   aspirin 325 MG EC tablet Take 1 tablet by mouth daily 3/19/21  Yes Jonas Ho MD   buPROPion [facility] SR) 150 MG extended release tablet Take 1 tablet by mouth 2 times daily 3/19/21  Yes Jonas Ho MD   dilTIAZem (CARDIZEM CD) 120 MG extended release capsule Take 1 capsule by mouth daily 3/19/21  Yes Sally Rivera MD   sennosides-docusate sodium (SENOKOT-S) 8.6-50 MG tablet Take 1 tablet by mouth 2 times daily 3/19/21  Yes Sally Rivera MD   carvedilol (COREG) 6.25 MG tablet Take 1 tablet by mouth 2 times daily (with meals) 1/6/21  Yes Beatriz Meyer MD   famotidine (PEPCID) 20 MG tablet Take 1 tablet by mouth 2 times daily 1/6/21  Yes Beatriz Meyer MD   atorvastatin (LIPITOR) 80 MG tablet Take 1 tablet by mouth nightly 1/6/21  Yes Beatriz Meyer MD          Allergies:  Patient has no known allergies. Review of Systems:   · Constitutional: no unanticipated weight loss. There's been no change in energy level, sleep pattern, or activity level. No fevers, chills. · Eyes: No visual changes or diplopia. No scleral icterus. · ENT: No Headaches, hearing loss or vertigo. No mouth sores or sore throat. · Cardiovascular: as reviewed in HPI  · Respiratory: No cough or wheezing, no sputum production. No hemoptysis. · Gastrointestinal: No abdominal pain, appetite loss, blood in stools. No change in bowel or bladder habits. · Genitourinary: No dysuria, trouble voiding, or hematuria. · Musculoskeletal:  No gait disturbance, no joint complaints. · Integumentary: No rash or pruritis. · Neurological: No headache, diplopia, change in muscle strength, numbness or tingling. · Psychiatric: No anxiety or depression. · Endocrine: No temperature intolerance. No excessive thirst, fluid intake, or urination. No tremor. · Hematologic/Lymphatic: No abnormal bruising or bleeding, blood clots or swollen lymph nodes. · Allergic/Immunologic: No nasal congestion or hives. Objective:   PHYSICAL EXAM:    Vitals:    04/12/21 1416   BP: 118/78   Pulse: 107   SpO2: 97%            General Appearance:  Alert, cooperative, no distress, appears stated age. Head:  Normocephalic, without obvious abnormality, atraumatic. Eyes:  Pupils equal and round. No scleral icterus. Mouth: Moist mucosa, no pharyngeal erythema. Nose: Nares normal. No drainage or sinus tenderness. Neck: Supple, symmetrical, trachea midline. No adenopathy. No tenderness/mass/nodules. No carotid bruit or elevated JVD. Lungs:   Clear to auscultation bilaterally, respirations unlabored. No wheeze, rales, or rhonchi. Chest Wall:  No tenderness or deformity. Heart:  Regular rate. S1/S2 normal. No murmur, rub, or gallop. Abdomen:   Soft, non-tender, bowel sounds active. Musculoskeletal: No muscle wasting or digital clubbing. Extremities: Extremities normal, atraumatic. No cyanosis or edema. Pulses: 2+ radial and carotid pulses, symmetric. Skin: No rashes or lesions. Pysch: Normal mood and affect. Alert and oriented x 4. Neurologic: Normal gross motor and sensory exam.       Labs     CBC:   Lab Results   Component Value Date    WBC 10.8 03/19/2021    RBC 3.74 03/19/2021    HGB 11.4 03/19/2021    HCT 33.2 03/19/2021    MCV 89.0 03/19/2021    RDW 13.8 03/19/2021     03/19/2021     CMP:  Lab Results   Component Value Date     03/19/2021    K 3.9 03/19/2021    K 4.0 03/18/2021    CL 98 03/19/2021    CO2 26 03/19/2021    BUN 25 03/19/2021    CREATININE 0.7 03/19/2021    GFRAA >60 03/19/2021    AGRATIO 1.2 05/05/2020    LABGLOM >60 03/19/2021    GLUCOSE 100 03/19/2021    PROT 7.2 03/14/2021    CALCIUM 9.0 03/19/2021    BILITOT 0.4 03/14/2021    ALKPHOS 114 03/14/2021    AST 36 03/14/2021    ALT 44 03/14/2021     PT/INR:  No results found for: PTINR  HgBA1c:  Lab Results   Component Value Date    LABA1C 5.7 03/08/2021     Lab Results   Component Value Date    TROPONINI 0.13 (H) 03/05/2021       CURRENT Medications:  Current Outpatient Medications on File Prior to Visit   Medication Sig Dispense Refill    oxyCODONE (ROXICODONE) 5 MG immediate release tablet Take 5 mg by mouth every 4 hours as needed for Pain.       aspirin 325 MG EC tablet Take 1 tablet by mouth daily 30 tablet 3    buPROPion (WELLBUTRIN SR) 150 MG extended release tablet Take 1 tablet by mouth 2 times daily 60 tablet 0    dilTIAZem (CARDIZEM CD) 120 MG extended release capsule Take 1 capsule by mouth daily 30 capsule 1    sennosides-docusate sodium (SENOKOT-S) 8.6-50 MG tablet Take 1 tablet by mouth 2 times daily 30 tablet 0    carvedilol (COREG) 6.25 MG tablet Take 1 tablet by mouth 2 times daily (with meals) 180 tablet 3    famotidine (PEPCID) 20 MG tablet Take 1 tablet by mouth 2 times daily 180 tablet 2    atorvastatin (LIPITOR) 80 MG tablet Take 1 tablet by mouth nightly 90 tablet 3     No current facility-administered medications on file prior to visit. Assessment and Plan   1) CAD s/p CABG 3/15/21  -s/p successful IVUS guided PCI distal/prox RCA X 3 MI    -CCB for radial artery graft   -Continue ASA, statin and B-blocker   -Cardiac rehab     2) Essential hypertensin  -Controlled   -On Lisinopril 20 mg daily  -Encouraged low sodium diet     3) Tobacco abuse   -Encouraged smoking cessation     4) Hyperlipidemia  -On Lipitor 80 mg daily  -LDL 93 (3/7/21)     5) Carotid/subclavain stenosis   -Management per Vascular   -S/p subclavian stent     Follow up in 6 months    Thank you for allowing us to participate in the care of Juma Hernandez. Rafy Le MD [address] [facility]   [facility] [address]   C): [phone]  [facility]): [phone]       This note was scribed in the presence of Dr Gregorio Wilde MD by Sneha Carr, BELL. Physician Attestation:  The scribes documentation has been prepared under my direction and personally reviewed by me in its entirety. I confirm the note above accurately reflects all work, treatment, procedures, and medical decision making performed by me.     Electronically signed by William Stubbs MD on 4/21/2021 at 7:22 AM

## 2021-04-23 ENCOUNTER — OFFICE VISIT (OUTPATIENT)
Dept: CARDIOTHORACIC SURGERY | Age: 41
End: 2021-04-23

## 2021-04-23 VITALS
OXYGEN SATURATION: 98 % | DIASTOLIC BLOOD PRESSURE: 62 MMHG | SYSTOLIC BLOOD PRESSURE: 114 MMHG | TEMPERATURE: 97.2 F | HEART RATE: 80 BPM | BODY MASS INDEX: 33.12 KG/M2 | HEIGHT: 69 IN | WEIGHT: 223.6 LBS

## 2021-04-23 DIAGNOSIS — Z95.1 S/P CABG (CORONARY ARTERY BYPASS GRAFT): Primary | ICD-10-CM

## 2021-04-23 DIAGNOSIS — K59.00 CONSTIPATION, UNSPECIFIED CONSTIPATION TYPE: ICD-10-CM

## 2021-04-23 PROCEDURE — 99024 POSTOP FOLLOW-UP VISIT: CPT | Performed by: THORACIC SURGERY (CARDIOTHORACIC VASCULAR SURGERY)

## 2021-04-23 RX ORDER — SENNA PLUS 8.6 MG/1
1 TABLET ORAL 2 TIMES DAILY
Qty: 60 TABLET | Refills: 11 | Status: SHIPPED | OUTPATIENT
Start: 2021-04-23 | End: 2022-04-23

## 2021-04-23 NOTE — PROGRESS NOTES
Progress Note    S/P urgent CABGx4; sternal plating; JAYDE exclusion 3/12/21  MSI, CT sites, and radial incision are healing without redness or purulence. He does report pain to lower part of incision. He has resumed smoking but continues to work on quitting. He has followed up Dr. Dilip Viera. Vital Signs:                                                 /62 (Site: Right Upper Arm, Position: Sitting)   Pulse 80   Temp 97.2 °F (36.2 °C) (Infrared)   Ht 5' 9\" (1.753 m)   Wt 223 lb 9.6 oz (101.4 kg)   SpO2 98%   BMI 33.02 kg/m²      Preop weight: 236 lb    CV: reg, wound c/d/i, sternum stable  Pulm: clear, decreased at bases  Abd: soft  Ext: warm. No edema. Numbness L digits 1-3    Medications:  Prior to Admission medications    Medication Sig Start Date End Date Taking?  Authorizing Provider   aspirin 325 MG EC tablet Take 1 tablet by mouth daily 3/19/21  Yes Vivek Madsen MD   buPROPion [facility] SR) 150 MG extended release tablet Take 1 tablet by mouth 2 times daily 3/19/21  Yes Vivek Madsen MD   dilTIAZem (CARDIZEM CD) 120 MG extended release capsule Take 1 capsule by mouth daily 3/19/21  Yes Vivek Madsen MD   sennosides-docusate sodium (SENOKOT-S) 8.6-50 MG tablet Take 1 tablet by mouth 2 times daily 3/19/21  Yes Vivek Madsen MD   carvedilol (COREG) 6.25 MG tablet Take 1 tablet by mouth 2 times daily (with meals) 1/6/21  Yes Stacy Neumann MD   famotidine (PEPCID) 20 MG tablet Take 1 tablet by mouth 2 times daily 1/6/21  Yes Stacy Neumann MD   atorvastatin (LIPITOR) 80 MG tablet Take 1 tablet by mouth nightly 1/6/21  Yes Stacy Neumann MD        Data Review:  CBC:   Lab Results   Component Value Date    WBC 10.8 03/19/2021    HGB 11.4 03/19/2021    HCT 33.2 03/19/2021    MCV 89.0 03/19/2021     03/19/2021     BMP:   Lab Results   Component Value Date     03/19/2021    K 3.9 03/19/2021    K 4.0 03/18/2021    CL 98 03/19/2021    CO2 26 03/19/2021    BUN 25

## 2021-04-23 NOTE — LETTER
04/23/21        [facility]  [address]  [address] [place] [address]        RE:    Nicole Johnson,   1980    Dear Dr. Ayala Ok,    It was my pleasure to see your patient, Nicole Johnson, in the office today in follow up of cabgx4 3/12/21 at [facility] [place].    I have attached a copy of the office evaluation. Thank you for allowing me to participate in the care of this patient.       Sincerely,     Yancy Toth MD    CC: Mara Velez,   [address] [facility]  [phone]  [address] Shiloh Cole MD  [address] [facility]  [address] [address]  [address] [facility], [address] [facility] [phone]  [address] [employer]

## 2021-07-24 ENCOUNTER — HOSPITAL ENCOUNTER (EMERGENCY)
Age: 41
Discharge: HOME | End: 2021-07-24
Payer: MEDICARE

## 2021-07-24 ENCOUNTER — APPOINTMENT (OUTPATIENT)
Dept: GENERAL RADIOLOGY | Age: 41
End: 2021-07-24
Payer: MEDICARE

## 2021-07-24 VITALS
WEIGHT: 225.53 LBS | BODY MASS INDEX: 33.4 KG/M2 | RESPIRATION RATE: 12 BRPM | OXYGEN SATURATION: 99 % | HEART RATE: 92 BPM | SYSTOLIC BLOOD PRESSURE: 157 MMHG | DIASTOLIC BLOOD PRESSURE: 81 MMHG | TEMPERATURE: 98.9 F | HEIGHT: 69 IN

## 2021-07-24 DIAGNOSIS — S99.921A INJURY OF RIGHT FOOT, INITIAL ENCOUNTER: Primary | ICD-10-CM

## 2021-07-24 PROCEDURE — 73630 X-RAY EXAM OF FOOT: CPT

## 2021-07-24 PROCEDURE — 99283 EMERGENCY DEPT VISIT LOW MDM: CPT

## 2021-07-24 ASSESSMENT — PAIN SCALES - GENERAL: PAINLEVEL_OUTOF10: 2

## 2021-07-24 NOTE — ED TRIAGE NOTES
Maite Liang is a 35 y.o. male brought himself to the ER for eval of right foot pain. The patient had foot pain after he twisted his foot in a gofer hole at 1400 today. The patient is alert and oriented with an open and patent airway with a normal respiratory effort.

## 2021-07-24 NOTE — ED PROVIDER NOTES
[address]  [address] [address]  Dept: [phone]  Loc: [address] ENCOUNTER        This patient was not seen or evaluated by the attending physician. I evaluated this patient, the attending physician was available for consultation. CHIEF COMPLAINT    Chief Complaint   Patient presents with    Foot Pain     rt foot pain after twisting his foot in a gofer hole, 4/10       HPI    Glenis Reis is a 35 y.o. male who presents with right foot pain. The onset was earlier today. The duration has been constant since the onset. The quality of the pain is sharp and the severity is 2/10. The patient has no other associated injury. The context is he stepped in a hole in his yard and twisted his foot    REVIEW OF SYSTEMS    Skin: No lacerations or puncture wounds  Musculoskeletal: see HPI, no other joint or bony injury or pain  Neurologic: No loss of consciousness, no head injury, no paresthesias or focal distal extremity weakness  All other systems reviewed and are negative.     PAST MEDICAL & SURGICAL HISTORY    Past Medical History:   Diagnosis Date    Coronary artery disease     Essential hypertension 04/07/2020    GERD (gastroesophageal reflux disease)     Headache     NSTEMI (non-ST elevated myocardial infarction) ([facility] [address]     4/25/20, 5/5/20, 3/5/21    Obesity     Other hyperlipidemia 04/07/2020    Stenosis of left subclavian artery ([address] 03/2021    stented 3/12/21    Tobacco abuse counseling 04/07/2020     Past Surgical History:   Procedure Laterality Date    CORONARY ANGIOPLASTY  03/08/2021    PTCA RCA    CORONARY ANGIOPLASTY WITH STENT PLACEMENT  05/05/2020    MI RCA    CORONARY ANGIOPLASTY WITH STENT PLACEMENT  04/25/2020    MI Diag, LCx    CORONARY ARTERY BYPASS GRAFT  03/15/2021    cabgx4 (LIMA-LAD, SVG-D1, L radial off LIMA-OM, SVG-PDA), JAYDE exclusion, sternal plating    CORONARY ARTERY BYPASS GRAFT N/A 3/15/2021    TRANSESOPHAGEAL ECHOCARDIOGRAM, TOTAL CARDIO PULMONARY BYPASS, CORONARY ARTERY BYPASS GRAFTING X 4 (VEIN X 1, ARTERY X 4 ) USING LEFT INTERNAL MAMMARY ARTERY, LEFT ENDOSCOPICALLY HARVESTED RADIAL ARTERY, ENDOSCOPICALLY HARVESTED RIGHT SAPHENOUS VEIN. LEFT ATRIAL APPENDAGE CLIPPING USING SIZE 28 ATRICLIP performed by Xavier Dietz MD at [address] [place] [address]      wisdom teeth removed    VASCULAR SURGERY  2021    L subclavian artery stenting       CURRENT MEDICATIONS  (may include discharge medications prescribed in the ED)  Current Outpatient Rx   Medication Sig Dispense Refill    senna (SENOKOT) 8.6 MG tablet Take 1 tablet by mouth 2 times daily 60 tablet 11    aspirin 325 MG EC tablet Take 1 tablet by mouth daily 30 tablet 3    buPROPion (WELLBUTRIN SR) 150 MG extended release tablet Take 1 tablet by mouth 2 times daily 60 tablet 0    dilTIAZem (CARDIZEM CD) 120 MG extended release capsule Take 1 capsule by mouth daily 30 capsule 1    sennosides-docusate sodium (SENOKOT-S) 8.6-50 MG tablet Take 1 tablet by mouth 2 times daily 30 tablet 0    carvedilol (COREG) 6.25 MG tablet Take 1 tablet by mouth 2 times daily (with meals) 180 tablet 3    famotidine (PEPCID) 20 MG tablet Take 1 tablet by mouth 2 times daily 180 tablet 2    atorvastatin (LIPITOR) 80 MG tablet Take 1 tablet by mouth nightly 90 tablet 3       ALLERGIES    No Known Allergies    SOCIAL & FAMILY HISTORY    Social History     Socioeconomic History    Marital status: Single     Spouse name: None    Number of children: None    Years of education: None    Highest education level: None   Occupational History    None   Tobacco Use    Smoking status: Current Every Day Smoker     Packs/day: 1.00     Types: Cigarettes     Start date: 1989     Last attempt to quit: 3/12/2021     Years since quittin.3    Smokeless tobacco: Never Used    Tobacco comment: started age 13.  tried vaping age 25 when trying to quit smoking. Has not smoked since surgery on 3/12/21 as of 21. Vaping Use    Vaping Use: Never used   Substance and Sexual Activity    Alcohol use: No    Drug use: Not Currently     Types: Marijuana     Comment: did use age 22, last time age 26    Sexual activity: Yes     Partners: Female   Other Topics Concern    None   Social History Narrative    None     Social Determinants of Health     Financial Resource Strain:     Difficulty of Paying Living Expenses:    Food Insecurity:     Worried About Running Out of Food in the Last Year:     [address] in the Last Year:    Transportation Needs:     Lack of Transportation (Medical):      Lack of Transportation (Non-Medical):    Physical Activity:     Days of Exercise per Week:     Minutes of Exercise per Session:    Stress:     Feeling of Stress :    Social Connections:     Frequency of Communication with Friends and Family:     Frequency of Social Gatherings with Friends and Family:     Attends [employer] Services:     Active Member of Clubs or Organizations:     Attends Club or Organization Meetings:     Marital Status:    Intimate Partner Violence:     Fear of Current or Ex-Partner:     Emotionally Abused:     Physically Abused:     Sexually Abused:      Family History   Problem Relation Age of Onset    Other Mother         smoker    Diabetes Mother         borderline    Other Father          of natural causes age 76    High Blood Pressure Maternal Aunt     Heart Disease Maternal Aunt         pacemaker    Heart Disease Maternal Uncle          of MI 80    Heart Disease Maternal Cousin         dx 39    Diabetes Brother          PHYSICAL EXAM    VITAL SIGNS: BP (!) 157/81   Pulse 92   Temp 98.9 °F (37.2 °C) (Oral)   Resp 12   Ht 5' 9\" (1.753 m)   Wt 225 lb 8.5 oz (102.3 kg)   SpO2 99%   BMI 33.31 kg/m²   Constitutional:  Well nourished, no acute distress  HENT:  Atraumatic, moist mucous membranes  NECK: normal range of motion,  supple   Respiratory:  No respiratory distress  Cardiovascular:  No JVD  Vascular: right DP pulse 2+, capillary refill less than 2 seconds  Musculoskeletal:  + tenderness to palpation of the right foot, + edema, no deformity  Integument:  Well hydrated, no skin lacerations, no erythema  Neurologic:  Awake alert, no slurred speech, sensory and motor intact    RADIOLOGY   XR FOOT RIGHT (MIN 3 VIEWS)   Final Result   No acute fracture or dislocation. Mild nonspecific soft tissue swelling. PROCEDURES  SPLINT PLACEMENT  A post-op shoe was applied to the affected limb by the emergency department technician. I evaluated the splint after it was applied. The splint was in good position. The patient's extremity was neurovascularly intact after the splint placement. ED COURSE & MEDICAL DECISION MAKING   See chart for medications given during emergency department course. Differential diagnosis: includes but not limited to Arterial Injury/Ischemia, Fracture, Dislocation, Infection, Compartment Syndrome, Neurologic Deficit/Injury. Patient presents with foot injury. See HPI for presentation. Physical exam as above. 57-year-old lying in bed in no acute distress. Swelling and tenderness across the metatarsals on the right. X-ray shows no fracture. She has no neurovascular deficits. Postop shoe and crutches and orthopedic surgery referral.  At this time, the evidence for any other entities in the differential is insufficient to justify any further testing. This was explained to the patient. The patient was advised that persistent or worsening symptoms will require further evaluation. The patient tolerated their visit well. I saw the patient independently with physician available for consultation as needed.   The patient and / or the family were informed of the results of any tests, a time was given to answer questions, a plan was proposed and they agreed with plan. FINAL IMPRESSION    1.  Injury of right foot, initial encounter        PLAN  Discharge with outpatient follow-up and discharge instructions (see EMR)    (Please note that this note was completed with a voice recognition program.  Every attempt was made to edit the dictations, but inevitably there remain words that are mis-transcribed.)         LACHELLE Shields - CNP  07/24/21 0151

## 2021-07-24 NOTE — ED NOTES
.Pt discharged at this time. Discharge instructions and medications reviewed,  Questions were answered. PT verbalized understanding. Follow up appointments were discussed.          Unice Gaucher, [address] [facility]  07/24/21 0005

## 2021-07-30 RX ORDER — CARVEDILOL 6.25 MG/1
6.25 TABLET ORAL 2 TIMES DAILY WITH MEALS
Qty: 60 TABLET | Refills: 3 | Status: SHIPPED | OUTPATIENT
Start: 2021-07-30 | End: 2022-03-23 | Stop reason: SDUPTHER

## 2021-07-30 RX ORDER — ATORVASTATIN CALCIUM 80 MG/1
80 TABLET, FILM COATED ORAL NIGHTLY
Qty: 30 TABLET | Refills: 3 | Status: SHIPPED | OUTPATIENT
Start: 2021-07-30 | End: 2022-02-10 | Stop reason: SDUPTHER

## 2021-07-30 RX ORDER — DILTIAZEM HYDROCHLORIDE 120 MG/1
120 CAPSULE, COATED, EXTENDED RELEASE ORAL DAILY
Qty: 30 CAPSULE | Refills: 1 | Status: SHIPPED | OUTPATIENT
Start: 2021-07-30 | End: 2022-03-23 | Stop reason: SDUPTHER

## 2021-07-30 NOTE — TELEPHONE ENCOUNTER
Medication Refill    Medication needing refilled:  Atorvastatin  Carvedilol  Famotidine  Aspirin   pragsugrel    Dosage of the medication:  80 mg  6.25 mg  20 mg   325 MG EC tablet  10 mg    How are you taking this medication (QD, BID, TID, QID, PRN):    Take 1 tablet by mouth nightly    Take 1 tablet by mouth 2 times daily (with meals)    Take 1 tablet by mouth 2 times daily    Take 1 tablet by mouth daily    Take 1 tablet by mouth      30 or 90 day supply called in:  30 30  30  30  30    When will you run out of your medication:  Pt is out    Which Pharmacy are we sending the medication to?:    [facility]  [address] [facility],[address] Floor, [address], [address] [place] [id number]   Phone:  [phone]  Fax:  [phone]

## 2021-08-04 ENCOUNTER — OFFICE VISIT (OUTPATIENT)
Dept: FAMILY MEDICINE CLINIC | Age: 41
End: 2021-08-04
Payer: MEDICARE

## 2021-08-04 VITALS
BODY MASS INDEX: 33.4 KG/M2 | SYSTOLIC BLOOD PRESSURE: 138 MMHG | OXYGEN SATURATION: 98 % | DIASTOLIC BLOOD PRESSURE: 72 MMHG | HEART RATE: 85 BPM | WEIGHT: 226.2 LBS

## 2021-08-04 DIAGNOSIS — I10 ESSENTIAL HYPERTENSION: ICD-10-CM

## 2021-08-04 DIAGNOSIS — I50.22 CHRONIC SYSTOLIC CONGESTIVE HEART FAILURE (HCC): ICD-10-CM

## 2021-08-04 DIAGNOSIS — E78.5 DYSLIPIDEMIA: ICD-10-CM

## 2021-08-04 DIAGNOSIS — I21.4 NSTEMI (NON-ST ELEVATED MYOCARDIAL INFARCTION) (HCC): Primary | ICD-10-CM

## 2021-08-04 PROCEDURE — 4004F PT TOBACCO SCREEN RCVD TLK: CPT | Performed by: FAMILY MEDICINE

## 2021-08-04 PROCEDURE — G8417 CALC BMI ABV UP PARAM F/U: HCPCS | Performed by: FAMILY MEDICINE

## 2021-08-04 PROCEDURE — 99214 OFFICE O/P EST MOD 30 MIN: CPT | Performed by: FAMILY MEDICINE

## 2021-08-04 PROCEDURE — G8427 DOCREV CUR MEDS BY ELIG CLIN: HCPCS | Performed by: FAMILY MEDICINE

## 2021-08-04 RX ORDER — FAMOTIDINE 20 MG/1
20 TABLET, FILM COATED ORAL 2 TIMES DAILY
Qty: 180 TABLET | Refills: 2 | Status: SHIPPED | OUTPATIENT
Start: 2021-08-04

## 2021-08-04 SDOH — ECONOMIC STABILITY: FOOD INSECURITY: WITHIN THE PAST 12 MONTHS, THE FOOD YOU BOUGHT JUST DIDN'T LAST AND YOU DIDN'T HAVE MONEY TO GET MORE.: NEVER TRUE

## 2021-08-04 SDOH — ECONOMIC STABILITY: FOOD INSECURITY: WITHIN THE PAST 12 MONTHS, YOU WORRIED THAT YOUR FOOD WOULD RUN OUT BEFORE YOU GOT MONEY TO BUY MORE.: NEVER TRUE

## 2021-08-04 ASSESSMENT — PATIENT HEALTH QUESTIONNAIRE - PHQ9
1. LITTLE INTEREST OR PLEASURE IN DOING THINGS: 0
SUM OF ALL RESPONSES TO PHQ QUESTIONS 1-9: 0
SUM OF ALL RESPONSES TO PHQ QUESTIONS 1-9: 0
2. FEELING DOWN, DEPRESSED OR HOPELESS: 0
SUM OF ALL RESPONSES TO PHQ9 QUESTIONS 1 & 2: 0
SUM OF ALL RESPONSES TO PHQ QUESTIONS 1-9: 0
SUM OF ALL RESPONSES TO PHQ9 QUESTIONS 1 & 2: 0
1. LITTLE INTEREST OR PLEASURE IN DOING THINGS: 0
SUM OF ALL RESPONSES TO PHQ QUESTIONS 1-9: 0
2. FEELING DOWN, DEPRESSED OR HOPELESS: 0
SUM OF ALL RESPONSES TO PHQ QUESTIONS 1-9: 0
SUM OF ALL RESPONSES TO PHQ QUESTIONS 1-9: 0

## 2021-08-04 ASSESSMENT — SOCIAL DETERMINANTS OF HEALTH (SDOH): HOW HARD IS IT FOR YOU TO PAY FOR THE VERY BASICS LIKE FOOD, HOUSING, MEDICAL CARE, AND HEATING?: NOT HARD AT ALL

## 2021-08-04 NOTE — PROGRESS NOTES
2021     Laura Larson (:  1980) is a 35 y.o. male, here for evaluation of the following medical concerns:    HPI   1. CAD- patient follows with Cardiology, recent note stated the following     \"Clark Love is a 41 y.o. male who presents with the above complaint. He was admitted to [facility] with complaints of sudden onset severe substernal chest pain. He underwent heart catheterization on 20 resulting in MI x 3 to RCA. He called the office with worsening chest pain and underwent repeat angiography 20 that showed patent stents. He presented to the ED 3/5/21 with an NSTEMI and underwent angiography. He was referred to [facility] and underwent CABG x4.\"   Patient stable today and doesn't have a new compliant.      2.  HTN- patient returns to clinic for follow up on elevated BP, he is taking Coreg 6.25 mg, Cardizem 120 mg, BP is much improvedhe denied headache, vision change or dizziness.      3.  Hyperlipidemia- discussed his lipid profile, , patient is taking statin tolerating the medication well.      Today, he denied chest pain, sob ,n,v, or diarrhea.          Review of Systems   Constitutional: Negative for activity change, fatigue, fever and unexpected weight change. HENT: Negative for congestion, ear pain, rhinorrhea and sore throat. Respiratory: Negative for cough and shortness of breath. Cardiovascular: Negative for chest pain, palpitations and leg swelling. Gastrointestinal: Negative for abdominal pain, diarrhea, nausea and vomiting. Endocrine: Negative for cold intolerance, heat intolerance, polydipsia and polyphagia. Musculoskeletal: Negative for arthralgias. Skin: Negative for rash. Neurological: Negative for dizziness, light-headedness and headaches. Psychiatric/Behavioral: Negative for dysphoric mood. The patient is not nervous/anxious. Prior to Visit Medications    Medication Sig Taking?  Authorizing Provider   famotidine (PEPCID) 20 MG tablet Take 1 tablet by mouth 2 times daily Yes Tulio Aviles DO   atorvastatin (LIPITOR) 80 MG tablet Take 1 tablet by mouth nightly Yes Graham Beach MD   carvedilol (COREG) 6.25 MG tablet Take 1 tablet by mouth 2 times daily (with meals) Yes Graham Beach MD   aspirin 325 MG EC tablet Take 1 tablet by mouth daily Yes Graham Beach MD   dilTIAZem (CARDIZEM CD) 120 MG extended release capsule Take 1 capsule by mouth daily Yes Graham Beach MD   senna (SENOKOT) 8.6 MG tablet Take 1 tablet by mouth 2 times daily Yes Tena Barakat MD   buPROPion [facility] SR) 150 MG extended release tablet Take 1 tablet by mouth 2 times daily Yes Tena Barakat MD        Social History     Tobacco Use    Smoking status: Current Every Day Smoker     Packs/day: 1.00     Types: Cigarettes     Start date: 1989     Last attempt to quit: 3/12/2021     Years since quittin.4    Smokeless tobacco: Never Used    Tobacco comment: started age 13. tried vaping age 25 when trying to quit smoking. Has not smoked since surgery on 3/12/21 as of 21. Substance Use Topics    Alcohol use: No        Vitals:    21 1452   BP: 138/72   Site: Right Upper Arm   Position: Sitting   Cuff Size: Medium Adult   Pulse: 85   SpO2: 98%   Weight: 226 lb 3.2 oz (102.6 kg)     Estimated body mass index is 33.4 kg/m² as calculated from the following:    Height as of 21: 5' 9\" (1.753 m). Weight as of this encounter: 226 lb 3.2 oz (102.6 kg). Physical Exam  Vitals and nursing note reviewed. Constitutional:       Appearance: He is well-developed. HENT:      Head: Normocephalic and atraumatic. Right Ear: External ear normal.      Left Ear: External ear normal.   Neck:      Thyroid: No thyromegaly. Cardiovascular:      Rate and Rhythm: Normal rate and regular rhythm. Heart sounds: No murmur heard. Pulmonary:      Effort: Pulmonary effort is normal.      Breath sounds: Normal breath sounds. No wheezing or rales. Abdominal:      General: Bowel sounds are normal.      Palpations: Abdomen is soft. Tenderness: There is no abdominal tenderness. Skin:     Findings: No rash. Neurological:      Mental Status: He is alert and oriented to person, place, and time. Mental status is at baseline. Psychiatric:         Behavior: Behavior normal.         Judgment: Judgment normal.         ASSESSMENT/PLAN:  1. NSTEMI (non-ST elevated myocardial infarction) ([facility]  Stable  Continue with medication  Keep appointments with specialist.   Noe Blanton questions    2. [facility]  Stable  Continue with medication  Keep appointments with specialist.   Noe Blanton questions    3. Essential hypertension  Well controlled. Discussed signs and symptoms for immediate evaluation in the ER. Reduce sodium. Monitor diet, exercise and lose weight. Keep a BP log and bring it to your next appointment. 4. Dyslipidemia  Take medication as prescribed. Discussed the need to exercise 30 minutes each day. Monitor diet, avoid fried foods etc... Educated on need to reduce cholesterol in diet and results of poor diet. Return in about 6 months (around 2/4/2022).

## 2021-08-11 PROBLEM — E78.49 OTHER HYPERLIPIDEMIA: Status: RESOLVED | Noted: 2020-04-07 | Resolved: 2021-08-11

## 2021-08-11 PROBLEM — I24.9 ACUTE CORONARY SYNDROME (HCC): Status: RESOLVED | Noted: 2020-04-25 | Resolved: 2021-08-11

## 2021-08-11 PROBLEM — R65.10 SIRS (SYSTEMIC INFLAMMATORY RESPONSE SYNDROME) (HCC): Status: RESOLVED | Noted: 2020-04-25 | Resolved: 2021-08-11

## 2021-08-11 ASSESSMENT — ENCOUNTER SYMPTOMS
SORE THROAT: 0
COUGH: 0
VOMITING: 0
ABDOMINAL PAIN: 0
NAUSEA: 0
SHORTNESS OF BREATH: 0
DIARRHEA: 0
RHINORRHEA: 0

## 2022-02-10 ENCOUNTER — OFFICE VISIT (OUTPATIENT)
Dept: FAMILY MEDICINE CLINIC | Age: 42
End: 2022-02-10
Payer: MEDICARE

## 2022-02-10 VITALS
HEART RATE: 84 BPM | DIASTOLIC BLOOD PRESSURE: 70 MMHG | WEIGHT: 238 LBS | BODY MASS INDEX: 35.15 KG/M2 | OXYGEN SATURATION: 97 % | SYSTOLIC BLOOD PRESSURE: 134 MMHG

## 2022-02-10 DIAGNOSIS — I50.22 CHRONIC SYSTOLIC CONGESTIVE HEART FAILURE (HCC): ICD-10-CM

## 2022-02-10 DIAGNOSIS — I21.4 NSTEMI (NON-ST ELEVATED MYOCARDIAL INFARCTION) (HCC): ICD-10-CM

## 2022-02-10 DIAGNOSIS — Z95.1 S/P CABG X 4: Primary | ICD-10-CM

## 2022-02-10 DIAGNOSIS — E78.5 DYSLIPIDEMIA: ICD-10-CM

## 2022-02-10 PROCEDURE — G8484 FLU IMMUNIZE NO ADMIN: HCPCS | Performed by: FAMILY MEDICINE

## 2022-02-10 PROCEDURE — G8417 CALC BMI ABV UP PARAM F/U: HCPCS | Performed by: FAMILY MEDICINE

## 2022-02-10 PROCEDURE — 4004F PT TOBACCO SCREEN RCVD TLK: CPT | Performed by: FAMILY MEDICINE

## 2022-02-10 PROCEDURE — 99214 OFFICE O/P EST MOD 30 MIN: CPT | Performed by: FAMILY MEDICINE

## 2022-02-10 PROCEDURE — G8428 CUR MEDS NOT DOCUMENT: HCPCS | Performed by: FAMILY MEDICINE

## 2022-02-10 RX ORDER — LISINOPRIL 20 MG/1
20 TABLET ORAL DAILY
Qty: 90 TABLET | Refills: 1 | Status: SHIPPED | OUTPATIENT
Start: 2022-02-10 | End: 2022-03-23 | Stop reason: SDUPTHER

## 2022-02-10 RX ORDER — ATORVASTATIN CALCIUM 80 MG/1
80 TABLET, FILM COATED ORAL NIGHTLY
Qty: 30 TABLET | Refills: 3 | Status: SHIPPED | OUTPATIENT
Start: 2022-02-10 | End: 2022-03-23 | Stop reason: SDUPTHER

## 2022-02-10 ASSESSMENT — ENCOUNTER SYMPTOMS
SORE THROAT: 0
VOMITING: 0
SHORTNESS OF BREATH: 0
NAUSEA: 0
ABDOMINAL PAIN: 0
DIARRHEA: 0
COUGH: 0
FACIAL SWELLING: 0
RHINORRHEA: 0
SINUS PRESSURE: 0

## 2022-02-10 NOTE — PROGRESS NOTES
2/10/2022     Peggy Alvarado (:  1980) is a 35 y.o. male, here for evaluation of the following medical concerns:    HPI     1. CAD- patient follows with Cardiology, recent note stated the following      \"Clark Gonzales is a 41 y. o. male who presents with the above complaint. He was admitted to [facility] with complaints of sudden onset severe substernal chest pain. He underwent heart catheterization on 20 resulting in MI x 3 to RCA. He called the office with worsening chest pain and underwent repeat angiography 20 that showed patent stents. He presented to the ED 3/5/21 with an NSTEMI and underwent angiography. He was referred to [facility] and underwent CABG x4.\"   Patient stable today and doesn't have a new compliant. He missed his last appointment and stated that he feels as if he \"heart is beating hard\", can feel discomfort in his throat. His next appointment is in 6 months. He will try to make a sooner appointment.      2.  HTN- patient returns to clinic for follow up on elevated BP, he is taking Coreg 6.25 mg, Cardizem 120 mg, BP is much improvedhe denied headache, vision change or dizziness.      3.  Hyperlipidemia- discussed his lipid profile, , patient is taking statin tolerating the medication well.      Today, he denied chest pain, sob ,n,v, or diarrhea.      Review of Systems   Constitutional: Negative for activity change, fatigue, fever and unexpected weight change. HENT: Negative for congestion, ear pain, facial swelling, rhinorrhea, sinus pressure and sore throat. Eyes: Negative for visual disturbance. Respiratory: Negative for cough and shortness of breath. Cardiovascular: Positive for chest pain and palpitations. Negative for leg swelling. Gastrointestinal: Negative for abdominal pain, diarrhea, nausea and vomiting. Endocrine: Negative for cold intolerance, heat intolerance, polydipsia and polyphagia. Musculoskeletal: Negative for arthralgias.    Skin: Negative for rash. Neurological: Negative for dizziness, syncope, light-headedness and headaches. Psychiatric/Behavioral: Negative for dysphoric mood. The patient is not nervous/anxious. Prior to Visit Medications    Medication Sig Taking? Authorizing Provider   atorvastatin (LIPITOR) 80 MG tablet Take 1 tablet by mouth nightly Yes Tulio Aviles DO   lisinopril (PRINIVIL;ZESTRIL) 20 MG tablet Take 1 tablet by mouth daily Yes Tulio Aviles DO   famotidine (PEPCID) 20 MG tablet Take 1 tablet by mouth 2 times daily Yes Tulio Aviles DO   carvedilol (COREG) 6.25 MG tablet Take 1 tablet by mouth 2 times daily (with meals) Yes Izabel Cardozo MD   aspirin 325 MG EC tablet Take 1 tablet by mouth daily Yes Izabel Cardozo MD   dilTIAZem (CARDIZEM CD) 120 MG extended release capsule Take 1 capsule by mouth daily Yes Izabel Cardozo MD   senna (SENOKOT) 8.6 MG tablet Take 1 tablet by mouth 2 times daily Yes Kenzie Mcneill MD   buPROPion (WELLBUTRIN SR) 150 MG extended release tablet Take 1 tablet by mouth 2 times daily Yes Kenzie Mcneill MD        Social History     Tobacco Use    Smoking status: Current Every Day Smoker     Packs/day: 1.00     Types: Cigarettes     Start date: 1989     Last attempt to quit: 3/12/2021     Years since quittin.9    Smokeless tobacco: Never Used    Tobacco comment: started age 13. tried vaping age 25 when trying to quit smoking. Has not smoked since surgery on 3/12/21 as of 21. Substance Use Topics    Alcohol use: No        Vitals:    02/10/22 1423   BP: 134/70   Pulse: 84   SpO2: 97%   Weight: 238 lb (108 kg)     Estimated body mass index is 35.15 kg/m² as calculated from the following:    Height as of 21: 5' 9\" (1.753 m). Weight as of this encounter: 238 lb (108 kg). Physical Exam  Vitals and nursing note reviewed. Constitutional:       Appearance: He is well-developed. HENT:      Head: Normocephalic and atraumatic.       Right Ear: Tympanic membrane, ear canal and external ear normal.      Left Ear: Tympanic membrane, ear canal and external ear normal.      Nose: Nose normal.   Eyes:      Pupils: Pupils are equal, round, and reactive to light. Neck:      Thyroid: No thyromegaly. Cardiovascular:      Rate and Rhythm: Normal rate and regular rhythm. Heart sounds: No murmur heard. Pulmonary:      Effort: Pulmonary effort is normal.      Breath sounds: Normal breath sounds. No wheezing or rales. Musculoskeletal:         General: Normal range of motion. Skin:     Findings: No rash. Neurological:      Mental Status: He is alert and oriented to person, place, and time. Psychiatric:         Behavior: Behavior normal.         Judgment: Judgment normal.         ASSESSMENT/PLAN:  1. S/P CABG x 4  Stable  Continue with medication  Keep appointments with specialist.   Answered questions  - CBC; Future  - Comprehensive Metabolic Panel; Future  - Lipid Panel; Future  - TSH WITH REFLEX TO FT4; Future    2. Chronic systolic congestive heart failure ([facility]  Stable  Continue with medication  Keep appointments with specialist.   Answered questions  - CBC; Future  - Comprehensive Metabolic Panel; Future  - Lipid Panel; Future  - TSH WITH REFLEX TO FT4; Future    3. NSTEMI (non-ST elevated myocardial infarction) ([facility]  Stable  Continue with medication  Keep appointments with specialist.   Answered questions  - CBC; Future  - Comprehensive Metabolic Panel; Future  - Lipid Panel; Future  - TSH WITH REFLEX TO FT4; Future    4. Dyslipidemia  Take medication as prescribed. need to exercise 30 minutes each day. Monitor diet, avoid fried foods etc... Educated on need to reduce cholesterol in diet and results of poor diet. - CBC; Future  - Comprehensive Metabolic Panel; Future  - Lipid Panel; Future  - TSH WITH REFLEX TO FT4; Future      Return in about 3 months (around 5/10/2022).

## 2022-03-21 ENCOUNTER — APPOINTMENT (OUTPATIENT)
Dept: GENERAL RADIOLOGY | Age: 42
End: 2022-03-21
Payer: MEDICARE

## 2022-03-21 ENCOUNTER — HOSPITAL ENCOUNTER (OUTPATIENT)
Age: 42
Setting detail: OBSERVATION
Discharge: LEFT BEFORE DISCHARGE | End: 2022-03-21
Attending: INTERNAL MEDICINE
Payer: MEDICARE

## 2022-03-21 VITALS
RESPIRATION RATE: 15 BRPM | OXYGEN SATURATION: 98 % | DIASTOLIC BLOOD PRESSURE: 73 MMHG | SYSTOLIC BLOOD PRESSURE: 113 MMHG | WEIGHT: 236.99 LBS | BODY MASS INDEX: 35.1 KG/M2 | TEMPERATURE: 97.3 F | HEIGHT: 69 IN | HEART RATE: 82 BPM

## 2022-03-21 DIAGNOSIS — R07.9 CHEST PAIN, UNSPECIFIED TYPE: ICD-10-CM

## 2022-03-21 DIAGNOSIS — Z53.29 LEFT AGAINST MEDICAL ADVICE: Primary | ICD-10-CM

## 2022-03-21 LAB
A/G RATIO: 1.2 (ref 1.1–2.2)
ALBUMIN SERPL-MCNC: 4.1 G/DL (ref 3.4–5)
ALP BLD-CCNC: 137 U/L (ref 40–129)
ALT SERPL-CCNC: 11 U/L (ref 10–40)
ANION GAP SERPL CALCULATED.3IONS-SCNC: 10 MMOL/L (ref 3–16)
AST SERPL-CCNC: 19 U/L (ref 15–37)
BASOPHILS ABSOLUTE: 0.1 K/UL (ref 0–0.2)
BASOPHILS RELATIVE PERCENT: 1.1 %
BILIRUB SERPL-MCNC: 0.6 MG/DL (ref 0–1)
BUN BLDV-MCNC: 8 MG/DL (ref 7–20)
CALCIUM SERPL-MCNC: 9.6 MG/DL (ref 8.3–10.6)
CHLORIDE BLD-SCNC: 104 MMOL/L (ref 99–110)
CO2: 23 MMOL/L (ref 21–32)
CREAT SERPL-MCNC: 0.6 MG/DL (ref 0.9–1.3)
EOSINOPHILS ABSOLUTE: 0.1 K/UL (ref 0–0.6)
EOSINOPHILS RELATIVE PERCENT: 1.4 %
GFR AFRICAN AMERICAN: >60
GFR NON-AFRICAN AMERICAN: >60
GLUCOSE BLD-MCNC: 111 MG/DL (ref 70–99)
HCT VFR BLD CALC: 51.2 % (ref 40.5–52.5)
HEMOGLOBIN: 17.6 G/DL (ref 13.5–17.5)
LYMPHOCYTES ABSOLUTE: 3 K/UL (ref 1–5.1)
LYMPHOCYTES RELATIVE PERCENT: 28.6 %
MCH RBC QN AUTO: 30.1 PG (ref 26–34)
MCHC RBC AUTO-ENTMCNC: 34.4 G/DL (ref 31–36)
MCV RBC AUTO: 87.5 FL (ref 80–100)
MONOCYTES ABSOLUTE: 0.8 K/UL (ref 0–1.3)
MONOCYTES RELATIVE PERCENT: 7.4 %
NEUTROPHILS ABSOLUTE: 6.5 K/UL (ref 1.7–7.7)
NEUTROPHILS RELATIVE PERCENT: 61.5 %
PDW BLD-RTO: 14.1 % (ref 12.4–15.4)
PLATELET # BLD: 243 K/UL (ref 135–450)
PMV BLD AUTO: 9 FL (ref 5–10.5)
POTASSIUM REFLEX MAGNESIUM: 4.4 MMOL/L (ref 3.5–5.1)
RBC # BLD: 5.85 M/UL (ref 4.2–5.9)
SODIUM BLD-SCNC: 137 MMOL/L (ref 136–145)
TOTAL PROTEIN: 7.4 G/DL (ref 6.4–8.2)
TROPONIN: <0.01 NG/ML
WBC # BLD: 10.6 K/UL (ref 4–11)

## 2022-03-21 PROCEDURE — 93005 ELECTROCARDIOGRAM TRACING: CPT | Performed by: PHYSICIAN ASSISTANT

## 2022-03-21 PROCEDURE — 36415 COLL VENOUS BLD VENIPUNCTURE: CPT

## 2022-03-21 PROCEDURE — 80053 COMPREHEN METABOLIC PANEL: CPT

## 2022-03-21 PROCEDURE — 6370000000 HC RX 637 (ALT 250 FOR IP): Performed by: PHYSICIAN ASSISTANT

## 2022-03-21 PROCEDURE — 71046 X-RAY EXAM CHEST 2 VIEWS: CPT

## 2022-03-21 PROCEDURE — 99283 EMERGENCY DEPT VISIT LOW MDM: CPT

## 2022-03-21 PROCEDURE — G0378 HOSPITAL OBSERVATION PER HR: HCPCS

## 2022-03-21 PROCEDURE — 85025 COMPLETE CBC W/AUTO DIFF WBC: CPT

## 2022-03-21 PROCEDURE — 84484 ASSAY OF TROPONIN QUANT: CPT

## 2022-03-21 RX ORDER — ACETAMINOPHEN 650 MG/1
650 SUPPOSITORY RECTAL EVERY 6 HOURS PRN
Status: CANCELLED | OUTPATIENT
Start: 2022-03-21

## 2022-03-21 RX ORDER — POTASSIUM CHLORIDE 20 MEQ/1
40 TABLET, EXTENDED RELEASE ORAL PRN
Status: CANCELLED | OUTPATIENT
Start: 2022-03-21

## 2022-03-21 RX ORDER — POTASSIUM CHLORIDE 7.45 MG/ML
10 INJECTION INTRAVENOUS PRN
Status: CANCELLED | OUTPATIENT
Start: 2022-03-21

## 2022-03-21 RX ORDER — PROMETHAZINE HYDROCHLORIDE 25 MG/1
12.5 TABLET ORAL EVERY 6 HOURS PRN
Status: CANCELLED | OUTPATIENT
Start: 2022-03-21

## 2022-03-21 RX ORDER — SODIUM CHLORIDE 9 MG/ML
25 INJECTION, SOLUTION INTRAVENOUS PRN
Status: CANCELLED | OUTPATIENT
Start: 2022-03-21

## 2022-03-21 RX ORDER — ONDANSETRON 2 MG/ML
4 INJECTION INTRAMUSCULAR; INTRAVENOUS EVERY 6 HOURS PRN
Status: CANCELLED | OUTPATIENT
Start: 2022-03-21

## 2022-03-21 RX ORDER — SODIUM CHLORIDE 0.9 % (FLUSH) 0.9 %
10 SYRINGE (ML) INJECTION EVERY 12 HOURS SCHEDULED
Status: CANCELLED | OUTPATIENT
Start: 2022-03-21

## 2022-03-21 RX ORDER — ACETAMINOPHEN 325 MG/1
650 TABLET ORAL EVERY 6 HOURS PRN
Status: CANCELLED | OUTPATIENT
Start: 2022-03-21

## 2022-03-21 RX ORDER — ASPIRIN 81 MG/1
324 TABLET, CHEWABLE ORAL ONCE
Status: COMPLETED | OUTPATIENT
Start: 2022-03-21 | End: 2022-03-21

## 2022-03-21 RX ORDER — MAGNESIUM SULFATE IN WATER 40 MG/ML
2000 INJECTION, SOLUTION INTRAVENOUS PRN
Status: CANCELLED | OUTPATIENT
Start: 2022-03-21

## 2022-03-21 RX ORDER — SODIUM CHLORIDE 0.9 % (FLUSH) 0.9 %
10 SYRINGE (ML) INJECTION PRN
Status: CANCELLED | OUTPATIENT
Start: 2022-03-21

## 2022-03-21 RX ADMIN — ASPIRIN 81 MG 324 MG: 81 TABLET ORAL at 16:12

## 2022-03-21 ASSESSMENT — ENCOUNTER SYMPTOMS
NAUSEA: 0
CHEST TIGHTNESS: 1
SHORTNESS OF BREATH: 1
ABDOMINAL PAIN: 0
VOMITING: 0
COUGH: 0

## 2022-03-21 ASSESSMENT — PAIN - FUNCTIONAL ASSESSMENT: PAIN_FUNCTIONAL_ASSESSMENT: 0-10

## 2022-03-21 ASSESSMENT — PAIN SCALES - WONG BAKER: WONGBAKER_NUMERICALRESPONSE: 2

## 2022-03-21 ASSESSMENT — HEART SCORE: ECG: 1

## 2022-03-21 ASSESSMENT — PAIN DESCRIPTION - PAIN TYPE
TYPE: ACUTE PAIN
TYPE: ACUTE PAIN

## 2022-03-21 ASSESSMENT — PAIN SCALES - GENERAL
PAINLEVEL_OUTOF10: 10
PAINLEVEL_OUTOF10: 2

## 2022-03-21 ASSESSMENT — PAIN DESCRIPTION - LOCATION
LOCATION: CHEST
LOCATION: CHEST

## 2022-03-21 ASSESSMENT — PAIN DESCRIPTION - DESCRIPTORS
DESCRIPTORS: ACHING
DESCRIPTORS: ACHING

## 2022-03-21 ASSESSMENT — PAIN DESCRIPTION - FREQUENCY: FREQUENCY: INTERMITTENT

## 2022-03-21 NOTE — ED NOTES
Patient is refusing to stay because he is not allowed to smoke while here.   Patient is aware it is Heaven Bacon RN  03/21/22 3656

## 2022-03-21 NOTE — ED PROVIDER NOTES
EKG: Normal sinus rhythm, rate of 87, left atrial enlargement, nonspecific ST-T wave changes. Rhythm strip shows sinus rhythm with a rate of 87, WI interval 150 ms, QRS 94 ms with no other ectopy as interpreted by me. This compared to EKG dated 4/12/2021, previously noted T wave inversion in V1 and V2 is improved. No other significant changes noted.       Santos Johnson MD  03/21/22 4468

## 2022-03-21 NOTE — ED TRIAGE NOTES
States he feels like he is having a heart attack, has had several in the past with history of bypass surgery.   Started Friday and today it started radiating to the back

## 2022-03-21 NOTE — CARE COORDINATION
INITIAL CASE MANAGEMENT ASSESSMENT    Reviewed chart, met with patient and his mother Jessica Page at bedside to assess possible discharge needs. Explained Case Management role/services. Living Situation: Patient lives in a single-family one story home with his mother Harlee Bloch and Lukas Carr. Patient lives in the basement of the home. There are four steps to enter the home. ADLs: Independent. DME: None. PT/OT Recs: TBD. Active Services: None. SW intern gave the patient and his mother a list of [place] [address] agencies and SNFs. Transportation: Patient is an active . His mother Harlee Bloch will transport him at discharge. Medications: Expl[facility] on the corner of [address] and [address] Patient denies having an difficulties affording medications at this time. PCP: Daniel Christopher DO.  [phone]      HD/PD: No.     PLAN/COMMENTS: Patient denies having any needs at this time. Patient to return home with family support. The Plan for Transition of Care is related to the following treatment goals: return home. The Patient and patient representative Jessica Page (mother) was provided with a choice of provider and agrees   with the discharge plan. [x] Yes [] No    Freedom of choice list was provided with basic dialogue that supports the patient's individualized plan of care/goals, treatment preferences and shares the quality data associated with the providers. [x] Yes [] No    SW/CM provided contact information for patient or family to call with any questions. SW/CM will follow and assist as needed.

## 2022-03-21 NOTE — ED NOTES
At approximately [phone] pt. started telling ED tech that he wanted to go out and smoke. Tech was advised by this writer to let pt. know that we could get a nicotine patch for him to help with his craving but he was being admitted, had a room assigned and would be going upstairs shortly. Tech came back and stated, \"He said he either goes outside and smokes or leaves\". Pt. then came walking out of room toward the exit with cigarette in hand. Several other ED personel informed pt. this was a non-smoking facility and if he wanted to go smoke he would need to sign out AMA. Pt. told this writer, \"I'm going out to smoke and will come back and sign your AMA paperwork\".       Kristopher Covington RN  03/21/22 1949

## 2022-03-21 NOTE — ED NOTES
Patient outside smoking, states he will come back to sign paper.   Patient was told several times this is a non smoking campus and he is not permitted to smoke here     Althea Jaquez RN  03/21/22 6184

## 2022-03-21 NOTE — ED PROVIDER NOTES
[facility]        Pt Name: Naeem Leon  MRN: [id number]  [place] 1980  Date of evaluation: 3/21/2022  Provider: MERNA Link  PCP: Barbara Dunn DO  Note Started: 2:27 PM EDT       SHAQ. I have evaluated this patient. My supervising physician was available for consultation. CHIEF COMPLAINT       Chief Complaint   Patient presents with    Chest Pain     States he feels like he is having a heart attack, has had several in the past with history of bypass surgery. Started Friday and today it started radiating to the back       HISTORY OF PRESENT ILLNESS   (Location, Timing/Onset, Context/Setting, Quality, Duration, Modifying Factors, Severity, Associated Signs and Symptoms)  Note limiting factors. Chief Complaint: Chest pain      Naeem Leon is a 35 y.o. male who presents to the emergency department today with complaints of chest pain. He states it feels like indigestion, like a pressure type sensation. He states he feels exactly the same as when he has had heart attacks in the past, which he has had for. He follows with Dr. Mina Jones. Patient states that the pain is intermittent, nothing seems to make it better or worse. He has associated shortness of breath. He has not tried taking any aspirin. He continues to smoke every day, previously was smoking 2 packs a day, is now down to about a half a pack to 1 pack a day. He has no further complaints at this time. Nursing Notes were all reviewed and agreed with or any disagreements were addressed in the HPI. REVIEW OF SYSTEMS    (2-9 systems for level 4, 10 or more for level 5)     Review of Systems   Constitutional: Negative for chills and fever. Respiratory: Positive for chest tightness and shortness of breath. Negative for cough. Cardiovascular: Positive for chest pain. Negative for palpitations and leg swelling.    Gastrointestinal: Negative for abdominal TABLET    Take 1 tablet by mouth 2 times daily    CARVEDILOL (COREG) 6.25 MG TABLET    Take 1 tablet by mouth 2 times daily (with meals)    DILTIAZEM (CARDIZEM CD) 120 MG EXTENDED RELEASE CAPSULE    Take 1 capsule by mouth daily    FAMOTIDINE (PEPCID) 20 MG TABLET    Take 1 tablet by mouth 2 times daily    LISINOPRIL (PRINIVIL;ZESTRIL) 20 MG TABLET    Take 1 tablet by mouth daily    SENNA (SENOKOT) 8.6 MG TABLET    Take 1 tablet by mouth 2 times daily         ALLERGIES     Patient has no known allergies. FAMILYHISTORY       Family History   Problem Relation Age of Onset    Other Mother         smoker    Diabetes Mother         borderline    Other Father          of natural causes age 76    High Blood Pressure Maternal Aunt     Heart Disease Maternal Aunt         pacemaker    Heart Disease Maternal Uncle          of MI 80    Heart Disease Maternal Cousin         dx 39    Diabetes Brother           SOCIAL HISTORY       Social History     Tobacco Use    Smoking status: Former Smoker     Packs/day: 1.00     Types: Cigarettes     Start date: 1989    Smokeless tobacco: Never Used    Tobacco comment: started age 13. tried vaping age 25 when trying to quit smoking.  .   Vaping Use    Vaping Use: Never used   Substance Use Topics    Alcohol use: No    Drug use: Not Currently     Types: Marijuana Mauro Medina     Comment: did use age 22, last time age 26       [address]    Lalo Coma Scale  Eye Opening: Spontaneous  Best Verbal Response: Oriented  Best Motor Response: Obeys commands  Lalo Coma Scale Score: 15 Heart Score for chest pain patients  History: Highly Suspicious  ECG: Non-Specifc repolarization disturbance/LBTB/PM  Patient Age: < 45 years  *Risk factors for Atherosclerotic disease: Cigarette smoking,Coronary Artery Disease,Hypertension,Hypercholesterolemia  Risk Factors: > 3 Risk factors or history of atherosclerotic disease*  Troponin: < 1X normal limit  Heart Score Total: 5      PHYSICAL EXAM    (up to 7 for level 4, 8 or more for level 5)     ED Triage Vitals   BP Temp Temp Source Pulse Resp SpO2 Height Weight   03/21/22 1306 03/21/22 1303 03/21/22 1303 03/21/22 1303 03/21/22 1303 03/21/22 1303 03/21/22 1306 03/21/22 1306   91/66 97.3 °F (36.3 °C) Temporal 90 16 90 % 5' 9\" (1.753 m) 236 lb 15.9 oz (107.5 kg)       Physical Exam    DIAGNOSTIC RESULTS   LABS:    Labs Reviewed   CBC WITH AUTO DIFFERENTIAL - Abnormal; Notable for the following components:       Result Value    Hemoglobin 17.6 (*)     All other components within normal limits   COMPREHENSIVE METABOLIC PANEL W/ REFLEX TO MG FOR LOW K - Abnormal; Notable for the following components:    Glucose 111 (*)     CREATININE 0.6 (*)     Alkaline Phosphatase 137 (*)     All other components within normal limits   TROPONIN       When ordered only abnormal lab results are displayed. All other labs were within normal range or not returned as of this dictation. EKG: When ordered, EKG's are interpreted by the Emergency Department Physician in the absence of a cardiologist.  Please see their note for interpretation of EKG. RADIOLOGY:   Non-plain film images such as CT, Ultrasound and MRI are read by the radiologist. Plain radiographic images are visualized and preliminarily interpreted by the ED Provider with the below findings:    Interpretation per the Radiologist below, if available at the time of this note:    XR CHEST (2 VW)   Final Result   No radiographic evidence of acute pulmonary disease. No results found.         PROCEDURES   Unless otherwise noted below, none     Procedures    CONSULTS:  IP CONSULT TO CARDIOLOGY  IP CONSULT TO HOSPITALIST      EMERGENCY DEPARTMENT COURSE and DIFFERENTIAL DIAGNOSIS/MDM:   Vitals:    Vitals:    03/21/22 1303 03/21/22 1306 03/21/22 1600 03/21/22 1615   BP:  91/66 113/73    Pulse: 90  82    Resp: 16  12 15   Temp: 97.3 °F (36.3 °C)      TempSrc: Temporal      SpO2: 90%   98% Weight:  236 lb 15.9 oz (107.5 kg)     Height:  5' 9\" (1.753 m)         Patient was given the following medications:  Medications   aspirin chewable tablet 324 mg (324 mg Oral Given 3/21/22 1612)           ED [facility]    - The patient presented to the ER with complaints of chest pain. Vital signs were reviewed. Exam as above. Peripheral IV placed. Labs, Imaging ordered. - Pertinent Labs & Imaging studies reviewed. (See chart for details)   -  Patient seen and evaluated in the emergency department. -  Triage and nursing notes reviewed and incorporated. -  Old chart records reviewed and incorporated. -  SHAQ. I have evaluated this patient. My supervising physician was available for consultation.  -  Differential diagnosis includes: acute coronary syndrome, pulmonary embolism, COPD/asthma, pneumonia, musculoskeletal, reflux/PUD/gastritis, pneumothorax, CHF, thoracic aortic dissection, anxiety  -  Work-up included:  See above  -  ED treatment included:  aspirin  - Consults: Cardiology - I spoke with Dr María Salas who advised admission for serial troponins. Hospitalist was consulted for admission orders.  -  Results discussed with patient and/or family. Labs show no leukocytosis or concerning anemia, metabolic panel with no concerning abnormalities. Troponin is less than 0.01. Imaging studies show no acute cardiopulmonary process. Please see attending physician's note for EKG interpretation. His heart score is 5. At this time, we recommend admission, as the patient has a significant cardiac history, and would benefit from admission for further evaluation and management. The patient and/or family is agreeable with plan of care and disposition.  -  Disposition:  Admission  - Critical Care: The total critical care time I independently spent while evaluating and treating this patient was 12 minutes. This excludes time spent doing separately billable procedures.   This includes time at the bedside, data interpretation, medication management, obtaining critical history from collateral sources if the patient is unable to provide it directly, and physician consultation. Specifics of interventions taken and potentially life-threatening diagnostic considerations are listed above in the medical decision making. If this was a shared visit with an physician, the time in this attestation is non-concurrent critical care time out of the total shared critical care time provided by the physician and myself. At [phone] I was advised by nursing staff that the patient was leaving AM because he was not permitted to go outside to smoke. He left prior to me being able to go in to reassess and discuss his leaving against medical advice. FINAL IMPRESSION      1. Left against medical advice    2. Chest pain, unspecified type          DISPOSITION/PLAN   DISPOSITION [place] 03/21/2022 05:58:35 PM      PATIENT REFERRED TO:  No follow-up provider specified.     DISCHARGE MEDICATIONS:  New Prescriptions    No medications on file       DISCONTINUED MEDICATIONS:  Discontinued Medications    No medications on file              (Please note that portions of this note were completed with a voice recognition program.  Efforts were made to edit the dictations but occasionally words are mis-transcribed.)    MERNA Pulido (electronically signed)            Chika Suarez, [address] Raymond Jin  03/21/22 315 [address], [facility] Raymond Jin  03/21/22 2534

## 2022-03-22 LAB
EKG ATRIAL RATE: 87 BPM
EKG DIAGNOSIS: NORMAL
EKG P AXIS: 53 DEGREES
EKG P-R INTERVAL: 150 MS
EKG Q-T INTERVAL: 370 MS
EKG QRS DURATION: 94 MS
EKG QTC CALCULATION (BAZETT): 445 MS
EKG R AXIS: 62 DEGREES
EKG T AXIS: 74 DEGREES
EKG VENTRICULAR RATE: 87 BPM

## 2022-03-22 PROCEDURE — 93010 ELECTROCARDIOGRAM REPORT: CPT | Performed by: INTERNAL MEDICINE

## 2022-03-23 ENCOUNTER — OFFICE VISIT (OUTPATIENT)
Dept: CARDIOLOGY CLINIC | Age: 42
End: 2022-03-23
Payer: MEDICARE

## 2022-03-23 VITALS
DIASTOLIC BLOOD PRESSURE: 92 MMHG | SYSTOLIC BLOOD PRESSURE: 120 MMHG | WEIGHT: 237 LBS | HEART RATE: 86 BPM | HEIGHT: 69 IN | OXYGEN SATURATION: 96 % | BODY MASS INDEX: 35.1 KG/M2

## 2022-03-23 DIAGNOSIS — I25.10 CORONARY ARTERY DISEASE INVOLVING NATIVE CORONARY ARTERY OF NATIVE HEART WITHOUT ANGINA PECTORIS: Primary | ICD-10-CM

## 2022-03-23 DIAGNOSIS — I10 ESSENTIAL HYPERTENSION: ICD-10-CM

## 2022-03-23 DIAGNOSIS — E78.5 HYPERLIPIDEMIA LDL GOAL <70: ICD-10-CM

## 2022-03-23 PROCEDURE — G8417 CALC BMI ABV UP PARAM F/U: HCPCS | Performed by: INTERNAL MEDICINE

## 2022-03-23 PROCEDURE — G8484 FLU IMMUNIZE NO ADMIN: HCPCS | Performed by: INTERNAL MEDICINE

## 2022-03-23 PROCEDURE — G8427 DOCREV CUR MEDS BY ELIG CLIN: HCPCS | Performed by: INTERNAL MEDICINE

## 2022-03-23 PROCEDURE — 4004F PT TOBACCO SCREEN RCVD TLK: CPT | Performed by: INTERNAL MEDICINE

## 2022-03-23 PROCEDURE — 99214 OFFICE O/P EST MOD 30 MIN: CPT | Performed by: INTERNAL MEDICINE

## 2022-03-23 RX ORDER — DILTIAZEM HYDROCHLORIDE 120 MG/1
120 CAPSULE, COATED, EXTENDED RELEASE ORAL DAILY
Qty: 30 CAPSULE | Refills: 5 | Status: SHIPPED | OUTPATIENT
Start: 2022-03-23

## 2022-03-23 RX ORDER — LISINOPRIL 20 MG/1
20 TABLET ORAL DAILY
Qty: 30 TABLET | Refills: 5 | Status: SHIPPED | OUTPATIENT
Start: 2022-03-23

## 2022-03-23 RX ORDER — CARVEDILOL 6.25 MG/1
6.25 TABLET ORAL 2 TIMES DAILY WITH MEALS
Qty: 60 TABLET | Refills: 5 | Status: SHIPPED | OUTPATIENT
Start: 2022-03-23

## 2022-03-23 RX ORDER — ATORVASTATIN CALCIUM 80 MG/1
80 TABLET, FILM COATED ORAL NIGHTLY
Qty: 30 TABLET | Refills: 5 | Status: SHIPPED | OUTPATIENT
Start: 2022-03-23

## 2022-04-19 ENCOUNTER — TELEPHONE (OUTPATIENT)
Dept: CARDIOLOGY CLINIC | Age: 42
End: 2022-04-19

## 2022-04-19 DIAGNOSIS — Z95.1 S/P CABG (CORONARY ARTERY BYPASS GRAFT): ICD-10-CM

## 2022-04-19 DIAGNOSIS — I25.10 CAD IN NATIVE ARTERY: Primary | ICD-10-CM

## 2022-04-19 NOTE — TELEPHONE ENCOUNTER
Notified patient that Dr. Cooper Ogden will not complete disability forms and encourages all patients to continue working. Patient states, \" I don't feel like myself\" since bypass surgery. I suggested he try cardiac rehab and he is agreeable. Referral sent to [facility] cardiac rehab.

## 2022-04-19 NOTE — TELEPHONE ENCOUNTER
Ritu Palacio is calling in an is attempting to get on disability and is wondering if Dr. Carson Shaffer can help out with that. He states he cannot hold a job due to his medical problems.     He can be reached back at [phone]

## 2022-04-20 ENCOUNTER — HOSPITAL ENCOUNTER (EMERGENCY)
Age: 42
Discharge: HOME | End: 2022-04-20
Payer: MEDICARE

## 2022-04-20 VITALS
HEART RATE: 98 BPM | DIASTOLIC BLOOD PRESSURE: 83 MMHG | RESPIRATION RATE: 16 BRPM | OXYGEN SATURATION: 100 % | SYSTOLIC BLOOD PRESSURE: 120 MMHG | TEMPERATURE: 97 F

## 2022-04-20 DIAGNOSIS — K08.89 PAIN, DENTAL: ICD-10-CM

## 2022-04-20 DIAGNOSIS — J01.00 ACUTE MAXILLARY SINUSITIS, RECURRENCE NOT SPECIFIED: Primary | ICD-10-CM

## 2022-04-20 DIAGNOSIS — F17.200 CURRENT SMOKER: ICD-10-CM

## 2022-04-20 DIAGNOSIS — K02.9 DENTAL DECAY: ICD-10-CM

## 2022-04-20 PROCEDURE — 99283 EMERGENCY DEPT VISIT LOW MDM: CPT

## 2022-04-20 RX ORDER — AMOXICILLIN AND CLAVULANATE POTASSIUM 875; 125 MG/1; MG/1
1 TABLET, FILM COATED ORAL 2 TIMES DAILY
Qty: 20 TABLET | Refills: 0 | Status: SHIPPED | OUTPATIENT
Start: 2022-04-20 | End: 2022-04-30

## 2022-04-20 ASSESSMENT — PAIN - FUNCTIONAL ASSESSMENT: PAIN_FUNCTIONAL_ASSESSMENT: 0-10

## 2022-04-20 ASSESSMENT — PAIN SCALES - GENERAL
PAINLEVEL_OUTOF10: 10
PAINLEVEL_OUTOF10: 10

## 2022-04-20 ASSESSMENT — PAIN DESCRIPTION - DESCRIPTORS: DESCRIPTORS: DISCOMFORT

## 2022-04-20 ASSESSMENT — PAIN DESCRIPTION - LOCATION: LOCATION: FACE

## 2022-04-20 NOTE — ED PROVIDER NOTES
[address] [facility]        Pt Name: Juel Barthel  MRN: [id number]  [place] 1980  Date of evaluation: 4/20/2022  Provider: MERNA Pereira      ADVANCED PRACTICE PROVIDER, I HAVE EVALUATED THIS PATIENT    CHIEF COMPLAINT     Left sinus pain  Dental pain      HISTORY OF PRESENT ILLNESS  (Location/Symptom, Timing/Onset, Context/Setting, Quality, Duration,Modifying Factors, Severity.)   Juel Barthel is a 35 y.o. male who presents to the emergencydepartment for left-sided sinus pain and left upper dental pain for the past 2 days. He thinks this is similar prior to that his sinus infection but also does have decayed teeth. Typically goes to 24/7 dental.  He is not taking anything for the symptoms. Denies fever or chills. Denies nausea vomiting or rhinorrhea. Nursing Notes were reviewed and I agree. REVIEW OF SYSTEMS    (2-9 systems for level 4, 10 or more for level 5)   Review of Systems     Pertinent positives and negatives as per HPI.    All other systems reviewed and are negative except as noted    PAST MEDICAL HISTORY         Diagnosis Date    Chronic systolic congestive heart failure (HCC) 8/4/2021    Coronary artery disease     Essential hypertension 04/07/2020    GERD (gastroesophageal reflux disease)     Headache     NSTEMI (non-ST elevated myocardial infarction) ([facility] [address]     4/25/20, 5/5/20, 3/5/21    Obesity     Other hyperlipidemia 04/07/2020    Stenosis of left subclavian artery ([facility] [address] 03/2021    stented 3/12/21    Tobacco abuse counseling 04/07/2020       SURGICAL HISTORY         Procedure Laterality Date    CORONARY ANGIOPLASTY  03/08/2021    PTCA RCA    CORONARY ANGIOPLASTY WITH STENT PLACEMENT  05/05/2020    MI RCA    CORONARY ANGIOPLASTY WITH STENT PLACEMENT  04/25/2020    MI Diag, LCx    CORONARY ARTERY BYPASS GRAFT  03/15/2021    cabgx4 (LIMA-LAD, SVG-D1, L radial off LIMA-OM, SVG-PDA), JAYDE exclusion, sternal plating    CORONARY ARTERY BYPASS GRAFT N/A 3/15/2021    TRANSESOPHAGEAL ECHOCARDIOGRAM, TOTAL CARDIO PULMONARY BYPASS, CORONARY ARTERY BYPASS GRAFTING X 4 (VEIN X 1, ARTERY X 4 ) USING LEFT INTERNAL MAMMARY ARTERY, LEFT ENDOSCOPICALLY HARVESTED RADIAL ARTERY, ENDOSCOPICALLY HARVESTED RIGHT SAPHENOUS VEIN. LEFT ATRIAL APPENDAGE CLIPPING USING SIZE 35 ATRICLIP performed by Aron Thorne MD at [address] [place] [address]      wisdom teeth removed    VASCULAR SURGERY  2021    L subclavian artery stenting       CURRENT MEDICATIONS       Previous Medications    ASPIRIN 325 MG EC TABLET    Take 1 tablet by mouth daily    ATORVASTATIN (LIPITOR) 80 MG TABLET    Take 1 tablet by mouth nightly    BUPROPION (WELLBUTRIN SR) 150 MG EXTENDED RELEASE TABLET    Take 1 tablet by mouth 2 times daily    CARVEDILOL (COREG) 6.25 MG TABLET    Take 1 tablet by mouth 2 times daily (with meals)    DILTIAZEM (CARDIZEM CD) 120 MG EXTENDED RELEASE CAPSULE    Take 1 capsule by mouth daily    FAMOTIDINE (PEPCID) 20 MG TABLET    Take 1 tablet by mouth 2 times daily    LISINOPRIL (PRINIVIL;ZESTRIL) 20 MG TABLET    Take 1 tablet by mouth daily    SENNA (SENOKOT) 8.6 MG TABLET    Take 1 tablet by mouth 2 times daily       ALLERGIES     Patient has no known allergies. FAMILY HISTORY           Problem Relation Age of Onset    Other Mother         smoker    Diabetes Mother         borderline    Other Father          of natural causes age 76    High Blood Pressure Maternal Aunt     Heart Disease Maternal Aunt         pacemaker    Heart Disease Maternal Uncle          of MI 80    Heart Disease Maternal Cousin         dx 39    Diabetes Brother      Family Status   Relation Name Status    Mother  Alive    Father      Brian Professor Roseanna Dejesus   at age 72        heart attack    MCousin  Alive    Brother  Alive        SOCIAL HISTORY      reports that he has been smoking cigarettes. Reviewed - No data to display    All other labs were within normal range or not returned as of this dictation. EMERGENCY DEPARTMENT COURSE and DIFFERENTIALDIAGNOSIS/MDM:   Vitals:    Vitals:    04/20/22 1144   BP: 120/83   Pulse: 98   Resp: 16   Temp: 97 °F (36.1 °C)   TempSrc: Temporal   SpO2: 100%       Patient wasnontoxic, well appearing, afebrile with normal vital signs. Saturating well on room air. Suspect this is sinusitis versus dental infection. Discussed this could be a viral sinusitis but since he is also having some dental pain, will treat with Augmentin which will cover both dental and sinus. Instructed to FU with his PCP for reeval in next few days and to FU with dentist to see if this is dental in origin. He agreed and understood    He is a current smoker which came up during visit. Reports he is trying to quit and has been slowly cutting cigarette consumption down. He has a very significant cardiac hx and discussed this would greatly benefit him to stop smoking. Suggested OTC nicotine replacement products but he is not interested. Also discussed could talk with PCP to see if Rx meds indicated. He is not interested in that either. I counseled patient face-to-face about tobacco and/or electronic nicotine delivery system cessation and risks to the patient for greater than 3 minutes and less than 9 minutes      PROCEDURES:  None    FINAL IMPRESSION      1. Acute maxillary sinusitis, recurrence not specified    2. Pain, dental    3. Dental decay    4.  Current smoker        DISPOSITION/PLAN   DISPOSITION Decision To Discharge 04/20/2022 01:06:43 PM      PATIENT REFERRED TO:  Kylie Alarcon,   [address] [facility]  [address] [place] [address] Jus Simmons 3    Schedule an appointment as soon as possible for a visit in 2 days  for reevaluation    A dentist    Schedule an appointment as soon as possible for a visit in 2 days  for reevaluation    [facility] [address]  [phone]    As needed, If symptoms worsen      DISCHARGE MEDICATIONS:  New Prescriptions    AMOXICILLIN-CLAVULANATE (AUGMENTIN) 875-125 MG PER TABLET    Take 1 tablet by mouth 2 times daily for 10 days       (Please note that portions ofthis note were completed with a voice recognition program.  Efforts were made to edit the dictations but occasionally words are mis-transcribed.)    Nathaniel Jackson, [address] [place] [address], [address] [place] [address]  04/20/22 6590

## 2022-04-20 NOTE — TELEPHONE ENCOUNTER
Medication Refill    Medication needing refilled:  aspirin       Dosage of the medication:  325 MG EC       How are you taking this medication (QD, BID, TID, QID, PRN):  Take 1 tablet by mouth daily    30 or 90 day supply called in:  30 tablet  When will you run out of your medication:      Which Pharmacy are we sending the medication to?:    Kelly Roberts [address] [facility],[address], [address] [place] [id number]   Phone:  [phone]  Fax:  [phone]

## 2022-04-21 ENCOUNTER — TELEPHONE (OUTPATIENT)
Dept: FAMILY MEDICINE CLINIC | Age: 42
End: 2022-04-21

## 2022-04-21 NOTE — TELEPHONE ENCOUNTER
----- Message from Taylor Ruiz sent at 4/20/2022  3:57 PM EDT -----  Subject: Appointment Request    Reason for Call: Urgent (Patient Request) ED Follow Up Visit    QUESTIONS  Type of Appointment? Established Patient  Reason for appointment request? Available appointments did not meet   patient need  Additional Information for Provider? Patient was seen in the ED today for   an infection in his cheek. Patient wants to schedule a follow up visit in   2 days but none are showing available. Could the doctor fit him in? Please   advise patient.  ---------------------------------------------------------------------------  --------------  CALL BACK INFO  What is the best way for the office to contact you? OK to leave message on   voicemail  Preferred Call Back Phone Number? [phone]  ---------------------------------------------------------------------------  --------------  SCRIPT ANSWERS  Relationship to Patient? Self  (Patient requests to see provider urgently. )? Yes  Have you been diagnosed with, awaiting test results for, or told that you   are suspected of having COVID-19 (Coronavirus)? (If patient has tested   negative or was tested as a requirement for work, school, or travel and   not based on symptoms, answer no)? No  Within the past 10 days have you developed any of the following symptoms   (answer no if symptoms have been present longer than 10 days or began   more than 10 days ago)? Fever or Chills, Cough, Shortness of breath or   difficulty breathing, Loss of taste or smell, Sore throat, Nasal   congestion, Sneezing or runny nose, Fatigue or generalized body aches   (answer no if pain is specific to a body part e.g. back pain), Diarrhea,   Headache?  Yes

## 2022-04-22 ENCOUNTER — OFFICE VISIT (OUTPATIENT)
Dept: FAMILY MEDICINE CLINIC | Age: 42
End: 2022-04-22
Payer: MEDICARE

## 2022-04-22 VITALS
HEIGHT: 69 IN | SYSTOLIC BLOOD PRESSURE: 130 MMHG | DIASTOLIC BLOOD PRESSURE: 78 MMHG | WEIGHT: 240.4 LBS | BODY MASS INDEX: 35.6 KG/M2

## 2022-04-22 DIAGNOSIS — B96.89 ACUTE BACTERIAL SINUSITIS: Primary | ICD-10-CM

## 2022-04-22 DIAGNOSIS — J01.90 ACUTE BACTERIAL SINUSITIS: Primary | ICD-10-CM

## 2022-04-22 PROCEDURE — G8417 CALC BMI ABV UP PARAM F/U: HCPCS | Performed by: FAMILY MEDICINE

## 2022-04-22 PROCEDURE — G8427 DOCREV CUR MEDS BY ELIG CLIN: HCPCS | Performed by: FAMILY MEDICINE

## 2022-04-22 PROCEDURE — 99213 OFFICE O/P EST LOW 20 MIN: CPT | Performed by: FAMILY MEDICINE

## 2022-04-22 PROCEDURE — 4004F PT TOBACCO SCREEN RCVD TLK: CPT | Performed by: FAMILY MEDICINE

## 2022-04-22 RX ORDER — BENZONATATE 100 MG/1
100 CAPSULE ORAL 2 TIMES DAILY PRN
Qty: 20 CAPSULE | Refills: 0 | Status: SHIPPED | OUTPATIENT
Start: 2022-04-22 | End: 2022-04-29

## 2022-04-22 NOTE — PROGRESS NOTES
2022     Ghulam Peters (:  1980) is a 35 y.o. male, here for evaluation of the following medical concerns:    HPI     Sinusitis  Went to urgent care   On medication- augmentin  Feels that it hasn't improved but has only been on antibiotic for one  Day  Facial pain, teeth pain  Fullness on right side of face  Denied fever or chills. Today, denied chest pain, sob,n, v, or diarrhea. Review of Systems   Constitutional: Negative for activity change, fatigue, fever and unexpected weight change. HENT: Positive for congestion, sinus pressure and sinus pain. Negative for ear pain, facial swelling, hearing loss, rhinorrhea, sore throat and trouble swallowing. Respiratory: Negative for cough and shortness of breath. Cardiovascular: Negative for chest pain, palpitations and leg swelling. Gastrointestinal: Negative for abdominal pain, diarrhea, nausea and vomiting. Neurological: Negative for light-headedness and headaches. Prior to Visit Medications    Medication Sig Taking?  Authorizing Provider   benzonatate (TESSALON) 100 MG capsule Take 1 capsule by mouth 2 times daily as needed for Cough Yes Tulio Aviles,    amoxicillin-clavulanate (AUGMENTIN) 875-125 MG per tablet Take 1 tablet by mouth 2 times daily for 10 days Yes MERNA Padilla   aspirin 325 MG EC tablet Take 1 tablet by mouth daily Yes Narayan Landon MD   carvedilol (COREG) 6.25 MG tablet Take 1 tablet by mouth 2 times daily (with meals) Yes Narayan Landon MD   dilTIAZem (CARDIZEM CD) 120 MG extended release capsule Take 1 capsule by mouth daily Yes Narayan Landon MD   lisinopril (PRINIVIL;ZESTRIL) 20 MG tablet Take 1 tablet by mouth daily Yes Narayan Landon MD   atorvastatin (LIPITOR) 80 MG tablet Take 1 tablet by mouth nightly Yes Narayan Landon MD   famotidine (PEPCID) 20 MG tablet Take 1 tablet by mouth 2 times daily Yes Luda Good DO   buPROPion (WELLBUTRIN SR) 150 MG extended release tablet Take 1 tablet by mouth 2 times daily Yes Brandt Melo MD        Social History     Tobacco Use    Smoking status: Current Every Day Smoker     Packs/day: 1.00     Types: Cigarettes     Start date: 12/19/1989    Smokeless tobacco: Never Used    Tobacco comment: started age 13. tried vaping age 25 when trying to quit smoking. .   Substance Use Topics    Alcohol use: No        Vitals:    04/22/22 1201   BP: 130/78   Weight: 240 lb 6.4 oz (109 kg)   Height: 5' 9\" (1.753 m)     Estimated body mass index is 35.5 kg/m² as calculated from the following:    Height as of this encounter: 5' 9\" (1.753 m). Weight as of this encounter: 240 lb 6.4 oz (109 kg). Physical Exam  Constitutional:       Appearance: He is well-developed. HENT:      Head: Normocephalic and atraumatic. Right Ear: External ear normal.      Left Ear: External ear normal.   Neck:      Thyroid: No thyromegaly. Cardiovascular:      Rate and Rhythm: Normal rate and regular rhythm. Heart sounds: No murmur heard. Pulmonary:      Effort: Pulmonary effort is normal.      Breath sounds: Normal breath sounds. No wheezing or rales. Abdominal:      Palpations: Abdomen is soft. Skin:     Findings: No rash. Neurological:      Mental Status: He is alert and oriented to person, place, and time. Psychiatric:         Behavior: Behavior normal.         Judgment: Judgment normal.         ASSESSMENT/PLAN:  1. Acute bacterial sinusitis  Continue antibiotic for 10 days  Push fluids and rest  Use Tylenol/Ibuprofen for pain  Please rtc if not improved. No follow-ups on file.

## 2022-04-25 ASSESSMENT — ENCOUNTER SYMPTOMS
VOMITING: 0
SHORTNESS OF BREATH: 0
SINUS PAIN: 1
COUGH: 0
DIARRHEA: 0
RHINORRHEA: 0
TROUBLE SWALLOWING: 0
SORE THROAT: 0
FACIAL SWELLING: 0
NAUSEA: 0
SINUS PRESSURE: 1
ABDOMINAL PAIN: 0

## 2022-05-11 ENCOUNTER — HOSPITAL ENCOUNTER (OUTPATIENT)
Dept: CARDIAC REHAB | Age: 42
Setting detail: THERAPIES SERIES
Discharge: HOME | End: 2022-05-11
Payer: MEDICARE

## 2022-05-27 ENCOUNTER — HOSPITAL ENCOUNTER (OUTPATIENT)
Dept: CARDIAC REHAB | Age: 42
Setting detail: THERAPIES SERIES
Discharge: HOME | End: 2022-05-27
Payer: MEDICARE

## 2022-05-27 PROCEDURE — 93798 PHYS/QHP OP CAR RHAB W/ECG: CPT

## 2022-06-03 ENCOUNTER — HOSPITAL ENCOUNTER (OUTPATIENT)
Dept: CARDIAC REHAB | Age: 42
Setting detail: THERAPIES SERIES
Discharge: HOME | End: 2022-06-03
Payer: MEDICARE

## 2022-06-03 PROCEDURE — 93798 PHYS/QHP OP CAR RHAB W/ECG: CPT

## 2022-06-10 ENCOUNTER — HOSPITAL ENCOUNTER (OUTPATIENT)
Dept: CARDIAC REHAB | Age: 42
Setting detail: THERAPIES SERIES
Discharge: HOME | End: 2022-06-10
Payer: MEDICARE

## 2022-06-10 PROCEDURE — 93798 PHYS/QHP OP CAR RHAB W/ECG: CPT

## 2022-06-13 ENCOUNTER — HOSPITAL ENCOUNTER (OUTPATIENT)
Dept: CARDIAC REHAB | Age: 42
Setting detail: THERAPIES SERIES
Discharge: HOME | End: 2022-06-13
Payer: MEDICARE

## 2022-06-13 PROCEDURE — 93798 PHYS/QHP OP CAR RHAB W/ECG: CPT

## 2022-06-17 ENCOUNTER — APPOINTMENT (OUTPATIENT)
Dept: CARDIAC REHAB | Age: 42
End: 2022-06-17
Payer: MEDICARE

## 2022-08-17 ENCOUNTER — OFFICE VISIT (OUTPATIENT)
Dept: FAMILY MEDICINE CLINIC | Age: 42
End: 2022-08-17
Payer: MEDICARE

## 2022-08-17 VITALS
HEART RATE: 98 BPM | WEIGHT: 250 LBS | SYSTOLIC BLOOD PRESSURE: 138 MMHG | BODY MASS INDEX: 36.92 KG/M2 | DIASTOLIC BLOOD PRESSURE: 78 MMHG | OXYGEN SATURATION: 98 %

## 2022-08-17 DIAGNOSIS — Z72.0 TOBACCO ABUSE: ICD-10-CM

## 2022-08-17 DIAGNOSIS — I73.9 CLAUDICATION (HCC): ICD-10-CM

## 2022-08-17 DIAGNOSIS — R06.81 APNEA: ICD-10-CM

## 2022-08-17 DIAGNOSIS — I21.4 NSTEMI (NON-ST ELEVATED MYOCARDIAL INFARCTION) (HCC): Primary | ICD-10-CM

## 2022-08-17 PROCEDURE — 93922 UPR/L XTREMITY ART 2 LEVELS: CPT | Performed by: FAMILY MEDICINE

## 2022-08-17 PROCEDURE — 99214 OFFICE O/P EST MOD 30 MIN: CPT | Performed by: FAMILY MEDICINE

## 2022-08-17 PROCEDURE — G8417 CALC BMI ABV UP PARAM F/U: HCPCS | Performed by: FAMILY MEDICINE

## 2022-08-17 PROCEDURE — G8427 DOCREV CUR MEDS BY ELIG CLIN: HCPCS | Performed by: FAMILY MEDICINE

## 2022-08-17 PROCEDURE — 4004F PT TOBACCO SCREEN RCVD TLK: CPT | Performed by: FAMILY MEDICINE

## 2022-08-17 SDOH — ECONOMIC STABILITY: FOOD INSECURITY: WITHIN THE PAST 12 MONTHS, THE FOOD YOU BOUGHT JUST DIDN'T LAST AND YOU DIDN'T HAVE MONEY TO GET MORE.: NEVER TRUE

## 2022-08-17 SDOH — ECONOMIC STABILITY: FOOD INSECURITY: WITHIN THE PAST 12 MONTHS, YOU WORRIED THAT YOUR FOOD WOULD RUN OUT BEFORE YOU GOT MONEY TO BUY MORE.: NEVER TRUE

## 2022-08-17 ASSESSMENT — PATIENT HEALTH QUESTIONNAIRE - PHQ9
SUM OF ALL RESPONSES TO PHQ QUESTIONS 1-9: 0
2. FEELING DOWN, DEPRESSED OR HOPELESS: 0
SUM OF ALL RESPONSES TO PHQ QUESTIONS 1-9: 0
SUM OF ALL RESPONSES TO PHQ QUESTIONS 1-9: 0
1. LITTLE INTEREST OR PLEASURE IN DOING THINGS: 0
SUM OF ALL RESPONSES TO PHQ9 QUESTIONS 1 & 2: 0
SUM OF ALL RESPONSES TO PHQ QUESTIONS 1-9: 0

## 2022-08-17 ASSESSMENT — SOCIAL DETERMINANTS OF HEALTH (SDOH): HOW HARD IS IT FOR YOU TO PAY FOR THE VERY BASICS LIKE FOOD, HOUSING, MEDICAL CARE, AND HEATING?: NOT HARD AT ALL

## 2022-08-17 NOTE — PROGRESS NOTES
2022     Teresita Ocampo (:  1980) is a 38 y.o. male, here for evaluation of the following medical concerns:    HPI    Today, patient presented to the clinic for follow up. 1. CAD- patient follows with Cardiology, recent note stated the following      \"Clark Gasca is a 41 y.o. male who presents with the above complaint. He was admitted to [facility] with complaints of sudden onset severe substernal chest pain. He underwent heart catheterization on 20 resulting in MI x 3 to RCA. He called the office with worsening chest pain and underwent repeat angiography 20 that showed patent stents. He presented to the ED 3/5/21 with an NSTEMI and underwent angiography. He was referred to [facility] and underwent CABG x4.\"   Patient stable today and doesn't have a new compliant. He missed his last appointment and stated that he feels as if he \"heart is beating hard\", can feel discomfort in his throat. His next appointment is in 6 months. He will try to make a sooner appointment. 2.  HTN- patient returns to clinic for follow up on elevated BP, he is taking Coreg 6.25 mg, Cardizem 120 mg, BP is much improvedhe denied headache, vision change or dizziness. 3.  Hyperlipidemia- discussed his lipid profile, , patient is taking statin tolerating the medication well. 4.Claudication- has leg pain that is reproducible, pain in both legs. Today, he denied chest pain, sob ,n,v, or diarrhea. Review of Systems   Constitutional:  Positive for fatigue. Negative for activity change, fever and unexpected weight change. HENT:  Negative for congestion, ear pain, rhinorrhea, sinus pressure, sore throat and trouble swallowing. Eyes:  Negative for visual disturbance. Respiratory:  Negative for cough and shortness of breath. Cardiovascular:  Negative for chest pain, palpitations and leg swelling. Gastrointestinal:  Negative for abdominal pain, diarrhea, nausea and vomiting. heard.  Pulmonary:      Effort: Pulmonary effort is normal.      Breath sounds: Normal breath sounds. No wheezing or rales. Abdominal:      General: Bowel sounds are normal.      Palpations: Abdomen is soft. Tenderness: There is no abdominal tenderness. Musculoskeletal:         General: Normal range of motion. Cervical back: Neck supple. Lymphadenopathy:      Cervical: No cervical adenopathy. Skin:     Findings: No rash. Neurological:      Mental Status: He is alert and oriented to person, place, and time. Psychiatric:         Behavior: Behavior normal.       ASSESSMENT/PLAN:  1. NSTEMI (non-ST elevated myocardial infarction) [facility])  Patient will need to keep a log of his glucose readings and bring them to the office. He needs to monitor his diet and exercise. Attempt to lose weight. Discussed proper eating habits. Continue to take medication as prescribed. Will obtain A1C at this visit. Educated on signs and symptoms for immediate evaluation in the ER. 2. Claudication [facility])  Referred for procedure  Educated on the importance of having this done. Answered questions   - Ambulatory referral to Sleep Medicine  - 44968 - SD Non-Invas Physiologic STD Extremity ART 1-2 Level    3. Apnea  Referred for procedure  Educated on the importance of having this done. Answered questions   - Ambulatory referral to Sleep Medicine  - 70701 - SD Non-Invas Physiologic STD Extremity ART 1-2 Level    4. Tobacco abuse  Patient educated on the need to stop smoking. Had [id number] recommended. Educated patient on consequences of smoking. Prescribed gum at this time. Spent yariel 5 minutes discussing this. Return in about 3 months (around 11/17/2022).

## 2022-08-23 ASSESSMENT — ENCOUNTER SYMPTOMS
SORE THROAT: 0
VOMITING: 0
COUGH: 0
SINUS PRESSURE: 0
ABDOMINAL PAIN: 0
SHORTNESS OF BREATH: 0
TROUBLE SWALLOWING: 0
RHINORRHEA: 0
DIARRHEA: 0
NAUSEA: 0

## 2022-09-12 ENCOUNTER — OFFICE VISIT (OUTPATIENT)
Dept: SLEEP MEDICINE | Age: 42
End: 2022-09-12
Payer: MEDICARE

## 2022-09-12 VITALS
HEIGHT: 69 IN | TEMPERATURE: 97.8 F | WEIGHT: 250.4 LBS | DIASTOLIC BLOOD PRESSURE: 70 MMHG | RESPIRATION RATE: 18 BRPM | BODY MASS INDEX: 37.09 KG/M2 | HEART RATE: 94 BPM | OXYGEN SATURATION: 97 % | SYSTOLIC BLOOD PRESSURE: 120 MMHG

## 2022-09-12 DIAGNOSIS — R06.81 WITNESSED EPISODE OF APNEA: ICD-10-CM

## 2022-09-12 DIAGNOSIS — E66.01 CLASS 2 SEVERE OBESITY DUE TO EXCESS CALORIES WITH SERIOUS COMORBIDITY AND BODY MASS INDEX (BMI) OF 36.0 TO 36.9 IN ADULT (HCC): ICD-10-CM

## 2022-09-12 DIAGNOSIS — Z91.89 AT RISK FOR CENTRAL SLEEP APNEA: ICD-10-CM

## 2022-09-12 DIAGNOSIS — R06.89 GASPING FOR BREATH: ICD-10-CM

## 2022-09-12 DIAGNOSIS — Z86.79 H/O CHF: ICD-10-CM

## 2022-09-12 DIAGNOSIS — G47.33 OSA (OBSTRUCTIVE SLEEP APNEA): Primary | ICD-10-CM

## 2022-09-12 DIAGNOSIS — G47.19 EXCESSIVE DAYTIME SLEEPINESS: ICD-10-CM

## 2022-09-12 DIAGNOSIS — I10 ESSENTIAL HYPERTENSION: ICD-10-CM

## 2022-09-12 DIAGNOSIS — R06.83 SNORING: ICD-10-CM

## 2022-09-12 PROCEDURE — G8427 DOCREV CUR MEDS BY ELIG CLIN: HCPCS | Performed by: PSYCHIATRY & NEUROLOGY

## 2022-09-12 PROCEDURE — 99204 OFFICE O/P NEW MOD 45 MIN: CPT | Performed by: PSYCHIATRY & NEUROLOGY

## 2022-09-12 PROCEDURE — G8417 CALC BMI ABV UP PARAM F/U: HCPCS | Performed by: PSYCHIATRY & NEUROLOGY

## 2022-09-12 PROCEDURE — 4004F PT TOBACCO SCREEN RCVD TLK: CPT | Performed by: PSYCHIATRY & NEUROLOGY

## 2022-09-12 ASSESSMENT — SLEEP AND FATIGUE QUESTIONNAIRES
HOW LIKELY ARE YOU TO NOD OFF OR FALL ASLEEP IN A CAR, WHILE STOPPED FOR A FEW MINUTES IN TRAFFIC: 3
NECK CIRCUMFERENCE (INCHES): 18
HOW LIKELY ARE YOU TO NOD OFF OR FALL ASLEEP WHILE SITTING INACTIVE IN A PUBLIC PLACE: 2
HOW LIKELY ARE YOU TO NOD OFF OR FALL ASLEEP WHILE SITTING QUIETLY AFTER LUNCH WITHOUT ALCOHOL: 3
HOW LIKELY ARE YOU TO NOD OFF OR FALL ASLEEP WHEN YOU ARE A PASSENGER IN A CAR FOR AN HOUR WITHOUT A BREAK: 3
HOW LIKELY ARE YOU TO NOD OFF OR FALL ASLEEP WHILE WATCHING TV: 3
ESS TOTAL SCORE: 21
HOW LIKELY ARE YOU TO NOD OFF OR FALL ASLEEP WHILE LYING DOWN TO REST IN THE AFTERNOON WHEN CIRCUMSTANCES PERMIT: 3
HOW LIKELY ARE YOU TO NOD OFF OR FALL ASLEEP WHILE SITTING AND READING: 3
HOW LIKELY ARE YOU TO NOD OFF OR FALL ASLEEP WHILE SITTING AND TALKING TO SOMEONE: 1

## 2022-09-12 ASSESSMENT — ENCOUNTER SYMPTOMS
GASTROINTESTINAL NEGATIVE: 1
ALLERGIC/IMMUNOLOGIC NEGATIVE: 1
APNEA: 1
CHOKING: 1
EYES NEGATIVE: 1
SHORTNESS OF BREATH: 1

## 2022-09-12 NOTE — PATIENT INSTRUCTIONS
Orders Placed This Encounter   Procedures    Baseline Diagnostic Sleep Study     Standing Status:   Future     Standing Expiration Date:   9/12/2023     Order Specific Question:   Adult or Pediatric     Answer:   Adult Study (>7 Years)     Order Specific Question:   Location For Sleep Study     Answer:   [facility]     Order Specific Question:   Select Sleep Lab Location     Answer:   [facility]    Sleep Study with PAP Titration     Standing Status:   Future     Standing Expiration Date:   9/12/2023     Order Specific Question:   Sleep Study Titration Type     Answer:   CPAP     Order Specific Question:   Location For Sleep Study     Answer:   [facility]     Order Specific Question:   Select Sleep Lab Location     Answer:   [facility]

## 2022-09-12 NOTE — PROGRESS NOTES
MD GILBERT Elaine Board Certified in Sleep Medicine  Certified [facility] Sleep Medicine  Board Certified in Neurology [address] [facility] [address], [place] [address] (2[address] [facility] [place]  [address], [address] [facility]           [address] [address] [phone]    Subjective:     Patient ID: Ryley Rivera is a 38 y.o. male. Chief Complaint   Patient presents with    Establish Care    Fatigue    Daytime Sleepiness         HPI:        Ryley Rivera is a 38 y.o. male referred by Dr. Jac Calvillo for a sleep evaluation. He complains of snoring, snorting, choking, periods of not breathing, tossing and turning, take naps during the day, teeth grinding but he denies knees buckling with laughing, completely or partially paralyzed while falling asleep or waking up, noisy environment, uncomfortable room temperature, uncomfortable bedding. Symptoms began several years ago, gradually worsening since that time. The patient's brother confirmed the snoring and stopped breathing at night. SLEEP SCHEDULE: Goes to bed around 11 PM-1 AM in the weekdays and 11 PM-1 AM in the weekends. It usually takes the patient 30 minutes to fall asleep. The patient gets up 3 per night to go to the bathroom. The Patient finally gets up at 4 AM during the weekdays and 4 AM and sometimes he sleeps . The patient has restless sleep with frequent arousals in addition to the Patient has significant daytime sleepiness. The Patient scored Franklin Sleepiness Score: 21 on Franklin Sleepiness Scale ( more than 10 is indicative of daytime sleepiness)and 27 in fatigue scale ( more than 36 is indicative of daytime fatigue). The patient takes daily nap for  minutes and usually is not refreshing nap.      Previous evaluation and treatment has included- none. The Patient has been obese for many years and tried, has gained 60 in the last 5 years,  unsuccessfully to lose weight through diet, exercise. DOT/CDL - N/A  FAA/'pura - N/A  The patient HTN is stable. H/O CHF last echocardiogram on 07/15/2020  Summary   Left ventricular cavity size is normal.   There is moderate concentric left ventricular hypertrophy. Ejection fraction is visually estimated to be 45-50%. There is moderate hypokinesis of the mid to apical inferoseptal and inferior   walls. Diastolic filling parameters suggest grade I diastolic dysfunction. Normal right ventricular size. Right ventricular systolic function is moderately reduced. TAPSE 1.2 cm    Previous Report(s) Reviewed: historical medical records       Social History     Socioeconomic History    Marital status: Single     Spouse name: Not on file    Number of children: Not on file    Years of education: Not on file    Highest education level: Not on file   Occupational History    Not on file   Tobacco Use    Smoking status: Every Day     Packs/day: 1.00     Types: Cigarettes     Start date: 12/19/1989     Passive exposure: Past    Smokeless tobacco: Never    Tobacco comments:     started age 13. tried vaping age 25 when trying to quit smoking.  .   Vaping Use    Vaping Use: Never used   Substance and Sexual Activity    Alcohol use: No    Drug use: Not Currently     Types: Marijuana [place])     Comment: did use age 22, last time age 26    Sexual activity: Yes     Partners: Female   Other Topics Concern    Not on file   Social History Narrative    Not on file     Social Determinants of Health     Financial Resource Strain: Low Risk     Difficulty of Paying Living Expenses: Not hard at all   Food Insecurity: No Food Insecurity    Worried About Running Out of Food in the Last Year: Never true    Ran Out of Food in the Last Year: Never true   Transportation Needs: Not on file   Physical Activity: Not on file Stress: Not on file   Social Connections: Not on file   Intimate Partner Violence: Not on file   Housing Stability: Not on file       Prior to Admission medications    Medication Sig Start Date End Date Taking?  Authorizing Provider   aspirin 325 MG EC tablet Take 1 tablet by mouth daily 4/20/22  Yes Ryan Fox MD   carvedilol (COREG) 6.25 MG tablet Take 1 tablet by mouth 2 times daily (with meals) 3/23/22  Yes Ryan Fox MD   dilTIAZem (CARDIZEM CD) 120 MG extended release capsule Take 1 capsule by mouth daily 3/23/22  Yes Ryan Fox MD   lisinopril (PRINIVIL;ZESTRIL) 20 MG tablet Take 1 tablet by mouth daily 3/23/22  Yes Ryan Fox MD   atorvastatin (LIPITOR) 80 MG tablet Take 1 tablet by mouth nightly 3/23/22  Yes Ryan Fox MD   famotidine (PEPCID) 20 MG tablet Take 1 tablet by mouth 2 times daily 8/4/21  Yes Wilfredo Urias DO   buPROPion [facility]) 150 MG extended release tablet Take 1 tablet by mouth 2 times daily 3/19/21  Yes Helga Winston MD       Allergies as of 09/12/2022    (No Known Allergies)       Patient Active Problem List   Diagnosis    Essential hypertension    Tobacco abuse counseling    NSTEMI (non-ST elevated myocardial infarction) ([address]    CAD in native artery    Tobacco abuse    Left leg numbness    Dyslipidemia    Subclavian artery stenosis, left (HCC)    S/P CABG x 4    S/P left atrial appendage ligation    Chronic systolic congestive heart failure ([address]    Chest pain    H/O CHF       Past Medical History:   Diagnosis Date    Chronic systolic congestive heart failure ([address] 8/4/2021    Coronary artery disease     Essential hypertension 04/07/2020    GERD (gastroesophageal reflux disease)     Headache     NSTEMI (non-ST elevated myocardial infarction) ([address]     4/25/20, 5/5/20, 3/5/21    Obesity     Other hyperlipidemia 04/07/2020    Stenosis of left subclavian artery ([address] 03/2021    stented 3/12/21    Tobacco abuse counseling 04/07/2020       Past Surgical History:   Procedure Laterality Date    CORONARY ANGIOPLASTY  2021    PTCA RCA    CORONARY ANGIOPLASTY WITH STENT PLACEMENT  2020    MI RCA    CORONARY ANGIOPLASTY WITH STENT PLACEMENT  2020    MI Diag, LCx    CORONARY ARTERY BYPASS GRAFT  03/15/2021    cabgx4 (LIMA-LAD, SVG-D1, L radial off LIMA-OM, SVG-PDA), JAYDE exclusion, sternal plating    CORONARY ARTERY BYPASS GRAFT N/A 3/15/2021    TRANSESOPHAGEAL ECHOCARDIOGRAM, TOTAL CARDIO PULMONARY BYPASS, CORONARY ARTERY BYPASS GRAFTING X 4 (VEIN X 1, ARTERY X 4 ) USING LEFT INTERNAL MAMMARY ARTERY, LEFT ENDOSCOPICALLY HARVESTED RADIAL ARTERY, ENDOSCOPICALLY HARVESTED RIGHT SAPHENOUS VEIN. LEFT ATRIAL APPENDAGE CLIPPING USING SIZE 28 ATRICLIP performed by Billy Light MD at [facility] 108      wisdom teeth removed    VASCULAR SURGERY  2021    L subclavian artery stenting       Family History   Problem Relation Age of Onset    Other Mother         smoker    Diabetes Mother         borderline    Other Father          of natural causes age 76    High Blood Pressure Maternal Aunt     Heart Disease Maternal Aunt         pacemaker    Heart Disease Maternal Uncle          of MI 80    Heart Disease Maternal Cousin         dx 39    Diabetes Brother        Review of Systems   Constitutional:  Negative for diaphoresis. HENT: Negative. Eyes: Negative. Respiratory:  Positive for apnea, choking and shortness of breath. Cardiovascular: Negative. Negative for leg swelling. Gastrointestinal: Negative. Endocrine: Negative. Genitourinary:  Positive for frequency. Musculoskeletal: Negative. Allergic/Immunologic: Negative. Neurological:  Negative for headaches. Hematological: Negative. Psychiatric/Behavioral: Negative.        Objective:     Vitals:  Weight BMI Neck circumference    Wt Readings from Last 3 Encounters:   22 250 lb 6.4 oz (113.6 kg)   22 250 lb (113.4 kg)   22 240 lb 6.4 oz (109 kg)    Body mass index is 36.98 kg/m². Neck circumference (Inches): 18     BP HR SaO2   BP Readings from Last 3 Encounters:   09/12/22 120/70   08/17/22 138/78   04/22/22 130/78    Pulse Readings from Last 3 Encounters:   09/12/22 94   08/17/22 98   04/20/22 98    SpO2 Readings from Last 3 Encounters:   09/12/22 97%   08/17/22 98%   04/20/22 100%        The mandibular molar Class :   [x]1 []2 []3      Mallampati I Airway Classification:   []1 []2 []3 [x]4        Physical Exam  Vitals and nursing note reviewed. Constitutional:       Appearance: Normal appearance. HENT:      Head: Atraumatic. Nose: Congestion present. Mouth/Throat:      Comments: Mallampati class 4, no retrognathia or hypognathia , , crowded oropharynx with low soft palate, no tonsils enlargement. Eyes:      Extraocular Movements: Extraocular movements intact. Cardiovascular:      Rate and Rhythm: Normal rate and regular rhythm. Pulmonary:      Effort: Pulmonary effort is normal.      Breath sounds: Normal breath sounds. Musculoskeletal:      Right lower leg: Edema present. Left lower leg: Edema present. Neurological:      Mental Status: Mental status is at baseline. Psychiatric:         Mood and Affect: Mood normal.       Assessment:   Obstructive sleep apnea especially with snoring, snorting,  observed apnea, daytime sleepiness, large neck circumference, Mallampati class of 4 and obesity. Suspected central events with h/o CHF. Diagnosis Orders   1. SANDY (obstructive sleep apnea)  Baseline Diagnostic Sleep Study    Sleep Study with PAP Titration      2. Essential hypertension        3. H/O CHF        4. At risk for central sleep apnea  Baseline Diagnostic Sleep Study    Sleep Study with PAP Titration      5. Class 2 severe obesity due to excess calories with serious comorbidity and body mass index (BMI) of 36.0 to 36.9 in adult ([facility]        6. Snoring        7. Witnessed episode of apnea        8.  Excessive daytime sleepiness        9. Gasping for breath          Plan:     Patient was counseled about the pathophysiology of obstructive sleep apnea syndrome and the methods for evaluating its presence and severity. Patient was counseled to avoid driving and other potentially hazardous circumstances if the patient is experiencing excessive sleepiness. Treatment considerations include the use of nasal CPAP, oral dental appliance or a surgical intervention, which should be based on otolarygologic findings, In the meantime, the patient should be cautioned to avoid the use of alcohol or other depressant medications because of potential for increasing the duration and severity of apnea and cautioned regarding driving or operating and dangerous equipment if the patient is experiencing daytime sleepiness. .  Most likely treating the SANDY will have positive impact on HTN, and CHF control. Weight loss: We discussed the proportionality between weight and AHI. With 10% weight change, the AHI has a 27% proportionate change. With 20% weight change, the AHI has a 45-50% proportionate change. Orders Placed This Encounter   Procedures    Baseline Diagnostic Sleep Study    Sleep Study with PAP Titration         Return for F/U between 31 and 90 days from the recieving CPAP.     Colby Pina MD  Medical Director - [facility]

## 2022-10-05 ENCOUNTER — HOSPITAL ENCOUNTER (OUTPATIENT)
Dept: SLEEP CENTER | Age: 42
Discharge: HOME | End: 2022-10-05
Payer: MEDICARE

## 2022-10-05 DIAGNOSIS — G47.33 OSA (OBSTRUCTIVE SLEEP APNEA): ICD-10-CM

## 2022-10-05 DIAGNOSIS — Z91.89 AT RISK FOR CENTRAL SLEEP APNEA: ICD-10-CM

## 2022-10-05 PROCEDURE — 95810 POLYSOM 6/> YRS 4/> PARAM: CPT

## 2022-10-06 PROBLEM — G47.33 OSA (OBSTRUCTIVE SLEEP APNEA): Status: ACTIVE | Noted: 2022-10-06

## 2022-10-07 ENCOUNTER — TELEPHONE (OUTPATIENT)
Dept: PULMONOLOGY | Age: 42
End: 2022-10-07

## 2022-10-07 PROCEDURE — 95811 POLYSOM 6/>YRS CPAP 4/> PARM: CPT | Performed by: PSYCHIATRY & NEUROLOGY

## 2022-10-07 PROCEDURE — 95810 POLYSOM 6/> YRS 4/> PARAM: CPT | Performed by: PSYCHIATRY & NEUROLOGY

## 2022-10-07 NOTE — TELEPHONE ENCOUNTER
PSG Sleep study showed Moderate SANDY. AHI was 29.4  per hr. And O2 Desaturations to 84%. Dr Juan Rosario: Follow up with the patient's sleep physician to discuss results  A trial of CPAP titration is recommended with supplemental oxygen per protocol if needed. LMOM for pt to cb to give him sleep number to schedule for a titration study. Pt also needs to be given his results.

## 2022-10-12 NOTE — TELEPHONE ENCOUNTER
The patient has been notified of this information and all questions answered. Pt transferred to [address] [place] [address] for titration study.

## 2022-10-21 ENCOUNTER — HOSPITAL ENCOUNTER (OUTPATIENT)
Dept: SLEEP CENTER | Age: 42
Discharge: HOME | End: 2022-10-21
Payer: MEDICARE

## 2022-10-21 DIAGNOSIS — Z91.89 AT RISK FOR CENTRAL SLEEP APNEA: ICD-10-CM

## 2022-10-21 DIAGNOSIS — G47.33 OSA (OBSTRUCTIVE SLEEP APNEA): ICD-10-CM

## 2022-10-21 PROCEDURE — 95811 POLYSOM 6/>YRS CPAP 4/> PARM: CPT

## 2022-10-25 NOTE — PROGRESS NOTES
Louann Parminder         : 1980  [] [facility]     [] [address]      [] Evelyn     []Zain    [] Vikki Kanner  [] [facility]  [] [facility]   [] [facility] [] Other:  Diagnosis: [x] SANDY (G47.33) [] CSA (G47.31) [] Apnea (G47.30)   Length of Need: [] 12 Months [x] 99 Months [] Other:    Machine (ARPITA!):  [x] ResMed AirSense     Auto [] Other:     [x]  CPAP () [] Bilevel ()   Mode: [x] Auto [] Spontaneous    Mode: [] Auto [] Spontaneous           15 cm                 Comfort Settings:   - Ramp Pressure: 5 cmH2O                                        - Ramp time: 15 min                                     -  Flex/EPR - 3 full time                                    - For ResMed Bilevel (TiMax-4 sec   TiMin- 0.2 sec)     Humidifier: [x] Heated ()        [x] Water chamber replacement ()/ 1 per 6 months        Mask:  Please always start with the mask the patient used during the titraion   [x] Nasal () /1 per 3 months [x] Full Face () /1 per 3 months   [x] Patient choice -Size and fit mask [x] Patient Choice - Size and fit mask   [] Dispense:    [] Dispense:   medium Simplus full face   [x] Headgear () / 1 per 3 months [x] Headgear () / 1 per 3 months   [x] Replacement Nasal Cushion ()/2 per month [x] Interface Replacement ()/1 per month   [x] Replacement Nasal Pillows ()/2 per month         Tubing: [x] Heated ()/1 per 3 months    [] Standard ()/1 per 3 months [] Other:           Filters: [x] Non-disposable ()/1 per 6 months     [x] Ultra-Fine, Disposable ()/2 per month        Miscellaneous: [x] Chin Strap ()/ 1 per 6 months [] O2 bleed-in:       LPM   [] Oximetry on CPAP/Bilevel []  Other:          Start Order Date: 10/25/22    MEDICAL JUSTIFICATION:  I, the undersigned, certify that the above prescribed supplies are medically necessary for this patients wellbeing.   In my opinion, the supplies are both reasonable and necessary in reference to accepted standards of medicalpractice in treatment of this patients condition.     Girard Hatchet, MD      NPI: [id number]       Order Signed Date: 10/25/22    Electronically signed by Girard Hatchet, MD on 10/25/2022 at 11:35 AM

## 2022-10-26 ENCOUNTER — TELEPHONE (OUTPATIENT)
Dept: PULMONOLOGY | Age: 42
End: 2022-10-26

## 2022-10-26 NOTE — TELEPHONE ENCOUNTER
Sleep study showed moderate SANDY. AHI was  29.4 per hr.  And O2 Desaturations to 84 %  adequate contrail of events CPAP pressure of 15    Dr recommends CPAP    Pt l/m  wcb  Needs DME

## 2022-11-01 ENCOUNTER — OFFICE VISIT (OUTPATIENT)
Dept: FAMILY MEDICINE CLINIC | Age: 42
End: 2022-11-01
Payer: MEDICARE

## 2022-11-01 ENCOUNTER — HOSPITAL ENCOUNTER (OUTPATIENT)
Age: 42
Discharge: HOME | End: 2022-11-01
Payer: MEDICARE

## 2022-11-01 ENCOUNTER — HOSPITAL ENCOUNTER (OUTPATIENT)
Dept: GENERAL RADIOLOGY | Age: 42
Discharge: HOME | End: 2022-11-01
Payer: MEDICARE

## 2022-11-01 VITALS
WEIGHT: 247 LBS | SYSTOLIC BLOOD PRESSURE: 130 MMHG | DIASTOLIC BLOOD PRESSURE: 88 MMHG | BODY MASS INDEX: 36.58 KG/M2 | HEIGHT: 69 IN

## 2022-11-01 DIAGNOSIS — M54.6 MIDLINE THORACIC BACK PAIN, UNSPECIFIED CHRONICITY: ICD-10-CM

## 2022-11-01 DIAGNOSIS — Z00.00 ENCOUNTER FOR MEDICARE ANNUAL WELLNESS EXAM: Primary | ICD-10-CM

## 2022-11-01 DIAGNOSIS — I21.4 NSTEMI (NON-ST ELEVATED MYOCARDIAL INFARCTION) (HCC): ICD-10-CM

## 2022-11-01 DIAGNOSIS — G47.33 OSA (OBSTRUCTIVE SLEEP APNEA): ICD-10-CM

## 2022-11-01 PROCEDURE — 3074F SYST BP LT 130 MM HG: CPT | Performed by: FAMILY MEDICINE

## 2022-11-01 PROCEDURE — G8484 FLU IMMUNIZE NO ADMIN: HCPCS | Performed by: FAMILY MEDICINE

## 2022-11-01 PROCEDURE — 3078F DIAST BP <80 MM HG: CPT | Performed by: FAMILY MEDICINE

## 2022-11-01 PROCEDURE — 99396 PREV VISIT EST AGE 40-64: CPT | Performed by: FAMILY MEDICINE

## 2022-11-01 PROCEDURE — 72072 X-RAY EXAM THORAC SPINE 3VWS: CPT

## 2022-11-01 ASSESSMENT — ENCOUNTER SYMPTOMS
SORE THROAT: 0
SINUS PRESSURE: 0
ABDOMINAL PAIN: 0
DIARRHEA: 0
COUGH: 0
SHORTNESS OF BREATH: 0
RHINORRHEA: 0
TROUBLE SWALLOWING: 0
NAUSEA: 0
VOMITING: 0

## 2022-11-01 NOTE — PROGRESS NOTES
2022     Maria E Elise (:  1980) is a 38 y.o. male, here for evaluation of the following medical concerns:    HPI     Annual exam  -need to discuss vaccinations and the need from these  -need to discuss HIV testing and basic labs. -need to discuss healthy eating habits and exercise.   -need to discuss age appropriate screening recommendations  -need to have detailed conversation concerning QUINTEN and PSA testing      Substantial CAD- follows with Cardiology    Upper back pain- mid thoracic spine pain with palpation, stated that it is painful with movement, no injury or trauma. Today, denied chest pain, sob,n , v or diarrhea. Review of Systems   Constitutional:  Positive for fatigue. Negative for activity change, fever and unexpected weight change. HENT:  Negative for congestion, ear pain, rhinorrhea, sinus pressure, sore throat and trouble swallowing. Eyes:  Positive for visual disturbance. Respiratory:  Negative for cough and shortness of breath. Cardiovascular:  Positive for chest pain. Negative for palpitations and leg swelling. Gastrointestinal:  Negative for abdominal pain, diarrhea, nausea and vomiting. Endocrine: Negative for cold intolerance, heat intolerance, polydipsia and polyphagia. Musculoskeletal:  Positive for arthralgias and back pain. Skin:  Negative for rash. Neurological:  Positive for headaches. Negative for dizziness, syncope and light-headedness. Psychiatric/Behavioral:  Negative for dysphoric mood. The patient is not nervous/anxious. Prior to Visit Medications    Medication Sig Taking?  Authorizing Provider   aspirin 325 MG EC tablet Take 1 tablet by mouth daily Yes Christiana Han MD   carvedilol (COREG) 6.25 MG tablet Take 1 tablet by mouth 2 times daily (with meals) Yes Christiana Han MD   dilTIAZem (CARDIZEM CD) 120 MG extended release capsule Take 1 capsule by mouth daily Yes Christiana Han MD   lisinopril (PRINIVIL;ZESTRIL) 20 MG tablet Take 1 tablet by mouth daily Yes Liborio Cole MD   atorvastatin (LIPITOR) 80 MG tablet Take 1 tablet by mouth nightly Yes Liborio Cole MD   famotidine (PEPCID) 20 MG tablet Take 1 tablet by mouth 2 times daily Yes Viry Progreso,    buPROPion [facility]) 150 MG extended release tablet Take 1 tablet by mouth 2 times daily Yes Bony Wu MD        Social History     Tobacco Use    Smoking status: Every Day     Packs/day: 1.00     Types: Cigarettes     Start date: 12/19/1989     Passive exposure: Past    Smokeless tobacco: Never    Tobacco comments:     started age 13. tried vaping age 25 when trying to quit smoking. .   Substance Use Topics    Alcohol use: No        Vitals:    11/01/22 1438   BP: 130/88   Weight: 247 lb (112 kg)   Height: 5' 9\" (1.753 m)     Estimated body mass index is 36.48 kg/m² as calculated from the following:    Height as of this encounter: 5' 9\" (1.753 m). Weight as of this encounter: 247 lb (112 kg). Physical Exam  Vitals and nursing note reviewed. Constitutional:       Appearance: He is well-developed. HENT:      Head: Normocephalic and atraumatic. Right Ear: Tympanic membrane, ear canal and external ear normal.      Left Ear: Tympanic membrane, ear canal and external ear normal.   Eyes:      Pupils: Pupils are equal, round, and reactive to light. Neck:      Thyroid: No thyromegaly. Cardiovascular:      Rate and Rhythm: Normal rate and regular rhythm. Heart sounds: No murmur heard. Pulmonary:      Effort: Pulmonary effort is normal.      Breath sounds: Normal breath sounds. No wheezing or rales. Abdominal:      General: Bowel sounds are normal.      Palpations: Abdomen is soft. Tenderness: There is no abdominal tenderness. Musculoskeletal:         General: Tenderness present. Cervical back: Neck supple. Comments: With palpation of T4-t8   Lymphadenopathy:      Cervical: No cervical adenopathy. Skin:     Findings: No rash. Neurological:      Mental Status: He is alert and oriented to person, place, and time. Psychiatric:         Behavior: Behavior normal.         Judgment: Judgment normal.       ASSESSMENT/PLAN:  1. Encounter for Medicare annual wellness exam  discussed vaccinations and he declined  -discussed HIV testing and basic labs he declined  -discuseds healthy eating habits and exercise and answered questions  -discussed age appropriate screening recommendations    - CBC; Future  - Comprehensive Metabolic Panel; Future  - Lipid Panel; Future    2. Midline thoracic back pain, unspecified chronicity  X ray ordered  Otc medication   - XR THORACIC SPINE (3 VIEWS); Future  - CBC; Future  - Comprehensive Metabolic Panel; Future  - Lipid Panel; Future    3. NSTEMI (non-ST elevated myocardial infarction) ([facility]  Stable  Continue with medication  Keep appointments with specialist.   Answered questions   - CBC; Future  - Comprehensive Metabolic Panel; Future  - Lipid Panel; Future    4. SANDY (obstructive sleep apnea)  Stable  Start with CPAP  Keep appointments with specialist.   Answered questions   - CBC; Future  - Comprehensive Metabolic Panel; Future  - Lipid Panel; Future    Return in about 6 months (around 5/1/2023).

## 2022-11-03 ENCOUNTER — TELEPHONE (OUTPATIENT)
Dept: FAMILY MEDICINE CLINIC | Age: 42
End: 2022-11-03

## 2022-11-06 ASSESSMENT — ENCOUNTER SYMPTOMS: BACK PAIN: 1

## 2022-11-14 ENCOUNTER — OFFICE VISIT (OUTPATIENT)
Dept: CARDIOLOGY CLINIC | Age: 42
End: 2022-11-14
Payer: MEDICARE

## 2022-11-14 VITALS
BODY MASS INDEX: 36.88 KG/M2 | HEART RATE: 68 BPM | WEIGHT: 249 LBS | HEIGHT: 69 IN | SYSTOLIC BLOOD PRESSURE: 118 MMHG | OXYGEN SATURATION: 98 % | DIASTOLIC BLOOD PRESSURE: 72 MMHG

## 2022-11-14 DIAGNOSIS — I87.1 SUBCLAVIAN VEIN STENOSIS: ICD-10-CM

## 2022-11-14 DIAGNOSIS — Z72.0 TOBACCO ABUSE: ICD-10-CM

## 2022-11-14 DIAGNOSIS — I25.10 CAD IN NATIVE ARTERY: Primary | ICD-10-CM

## 2022-11-14 DIAGNOSIS — I10 ESSENTIAL HYPERTENSION: ICD-10-CM

## 2022-11-14 DIAGNOSIS — I65.29 STENOSIS OF CAROTID ARTERY, UNSPECIFIED LATERALITY: ICD-10-CM

## 2022-11-14 DIAGNOSIS — E78.5 HYPERLIPIDEMIA LDL GOAL <70: ICD-10-CM

## 2022-11-14 DIAGNOSIS — I73.9 CLAUDICATION (HCC): ICD-10-CM

## 2022-11-14 PROCEDURE — G8484 FLU IMMUNIZE NO ADMIN: HCPCS | Performed by: INTERNAL MEDICINE

## 2022-11-14 PROCEDURE — G8417 CALC BMI ABV UP PARAM F/U: HCPCS | Performed by: INTERNAL MEDICINE

## 2022-11-14 PROCEDURE — 4004F PT TOBACCO SCREEN RCVD TLK: CPT | Performed by: INTERNAL MEDICINE

## 2022-11-14 PROCEDURE — G8427 DOCREV CUR MEDS BY ELIG CLIN: HCPCS | Performed by: INTERNAL MEDICINE

## 2022-11-14 PROCEDURE — 93000 ELECTROCARDIOGRAM COMPLETE: CPT | Performed by: INTERNAL MEDICINE

## 2022-11-14 PROCEDURE — 3078F DIAST BP <80 MM HG: CPT | Performed by: INTERNAL MEDICINE

## 2022-11-14 PROCEDURE — 3074F SYST BP LT 130 MM HG: CPT | Performed by: INTERNAL MEDICINE

## 2022-11-14 PROCEDURE — 99214 OFFICE O/P EST MOD 30 MIN: CPT | Performed by: INTERNAL MEDICINE

## 2022-11-14 RX ORDER — ISOSORBIDE MONONITRATE 30 MG/1
30 TABLET, EXTENDED RELEASE ORAL DAILY
Qty: 90 TABLET | Refills: 3 | Status: SHIPPED | OUTPATIENT
Start: 2022-11-14

## 2022-11-14 NOTE — PROGRESS NOTES
Cardiology Consultation   Referring Physician: Jaylin Quispe DO  Reason for Consultation: CAD s/p CABG   Chief Complaint:   Chief Complaint   Patient presents with    Follow-up     Sob and leg pain      Subjective:   History of Present Illness:  Guero Nichole is a 38 y.o. male who presents with the above complaint. He was admitted to [facility] with complaints of sudden onset severe substernal chest pain. He underwent heart catheterization on 20 resulting in MI x 3 to RCA. He called the office with worsening chest pain and underwent repeat angiography 20 that showed patent stents. He presented to the ED 3/5/21 with an NSTEMI and underwent angiography. He was referred to [facility] and underwent CABG x4. He presents today for follow up. States his legs \"always hurt. \" Walking 6 minutes causes bilateral leg pain. He also reports chest pains that     He continues to be a daily smoker and has reduced this to 1 PPD. His breathing has been comfortable without shortness of breath. Prior cardiac testing:    EK20 reviewed. NSR   EKG 21 NSR       Echo: 2020  There is akinesis of the mid to apical/ lateral myocardium. Ejection fraction is visually estimated to be 40-45%. Grade I diastolic dysfunction with normal LV filling pressures. No evidence of left ventricular mass or thrombus noted. Echo: 7/15/20  Left ventricular cavity size is normal.  There is moderate concentric left ventricular hypertrophy. Ejection fraction is visually estimated to be 45-50%. There is moderate hypokinesis of the mid to apical inferoseptal and inferior walls. Diastolic filling parameters suggest grade I diastolic dysfunction. Normal right ventricular size. Right ventricular systolic function is moderately reduced.  TAPSE 1.2 cm     Cath: 2020  ANGIOGRAM/CORONARY ARTERIOGRAM:        The left main coronary artery is patent   The left anterior descending artery is patent, D1 stent is widely patent . The left circumflex artery is patent, mid stent is widely patent. The right coronary artery is diffusely diseased, prox 80%, distal 80%      INTERVENTION  JR 4 guide   runthrough wire used to cross the lesion   Distal RCA predilated w/ 2.5/3.5 regular balloons  IVUS catheter passed to distal RCA, distal reference diameter 4.5 mm   4.0 X 18 mm Maybell MI distal RCA  4.0 X 26 / 4.5 X 12 prox RCA  Post dilation distal/prox  RCA w/ 4.0 NC balloon to 18 keyur      SUMMARY:      Single vessel CAD   S/p successful IVUS guided PCI distal/prox RCA X 3 MI     Cath: 6/12/20   The left main coronary artery is patent   The left anterior descending artery is widely patent D1 stent is widely patent   The left circumflex artery is widely patent, small vessel OM disease   The right coronary artery is widely patent, with patents stents, small vessel disease of the distal RPDA       Echo: 3/5/21  Mild conc. LVH with normal LV size & wall motion. EF is 65%. Normal diastolic function. The right ventricle is normal in size and function. The left atrium is normal in size. Stress test: 3/5/21  Moderate size moderate severity inferior lateral wall defect consistent with ischemia      Cath: 3/5/21   The left main coronary artery is patent . The left anterior descending artery has a mid 90% lesion,   D1 stent is widely patent . The left circumflex artery has an ostial 90% lesion, mid ISR 90% .   The right coronary artery has a mid and distal 99% lesion with left to right collaterals of the PDA   Ostial and distal stents are patent    INTERVENTION  JR 4 guide  Runthrought wire used to corss RCA lesion   POBA of the mid and distal RCA w/ 3.0 X 12 balloon      SUMMARY:   Severe native 3V CAD   S/p POBA mid and distal RCA     3/15/21 cabgx4 (LIMA-LAD, SVG-D1, L radial off LIMA-OM, SVG-PDA)     Past Medical History:   has a past medical history of Chronic systolic congestive heart failure [facility]), Coronary artery disease, Essential hypertension, GERD (gastroesophageal reflux disease), Headache, NSTEMI (non-ST elevated myocardial infarction) ([facility] [address] Obesity, Other hyperlipidemia, Stenosis of left subclavian artery ([facility] [address] and Tobacco abuse counseling. Surgical History:   has a past surgical history that includes Mouth surgery; Coronary angioplasty with stent (05/05/2020); Coronary angioplasty with stent (04/25/2020); Coronary angioplasty (03/08/2021); vascular surgery (03/12/2021); Coronary artery bypass graft (03/15/2021); and Coronary artery bypass graft (N/A, 3/15/2021). Social History:   reports that he has been smoking cigarettes. He started smoking about 32 years ago. He has been smoking an average of 1 pack per day. He has been exposed to tobacco smoke. He has never used smokeless tobacco. He reports that he does not currently use drugs after having used the following drugs: Marijuana [place]). He reports that he does not drink alcohol. Family History:  family history includes Diabetes in his brother and mother; Heart Disease in his maternal aunt, maternal cousin, and maternal uncle; High Blood Pressure in his maternal aunt; Other in his father and mother. Home Medications:  Were reviewed and are listed in nursing record and/or below  Prior to Admission medications    Medication Sig Start Date End Date Taking?  Authorizing Provider   aspirin 325 MG EC tablet Take 1 tablet by mouth daily 4/20/22  Yes Gian Cifuentes MD   carvedilol (COREG) 6.25 MG tablet Take 1 tablet by mouth 2 times daily (with meals) 3/23/22  Yes Gian Cifuentes MD   dilTIAZem (CARDIZEM CD) 120 MG extended release capsule Take 1 capsule by mouth daily 3/23/22  Yes Gian Cifuentes MD   lisinopril (PRINIVIL;ZESTRIL) 20 MG tablet Take 1 tablet by mouth daily 3/23/22  Yes Gian Cifuentes MD   atorvastatin (LIPITOR) 80 MG tablet Take 1 tablet by mouth nightly 3/23/22  Yes Gian Cifuentes MD   famotidine (PEPCID) 20 MG tablet Take 1 tablet by mouth 2 times daily 8/4/21  Yes Beverly Fink DO          Allergies:  Patient has no known allergies. Review of Systems:   Constitutional: no unanticipated weight loss. There's been no change in energy level, sleep pattern, or activity level. No fevers, chills. Eyes: No visual changes or diplopia. No scleral icterus. ENT: No Headaches, hearing loss or vertigo. No mouth sores or sore throat. Cardiovascular: as reviewed in HPI  Respiratory: No cough or wheezing, no sputum production. No hemoptysis. Gastrointestinal: No abdominal pain, appetite loss, blood in stools. No change in bowel or bladder habits. Genitourinary: No dysuria, trouble voiding, or hematuria. Musculoskeletal:  No gait disturbance, no joint complaints. Integumentary: No rash or pruritis. Neurological: No headache, diplopia, change in muscle strength, numbness or tingling. Psychiatric: No anxiety or depression. Endocrine: No temperature intolerance. No excessive thirst, fluid intake, or urination. No tremor. Hematologic/Lymphatic: No abnormal bruising or bleeding, blood clots or swollen lymph nodes. Allergic/Immunologic: No nasal congestion or hives. Objective:   PHYSICAL EXAM:    Vitals:    11/14/22 1551   BP: 118/72   Pulse: 68   SpO2: 98%    Weight: 249 lb (112.9 kg)       General Appearance:  Alert, cooperative, no distress, appears stated age. Head:  Normocephalic, without obvious abnormality, atraumatic. Eyes:  Pupils equal and round. No scleral icterus. Mouth: Moist mucosa, no pharyngeal erythema. Nose: Nares normal. No drainage or sinus tenderness. Neck: Supple, symmetrical, trachea midline. No adenopathy. No tenderness/mass/nodules. No carotid bruit or elevated JVD. Lungs:   Clear to auscultation bilaterally, respirations unlabored. No wheeze, rales, or rhonchi. Chest Wall:  No tenderness or deformity. Heart:  Regular rate. S1/S2 normal. No murmur, rub, or gallop.    Abdomen: Soft, non-tender, bowel sounds active. Musculoskeletal: No muscle wasting or digital clubbing. Extremities: Extremities normal, atraumatic. No cyanosis or edema. Pulses: 2+ radial and carotid pulses, symmetric. Skin: No rashes or lesions. Pysch: Normal mood and affect. Alert and oriented x 4.    Neurologic: Normal gross motor and sensory exam.       Labs     CBC:   Lab Results   Component Value Date/Time    WBC 10.6 03/21/2022 02:37 PM    RBC 5.85 03/21/2022 02:37 PM    HGB 17.6 03/21/2022 02:37 PM    HCT 51.2 03/21/2022 02:37 PM    MCV 87.5 03/21/2022 02:37 PM    RDW 14.1 03/21/2022 02:37 PM     03/21/2022 02:37 PM     CMP:  Lab Results   Component Value Date/Time     03/21/2022 02:37 PM    K 4.4 03/21/2022 02:37 PM     03/21/2022 02:37 PM    CO2 23 03/21/2022 02:37 PM    BUN 8 03/21/2022 02:37 PM    CREATININE 0.6 03/21/2022 02:37 PM    GFRAA >60 03/21/2022 02:37 PM    AGRATIO 1.2 03/21/2022 02:37 PM    LABGLOM >60 03/21/2022 02:37 PM    GLUCOSE 111 03/21/2022 02:37 PM    PROT 7.4 03/21/2022 02:37 PM    CALCIUM 9.6 03/21/2022 02:37 PM    BILITOT 0.6 03/21/2022 02:37 PM    ALKPHOS 137 03/21/2022 02:37 PM    AST 19 03/21/2022 02:37 PM    ALT 11 03/21/2022 02:37 PM     PT/INR:  No results found for: PTINR  HgBA1c:  Lab Results   Component Value Date    LABA1C 5.7 03/08/2021     Lab Results   Component Value Date    TROPONINI <0.01 03/21/2022       CURRENT Medications:  Current Outpatient Medications on File Prior to Visit   Medication Sig Dispense Refill    aspirin 325 MG EC tablet Take 1 tablet by mouth daily 30 tablet 11    carvedilol (COREG) 6.25 MG tablet Take 1 tablet by mouth 2 times daily (with meals) 60 tablet 5    dilTIAZem (CARDIZEM CD) 120 MG extended release capsule Take 1 capsule by mouth daily 30 capsule 5    lisinopril (PRINIVIL;ZESTRIL) 20 MG tablet Take 1 tablet by mouth daily 30 tablet 5    atorvastatin (LIPITOR) 80 MG tablet Take 1 tablet by mouth nightly 30 tablet 5 famotidine (PEPCID) 20 MG tablet Take 1 tablet by mouth 2 times daily 180 tablet 2     No current facility-administered medications on file prior to visit. Assessment and Plan   1) CAD s/p CABG 3/15/21  -s/p successful IVUS guided PCI distal/prox RCA X 3 MI    -CCB for radial artery graft   -Continue ASA, statin and B-blocker   -chest pain; will begin Imdur 30 daily     2) Essential hypertensin  -Controlled   -On Lisinopril 20 mg daily  -Encouraged low sodium diet     3) Tobacco abuse   -Encouraged smoking cessation     4) Hyperlipidemia  -On Lipitor 80 mg daily  -LDL 93 (3/7/21)     5) Carotid/subclavain stenosis   -Management per Vascular   -S/p subclavian stent     6) Claudication  -progressively increasing after walking 6 minutes; mainly in calf   -denies any wounds  -will arrange for arterial doppler to further assess     Follow up in 6 months    Thank you for allowing us to participate in the care of Wilfrido Murray. Juancarlos Giraldo MD [address] [facility] [address]\[facility] [place] [facility]\"" [address]   (O): [phone]   (F): [phone]      This note was scribed in the presence of Dr. Juancarlos Giraldo MD by Jhonatan Mariscal, RN    Physician Attestation:  The scribes documentation has been prepared under my direction and personally reviewed by me in its entirety. I confirm the note above accurately reflects all work, treatment, procedures, and medical decision making performed by me.     Electronically signed by Juliane Waller MD on 11/16/2022 at 11:28 AM

## 2022-11-25 ENCOUNTER — HOSPITAL ENCOUNTER (OUTPATIENT)
Dept: VASCULAR LAB | Age: 42
Discharge: HOME | End: 2022-11-25
Payer: MEDICARE

## 2022-11-25 DIAGNOSIS — I73.9 CLAUDICATION (HCC): ICD-10-CM

## 2022-11-25 PROCEDURE — 93925 LOWER EXTREMITY STUDY: CPT

## 2022-11-30 ENCOUNTER — TELEPHONE (OUTPATIENT)
Dept: CARDIOLOGY CLINIC | Age: 42
End: 2022-11-30

## 2022-11-30 DIAGNOSIS — I73.9 CLAUDICATION (HCC): Primary | ICD-10-CM

## 2022-11-30 NOTE — TELEPHONE ENCOUNTER
TEST/LAB RESULTS      WHICH RESULTS ARE YOU CALLING ABOUT:  VL DUP LOWER EXTREMITY ARTERIES BILATERAL     WHEN WAS THIS DONE: 11/25/22    WHERE DID YOU HAVE THIS DONE: [facility]     CALL BACK NUMBER:   You can reach Samy Rodriguez at #[phone]

## 2022-12-09 NOTE — TELEPHONE ENCOUNTER
Carolin Iglesias calling to see if Dr. Beckie Galicia has gotten a chance to look at the results from his doppler. Has not heard anything from our office yet.     Can be reached at [phone]

## 2022-12-12 NOTE — TELEPHONE ENCOUNTER
Called the number we have for Luis Pilar was told I had the wrong number. So I called Timmae Santana( mother ) on hippa form she v/u date/time/location/prep he is scheduled 01/06/2022 ARRIVING 8:30 test time 9:00am at [facility]...

## 2022-12-12 NOTE — TELEPHONE ENCOUNTER
Lily Grover called in upset as he's waiting for the results of his doppler done over 2 weeks ago.     Please call Lily Grover: [phone]

## 2022-12-12 NOTE — TELEPHONE ENCOUNTER
Discussed with Dr Hugo Landers and Doppler looks okay, if he is still having symptoms will get CTA. Called patient and he continues to have symptoms with pain.  He is agreeable to proceed with CTA

## 2023-01-12 ENCOUNTER — HOSPITAL ENCOUNTER (OUTPATIENT)
Dept: CT IMAGING | Age: 43
Discharge: HOME | End: 2023-01-12
Payer: MEDICARE

## 2023-01-12 DIAGNOSIS — I73.9 CLAUDICATION (HCC): ICD-10-CM

## 2023-01-12 PROCEDURE — 75635 CT ANGIO ABDOMINAL ARTERIES: CPT

## 2023-01-12 PROCEDURE — 6360000004 HC RX CONTRAST MEDICATION: Performed by: INTERNAL MEDICINE

## 2023-01-12 RX ADMIN — IOPAMIDOL 75 ML: 755 INJECTION, SOLUTION INTRAVENOUS at 07:22

## 2023-01-13 ENCOUNTER — TELEPHONE (OUTPATIENT)
Dept: CARDIOLOGY CLINIC | Age: 43
End: 2023-01-13

## 2023-01-13 NOTE — TELEPHONE ENCOUNTER
----- Message from Isha Guerra RN sent at 1/13/2023  8:06 AM EST -----  Please call patient with results. Thank you.

## 2023-01-13 NOTE — TELEPHONE ENCOUNTER
Salud Rivera MD   1/12/2023  9:34 PM EST       Please notify patient that their CT shows the circulation to his legs is normal     Sarah Laura RN   1/12/2023 12:32 PM EST       Reported claudication symptoms at follow up 11/2022 for CAD s/p CABG. ABIs completed showed no significant disease. Completed CTA due to continued symptoms. LMOM for patient to return call in regards to results.

## 2023-01-16 ENCOUNTER — PATIENT MESSAGE (OUTPATIENT)
Dept: CARDIOLOGY CLINIC | Age: 43
End: 2023-01-16

## 2023-01-16 ENCOUNTER — TELEPHONE (OUTPATIENT)
Dept: CARDIOLOGY CLINIC | Age: 43
End: 2023-01-16

## 2023-01-18 NOTE — TELEPHONE ENCOUNTER
Patient returned call and informed of results. We discussed him increasing his activity level as tolerated to help with deconditioning. He v/u.

## 2023-06-25 ENCOUNTER — APPOINTMENT (OUTPATIENT)
Dept: CT IMAGING | Age: 43
DRG: 182 | End: 2023-06-25
Payer: MEDICAID

## 2023-06-25 ENCOUNTER — APPOINTMENT (OUTPATIENT)
Dept: GENERAL RADIOLOGY | Age: 43
DRG: 182 | End: 2023-06-25
Payer: MEDICAID

## 2023-06-25 ENCOUNTER — HOSPITAL ENCOUNTER (INPATIENT)
Age: 43
LOS: 4 days | Discharge: HOME | DRG: 182 | End: 2023-06-29
Attending: EMERGENCY MEDICINE | Admitting: STUDENT IN AN ORGANIZED HEALTH CARE EDUCATION/TRAINING PROGRAM
Payer: MEDICAID

## 2023-06-25 DIAGNOSIS — T82.868A ARTERIAL STENT THROMBOSIS, INITIAL ENCOUNTER (HCC): ICD-10-CM

## 2023-06-25 DIAGNOSIS — I66.02 MIDDLE CEREBRAL ARTERY STENOSIS, LEFT: ICD-10-CM

## 2023-06-25 DIAGNOSIS — R20.0 LEFT ARM NUMBNESS: Primary | ICD-10-CM

## 2023-06-25 LAB
ALBUMIN SERPL-MCNC: 4.5 G/DL (ref 3.4–5)
ALBUMIN/GLOB SERPL: 1.5 {RATIO} (ref 1.1–2.2)
ALP SERPL-CCNC: 130 U/L (ref 40–129)
ALT SERPL-CCNC: 11 U/L (ref 10–40)
ANION GAP SERPL CALCULATED.3IONS-SCNC: 10 MMOL/L (ref 3–16)
ANTI-XA UNFRAC HEPARIN: 0.54 IU/ML (ref 0.3–0.7)
APTT BLD: 26.1 SEC (ref 22.7–35.9)
AST SERPL-CCNC: 22 U/L (ref 15–37)
BASOPHILS # BLD: 0.1 K/UL (ref 0–0.2)
BASOPHILS NFR BLD: 1.1 %
BILIRUB SERPL-MCNC: 0.7 MG/DL (ref 0–1)
BUN SERPL-MCNC: 12 MG/DL (ref 7–20)
CALCIUM SERPL-MCNC: 9.3 MG/DL (ref 8.3–10.6)
CHLORIDE SERPL-SCNC: 104 MMOL/L (ref 99–110)
CHP ED QC CHECK: YES
CO2 SERPL-SCNC: 23 MMOL/L (ref 21–32)
CREAT SERPL-MCNC: 0.7 MG/DL (ref 0.9–1.3)
DEPRECATED RDW RBC AUTO: 13.6 % (ref 12.4–15.4)
EOSINOPHIL # BLD: 0.2 K/UL (ref 0–0.6)
EOSINOPHIL NFR BLD: 1.6 %
GFR SERPLBLD CREATININE-BSD FMLA CKD-EPI: >60 ML/MIN/{1.73_M2}
GLUCOSE BLD-MCNC: 106 MG/DL
GLUCOSE BLD-MCNC: 106 MG/DL (ref 70–99)
GLUCOSE SERPL-MCNC: 119 MG/DL (ref 70–99)
HCT VFR BLD AUTO: 50.4 % (ref 40.5–52.5)
HGB BLD-MCNC: 17.6 G/DL (ref 13.5–17.5)
INR PPP: 0.96 (ref 0.84–1.16)
LYMPHOCYTES # BLD: 2.8 K/UL (ref 1–5.1)
LYMPHOCYTES NFR BLD: 26.4 %
MCH RBC QN AUTO: 30.8 PG (ref 26–34)
MCHC RBC AUTO-ENTMCNC: 34.8 G/DL (ref 31–36)
MCV RBC AUTO: 88.5 FL (ref 80–100)
MONOCYTES # BLD: 0.8 K/UL (ref 0–1.3)
MONOCYTES NFR BLD: 7.7 %
NEUTROPHILS # BLD: 6.8 K/UL (ref 1.7–7.7)
NEUTROPHILS NFR BLD: 63.2 %
PERFORMED ON: ABNORMAL
PLATELET # BLD AUTO: 240 K/UL (ref 135–450)
PMV BLD AUTO: 9.7 FL (ref 5–10.5)
POTASSIUM SERPL-SCNC: 4.4 MMOL/L (ref 3.5–5.1)
PROT SERPL-MCNC: 7.6 G/DL (ref 6.4–8.2)
PROTHROMBIN TIME: 12.8 SEC (ref 11.5–14.8)
RBC # BLD AUTO: 5.7 M/UL (ref 4.2–5.9)
SODIUM SERPL-SCNC: 137 MMOL/L (ref 136–145)
TROPONIN, HIGH SENSITIVITY: 7 NG/L (ref 0–22)
TROPONIN, HIGH SENSITIVITY: 8 NG/L (ref 0–22)
WBC # BLD AUTO: 10.7 K/UL (ref 4–11)

## 2023-06-25 PROCEDURE — 6370000000 HC RX 637 (ALT 250 FOR IP): Performed by: STUDENT IN AN ORGANIZED HEALTH CARE EDUCATION/TRAINING PROGRAM

## 2023-06-25 PROCEDURE — 6360000002 HC RX W HCPCS: Performed by: EMERGENCY MEDICINE

## 2023-06-25 PROCEDURE — 93005 ELECTROCARDIOGRAM TRACING: CPT | Performed by: EMERGENCY MEDICINE

## 2023-06-25 PROCEDURE — 96375 TX/PRO/DX INJ NEW DRUG ADDON: CPT

## 2023-06-25 PROCEDURE — 85610 PROTHROMBIN TIME: CPT

## 2023-06-25 PROCEDURE — 71045 X-RAY EXAM CHEST 1 VIEW: CPT

## 2023-06-25 PROCEDURE — 36415 COLL VENOUS BLD VENIPUNCTURE: CPT

## 2023-06-25 PROCEDURE — 99285 EMERGENCY DEPT VISIT HI MDM: CPT

## 2023-06-25 PROCEDURE — 80053 COMPREHEN METABOLIC PANEL: CPT

## 2023-06-25 PROCEDURE — 6360000004 HC RX CONTRAST MEDICATION: Performed by: EMERGENCY MEDICINE

## 2023-06-25 PROCEDURE — 2060000000 HC ICU INTERMEDIATE R&B

## 2023-06-25 PROCEDURE — 70498 CT ANGIOGRAPHY NECK: CPT

## 2023-06-25 PROCEDURE — 70450 CT HEAD/BRAIN W/O DYE: CPT

## 2023-06-25 PROCEDURE — 96365 THER/PROPH/DIAG IV INF INIT: CPT

## 2023-06-25 PROCEDURE — 2580000003 HC RX 258: Performed by: STUDENT IN AN ORGANIZED HEALTH CARE EDUCATION/TRAINING PROGRAM

## 2023-06-25 PROCEDURE — 85730 THROMBOPLASTIN TIME PARTIAL: CPT

## 2023-06-25 PROCEDURE — 84484 ASSAY OF TROPONIN QUANT: CPT

## 2023-06-25 PROCEDURE — 6370000000 HC RX 637 (ALT 250 FOR IP): Performed by: EMERGENCY MEDICINE

## 2023-06-25 PROCEDURE — 85025 COMPLETE CBC W/AUTO DIFF WBC: CPT

## 2023-06-25 PROCEDURE — 85520 HEPARIN ASSAY: CPT

## 2023-06-25 RX ORDER — ONDANSETRON 4 MG/1
4 TABLET, ORALLY DISINTEGRATING ORAL EVERY 8 HOURS PRN
Status: DISCONTINUED | OUTPATIENT
Start: 2023-06-25 | End: 2023-06-29 | Stop reason: HOSPADM

## 2023-06-25 RX ORDER — POLYETHYLENE GLYCOL 3350 17 G/17G
17 POWDER, FOR SOLUTION ORAL DAILY PRN
Status: DISCONTINUED | OUTPATIENT
Start: 2023-06-25 | End: 2023-06-29 | Stop reason: HOSPADM

## 2023-06-25 RX ORDER — HEPARIN SODIUM 10000 [USP'U]/100ML
0-4000 INJECTION, SOLUTION INTRAVENOUS CONTINUOUS
Status: DISPENSED | OUTPATIENT
Start: 2023-06-25 | End: 2023-06-27

## 2023-06-25 RX ORDER — SODIUM CHLORIDE 9 MG/ML
INJECTION, SOLUTION INTRAVENOUS PRN
Status: DISCONTINUED | OUTPATIENT
Start: 2023-06-25 | End: 2023-06-27 | Stop reason: SDUPTHER

## 2023-06-25 RX ORDER — NICOTINE 21 MG/24HR
1 PATCH, TRANSDERMAL 24 HOURS TRANSDERMAL DAILY
Status: DISCONTINUED | OUTPATIENT
Start: 2023-06-25 | End: 2023-06-25

## 2023-06-25 RX ORDER — LISINOPRIL 20 MG/1
20 TABLET ORAL DAILY
Status: DISCONTINUED | OUTPATIENT
Start: 2023-06-26 | End: 2023-06-29 | Stop reason: HOSPADM

## 2023-06-25 RX ORDER — ATORVASTATIN CALCIUM 80 MG/1
80 TABLET, FILM COATED ORAL DAILY
Status: DISCONTINUED | OUTPATIENT
Start: 2023-06-26 | End: 2023-06-29 | Stop reason: HOSPADM

## 2023-06-25 RX ORDER — HEPARIN SODIUM 1000 [USP'U]/ML
40 INJECTION, SOLUTION INTRAVENOUS; SUBCUTANEOUS PRN
Status: DISCONTINUED | OUTPATIENT
Start: 2023-06-25 | End: 2023-06-25

## 2023-06-25 RX ORDER — ASPIRIN 325 MG
325 TABLET, DELAYED RELEASE (ENTERIC COATED) ORAL DAILY
Status: DISCONTINUED | OUTPATIENT
Start: 2023-06-26 | End: 2023-06-27

## 2023-06-25 RX ORDER — HEPARIN SODIUM 1000 [USP'U]/ML
80 INJECTION, SOLUTION INTRAVENOUS; SUBCUTANEOUS PRN
Status: DISCONTINUED | OUTPATIENT
Start: 2023-06-25 | End: 2023-06-25

## 2023-06-25 RX ORDER — SODIUM CHLORIDE 0.9 % (FLUSH) 0.9 %
5-40 SYRINGE (ML) INJECTION EVERY 12 HOURS SCHEDULED
Status: DISCONTINUED | OUTPATIENT
Start: 2023-06-25 | End: 2023-06-29 | Stop reason: HOSPADM

## 2023-06-25 RX ORDER — ISOSORBIDE MONONITRATE 30 MG/1
30 TABLET, EXTENDED RELEASE ORAL DAILY
Status: DISCONTINUED | OUTPATIENT
Start: 2023-06-25 | End: 2023-06-25

## 2023-06-25 RX ORDER — SODIUM CHLORIDE 0.9 % (FLUSH) 0.9 %
5-40 SYRINGE (ML) INJECTION PRN
Status: DISCONTINUED | OUTPATIENT
Start: 2023-06-25 | End: 2023-06-29 | Stop reason: HOSPADM

## 2023-06-25 RX ORDER — ACETAMINOPHEN 325 MG/1
650 TABLET ORAL EVERY 6 HOURS PRN
Status: DISCONTINUED | OUTPATIENT
Start: 2023-06-25 | End: 2023-06-27 | Stop reason: SDUPTHER

## 2023-06-25 RX ORDER — DILTIAZEM HYDROCHLORIDE 120 MG/1
120 CAPSULE, COATED, EXTENDED RELEASE ORAL DAILY
Status: DISCONTINUED | OUTPATIENT
Start: 2023-06-26 | End: 2023-06-29 | Stop reason: HOSPADM

## 2023-06-25 RX ORDER — CARVEDILOL 6.25 MG/1
6.25 TABLET ORAL 2 TIMES DAILY WITH MEALS
Status: DISCONTINUED | OUTPATIENT
Start: 2023-06-25 | End: 2023-06-29 | Stop reason: HOSPADM

## 2023-06-25 RX ORDER — ACETAMINOPHEN 650 MG/1
650 SUPPOSITORY RECTAL EVERY 6 HOURS PRN
Status: DISCONTINUED | OUTPATIENT
Start: 2023-06-25 | End: 2023-06-29 | Stop reason: HOSPADM

## 2023-06-25 RX ORDER — HEPARIN SODIUM 1000 [USP'U]/ML
8400 INJECTION, SOLUTION INTRAVENOUS; SUBCUTANEOUS ONCE
Status: COMPLETED | OUTPATIENT
Start: 2023-06-25 | End: 2023-06-25

## 2023-06-25 RX ORDER — ONDANSETRON 2 MG/ML
4 INJECTION INTRAMUSCULAR; INTRAVENOUS EVERY 6 HOURS PRN
Status: DISCONTINUED | OUTPATIENT
Start: 2023-06-25 | End: 2023-06-29 | Stop reason: HOSPADM

## 2023-06-25 RX ORDER — NICOTINE 21 MG/24HR
1 PATCH, TRANSDERMAL 24 HOURS TRANSDERMAL DAILY
Status: DISCONTINUED | OUTPATIENT
Start: 2023-06-25 | End: 2023-06-29 | Stop reason: HOSPADM

## 2023-06-25 RX ORDER — FAMOTIDINE 20 MG/1
20 TABLET, FILM COATED ORAL 2 TIMES DAILY
Status: DISCONTINUED | OUTPATIENT
Start: 2023-06-25 | End: 2023-06-29 | Stop reason: HOSPADM

## 2023-06-25 RX ADMIN — CARVEDILOL 6.25 MG: 6.25 TABLET, FILM COATED ORAL at 18:00

## 2023-06-25 RX ADMIN — HEPARIN SODIUM 1890 UNITS/HR: 10000 INJECTION, SOLUTION INTRAVENOUS at 11:23

## 2023-06-25 RX ADMIN — FAMOTIDINE 20 MG: 20 TABLET ORAL at 21:33

## 2023-06-25 RX ADMIN — IOPAMIDOL 75 ML: 755 INJECTION, SOLUTION INTRAVENOUS at 09:51

## 2023-06-25 RX ADMIN — HEPARIN SODIUM 8400 UNITS: 1000 INJECTION INTRAVENOUS; SUBCUTANEOUS at 11:16

## 2023-06-25 RX ADMIN — SODIUM CHLORIDE, PRESERVATIVE FREE 10 ML: 5 INJECTION INTRAVENOUS at 21:33

## 2023-06-25 ASSESSMENT — PAIN SCALES - GENERAL
PAINLEVEL_OUTOF10: 0

## 2023-06-25 ASSESSMENT — PAIN - FUNCTIONAL ASSESSMENT: PAIN_FUNCTIONAL_ASSESSMENT: 0-10

## 2023-06-26 LAB
ANION GAP SERPL CALCULATED.3IONS-SCNC: 11 MMOL/L (ref 3–16)
ANION GAP SERPL CALCULATED.3IONS-SCNC: 11 MMOL/L (ref 3–16)
ANTI-XA UNFRAC HEPARIN: 0.41 IU/ML (ref 0.3–0.7)
ANTI-XA UNFRAC HEPARIN: 0.47 IU/ML (ref 0.3–0.7)
BASOPHILS # BLD: 0.1 K/UL (ref 0–0.2)
BASOPHILS NFR BLD: 0.9 %
BUN SERPL-MCNC: 8 MG/DL (ref 7–20)
BUN SERPL-MCNC: 9 MG/DL (ref 7–20)
CALCIUM SERPL-MCNC: 9 MG/DL (ref 8.3–10.6)
CALCIUM SERPL-MCNC: 9.1 MG/DL (ref 8.3–10.6)
CHLORIDE SERPL-SCNC: 102 MMOL/L (ref 99–110)
CHLORIDE SERPL-SCNC: 103 MMOL/L (ref 99–110)
CO2 SERPL-SCNC: 23 MMOL/L (ref 21–32)
CO2 SERPL-SCNC: 25 MMOL/L (ref 21–32)
CREAT SERPL-MCNC: 0.7 MG/DL (ref 0.9–1.3)
CREAT SERPL-MCNC: 0.7 MG/DL (ref 0.9–1.3)
DEPRECATED RDW RBC AUTO: 13.4 % (ref 12.4–15.4)
DEPRECATED RDW RBC AUTO: 13.8 % (ref 12.4–15.4)
EKG ATRIAL RATE: 68 BPM
EKG DIAGNOSIS: NORMAL
EKG P AXIS: 57 DEGREES
EKG P-R INTERVAL: 172 MS
EKG Q-T INTERVAL: 408 MS
EKG QRS DURATION: 106 MS
EKG QTC CALCULATION (BAZETT): 433 MS
EKG R AXIS: 53 DEGREES
EKG T AXIS: 74 DEGREES
EKG VENTRICULAR RATE: 68 BPM
EOSINOPHIL # BLD: 0.2 K/UL (ref 0–0.6)
EOSINOPHIL NFR BLD: 2.1 %
GFR SERPLBLD CREATININE-BSD FMLA CKD-EPI: >60 ML/MIN/{1.73_M2}
GFR SERPLBLD CREATININE-BSD FMLA CKD-EPI: >60 ML/MIN/{1.73_M2}
GLUCOSE SERPL-MCNC: 133 MG/DL (ref 70–99)
GLUCOSE SERPL-MCNC: 145 MG/DL (ref 70–99)
HCT VFR BLD AUTO: 49.7 % (ref 40.5–52.5)
HCT VFR BLD AUTO: 51 % (ref 40.5–52.5)
HGB BLD-MCNC: 16.9 G/DL (ref 13.5–17.5)
HGB BLD-MCNC: 17.1 G/DL (ref 13.5–17.5)
LYMPHOCYTES # BLD: 3.9 K/UL (ref 1–5.1)
LYMPHOCYTES NFR BLD: 41 %
MCH RBC QN AUTO: 30.1 PG (ref 26–34)
MCH RBC QN AUTO: 30.5 PG (ref 26–34)
MCHC RBC AUTO-ENTMCNC: 33.6 G/DL (ref 31–36)
MCHC RBC AUTO-ENTMCNC: 33.9 G/DL (ref 31–36)
MCV RBC AUTO: 89.8 FL (ref 80–100)
MCV RBC AUTO: 90 FL (ref 80–100)
MONOCYTES # BLD: 0.8 K/UL (ref 0–1.3)
MONOCYTES NFR BLD: 8 %
NEUTROPHILS # BLD: 4.6 K/UL (ref 1.7–7.7)
NEUTROPHILS NFR BLD: 48 %
PLATELET # BLD AUTO: 216 K/UL (ref 135–450)
PLATELET # BLD AUTO: 220 K/UL (ref 135–450)
PMV BLD AUTO: 10.3 FL (ref 5–10.5)
PMV BLD AUTO: 9.9 FL (ref 5–10.5)
POTASSIUM SERPL-SCNC: 4.2 MMOL/L (ref 3.5–5.1)
POTASSIUM SERPL-SCNC: 4.4 MMOL/L (ref 3.5–5.1)
RBC # BLD AUTO: 5.52 M/UL (ref 4.2–5.9)
RBC # BLD AUTO: 5.68 M/UL (ref 4.2–5.9)
SODIUM SERPL-SCNC: 136 MMOL/L (ref 136–145)
SODIUM SERPL-SCNC: 139 MMOL/L (ref 136–145)
WBC # BLD AUTO: 9.5 K/UL (ref 4–11)
WBC # BLD AUTO: 9.9 K/UL (ref 4–11)

## 2023-06-26 PROCEDURE — 85025 COMPLETE CBC W/AUTO DIFF WBC: CPT

## 2023-06-26 PROCEDURE — 6370000000 HC RX 637 (ALT 250 FOR IP): Performed by: NURSE PRACTITIONER

## 2023-06-26 PROCEDURE — 94760 N-INVAS EAR/PLS OXIMETRY 1: CPT

## 2023-06-26 PROCEDURE — 36415 COLL VENOUS BLD VENIPUNCTURE: CPT

## 2023-06-26 PROCEDURE — 80048 BASIC METABOLIC PNL TOTAL CA: CPT

## 2023-06-26 PROCEDURE — 6360000002 HC RX W HCPCS: Performed by: EMERGENCY MEDICINE

## 2023-06-26 PROCEDURE — 9990000010 HC NO CHARGE VISIT: Performed by: PHYSICAL THERAPIST

## 2023-06-26 PROCEDURE — 99221 1ST HOSP IP/OBS SF/LOW 40: CPT | Performed by: STUDENT IN AN ORGANIZED HEALTH CARE EDUCATION/TRAINING PROGRAM

## 2023-06-26 PROCEDURE — 85520 HEPARIN ASSAY: CPT

## 2023-06-26 PROCEDURE — 85027 COMPLETE CBC AUTOMATED: CPT

## 2023-06-26 PROCEDURE — 6360000002 HC RX W HCPCS: Performed by: INTERNAL MEDICINE

## 2023-06-26 PROCEDURE — 2060000000 HC ICU INTERMEDIATE R&B

## 2023-06-26 PROCEDURE — 6370000000 HC RX 637 (ALT 250 FOR IP): Performed by: STUDENT IN AN ORGANIZED HEALTH CARE EDUCATION/TRAINING PROGRAM

## 2023-06-26 PROCEDURE — 2580000003 HC RX 258: Performed by: INTERNAL MEDICINE

## 2023-06-26 PROCEDURE — 99223 1ST HOSP IP/OBS HIGH 75: CPT | Performed by: INTERNAL MEDICINE

## 2023-06-26 PROCEDURE — 93010 ELECTROCARDIOGRAM REPORT: CPT | Performed by: INTERNAL MEDICINE

## 2023-06-26 RX ORDER — POLYETHYLENE GLYCOL 3350 17 G
2 POWDER IN PACKET (EA) ORAL ONCE
Status: COMPLETED | OUTPATIENT
Start: 2023-06-26 | End: 2023-06-26

## 2023-06-26 RX ORDER — SODIUM CHLORIDE 0.9 % (FLUSH) 0.9 %
5-40 SYRINGE (ML) INJECTION PRN
Status: DISCONTINUED | OUTPATIENT
Start: 2023-06-26 | End: 2023-06-29 | Stop reason: HOSPADM

## 2023-06-26 RX ORDER — SODIUM CHLORIDE 0.9 % (FLUSH) 0.9 %
5-40 SYRINGE (ML) INJECTION EVERY 12 HOURS SCHEDULED
Status: DISCONTINUED | OUTPATIENT
Start: 2023-06-26 | End: 2023-06-29 | Stop reason: HOSPADM

## 2023-06-26 RX ORDER — SODIUM CHLORIDE 9 MG/ML
INJECTION, SOLUTION INTRAVENOUS PRN
Status: DISCONTINUED | OUTPATIENT
Start: 2023-06-26 | End: 2023-06-27 | Stop reason: SDUPTHER

## 2023-06-26 RX ADMIN — CARVEDILOL 6.25 MG: 6.25 TABLET, FILM COATED ORAL at 08:46

## 2023-06-26 RX ADMIN — LISINOPRIL 20 MG: 20 TABLET ORAL at 08:46

## 2023-06-26 RX ADMIN — HEPARIN SODIUM 1890 UNITS/HR: 10000 INJECTION, SOLUTION INTRAVENOUS at 00:59

## 2023-06-26 RX ADMIN — SODIUM CHLORIDE, PRESERVATIVE FREE 10 ML: 5 INJECTION INTRAVENOUS at 21:28

## 2023-06-26 RX ADMIN — DILTIAZEM HYDROCHLORIDE 120 MG: 120 CAPSULE, COATED, EXTENDED RELEASE ORAL at 08:46

## 2023-06-26 RX ADMIN — FAMOTIDINE 20 MG: 20 TABLET ORAL at 21:28

## 2023-06-26 RX ADMIN — FAMOTIDINE 20 MG: 20 TABLET ORAL at 08:46

## 2023-06-26 RX ADMIN — Medication 2 MG: at 21:49

## 2023-06-26 RX ADMIN — ATORVASTATIN CALCIUM 80 MG: 80 TABLET, FILM COATED ORAL at 08:46

## 2023-06-26 RX ADMIN — CARVEDILOL 6.25 MG: 6.25 TABLET, FILM COATED ORAL at 17:38

## 2023-06-26 RX ADMIN — ASPIRIN 325 MG: 325 TABLET, COATED ORAL at 08:46

## 2023-06-26 RX ADMIN — HEPARIN SODIUM 1890 UNITS/HR: 10000 INJECTION, SOLUTION INTRAVENOUS at 12:41

## 2023-06-26 ASSESSMENT — ENCOUNTER SYMPTOMS: SHORTNESS OF BREATH: 0

## 2023-06-27 ENCOUNTER — APPOINTMENT (OUTPATIENT)
Dept: CARDIAC CATH/INVASIVE PROCEDURES | Age: 43
DRG: 182 | End: 2023-06-27
Payer: MEDICAID

## 2023-06-27 LAB
ANION GAP SERPL CALCULATED.3IONS-SCNC: 7 MMOL/L (ref 3–16)
ANTI-XA UNFRAC HEPARIN: 0.28 IU/ML (ref 0.3–0.7)
BASOPHILS # BLD: 0.1 K/UL (ref 0–0.2)
BASOPHILS NFR BLD: 0.9 %
BUN SERPL-MCNC: 13 MG/DL (ref 7–20)
CALCIUM SERPL-MCNC: 9.1 MG/DL (ref 8.3–10.6)
CHLORIDE SERPL-SCNC: 105 MMOL/L (ref 99–110)
CO2 SERPL-SCNC: 27 MMOL/L (ref 21–32)
CREAT SERPL-MCNC: 0.8 MG/DL (ref 0.9–1.3)
DEPRECATED RDW RBC AUTO: 13.7 % (ref 12.4–15.4)
EOSINOPHIL # BLD: 0.1 K/UL (ref 0–0.6)
EOSINOPHIL NFR BLD: 1.4 %
FIBRINOGEN PPP-MCNC: 373 MG/DL (ref 243–550)
GFR SERPLBLD CREATININE-BSD FMLA CKD-EPI: >60 ML/MIN/{1.73_M2}
GLUCOSE SERPL-MCNC: 169 MG/DL (ref 70–99)
HCT VFR BLD AUTO: 49.2 % (ref 40.5–52.5)
HGB BLD-MCNC: 16.7 G/DL (ref 13.5–17.5)
LYMPHOCYTES # BLD: 3.3 K/UL (ref 1–5.1)
LYMPHOCYTES NFR BLD: 36.3 %
MCH RBC QN AUTO: 30.2 PG (ref 26–34)
MCHC RBC AUTO-ENTMCNC: 34 G/DL (ref 31–36)
MCV RBC AUTO: 88.9 FL (ref 80–100)
MONOCYTES # BLD: 0.9 K/UL (ref 0–1.3)
MONOCYTES NFR BLD: 10 %
NEUTROPHILS # BLD: 4.7 K/UL (ref 1.7–7.7)
NEUTROPHILS NFR BLD: 51.4 %
PLATELET # BLD AUTO: 206 K/UL (ref 135–450)
PMV BLD AUTO: 9.8 FL (ref 5–10.5)
POC ACT LR: 199 SEC
POTASSIUM SERPL-SCNC: 4 MMOL/L (ref 3.5–5.1)
RBC # BLD AUTO: 5.53 M/UL (ref 4.2–5.9)
SODIUM SERPL-SCNC: 139 MMOL/L (ref 136–145)
WBC # BLD AUTO: 9.2 K/UL (ref 4–11)

## 2023-06-27 PROCEDURE — 6370000000 HC RX 637 (ALT 250 FOR IP): Performed by: INTERNAL MEDICINE

## 2023-06-27 PROCEDURE — C1773 RET DEV, INSERTABLE: HCPCS

## 2023-06-27 PROCEDURE — 37211 THROMBOLYTIC ART THERAPY: CPT

## 2023-06-27 PROCEDURE — 6370000000 HC RX 637 (ALT 250 FOR IP): Performed by: STUDENT IN AN ORGANIZED HEALTH CARE EDUCATION/TRAINING PROGRAM

## 2023-06-27 PROCEDURE — 37246 TRLUML BALO ANGIOP 1ST ART: CPT

## 2023-06-27 PROCEDURE — 80048 BASIC METABOLIC PNL TOTAL CA: CPT

## 2023-06-27 PROCEDURE — 3E03317 INTRODUCTION OF OTHER THROMBOLYTIC INTO PERIPHERAL VEIN, PERCUTANEOUS APPROACH: ICD-10-PCS | Performed by: INTERNAL MEDICINE

## 2023-06-27 PROCEDURE — 36140 INTRO NDL ICATH UPR/LXTR ART: CPT

## 2023-06-27 PROCEDURE — C1769 GUIDE WIRE: HCPCS

## 2023-06-27 PROCEDURE — 2580000003 HC RX 258

## 2023-06-27 PROCEDURE — 36221 PLACE CATH THORACIC AORTA: CPT

## 2023-06-27 PROCEDURE — 2709999900 HC NON-CHARGEABLE SUPPLY

## 2023-06-27 PROCEDURE — 6360000002 HC RX W HCPCS: Performed by: INTERNAL MEDICINE

## 2023-06-27 PROCEDURE — 2000000000 HC ICU R&B

## 2023-06-27 PROCEDURE — 2580000003 HC RX 258: Performed by: STUDENT IN AN ORGANIZED HEALTH CARE EDUCATION/TRAINING PROGRAM

## 2023-06-27 PROCEDURE — C1894 INTRO/SHEATH, NON-LASER: HCPCS

## 2023-06-27 PROCEDURE — 6360000004 HC RX CONTRAST MEDICATION: Performed by: INTERNAL MEDICINE

## 2023-06-27 PROCEDURE — 36415 COLL VENOUS BLD VENIPUNCTURE: CPT

## 2023-06-27 PROCEDURE — 75710 ARTERY X-RAYS ARM/LEG: CPT

## 2023-06-27 PROCEDURE — 6360000002 HC RX W HCPCS

## 2023-06-27 PROCEDURE — C1887 CATHETER, GUIDING: HCPCS

## 2023-06-27 PROCEDURE — 85025 COMPLETE CBC W/AUTO DIFF WBC: CPT

## 2023-06-27 PROCEDURE — 99152 MOD SED SAME PHYS/QHP 5/>YRS: CPT

## 2023-06-27 PROCEDURE — 6360000002 HC RX W HCPCS: Performed by: STUDENT IN AN ORGANIZED HEALTH CARE EDUCATION/TRAINING PROGRAM

## 2023-06-27 PROCEDURE — C1751 CATH, INF, PER/CENT/MIDLINE: HCPCS

## 2023-06-27 PROCEDURE — 2500000003 HC RX 250 WO HCPCS

## 2023-06-27 PROCEDURE — 85384 FIBRINOGEN ACTIVITY: CPT

## 2023-06-27 PROCEDURE — 94760 N-INVAS EAR/PLS OXIMETRY 1: CPT

## 2023-06-27 PROCEDURE — 85347 COAGULATION TIME ACTIVATED: CPT

## 2023-06-27 PROCEDURE — 03743ZZ DILATION OF LEFT SUBCLAVIAN ARTERY, PERCUTANEOUS APPROACH: ICD-10-PCS | Performed by: INTERNAL MEDICINE

## 2023-06-27 PROCEDURE — 2580000003 HC RX 258: Performed by: INTERNAL MEDICINE

## 2023-06-27 PROCEDURE — 99153 MOD SED SAME PHYS/QHP EA: CPT

## 2023-06-27 PROCEDURE — 03F43Z0 FRAGMENTATION OF LEFT SUBCLAVIAN ARTERY, PERCUTANEOUS APPROACH, ULTRASONIC: ICD-10-PCS | Performed by: INTERNAL MEDICINE

## 2023-06-27 PROCEDURE — 85520 HEPARIN ASSAY: CPT

## 2023-06-27 PROCEDURE — C1725 CATH, TRANSLUMIN NON-LASER: HCPCS

## 2023-06-27 RX ORDER — MORPHINE SULFATE 4 MG/ML
4 INJECTION, SOLUTION INTRAMUSCULAR; INTRAVENOUS
Status: DISCONTINUED | OUTPATIENT
Start: 2023-06-27 | End: 2023-06-27

## 2023-06-27 RX ORDER — SODIUM CHLORIDE 9 MG/ML
INJECTION, SOLUTION INTRAVENOUS CONTINUOUS
Status: DISCONTINUED | OUTPATIENT
Start: 2023-06-27 | End: 2023-06-28

## 2023-06-27 RX ORDER — HEPARIN SODIUM 1000 [USP'U]/ML
4200 INJECTION, SOLUTION INTRAVENOUS; SUBCUTANEOUS ONCE
Status: COMPLETED | OUTPATIENT
Start: 2023-06-27 | End: 2023-06-27

## 2023-06-27 RX ORDER — SODIUM CHLORIDE 0.9 % (FLUSH) 0.9 %
5-40 SYRINGE (ML) INJECTION PRN
Status: DISCONTINUED | OUTPATIENT
Start: 2023-06-27 | End: 2023-06-28 | Stop reason: SDUPTHER

## 2023-06-27 RX ORDER — HEPARIN SODIUM 10000 [USP'U]/100ML
500 INJECTION, SOLUTION INTRAVENOUS CONTINUOUS
Status: DISCONTINUED | OUTPATIENT
Start: 2023-06-27 | End: 2023-06-28

## 2023-06-27 RX ORDER — MORPHINE SULFATE 2 MG/ML
2 INJECTION, SOLUTION INTRAMUSCULAR; INTRAVENOUS
Status: DISCONTINUED | OUTPATIENT
Start: 2023-06-27 | End: 2023-06-27

## 2023-06-27 RX ORDER — ACETAMINOPHEN 325 MG/1
650 TABLET ORAL EVERY 4 HOURS PRN
Status: DISCONTINUED | OUTPATIENT
Start: 2023-06-27 | End: 2023-06-28 | Stop reason: SDUPTHER

## 2023-06-27 RX ORDER — MORPHINE SULFATE 2 MG/ML
2 INJECTION, SOLUTION INTRAMUSCULAR; INTRAVENOUS
Status: DISCONTINUED | OUTPATIENT
Start: 2023-06-27 | End: 2023-06-29 | Stop reason: HOSPADM

## 2023-06-27 RX ORDER — SODIUM CHLORIDE 0.9 % (FLUSH) 0.9 %
5-40 SYRINGE (ML) INJECTION EVERY 12 HOURS SCHEDULED
Status: DISCONTINUED | OUTPATIENT
Start: 2023-06-27 | End: 2023-06-28 | Stop reason: SDUPTHER

## 2023-06-27 RX ORDER — SODIUM CHLORIDE 9 MG/ML
INJECTION, SOLUTION INTRAVENOUS PRN
Status: DISCONTINUED | OUTPATIENT
Start: 2023-06-27 | End: 2023-06-28 | Stop reason: SDUPTHER

## 2023-06-27 RX ORDER — MORPHINE SULFATE 4 MG/ML
4 INJECTION, SOLUTION INTRAMUSCULAR; INTRAVENOUS
Status: DISCONTINUED | OUTPATIENT
Start: 2023-06-27 | End: 2023-06-29 | Stop reason: HOSPADM

## 2023-06-27 RX ADMIN — SODIUM CHLORIDE, PRESERVATIVE FREE 10 ML: 5 INJECTION INTRAVENOUS at 11:00

## 2023-06-27 RX ADMIN — FAMOTIDINE 20 MG: 20 TABLET ORAL at 20:02

## 2023-06-27 RX ADMIN — HEPARIN SODIUM 4200 UNITS: 1000 INJECTION INTRAVENOUS; SUBCUTANEOUS at 07:07

## 2023-06-27 RX ADMIN — FAMOTIDINE 20 MG: 20 TABLET ORAL at 09:11

## 2023-06-27 RX ADMIN — MORPHINE SULFATE 2 MG: 2 INJECTION, SOLUTION INTRAMUSCULAR; INTRAVENOUS at 18:33

## 2023-06-27 RX ADMIN — CARVEDILOL 6.25 MG: 6.25 TABLET, FILM COATED ORAL at 17:42

## 2023-06-27 RX ADMIN — DILTIAZEM HYDROCHLORIDE 120 MG: 120 CAPSULE, COATED, EXTENDED RELEASE ORAL at 09:11

## 2023-06-27 RX ADMIN — IOPAMIDOL 90 ML: 755 INJECTION, SOLUTION INTRAVENOUS at 15:22

## 2023-06-27 RX ADMIN — ALTEPLASE 1 MG/HR: 2.2 INJECTION, POWDER, LYOPHILIZED, FOR SOLUTION INTRAVENOUS at 15:45

## 2023-06-27 RX ADMIN — HEPARIN SODIUM 1890 UNITS/HR: 10000 INJECTION, SOLUTION INTRAVENOUS at 03:14

## 2023-06-27 RX ADMIN — MORPHINE SULFATE 4 MG: 4 INJECTION, SOLUTION INTRAMUSCULAR; INTRAVENOUS at 21:29

## 2023-06-27 RX ADMIN — SODIUM CHLORIDE, PRESERVATIVE FREE 10 ML: 5 INJECTION INTRAVENOUS at 20:03

## 2023-06-27 RX ADMIN — LISINOPRIL 20 MG: 20 TABLET ORAL at 09:11

## 2023-06-27 RX ADMIN — ACETAMINOPHEN 650 MG: 325 TABLET ORAL at 17:41

## 2023-06-27 RX ADMIN — ATORVASTATIN CALCIUM 80 MG: 80 TABLET, FILM COATED ORAL at 09:11

## 2023-06-27 RX ADMIN — CARVEDILOL 6.25 MG: 6.25 TABLET, FILM COATED ORAL at 09:11

## 2023-06-27 RX ADMIN — ASPIRIN 325 MG: 325 TABLET, COATED ORAL at 09:11

## 2023-06-27 ASSESSMENT — PAIN DESCRIPTION - PAIN TYPE
TYPE: ACUTE PAIN
TYPE: ACUTE PAIN

## 2023-06-27 ASSESSMENT — PAIN DESCRIPTION - DESCRIPTORS
DESCRIPTORS: ACHING;DISCOMFORT;THROBBING
DESCRIPTORS_2: ACHING
DESCRIPTORS: ACHING
DESCRIPTORS: BURNING;THROBBING
DESCRIPTORS_2: THROBBING
DESCRIPTORS: ACHING
DESCRIPTORS: ACHING

## 2023-06-27 ASSESSMENT — PAIN DESCRIPTION - ONSET
ONSET: ON-GOING
ONSET: ON-GOING

## 2023-06-27 ASSESSMENT — PAIN - FUNCTIONAL ASSESSMENT
PAIN_FUNCTIONAL_ASSESSMENT_SITE2: ACTIVITIES ARE NOT PREVENTED
PAIN_FUNCTIONAL_ASSESSMENT_SITE2: ACTIVITIES ARE NOT PREVENTED
PAIN_FUNCTIONAL_ASSESSMENT: ACTIVITIES ARE NOT PREVENTED

## 2023-06-27 ASSESSMENT — PAIN SCALES - GENERAL
PAINLEVEL_OUTOF10: 6
PAINLEVEL_OUTOF10: 0
PAINLEVEL_OUTOF10: 4
PAINLEVEL_OUTOF10: 2
PAINLEVEL_OUTOF10: 5
PAINLEVEL_OUTOF10: 7

## 2023-06-27 ASSESSMENT — PAIN DESCRIPTION - LOCATION
LOCATION_2: ARM
LOCATION: ARM
LOCATION: ELBOW;LEG
LOCATION: ARM
LOCATION: BACK
LOCATION: ELBOW
LOCATION_2: BACK

## 2023-06-27 ASSESSMENT — PAIN DESCRIPTION - ORIENTATION
ORIENTATION_2: MID
ORIENTATION: MID
ORIENTATION: LEFT
ORIENTATION: LEFT
ORIENTATION_2: LEFT

## 2023-06-27 ASSESSMENT — PAIN DESCRIPTION - INTENSITY
RATING_2: 2
RATING_2: 6

## 2023-06-27 ASSESSMENT — PAIN DESCRIPTION - FREQUENCY
FREQUENCY: CONTINUOUS
FREQUENCY: CONTINUOUS

## 2023-06-28 LAB
FIBRINOGEN PPP-MCNC: 298 MG/DL (ref 243–550)
FIBRINOGEN PPP-MCNC: 311 MG/DL (ref 243–550)
FIBRINOGEN PPP-MCNC: 382 MG/DL (ref 243–550)
HCT VFR BLD AUTO: 49.7 % (ref 40.5–52.5)
HCT VFR BLD AUTO: 52.3 % (ref 40.5–52.5)
HCT VFR BLD AUTO: 54.5 % (ref 40.5–52.5)
HGB BLD-MCNC: 16.9 G/DL (ref 13.5–17.5)
HGB BLD-MCNC: 17.6 G/DL (ref 13.5–17.5)
HGB BLD-MCNC: 18.3 G/DL (ref 13.5–17.5)
POC ACT LR: 253 SEC

## 2023-06-28 PROCEDURE — 37799 UNLISTED PX VASCULAR SURGERY: CPT

## 2023-06-28 PROCEDURE — 85014 HEMATOCRIT: CPT

## 2023-06-28 PROCEDURE — C1725 CATH, TRANSLUMIN NON-LASER: HCPCS

## 2023-06-28 PROCEDURE — 6360000002 HC RX W HCPCS

## 2023-06-28 PROCEDURE — C1887 CATHETER, GUIDING: HCPCS

## 2023-06-28 PROCEDURE — 2580000003 HC RX 258

## 2023-06-28 PROCEDURE — 6370000000 HC RX 637 (ALT 250 FOR IP): Performed by: STUDENT IN AN ORGANIZED HEALTH CARE EDUCATION/TRAINING PROGRAM

## 2023-06-28 PROCEDURE — 2580000003 HC RX 258: Performed by: STUDENT IN AN ORGANIZED HEALTH CARE EDUCATION/TRAINING PROGRAM

## 2023-06-28 PROCEDURE — 6370000000 HC RX 637 (ALT 250 FOR IP): Performed by: INTERNAL MEDICINE

## 2023-06-28 PROCEDURE — 6360000004 HC RX CONTRAST MEDICATION: Performed by: INTERNAL MEDICINE

## 2023-06-28 PROCEDURE — C1760 CLOSURE DEV, VASC: HCPCS

## 2023-06-28 PROCEDURE — 99153 MOD SED SAME PHYS/QHP EA: CPT

## 2023-06-28 PROCEDURE — 99152 MOD SED SAME PHYS/QHP 5/>YRS: CPT

## 2023-06-28 PROCEDURE — C1769 GUIDE WIRE: HCPCS

## 2023-06-28 PROCEDURE — 2709999900 HC NON-CHARGEABLE SUPPLY

## 2023-06-28 PROCEDURE — 36215 PLACE CATHETER IN ARTERY: CPT

## 2023-06-28 PROCEDURE — 85384 FIBRINOGEN ACTIVITY: CPT

## 2023-06-28 PROCEDURE — 85018 HEMOGLOBIN: CPT

## 2023-06-28 PROCEDURE — 36415 COLL VENOUS BLD VENIPUNCTURE: CPT

## 2023-06-28 PROCEDURE — C1894 INTRO/SHEATH, NON-LASER: HCPCS

## 2023-06-28 PROCEDURE — 85347 COAGULATION TIME ACTIVATED: CPT

## 2023-06-28 PROCEDURE — 2580000003 HC RX 258: Performed by: INTERNAL MEDICINE

## 2023-06-28 PROCEDURE — 94760 N-INVAS EAR/PLS OXIMETRY 1: CPT

## 2023-06-28 PROCEDURE — 75710 ARTERY X-RAYS ARM/LEG: CPT

## 2023-06-28 PROCEDURE — 6360000002 HC RX W HCPCS: Performed by: INTERNAL MEDICINE

## 2023-06-28 PROCEDURE — 2500000003 HC RX 250 WO HCPCS

## 2023-06-28 PROCEDURE — 03C43ZZ EXTIRPATION OF MATTER FROM LEFT SUBCLAVIAN ARTERY, PERCUTANEOUS APPROACH: ICD-10-PCS | Performed by: INTERNAL MEDICINE

## 2023-06-28 PROCEDURE — 2000000000 HC ICU R&B

## 2023-06-28 PROCEDURE — 37246 TRLUML BALO ANGIOP 1ST ART: CPT

## 2023-06-28 PROCEDURE — 03743D1 DILATION OF LEFT SUBCLAVIAN ARTERY WITH INTRALUMINAL DEVICE, USING DRUG-COATED BALLOON, PERCUTANEOUS APPROACH: ICD-10-PCS | Performed by: INTERNAL MEDICINE

## 2023-06-28 PROCEDURE — 6360000002 HC RX W HCPCS: Performed by: NURSE PRACTITIONER

## 2023-06-28 PROCEDURE — C2623 CATH, TRANSLUMIN, DRUG-COAT: HCPCS

## 2023-06-28 RX ORDER — CLOPIDOGREL BISULFATE 75 MG/1
75 TABLET ORAL DAILY
Status: DISCONTINUED | OUTPATIENT
Start: 2023-06-28 | End: 2023-06-29 | Stop reason: HOSPADM

## 2023-06-28 RX ORDER — SODIUM CHLORIDE 0.9 % (FLUSH) 0.9 %
5-40 SYRINGE (ML) INJECTION EVERY 12 HOURS SCHEDULED
Status: DISCONTINUED | OUTPATIENT
Start: 2023-06-28 | End: 2023-06-29 | Stop reason: HOSPADM

## 2023-06-28 RX ORDER — SODIUM CHLORIDE 0.9 % (FLUSH) 0.9 %
5-40 SYRINGE (ML) INJECTION PRN
Status: DISCONTINUED | OUTPATIENT
Start: 2023-06-28 | End: 2023-06-29 | Stop reason: HOSPADM

## 2023-06-28 RX ORDER — ACETAMINOPHEN 325 MG/1
650 TABLET ORAL EVERY 4 HOURS PRN
Status: DISCONTINUED | OUTPATIENT
Start: 2023-06-28 | End: 2023-06-29 | Stop reason: HOSPADM

## 2023-06-28 RX ORDER — MORPHINE SULFATE 2 MG/ML
2 INJECTION, SOLUTION INTRAMUSCULAR; INTRAVENOUS ONCE
Status: COMPLETED | OUTPATIENT
Start: 2023-06-28 | End: 2023-06-28

## 2023-06-28 RX ORDER — ASPIRIN 81 MG/1
81 TABLET ORAL ONCE
Status: COMPLETED | OUTPATIENT
Start: 2023-06-28 | End: 2023-06-28

## 2023-06-28 RX ORDER — SODIUM CHLORIDE 9 MG/ML
INJECTION, SOLUTION INTRAVENOUS PRN
Status: DISCONTINUED | OUTPATIENT
Start: 2023-06-28 | End: 2023-06-29 | Stop reason: HOSPADM

## 2023-06-28 RX ADMIN — FAMOTIDINE 20 MG: 20 TABLET ORAL at 20:10

## 2023-06-28 RX ADMIN — CARVEDILOL 6.25 MG: 6.25 TABLET, FILM COATED ORAL at 08:06

## 2023-06-28 RX ADMIN — FAMOTIDINE 20 MG: 20 TABLET ORAL at 08:06

## 2023-06-28 RX ADMIN — CLOPIDOGREL BISULFATE 75 MG: 75 TABLET ORAL at 17:48

## 2023-06-28 RX ADMIN — ASPIRIN 81 MG: 81 TABLET, COATED ORAL at 17:48

## 2023-06-28 RX ADMIN — IOPAMIDOL 105 ML: 755 INJECTION, SOLUTION INTRAVENOUS at 14:58

## 2023-06-28 RX ADMIN — ATORVASTATIN CALCIUM 80 MG: 80 TABLET, FILM COATED ORAL at 08:06

## 2023-06-28 RX ADMIN — MORPHINE SULFATE 2 MG: 2 INJECTION, SOLUTION INTRAMUSCULAR; INTRAVENOUS at 11:32

## 2023-06-28 RX ADMIN — MORPHINE SULFATE 4 MG: 4 INJECTION, SOLUTION INTRAMUSCULAR; INTRAVENOUS at 04:12

## 2023-06-28 RX ADMIN — LISINOPRIL 20 MG: 20 TABLET ORAL at 08:06

## 2023-06-28 RX ADMIN — SODIUM CHLORIDE, PRESERVATIVE FREE 10 ML: 5 INJECTION INTRAVENOUS at 08:08

## 2023-06-28 RX ADMIN — MORPHINE SULFATE 4 MG: 4 INJECTION, SOLUTION INTRAMUSCULAR; INTRAVENOUS at 00:42

## 2023-06-28 RX ADMIN — MORPHINE SULFATE 2 MG: 2 INJECTION, SOLUTION INTRAMUSCULAR; INTRAVENOUS at 06:33

## 2023-06-28 RX ADMIN — DILTIAZEM HYDROCHLORIDE 120 MG: 120 CAPSULE, COATED, EXTENDED RELEASE ORAL at 08:06

## 2023-06-28 RX ADMIN — ALTEPLASE 1 MG/HR: 2.2 INJECTION, POWDER, LYOPHILIZED, FOR SOLUTION INTRAVENOUS at 02:04

## 2023-06-28 RX ADMIN — CARVEDILOL 6.25 MG: 6.25 TABLET, FILM COATED ORAL at 17:48

## 2023-06-28 RX ADMIN — SODIUM CHLORIDE, PRESERVATIVE FREE 10 ML: 5 INJECTION INTRAVENOUS at 08:07

## 2023-06-28 RX ADMIN — SODIUM CHLORIDE, PRESERVATIVE FREE 10 ML: 5 INJECTION INTRAVENOUS at 20:10

## 2023-06-28 ASSESSMENT — PAIN DESCRIPTION - FREQUENCY
FREQUENCY: CONTINUOUS

## 2023-06-28 ASSESSMENT — PAIN DESCRIPTION - DESCRIPTORS
DESCRIPTORS: ACHING
DESCRIPTORS: ACHING
DESCRIPTORS: CRAMPING;DISCOMFORT;DULL
DESCRIPTORS_2: ACHING
DESCRIPTORS: ACHING

## 2023-06-28 ASSESSMENT — PAIN SCALES - GENERAL
PAINLEVEL_OUTOF10: 6
PAINLEVEL_OUTOF10: 0
PAINLEVEL_OUTOF10: 0
PAINLEVEL_OUTOF10: 8
PAINLEVEL_OUTOF10: 0
PAINLEVEL_OUTOF10: 2
PAINLEVEL_OUTOF10: 0
PAINLEVEL_OUTOF10: 0
PAINLEVEL_OUTOF10: 3
PAINLEVEL_OUTOF10: 0
PAINLEVEL_OUTOF10: 2
PAINLEVEL_OUTOF10: 7
PAINLEVEL_OUTOF10: 6

## 2023-06-28 ASSESSMENT — PAIN DESCRIPTION - PAIN TYPE
TYPE: ACUTE PAIN

## 2023-06-28 ASSESSMENT — PAIN DESCRIPTION - ONSET
ONSET: ON-GOING

## 2023-06-28 ASSESSMENT — PAIN - FUNCTIONAL ASSESSMENT
PAIN_FUNCTIONAL_ASSESSMENT: ACTIVITIES ARE NOT PREVENTED
PAIN_FUNCTIONAL_ASSESSMENT: ACTIVITIES ARE NOT PREVENTED
PAIN_FUNCTIONAL_ASSESSMENT_SITE2: ACTIVITIES ARE NOT PREVENTED
PAIN_FUNCTIONAL_ASSESSMENT: ACTIVITIES ARE NOT PREVENTED
PAIN_FUNCTIONAL_ASSESSMENT: ACTIVITIES ARE NOT PREVENTED

## 2023-06-28 ASSESSMENT — PAIN DESCRIPTION - ORIENTATION
ORIENTATION: LEFT
ORIENTATION_2: MID
ORIENTATION: MID

## 2023-06-28 ASSESSMENT — PAIN DESCRIPTION - LOCATION
LOCATION_2: BACK
LOCATION: ARM
LOCATION: BACK

## 2023-06-28 ASSESSMENT — PAIN DESCRIPTION - INTENSITY: RATING_2: 6

## 2023-06-28 ASSESSMENT — PAIN SCALES - WONG BAKER
WONGBAKER_NUMERICALRESPONSE: 2
WONGBAKER_NUMERICALRESPONSE: 2

## 2023-06-29 ENCOUNTER — TELEPHONE (OUTPATIENT)
Dept: CARDIOLOGY CLINIC | Age: 43
End: 2023-06-29

## 2023-06-29 VITALS
OXYGEN SATURATION: 96 % | HEART RATE: 91 BPM | BODY MASS INDEX: 35.21 KG/M2 | RESPIRATION RATE: 18 BRPM | TEMPERATURE: 97.8 F | HEIGHT: 69 IN | DIASTOLIC BLOOD PRESSURE: 72 MMHG | SYSTOLIC BLOOD PRESSURE: 112 MMHG | WEIGHT: 237.7 LBS

## 2023-06-29 DIAGNOSIS — I77.1 SUBCLAVIAN ARTERY STENOSIS, LEFT (HCC): ICD-10-CM

## 2023-06-29 DIAGNOSIS — I87.1 SUBCLAVIAN VEIN STENOSIS: Primary | ICD-10-CM

## 2023-06-29 LAB
ANION GAP SERPL CALCULATED.3IONS-SCNC: 13 MMOL/L (ref 3–16)
BASOPHILS # BLD: 0.1 K/UL (ref 0–0.2)
BASOPHILS NFR BLD: 1 %
BUN SERPL-MCNC: 12 MG/DL (ref 7–20)
CALCIUM SERPL-MCNC: 9.3 MG/DL (ref 8.3–10.6)
CHLORIDE SERPL-SCNC: 99 MMOL/L (ref 99–110)
CO2 SERPL-SCNC: 23 MMOL/L (ref 21–32)
CREAT SERPL-MCNC: 0.8 MG/DL (ref 0.9–1.3)
DEPRECATED RDW RBC AUTO: 13.8 % (ref 12.4–15.4)
EOSINOPHIL # BLD: 0.2 K/UL (ref 0–0.6)
EOSINOPHIL NFR BLD: 1.3 %
GFR SERPLBLD CREATININE-BSD FMLA CKD-EPI: >60 ML/MIN/{1.73_M2}
GLUCOSE SERPL-MCNC: 140 MG/DL (ref 70–99)
HCT VFR BLD AUTO: 50.3 % (ref 40.5–52.5)
HGB BLD-MCNC: 17.2 G/DL (ref 13.5–17.5)
LYMPHOCYTES # BLD: 3.1 K/UL (ref 1–5.1)
LYMPHOCYTES NFR BLD: 21.7 %
MCH RBC QN AUTO: 30 PG (ref 26–34)
MCHC RBC AUTO-ENTMCNC: 34.2 G/DL (ref 31–36)
MCV RBC AUTO: 87.8 FL (ref 80–100)
MONOCYTES # BLD: 1.3 K/UL (ref 0–1.3)
MONOCYTES NFR BLD: 9.1 %
NEUTROPHILS # BLD: 9.5 K/UL (ref 1.7–7.7)
NEUTROPHILS NFR BLD: 66.9 %
PLATELET # BLD AUTO: 188 K/UL (ref 135–450)
PMV BLD AUTO: 10.1 FL (ref 5–10.5)
POTASSIUM SERPL-SCNC: 4 MMOL/L (ref 3.5–5.1)
RBC # BLD AUTO: 5.73 M/UL (ref 4.2–5.9)
SODIUM SERPL-SCNC: 135 MMOL/L (ref 136–145)
WBC # BLD AUTO: 14.1 K/UL (ref 4–11)

## 2023-06-29 PROCEDURE — 2580000003 HC RX 258: Performed by: STUDENT IN AN ORGANIZED HEALTH CARE EDUCATION/TRAINING PROGRAM

## 2023-06-29 PROCEDURE — 94760 N-INVAS EAR/PLS OXIMETRY 1: CPT

## 2023-06-29 PROCEDURE — 2580000003 HC RX 258: Performed by: INTERNAL MEDICINE

## 2023-06-29 PROCEDURE — 36415 COLL VENOUS BLD VENIPUNCTURE: CPT

## 2023-06-29 PROCEDURE — 85025 COMPLETE CBC W/AUTO DIFF WBC: CPT

## 2023-06-29 PROCEDURE — 6370000000 HC RX 637 (ALT 250 FOR IP): Performed by: STUDENT IN AN ORGANIZED HEALTH CARE EDUCATION/TRAINING PROGRAM

## 2023-06-29 PROCEDURE — 6370000000 HC RX 637 (ALT 250 FOR IP): Performed by: INTERNAL MEDICINE

## 2023-06-29 PROCEDURE — 80048 BASIC METABOLIC PNL TOTAL CA: CPT

## 2023-06-29 RX ORDER — ATORVASTATIN CALCIUM 80 MG/1
80 TABLET, FILM COATED ORAL DAILY
Qty: 30 TABLET | Refills: 3 | Status: SHIPPED | OUTPATIENT
Start: 2023-06-30

## 2023-06-29 RX ORDER — ASPIRIN 81 MG/1
81 TABLET, CHEWABLE ORAL DAILY
Qty: 30 TABLET | Refills: 3 | Status: SHIPPED | OUTPATIENT
Start: 2023-06-29

## 2023-06-29 RX ORDER — ASPIRIN 81 MG/1
81 TABLET ORAL DAILY
Status: DISCONTINUED | OUTPATIENT
Start: 2023-06-29 | End: 2023-06-29 | Stop reason: HOSPADM

## 2023-06-29 RX ORDER — CLOPIDOGREL BISULFATE 75 MG/1
75 TABLET ORAL DAILY
Qty: 30 TABLET | Refills: 0 | Status: SHIPPED | OUTPATIENT
Start: 2023-06-30 | End: 2023-07-30

## 2023-06-29 RX ORDER — NICOTINE 21 MG/24HR
1 PATCH, TRANSDERMAL 24 HOURS TRANSDERMAL DAILY
Qty: 30 PATCH | Refills: 3 | Status: SHIPPED | OUTPATIENT
Start: 2023-06-30

## 2023-06-29 RX ADMIN — LISINOPRIL 20 MG: 20 TABLET ORAL at 09:00

## 2023-06-29 RX ADMIN — FAMOTIDINE 20 MG: 20 TABLET ORAL at 09:00

## 2023-06-29 RX ADMIN — CLOPIDOGREL BISULFATE 75 MG: 75 TABLET ORAL at 09:00

## 2023-06-29 RX ADMIN — ASPIRIN 81 MG: 81 TABLET, COATED ORAL at 13:23

## 2023-06-29 RX ADMIN — DILTIAZEM HYDROCHLORIDE 120 MG: 120 CAPSULE, COATED, EXTENDED RELEASE ORAL at 09:00

## 2023-06-29 RX ADMIN — SODIUM CHLORIDE, PRESERVATIVE FREE 10 ML: 5 INJECTION INTRAVENOUS at 09:02

## 2023-06-29 RX ADMIN — CARVEDILOL 6.25 MG: 6.25 TABLET, FILM COATED ORAL at 09:00

## 2023-06-29 RX ADMIN — SODIUM CHLORIDE, PRESERVATIVE FREE 10 ML: 5 INJECTION INTRAVENOUS at 09:01

## 2023-06-29 RX ADMIN — ATORVASTATIN CALCIUM 80 MG: 80 TABLET, FILM COATED ORAL at 09:00

## 2023-06-29 ASSESSMENT — PAIN SCALES - GENERAL
PAINLEVEL_OUTOF10: 0

## 2023-06-30 ENCOUNTER — TELEPHONE (OUTPATIENT)
Dept: FAMILY MEDICINE CLINIC | Age: 43
End: 2023-06-30

## 2023-07-05 ENCOUNTER — OFFICE VISIT (OUTPATIENT)
Dept: FAMILY MEDICINE CLINIC | Age: 43
End: 2023-07-05

## 2023-07-05 VITALS
WEIGHT: 241 LBS | SYSTOLIC BLOOD PRESSURE: 136 MMHG | BODY MASS INDEX: 35.7 KG/M2 | DIASTOLIC BLOOD PRESSURE: 84 MMHG | HEIGHT: 69 IN

## 2023-07-05 DIAGNOSIS — Z72.0 TOBACCO USE: ICD-10-CM

## 2023-07-05 DIAGNOSIS — I21.4 NSTEMI (NON-ST ELEVATED MYOCARDIAL INFARCTION) (HCC): ICD-10-CM

## 2023-07-05 DIAGNOSIS — I10 ESSENTIAL HYPERTENSION: Primary | ICD-10-CM

## 2023-07-05 DIAGNOSIS — G47.33 OSA (OBSTRUCTIVE SLEEP APNEA): ICD-10-CM

## 2023-07-05 RX ORDER — CARVEDILOL 6.25 MG/1
6.25 TABLET ORAL 2 TIMES DAILY WITH MEALS
Qty: 60 TABLET | Refills: 5 | Status: SHIPPED | OUTPATIENT
Start: 2023-07-05

## 2023-07-05 RX ORDER — DILTIAZEM HYDROCHLORIDE 120 MG/1
120 CAPSULE, COATED, EXTENDED RELEASE ORAL DAILY
Qty: 30 CAPSULE | Refills: 5 | Status: SHIPPED | OUTPATIENT
Start: 2023-07-05

## 2023-07-05 RX ORDER — ATORVASTATIN CALCIUM 80 MG/1
80 TABLET, FILM COATED ORAL DAILY
Qty: 30 TABLET | Refills: 3 | Status: SHIPPED | OUTPATIENT
Start: 2023-07-05

## 2023-07-05 RX ORDER — ASPIRIN 81 MG/1
81 TABLET, CHEWABLE ORAL DAILY
Qty: 30 TABLET | Refills: 3 | Status: SHIPPED | OUTPATIENT
Start: 2023-07-05

## 2023-07-05 RX ORDER — CLOPIDOGREL BISULFATE 75 MG/1
75 TABLET ORAL DAILY
Qty: 30 TABLET | Refills: 0 | Status: SHIPPED | OUTPATIENT
Start: 2023-07-05 | End: 2023-08-04

## 2023-07-05 RX ORDER — LISINOPRIL 20 MG/1
20 TABLET ORAL DAILY
Qty: 30 TABLET | Refills: 5 | Status: SHIPPED | OUTPATIENT
Start: 2023-07-05

## 2023-07-05 SDOH — ECONOMIC STABILITY: FOOD INSECURITY: WITHIN THE PAST 12 MONTHS, THE FOOD YOU BOUGHT JUST DIDN'T LAST AND YOU DIDN'T HAVE MONEY TO GET MORE.: NEVER TRUE

## 2023-07-05 SDOH — ECONOMIC STABILITY: INCOME INSECURITY: HOW HARD IS IT FOR YOU TO PAY FOR THE VERY BASICS LIKE FOOD, HOUSING, MEDICAL CARE, AND HEATING?: NOT HARD AT ALL

## 2023-07-05 SDOH — ECONOMIC STABILITY: HOUSING INSECURITY
IN THE LAST 12 MONTHS, WAS THERE A TIME WHEN YOU DID NOT HAVE A STEADY PLACE TO SLEEP OR SLEPT IN A SHELTER (INCLUDING NOW)?: NO

## 2023-07-05 SDOH — ECONOMIC STABILITY: FOOD INSECURITY: WITHIN THE PAST 12 MONTHS, YOU WORRIED THAT YOUR FOOD WOULD RUN OUT BEFORE YOU GOT MONEY TO BUY MORE.: NEVER TRUE

## 2023-07-05 ASSESSMENT — ENCOUNTER SYMPTOMS
NAUSEA: 0
COUGH: 0
TROUBLE SWALLOWING: 0
RHINORRHEA: 0
SORE THROAT: 0
DIARRHEA: 0
WHEEZING: 0
CHEST TIGHTNESS: 0
SINUS PRESSURE: 0
ABDOMINAL PAIN: 0
FACIAL SWELLING: 0
VOMITING: 0
SHORTNESS OF BREATH: 0

## 2023-07-05 ASSESSMENT — PATIENT HEALTH QUESTIONNAIRE - PHQ9
SUM OF ALL RESPONSES TO PHQ QUESTIONS 1-9: 0
1. LITTLE INTEREST OR PLEASURE IN DOING THINGS: 0
SUM OF ALL RESPONSES TO PHQ QUESTIONS 1-9: 0
2. FEELING DOWN, DEPRESSED OR HOPELESS: 0
SUM OF ALL RESPONSES TO PHQ9 QUESTIONS 1 & 2: 0

## 2023-07-05 NOTE — PROGRESS NOTES
Hyperlipidemia  Continue lisinopril  Continue statin     # GERD  Continue famotidine     Comment: Please note this report has been produced using speech recognition software and may contain errors related to that system including errors in grammar, punctuation, and spelling, as well as words and phrases that may be inappropriate. If there are any questions or concerns please feel free to contact the dictating provider for clarification. The patient expressed appropriate understanding of, and agreement with the discharge recommendations, medications, and plan. Consults this admission:  IP CONSULT TO STROKE TEAM  IP CONSULT TO NEUROLOGY  IP CONSULT TO VASCULAR SURGERY     Discharge Diagnosis:   Left arm numbness 2/2 In-stent thrombosis s/p Drug coated balloon angioplasty/cutting balloon of subclavian stent     HTN- it is still slightly high but stable today, continue on medication as prescribed. SANDY- still on CPAP    Today, the patient is feeling well, has follow up with cardiology, continues to work in [place]. Very stressful. Review of Systems   Constitutional:  Negative for activity change, fatigue, fever and unexpected weight change. HENT:  Negative for congestion, ear pain, facial swelling, hearing loss, rhinorrhea, sinus pressure, sore throat and trouble swallowing. Eyes:  Negative for visual disturbance. Respiratory:  Negative for cough, chest tightness, shortness of breath and wheezing. Cardiovascular:  Negative for chest pain, palpitations and leg swelling. Gastrointestinal:  Negative for abdominal pain, diarrhea, nausea and vomiting. Musculoskeletal:  Negative for arthralgias. Skin:  Negative for rash. Allergic/Immunologic: Negative for food allergies. Neurological:  Negative for dizziness, syncope, light-headedness and headaches. Psychiatric/Behavioral:  Negative for dysphoric mood. The patient is not nervous/anxious.       Prior to Visit

## 2023-07-18 NOTE — TELEPHONE ENCOUNTER
Called and spoke to patient and let him know that he will have the procedure. He will call with any questions.

## 2023-07-20 ENCOUNTER — HOSPITAL ENCOUNTER (OUTPATIENT)
Dept: CARDIAC CATH/INVASIVE PROCEDURES | Age: 43
Discharge: HOME | End: 2023-07-20
Payer: MEDICAID

## 2023-07-20 VITALS
HEIGHT: 69 IN | OXYGEN SATURATION: 96 % | HEART RATE: 78 BPM | BODY MASS INDEX: 35.55 KG/M2 | DIASTOLIC BLOOD PRESSURE: 101 MMHG | TEMPERATURE: 98.1 F | WEIGHT: 240 LBS | SYSTOLIC BLOOD PRESSURE: 143 MMHG

## 2023-07-20 DIAGNOSIS — I77.1 SUBCLAVIAN ARTERY STENOSIS, LEFT (HCC): ICD-10-CM

## 2023-07-20 LAB
ANION GAP SERPL CALCULATED.3IONS-SCNC: 9 MMOL/L (ref 3–16)
BUN SERPL-MCNC: 11 MG/DL (ref 7–20)
CALCIUM SERPL-MCNC: 9.4 MG/DL (ref 8.3–10.6)
CHLORIDE SERPL-SCNC: 104 MMOL/L (ref 99–110)
CO2 SERPL-SCNC: 22 MMOL/L (ref 21–32)
CREAT SERPL-MCNC: 0.8 MG/DL (ref 0.9–1.3)
DEPRECATED RDW RBC AUTO: 13.7 % (ref 12.4–15.4)
GFR SERPLBLD CREATININE-BSD FMLA CKD-EPI: >60 ML/MIN/{1.73_M2}
GLUCOSE SERPL-MCNC: 96 MG/DL (ref 70–99)
HCT VFR BLD AUTO: 46 % (ref 40.5–52.5)
HGB BLD-MCNC: 15.8 G/DL (ref 13.5–17.5)
MCH RBC QN AUTO: 30.3 PG (ref 26–34)
MCHC RBC AUTO-ENTMCNC: 34.2 G/DL (ref 31–36)
MCV RBC AUTO: 88.4 FL (ref 80–100)
PLATELET # BLD AUTO: 206 K/UL (ref 135–450)
PLATELET BLD QL SMEAR: ADEQUATE
PMV BLD AUTO: 9.6 FL (ref 5–10.5)
POTASSIUM SERPL-SCNC: 4.5 MMOL/L (ref 3.5–5.1)
RBC # BLD AUTO: 5.2 M/UL (ref 4.2–5.9)
SODIUM SERPL-SCNC: 135 MMOL/L (ref 136–145)
WBC # BLD AUTO: 12.7 K/UL (ref 4–11)

## 2023-07-20 PROCEDURE — 6360000002 HC RX W HCPCS

## 2023-07-20 PROCEDURE — 2500000003 HC RX 250 WO HCPCS

## 2023-07-20 PROCEDURE — 6360000004 HC RX CONTRAST MEDICATION: Performed by: INTERNAL MEDICINE

## 2023-07-20 PROCEDURE — C1769 GUIDE WIRE: HCPCS

## 2023-07-20 PROCEDURE — 99152 MOD SED SAME PHYS/QHP 5/>YRS: CPT

## 2023-07-20 PROCEDURE — C1887 CATHETER, GUIDING: HCPCS

## 2023-07-20 PROCEDURE — 36215 PLACE CATHETER IN ARTERY: CPT

## 2023-07-20 PROCEDURE — 37246 TRLUML BALO ANGIOP 1ST ART: CPT

## 2023-07-20 PROCEDURE — 80048 BASIC METABOLIC PNL TOTAL CA: CPT

## 2023-07-20 PROCEDURE — 2709999900 HC NON-CHARGEABLE SUPPLY

## 2023-07-20 PROCEDURE — C1760 CLOSURE DEV, VASC: HCPCS

## 2023-07-20 PROCEDURE — 99153 MOD SED SAME PHYS/QHP EA: CPT

## 2023-07-20 PROCEDURE — 75710 ARTERY X-RAYS ARM/LEG: CPT

## 2023-07-20 PROCEDURE — 6370000000 HC RX 637 (ALT 250 FOR IP)

## 2023-07-20 PROCEDURE — C1894 INTRO/SHEATH, NON-LASER: HCPCS

## 2023-07-20 PROCEDURE — C1725 CATH, TRANSLUMIN NON-LASER: HCPCS

## 2023-07-20 PROCEDURE — 85027 COMPLETE CBC AUTOMATED: CPT

## 2023-07-20 RX ORDER — SODIUM CHLORIDE 9 MG/ML
INJECTION, SOLUTION INTRAVENOUS PRN
Status: DISCONTINUED | OUTPATIENT
Start: 2023-07-20 | End: 2023-07-21 | Stop reason: HOSPADM

## 2023-07-20 RX ORDER — SODIUM CHLORIDE 0.9 % (FLUSH) 0.9 %
5-40 SYRINGE (ML) INJECTION PRN
Status: DISCONTINUED | OUTPATIENT
Start: 2023-07-20 | End: 2023-07-21 | Stop reason: HOSPADM

## 2023-07-20 RX ORDER — SODIUM CHLORIDE 0.9 % (FLUSH) 0.9 %
5-40 SYRINGE (ML) INJECTION EVERY 12 HOURS SCHEDULED
Status: DISCONTINUED | OUTPATIENT
Start: 2023-07-20 | End: 2023-07-21 | Stop reason: HOSPADM

## 2023-07-20 RX ORDER — ACETAMINOPHEN 325 MG/1
650 TABLET ORAL EVERY 4 HOURS PRN
Status: DISCONTINUED | OUTPATIENT
Start: 2023-07-20 | End: 2023-07-21 | Stop reason: HOSPADM

## 2023-07-20 RX ADMIN — IOPAMIDOL 40 ML: 612 INJECTION, SOLUTION INTRAVENOUS at 11:14

## 2023-07-20 NOTE — DISCHARGE INSTRUCTIONS
PERIPHERAL ANGIOGRAM    Care of your puncture site:  Remove bandage 24 hours after the procedure. May shower in 24 hours but do not sit in a bathtub/pool of water for 5 days or until the wound is healed. Gently clean groin using soap and water. Dry thoroughly and apply a Band-Aid that covers the entire site. Use Band-Aid until skin heals over in about 3-5 days. Do not apply powder or lotion. Normal Observations:  Soreness or tenderness which may last one week. Possible bruising that could last 2 weeks. Activity:  You may resume driving 24 hours following the procedure. Do not make important / legal decisions within 24 hours after procedure. You may resume normal activity in 5 days or after the wound heals. Avoid lifting more than 10 pounds for 5 days or until the wound heals. Avoid strenuous exercise or activity for 1 week. Nutrition:  Regular diet. Drink at least 8 to 10 glasses of decaffeinated, non-alcoholic fluid for the next 24 hours to flush the x-ray dye used for your angiogram out of your body. Call your doctor immediately if your condition worsens, for any other concerns, for a follow-up appointment or if you experience any of the following:  Increased swelling on the groin or leg. Unusual pain, numbness, or tingling of the groin or down the leg. Any signs of infection such as: redness, yellow drainage at the site, swelling or pain.     IF GROIN STARTS BLEEDING SIGNIFICANTLY:   LAY FLAT, HOLD FIRM DIRECT PRESSURE, AND CALL 911

## 2023-07-25 ENCOUNTER — TELEPHONE (OUTPATIENT)
Dept: CARDIOLOGY CLINIC | Age: 43
End: 2023-07-25

## 2023-07-25 RX ORDER — DILTIAZEM HYDROCHLORIDE 120 MG/1
120 CAPSULE, COATED, EXTENDED RELEASE ORAL DAILY
Qty: 30 CAPSULE | Refills: 5 | OUTPATIENT
Start: 2023-07-25

## 2023-07-26 NOTE — TELEPHONE ENCOUNTER
Last office visit 7/5/2023       Next office visit scheduled 1/4/2024    Requested Prescriptions     Pending Prescriptions Disp Refills    famotidine (PEPCID) 20 MG tablet [Pharmacy Med Name: FAMOTIDINE 20MG TABLETS] 180 tablet 2     Sig: TAKE 1 TABLET BY MOUTH TWICE DAILY

## 2023-07-27 RX ORDER — FAMOTIDINE 20 MG/1
TABLET, FILM COATED ORAL
Qty: 180 TABLET | Refills: 2 | Status: SHIPPED | OUTPATIENT
Start: 2023-07-27

## 2023-09-21 ENCOUNTER — APPOINTMENT (OUTPATIENT)
Dept: GENERAL RADIOLOGY | Age: 43
End: 2023-09-21
Payer: MEDICAID

## 2023-09-21 ENCOUNTER — HOSPITAL ENCOUNTER (EMERGENCY)
Age: 43
Discharge: HOME | End: 2023-09-22
Attending: EMERGENCY MEDICINE
Payer: MEDICAID

## 2023-09-21 VITALS
DIASTOLIC BLOOD PRESSURE: 83 MMHG | WEIGHT: 233.03 LBS | BODY MASS INDEX: 34.51 KG/M2 | SYSTOLIC BLOOD PRESSURE: 131 MMHG | HEIGHT: 69 IN | TEMPERATURE: 99.4 F | RESPIRATION RATE: 20 BRPM | HEART RATE: 79 BPM | OXYGEN SATURATION: 93 %

## 2023-09-21 DIAGNOSIS — U07.1 COVID-19: Primary | ICD-10-CM

## 2023-09-21 LAB
ALBUMIN SERPL-MCNC: 3.8 G/DL (ref 3.4–5)
ALBUMIN/GLOB SERPL: 1.2 {RATIO} (ref 1.1–2.2)
ALP SERPL-CCNC: 100 U/L (ref 40–129)
ALT SERPL-CCNC: 19 U/L (ref 10–40)
ANION GAP SERPL CALCULATED.3IONS-SCNC: 12 MMOL/L (ref 3–16)
AST SERPL-CCNC: 44 U/L (ref 15–37)
BASE EXCESS BLDV CALC-SCNC: 0 MMOL/L
BASOPHILS # BLD: 0.1 K/UL (ref 0–0.2)
BASOPHILS NFR BLD: 1 %
BILIRUB SERPL-MCNC: 0.3 MG/DL (ref 0–1)
BUN SERPL-MCNC: 12 MG/DL (ref 7–20)
CALCIUM SERPL-MCNC: 8.8 MG/DL (ref 8.3–10.6)
CHLORIDE SERPL-SCNC: 100 MMOL/L (ref 99–110)
CO2 BLDV-SCNC: 27 MMOL/L
CO2 SERPL-SCNC: 23 MMOL/L (ref 21–32)
COHGB MFR BLDV: 7.9 %
CREAT SERPL-MCNC: 1 MG/DL (ref 0.9–1.3)
DEPRECATED RDW RBC AUTO: 13.8 % (ref 12.4–15.4)
EOSINOPHIL # BLD: 0 K/UL (ref 0–0.6)
EOSINOPHIL NFR BLD: 0.6 %
GFR SERPLBLD CREATININE-BSD FMLA CKD-EPI: >60 ML/MIN/{1.73_M2}
GLUCOSE SERPL-MCNC: 96 MG/DL (ref 70–99)
HCO3 BLDV-SCNC: 25 MMOL/L (ref 23–29)
HCT VFR BLD AUTO: 50.8 % (ref 40.5–52.5)
HGB BLD-MCNC: 17.5 G/DL (ref 13.5–17.5)
LYMPHOCYTES # BLD: 2.2 K/UL (ref 1–5.1)
LYMPHOCYTES NFR BLD: 39.4 %
MCH RBC QN AUTO: 30.2 PG (ref 26–34)
MCHC RBC AUTO-ENTMCNC: 34.4 G/DL (ref 31–36)
MCV RBC AUTO: 87.8 FL (ref 80–100)
METHGB MFR BLDV: 0.4 %
MONOCYTES # BLD: 0.7 K/UL (ref 0–1.3)
MONOCYTES NFR BLD: 12.8 %
NEUTROPHILS # BLD: 2.5 K/UL (ref 1.7–7.7)
NEUTROPHILS NFR BLD: 46.2 %
NT-PROBNP SERPL-MCNC: 51 PG/ML (ref 0–124)
O2 THERAPY: NORMAL
PCO2 BLDV: 42.4 MMHG (ref 40–50)
PH BLDV: 7.38 [PH] (ref 7.35–7.45)
PLATELET # BLD AUTO: 159 K/UL (ref 135–450)
PMV BLD AUTO: 9.6 FL (ref 5–10.5)
PO2 BLDV: 45 MMHG
POTASSIUM SERPL-SCNC: 3.7 MMOL/L (ref 3.5–5.1)
PROT SERPL-MCNC: 6.9 G/DL (ref 6.4–8.2)
RBC # BLD AUTO: 5.78 M/UL (ref 4.2–5.9)
SAO2 % BLDV: 85 %
SARS-COV-2 RDRP RESP QL NAA+PROBE: DETECTED
SODIUM SERPL-SCNC: 135 MMOL/L (ref 136–145)
TROPONIN, HIGH SENSITIVITY: 10 NG/L (ref 0–22)
WBC # BLD AUTO: 5.5 K/UL (ref 4–11)

## 2023-09-21 PROCEDURE — 99285 EMERGENCY DEPT VISIT HI MDM: CPT

## 2023-09-21 PROCEDURE — 84484 ASSAY OF TROPONIN QUANT: CPT

## 2023-09-21 PROCEDURE — 6370000000 HC RX 637 (ALT 250 FOR IP): Performed by: EMERGENCY MEDICINE

## 2023-09-21 PROCEDURE — 82803 BLOOD GASES ANY COMBINATION: CPT

## 2023-09-21 PROCEDURE — 80053 COMPREHEN METABOLIC PANEL: CPT

## 2023-09-21 PROCEDURE — 87635 SARS-COV-2 COVID-19 AMP PRB: CPT

## 2023-09-21 PROCEDURE — 87804 INFLUENZA ASSAY W/OPTIC: CPT

## 2023-09-21 PROCEDURE — 93005 ELECTROCARDIOGRAM TRACING: CPT | Performed by: EMERGENCY MEDICINE

## 2023-09-21 PROCEDURE — 85025 COMPLETE CBC W/AUTO DIFF WBC: CPT

## 2023-09-21 PROCEDURE — 71046 X-RAY EXAM CHEST 2 VIEWS: CPT

## 2023-09-21 PROCEDURE — 96374 THER/PROPH/DIAG INJ IV PUSH: CPT

## 2023-09-21 PROCEDURE — 6360000002 HC RX W HCPCS: Performed by: EMERGENCY MEDICINE

## 2023-09-21 PROCEDURE — 83880 ASSAY OF NATRIURETIC PEPTIDE: CPT

## 2023-09-21 RX ORDER — KETOROLAC TROMETHAMINE 30 MG/ML
30 INJECTION, SOLUTION INTRAMUSCULAR; INTRAVENOUS ONCE
Status: DISCONTINUED | OUTPATIENT
Start: 2023-09-21 | End: 2023-09-21

## 2023-09-21 RX ORDER — ACETAMINOPHEN 500 MG
1000 TABLET ORAL ONCE
Status: COMPLETED | OUTPATIENT
Start: 2023-09-21 | End: 2023-09-21

## 2023-09-21 RX ORDER — KETOROLAC TROMETHAMINE 30 MG/ML
15 INJECTION, SOLUTION INTRAMUSCULAR; INTRAVENOUS ONCE
Status: COMPLETED | OUTPATIENT
Start: 2023-09-21 | End: 2023-09-21

## 2023-09-21 RX ORDER — ASPIRIN 325 MG
325 TABLET ORAL ONCE
Status: COMPLETED | OUTPATIENT
Start: 2023-09-21 | End: 2023-09-21

## 2023-09-21 RX ADMIN — KETOROLAC TROMETHAMINE 15 MG: 30 INJECTION, SOLUTION INTRAMUSCULAR at 22:57

## 2023-09-21 RX ADMIN — ASPIRIN 325 MG: 325 TABLET ORAL at 22:57

## 2023-09-21 RX ADMIN — ACETAMINOPHEN 1000 MG: 500 TABLET ORAL at 22:56

## 2023-09-22 LAB
EKG ATRIAL RATE: 93 BPM
EKG DIAGNOSIS: NORMAL
EKG P AXIS: 76 DEGREES
EKG P-R INTERVAL: 148 MS
EKG Q-T INTERVAL: 344 MS
EKG QRS DURATION: 92 MS
EKG QTC CALCULATION (BAZETT): 427 MS
EKG R AXIS: 94 DEGREES
EKG T AXIS: 87 DEGREES
EKG VENTRICULAR RATE: 93 BPM
FLUAV RNA UPPER RESP QL NAA+PROBE: NEGATIVE
FLUBV AG NPH QL: NEGATIVE
TROPONIN, HIGH SENSITIVITY: 10 NG/L (ref 0–22)

## 2023-09-22 PROCEDURE — 93010 ELECTROCARDIOGRAM REPORT: CPT | Performed by: INTERNAL MEDICINE

## 2023-09-22 RX ORDER — NAPROXEN 500 MG/1
500 TABLET ORAL 2 TIMES DAILY WITH MEALS
Qty: 60 TABLET | Refills: 0 | Status: SHIPPED | OUTPATIENT
Start: 2023-09-22

## 2023-09-22 RX ORDER — ACETAMINOPHEN 500 MG
1000 TABLET ORAL 3 TIMES DAILY PRN
Qty: 60 TABLET | Refills: 0 | Status: SHIPPED | OUTPATIENT
Start: 2023-09-22

## 2023-09-22 RX ORDER — ONDANSETRON 8 MG/1
8 TABLET, ORALLY DISINTEGRATING ORAL EVERY 8 HOURS PRN
Qty: 20 TABLET | Refills: 0 | Status: SHIPPED | OUTPATIENT
Start: 2023-09-22

## 2023-09-22 ASSESSMENT — PAIN SCALES - GENERAL: PAINLEVEL_OUTOF10: 0

## 2023-09-22 ASSESSMENT — PAIN - FUNCTIONAL ASSESSMENT: PAIN_FUNCTIONAL_ASSESSMENT: 0-10

## 2023-09-22 NOTE — ED NOTES
Handoff report given to Scott Sparrow RN and Melyssa Pearce RN to assume care. No further questions at this time.       Amber Morse RN  09/22/23 0000

## 2023-09-22 NOTE — ED NOTES
Discharge and education instructions reviewed. Patient verbalized understanding, teach-back successful. Patient denied questions at this time. No acute distress noted. Patient instructed to follow-up as noted - return to emergency department if symptoms worsen. Patient verbalized understanding. Discharged per EDMD with discharge instructions.         Naldo Avalos RN  09/22/23 8907

## 2023-09-22 NOTE — ED NOTES
Introduce myself to the patient, identification band inplace, stretcher in the lowest position for safety, and the call light is in reach. Updated patient on Emergency Department plan of care, no additional needs at this time, will continue to monitor patient.         Sandra Walter RN  09/21/23 9826

## 2023-09-22 NOTE — ED NOTES
Patient's oxygen saturation monitored with ambulation. Oxygen saturation remained 95% on RA.  Denies dyspnea with exertion     Za Kessler RN  09/22/23 8685

## 2023-09-22 NOTE — DISCHARGE INSTRUCTIONS
Take naproxen or acetaminophen as needed for pain. Drink plenty of fluids. Take ondansetron as needed for nausea or vomiting. Take your regular cardiac medications as prescribed. Seek medical attention for difficulty breathing, severe chest pain or other new or concerning symptoms.

## 2023-09-22 NOTE — ED NOTES
Report received from  BELL Cortes at this time. Introduced self to pt, all needs met, call light within reach.        Gena Grimaldo RN  09/21/23 0975

## 2023-10-23 ENCOUNTER — APPOINTMENT (OUTPATIENT)
Dept: GENERAL RADIOLOGY | Age: 43
DRG: 182 | End: 2023-10-23
Payer: MEDICAID

## 2023-10-23 ENCOUNTER — HOSPITAL ENCOUNTER (INPATIENT)
Age: 43
LOS: 2 days | Discharge: HOME | DRG: 182 | End: 2023-10-25
Attending: EMERGENCY MEDICINE | Admitting: INTERNAL MEDICINE
Payer: MEDICAID

## 2023-10-23 DIAGNOSIS — R07.9 CHEST PAIN, UNSPECIFIED TYPE: Primary | ICD-10-CM

## 2023-10-23 DIAGNOSIS — I10 UNCONTROLLED HYPERTENSION: ICD-10-CM

## 2023-10-23 DIAGNOSIS — Z91.148 HX OF MEDICATION NONCOMPLIANCE: ICD-10-CM

## 2023-10-23 LAB
ALBUMIN SERPL-MCNC: 4.4 G/DL (ref 3.4–5)
ALBUMIN/GLOB SERPL: 1.6 {RATIO} (ref 1.1–2.2)
ALP SERPL-CCNC: 123 U/L (ref 40–129)
ALT SERPL-CCNC: 11 U/L (ref 10–40)
ANION GAP SERPL CALCULATED.3IONS-SCNC: 14 MMOL/L (ref 3–16)
ANTI-XA UNFRAC HEPARIN: <0.1 IU/ML (ref 0.3–0.7)
APTT BLD: 26.1 SEC (ref 22.7–35.9)
APTT BLD: 46.8 SEC (ref 22.7–35.9)
AST SERPL-CCNC: 19 U/L (ref 15–37)
BASOPHILS # BLD: 0.1 K/UL (ref 0–0.2)
BASOPHILS NFR BLD: 0.9 %
BILIRUB SERPL-MCNC: 0.4 MG/DL (ref 0–1)
BUN SERPL-MCNC: 11 MG/DL (ref 7–20)
CALCIUM SERPL-MCNC: 9 MG/DL (ref 8.3–10.6)
CHLORIDE SERPL-SCNC: 103 MMOL/L (ref 99–110)
CO2 SERPL-SCNC: 20 MMOL/L (ref 21–32)
CREAT SERPL-MCNC: 0.7 MG/DL (ref 0.9–1.3)
D DIMER: 0.43 UG/ML FEU (ref 0–0.6)
DEPRECATED RDW RBC AUTO: 13.9 % (ref 12.4–15.4)
DEPRECATED RDW RBC AUTO: 14.1 % (ref 12.4–15.4)
EKG ATRIAL RATE: 71 BPM
EKG DIAGNOSIS: NORMAL
EKG P AXIS: 44 DEGREES
EKG P-R INTERVAL: 144 MS
EKG Q-T INTERVAL: 410 MS
EKG QRS DURATION: 100 MS
EKG QTC CALCULATION (BAZETT): 445 MS
EKG R AXIS: 66 DEGREES
EKG T AXIS: 55 DEGREES
EKG VENTRICULAR RATE: 71 BPM
EOSINOPHIL # BLD: 0.1 K/UL (ref 0–0.6)
EOSINOPHIL NFR BLD: 1 %
GFR SERPLBLD CREATININE-BSD FMLA CKD-EPI: >60 ML/MIN/{1.73_M2}
GLUCOSE SERPL-MCNC: 133 MG/DL (ref 70–99)
HCT VFR BLD AUTO: 48.3 % (ref 40.5–52.5)
HCT VFR BLD AUTO: 52 % (ref 40.5–52.5)
HGB BLD-MCNC: 16.5 G/DL (ref 13.5–17.5)
HGB BLD-MCNC: 17.3 G/DL (ref 13.5–17.5)
LYMPHOCYTES # BLD: 2.6 K/UL (ref 1–5.1)
LYMPHOCYTES NFR BLD: 23.2 %
MCH RBC QN AUTO: 29.4 PG (ref 26–34)
MCH RBC QN AUTO: 30 PG (ref 26–34)
MCHC RBC AUTO-ENTMCNC: 33.3 G/DL (ref 31–36)
MCHC RBC AUTO-ENTMCNC: 34.1 G/DL (ref 31–36)
MCV RBC AUTO: 87.8 FL (ref 80–100)
MCV RBC AUTO: 88.4 FL (ref 80–100)
MONOCYTES # BLD: 0.7 K/UL (ref 0–1.3)
MONOCYTES NFR BLD: 6.2 %
NEUTROPHILS # BLD: 7.7 K/UL (ref 1.7–7.7)
NEUTROPHILS NFR BLD: 68.7 %
PLATELET # BLD AUTO: 203 K/UL (ref 135–450)
PLATELET # BLD AUTO: 222 K/UL (ref 135–450)
PMV BLD AUTO: 9.5 FL (ref 5–10.5)
PMV BLD AUTO: 9.6 FL (ref 5–10.5)
POTASSIUM SERPL-SCNC: 4.1 MMOL/L (ref 3.5–5.1)
PROT SERPL-MCNC: 7.2 G/DL (ref 6.4–8.2)
RBC # BLD AUTO: 5.5 M/UL (ref 4.2–5.9)
RBC # BLD AUTO: 5.88 M/UL (ref 4.2–5.9)
SODIUM SERPL-SCNC: 137 MMOL/L (ref 136–145)
TROPONIN, HIGH SENSITIVITY: 112 NG/L (ref 0–22)
TROPONIN, HIGH SENSITIVITY: 21 NG/L (ref 0–22)
TROPONIN, HIGH SENSITIVITY: 37 NG/L (ref 0–22)
WBC # BLD AUTO: 11.3 K/UL (ref 4–11)
WBC # BLD AUTO: 11.3 K/UL (ref 4–11)

## 2023-10-23 PROCEDURE — 85520 HEPARIN ASSAY: CPT

## 2023-10-23 PROCEDURE — 84484 ASSAY OF TROPONIN QUANT: CPT

## 2023-10-23 PROCEDURE — 71046 X-RAY EXAM CHEST 2 VIEWS: CPT

## 2023-10-23 PROCEDURE — 85379 FIBRIN DEGRADATION QUANT: CPT

## 2023-10-23 PROCEDURE — 93010 ELECTROCARDIOGRAM REPORT: CPT | Performed by: INTERNAL MEDICINE

## 2023-10-23 PROCEDURE — 6370000000 HC RX 637 (ALT 250 FOR IP): Performed by: EMERGENCY MEDICINE

## 2023-10-23 PROCEDURE — 85027 COMPLETE CBC AUTOMATED: CPT

## 2023-10-23 PROCEDURE — 6370000000 HC RX 637 (ALT 250 FOR IP): Performed by: NURSE PRACTITIONER

## 2023-10-23 PROCEDURE — 99285 EMERGENCY DEPT VISIT HI MDM: CPT

## 2023-10-23 PROCEDURE — 93005 ELECTROCARDIOGRAM TRACING: CPT | Performed by: INTERNAL MEDICINE

## 2023-10-23 PROCEDURE — 6370000000 HC RX 637 (ALT 250 FOR IP): Performed by: INTERNAL MEDICINE

## 2023-10-23 PROCEDURE — 85730 THROMBOPLASTIN TIME PARTIAL: CPT

## 2023-10-23 PROCEDURE — 96375 TX/PRO/DX INJ NEW DRUG ADDON: CPT

## 2023-10-23 PROCEDURE — 2060000000 HC ICU INTERMEDIATE R&B

## 2023-10-23 PROCEDURE — 93005 ELECTROCARDIOGRAM TRACING: CPT | Performed by: EMERGENCY MEDICINE

## 2023-10-23 PROCEDURE — 85025 COMPLETE CBC W/AUTO DIFF WBC: CPT

## 2023-10-23 PROCEDURE — 6360000002 HC RX W HCPCS: Performed by: INTERNAL MEDICINE

## 2023-10-23 PROCEDURE — 96374 THER/PROPH/DIAG INJ IV PUSH: CPT

## 2023-10-23 PROCEDURE — 80053 COMPREHEN METABOLIC PANEL: CPT

## 2023-10-23 PROCEDURE — 36415 COLL VENOUS BLD VENIPUNCTURE: CPT

## 2023-10-23 PROCEDURE — 6360000002 HC RX W HCPCS: Performed by: NURSE PRACTITIONER

## 2023-10-23 PROCEDURE — 2500000003 HC RX 250 WO HCPCS: Performed by: EMERGENCY MEDICINE

## 2023-10-23 PROCEDURE — 2580000003 HC RX 258: Performed by: INTERNAL MEDICINE

## 2023-10-23 PROCEDURE — 6360000002 HC RX W HCPCS: Performed by: EMERGENCY MEDICINE

## 2023-10-23 RX ORDER — FAMOTIDINE 20 MG/1
20 TABLET, FILM COATED ORAL 2 TIMES DAILY PRN
Status: DISCONTINUED | OUTPATIENT
Start: 2023-10-23 | End: 2023-10-25 | Stop reason: HOSPADM

## 2023-10-23 RX ORDER — HEPARIN SODIUM 1000 [USP'U]/ML
60 INJECTION, SOLUTION INTRAVENOUS; SUBCUTANEOUS PRN
Status: DISCONTINUED | OUTPATIENT
Start: 2023-10-23 | End: 2023-10-23

## 2023-10-23 RX ORDER — NICOTINE 21 MG/24HR
1 PATCH, TRANSDERMAL 24 HOURS TRANSDERMAL DAILY
Status: DISCONTINUED | OUTPATIENT
Start: 2023-10-23 | End: 2023-10-25 | Stop reason: HOSPADM

## 2023-10-23 RX ORDER — MORPHINE SULFATE 2 MG/ML
2 INJECTION, SOLUTION INTRAMUSCULAR; INTRAVENOUS ONCE
Status: COMPLETED | OUTPATIENT
Start: 2023-10-24 | End: 2023-10-23

## 2023-10-23 RX ORDER — HEPARIN SODIUM 1000 [USP'U]/ML
4000 INJECTION, SOLUTION INTRAVENOUS; SUBCUTANEOUS ONCE
Status: COMPLETED | OUTPATIENT
Start: 2023-10-23 | End: 2023-10-23

## 2023-10-23 RX ORDER — ASPIRIN 81 MG/1
81 TABLET, CHEWABLE ORAL DAILY
Status: DISCONTINUED | OUTPATIENT
Start: 2023-10-24 | End: 2023-10-25 | Stop reason: HOSPADM

## 2023-10-23 RX ORDER — SODIUM CHLORIDE 9 MG/ML
INJECTION, SOLUTION INTRAVENOUS PRN
Status: DISCONTINUED | OUTPATIENT
Start: 2023-10-23 | End: 2023-10-24 | Stop reason: SDUPTHER

## 2023-10-23 RX ORDER — POLYETHYLENE GLYCOL 3350 17 G
2 POWDER IN PACKET (EA) ORAL
Status: DISCONTINUED | OUTPATIENT
Start: 2023-10-23 | End: 2023-10-25 | Stop reason: HOSPADM

## 2023-10-23 RX ORDER — HEPARIN SODIUM 1000 [USP'U]/ML
2000 INJECTION, SOLUTION INTRAVENOUS; SUBCUTANEOUS PRN
Status: DISCONTINUED | OUTPATIENT
Start: 2023-10-23 | End: 2023-10-23

## 2023-10-23 RX ORDER — ONDANSETRON 4 MG/1
4 TABLET, ORALLY DISINTEGRATING ORAL EVERY 8 HOURS PRN
Status: DISCONTINUED | OUTPATIENT
Start: 2023-10-23 | End: 2023-10-25 | Stop reason: HOSPADM

## 2023-10-23 RX ORDER — POLYETHYLENE GLYCOL 3350 17 G/17G
17 POWDER, FOR SOLUTION ORAL DAILY PRN
Status: DISCONTINUED | OUTPATIENT
Start: 2023-10-23 | End: 2023-10-25 | Stop reason: HOSPADM

## 2023-10-23 RX ORDER — LABETALOL HYDROCHLORIDE 5 MG/ML
10 INJECTION, SOLUTION INTRAVENOUS ONCE
Status: COMPLETED | OUTPATIENT
Start: 2023-10-23 | End: 2023-10-23

## 2023-10-23 RX ORDER — ASPIRIN 81 MG/1
324 TABLET, CHEWABLE ORAL ONCE
Status: COMPLETED | OUTPATIENT
Start: 2023-10-23 | End: 2023-10-23

## 2023-10-23 RX ORDER — SODIUM CHLORIDE 0.9 % (FLUSH) 0.9 %
5-40 SYRINGE (ML) INJECTION EVERY 12 HOURS SCHEDULED
Status: DISCONTINUED | OUTPATIENT
Start: 2023-10-23 | End: 2023-10-25 | Stop reason: HOSPADM

## 2023-10-23 RX ORDER — HEPARIN SODIUM 1000 [USP'U]/ML
60 INJECTION, SOLUTION INTRAVENOUS; SUBCUTANEOUS ONCE
Status: DISCONTINUED | OUTPATIENT
Start: 2023-10-23 | End: 2023-10-23

## 2023-10-23 RX ORDER — CLOPIDOGREL BISULFATE 75 MG/1
75 TABLET ORAL DAILY
Status: DISCONTINUED | OUTPATIENT
Start: 2023-10-23 | End: 2023-10-25 | Stop reason: HOSPADM

## 2023-10-23 RX ORDER — ACETAMINOPHEN 325 MG/1
650 TABLET ORAL EVERY 6 HOURS PRN
Status: DISCONTINUED | OUTPATIENT
Start: 2023-10-23 | End: 2023-10-24 | Stop reason: SDUPTHER

## 2023-10-23 RX ORDER — HEPARIN SODIUM 1000 [USP'U]/ML
4000 INJECTION, SOLUTION INTRAVENOUS; SUBCUTANEOUS PRN
Status: DISCONTINUED | OUTPATIENT
Start: 2023-10-23 | End: 2023-10-23

## 2023-10-23 RX ORDER — ATORVASTATIN CALCIUM 80 MG/1
80 TABLET, FILM COATED ORAL NIGHTLY
Status: DISCONTINUED | OUTPATIENT
Start: 2023-10-23 | End: 2023-10-25 | Stop reason: HOSPADM

## 2023-10-23 RX ORDER — SODIUM CHLORIDE 0.9 % (FLUSH) 0.9 %
5-40 SYRINGE (ML) INJECTION PRN
Status: DISCONTINUED | OUTPATIENT
Start: 2023-10-23 | End: 2023-10-25 | Stop reason: HOSPADM

## 2023-10-23 RX ORDER — LISINOPRIL 20 MG/1
20 TABLET ORAL DAILY
Status: DISCONTINUED | OUTPATIENT
Start: 2023-10-23 | End: 2023-10-25 | Stop reason: HOSPADM

## 2023-10-23 RX ORDER — NITROGLYCERIN 0.4 MG/1
0.4 TABLET SUBLINGUAL ONCE
Status: COMPLETED | OUTPATIENT
Start: 2023-10-23 | End: 2023-10-23

## 2023-10-23 RX ORDER — DILTIAZEM HYDROCHLORIDE 120 MG/1
120 CAPSULE, COATED, EXTENDED RELEASE ORAL DAILY
Status: DISCONTINUED | OUTPATIENT
Start: 2023-10-23 | End: 2023-10-25 | Stop reason: HOSPADM

## 2023-10-23 RX ORDER — HEPARIN SODIUM 10000 [USP'U]/100ML
0-4000 INJECTION, SOLUTION INTRAVENOUS CONTINUOUS
Status: DISCONTINUED | OUTPATIENT
Start: 2023-10-23 | End: 2023-10-23 | Stop reason: SDUPTHER

## 2023-10-23 RX ORDER — HEPARIN SODIUM 10000 [USP'U]/100ML
0-4000 INJECTION, SOLUTION INTRAVENOUS CONTINUOUS
Status: DISCONTINUED | OUTPATIENT
Start: 2023-10-23 | End: 2023-10-24 | Stop reason: ALTCHOICE

## 2023-10-23 RX ORDER — CARVEDILOL 6.25 MG/1
6.25 TABLET ORAL 2 TIMES DAILY WITH MEALS
Status: DISCONTINUED | OUTPATIENT
Start: 2023-10-23 | End: 2023-10-25 | Stop reason: HOSPADM

## 2023-10-23 RX ORDER — HEPARIN SODIUM 1000 [USP'U]/ML
30 INJECTION, SOLUTION INTRAVENOUS; SUBCUTANEOUS PRN
Status: DISCONTINUED | OUTPATIENT
Start: 2023-10-23 | End: 2023-10-23

## 2023-10-23 RX ORDER — ONDANSETRON 2 MG/ML
4 INJECTION INTRAMUSCULAR; INTRAVENOUS EVERY 6 HOURS PRN
Status: DISCONTINUED | OUTPATIENT
Start: 2023-10-23 | End: 2023-10-25 | Stop reason: HOSPADM

## 2023-10-23 RX ORDER — ACETAMINOPHEN 650 MG/1
650 SUPPOSITORY RECTAL EVERY 6 HOURS PRN
Status: DISCONTINUED | OUTPATIENT
Start: 2023-10-23 | End: 2023-10-25 | Stop reason: HOSPADM

## 2023-10-23 RX ADMIN — HEPARIN SODIUM 1000 UNITS/HR: 10000 INJECTION, SOLUTION INTRAVENOUS at 14:47

## 2023-10-23 RX ADMIN — HEPARIN SODIUM 4000 UNITS: 1000 INJECTION INTRAVENOUS; SUBCUTANEOUS at 19:56

## 2023-10-23 RX ADMIN — DILTIAZEM HYDROCHLORIDE 120 MG: 120 CAPSULE, COATED, EXTENDED RELEASE ORAL at 14:23

## 2023-10-23 RX ADMIN — HEPARIN SODIUM 4000 UNITS: 1000 INJECTION INTRAVENOUS; SUBCUTANEOUS at 11:59

## 2023-10-23 RX ADMIN — ASPIRIN 324 MG: 81 TABLET, CHEWABLE ORAL at 09:46

## 2023-10-23 RX ADMIN — FAMOTIDINE 20 MG: 20 TABLET ORAL at 21:45

## 2023-10-23 RX ADMIN — SODIUM CHLORIDE, PRESERVATIVE FREE 10 ML: 5 INJECTION INTRAVENOUS at 20:05

## 2023-10-23 RX ADMIN — Medication 2 MG: at 18:46

## 2023-10-23 RX ADMIN — ATORVASTATIN CALCIUM 80 MG: 80 TABLET, FILM COATED ORAL at 19:57

## 2023-10-23 RX ADMIN — CLOPIDOGREL BISULFATE 75 MG: 75 TABLET ORAL at 14:23

## 2023-10-23 RX ADMIN — NITROGLYCERIN 0.4 MG: 0.4 TABLET, ORALLY DISINTEGRATING SUBLINGUAL at 09:46

## 2023-10-23 RX ADMIN — HEPARIN SODIUM 1000 UNITS/HR: 10000 INJECTION, SOLUTION INTRAVENOUS at 12:03

## 2023-10-23 RX ADMIN — MORPHINE SULFATE 2 MG: 2 INJECTION, SOLUTION INTRAMUSCULAR; INTRAVENOUS at 23:57

## 2023-10-23 RX ADMIN — LABETALOL HYDROCHLORIDE 10 MG: 5 INJECTION, SOLUTION INTRAVENOUS at 09:49

## 2023-10-23 RX ADMIN — LISINOPRIL 20 MG: 20 TABLET ORAL at 14:23

## 2023-10-23 ASSESSMENT — PAIN DESCRIPTION - DIRECTION: RADIATING_TOWARDS: BILATERAL ARMS AND BACK

## 2023-10-23 ASSESSMENT — PAIN - FUNCTIONAL ASSESSMENT
PAIN_FUNCTIONAL_ASSESSMENT: ACTIVITIES ARE NOT PREVENTED
PAIN_FUNCTIONAL_ASSESSMENT: 0-10

## 2023-10-23 ASSESSMENT — PAIN DESCRIPTION - PAIN TYPE
TYPE: ACUTE PAIN
TYPE: ACUTE PAIN

## 2023-10-23 ASSESSMENT — PAIN SCALES - GENERAL
PAINLEVEL_OUTOF10: 5
PAINLEVEL_OUTOF10: 0
PAINLEVEL_OUTOF10: 7

## 2023-10-23 ASSESSMENT — PAIN DESCRIPTION - ONSET: ONSET: AWAKENED FROM SLEEP

## 2023-10-23 ASSESSMENT — PAIN DESCRIPTION - LOCATION: LOCATION: CHEST

## 2023-10-23 ASSESSMENT — PAIN DESCRIPTION - FREQUENCY: FREQUENCY: CONTINUOUS

## 2023-10-23 ASSESSMENT — PAIN DESCRIPTION - DESCRIPTORS: DESCRIPTORS: SHARP;SHOOTING

## 2023-10-23 ASSESSMENT — PAIN DESCRIPTION - ORIENTATION: ORIENTATION: RIGHT;LEFT

## 2023-10-23 NOTE — CARE COORDINATION
10/23/23 1239   Service Assessment   Patient Orientation Alert and Oriented   Cognition Alert   History Provided By Patient   Primary Caregiver Self   Accompanied By/Relationship Mother, [address] Parent   Patient's Healthcare Decision Maker is: Legal Next of Kin   PCP Verified by CM Yes   Last Visit to PCP Within last 6 months   Prior Functional Level Independent in ADLs/IADLs   Current Functional Level Independent in ADLs/IADLs   Can patient return to prior living arrangement Yes   Ability to make needs known: Good   Family able to assist with home care needs: Yes   Would you like for me to discuss the discharge plan with any other family members/significant others, and if so, who? Yes  (Mother, Bert Matos)   Financial Resources  Resources None   CM/SW Referral Other (see comment)  (None)   Discharge Planning   Type of Residence House   Living Arrangements Parent   Current Services Prior To Admission None   Potential Assistance Needed N/A   DME Ordered? No   Potential Assistance Purchasing Medications No   Type of Home Care Services None   Patient expects to be discharged to: House   History of falls? 0   Services At/After Discharge   Transition of Care Consult (CM Consult) Discharge Planning   Services At/After Discharge None   Mode of Transport at Discharge Other (see comment)  (Mother, Bert Matos)   Confirm Follow Up Transport Self   Condition of Participation: Discharge Planning   The Plan for Transition of Care is related to the following treatment goals: Strengthening     Case Management Assessment  Initial Evaluation    Date/Time of Evaluation: 10/23/2023 12:45 PM  Assessment Completed by: NATIVIDAD Santana    If patient is discharged prior to next notation, then this note serves as note for discharge by case management.     Patient Name: Gracy Hudson                   YOB: 1980  Diagnosis: NSTEMI (non-ST elevated myocardial infarction) Yes    Potential assistance Purchasing Medications: (P) No  Meds-to-Beds request:        [address], [address] [phone] [facility] [phone] [address] [address]  Phone: [phone] Fax: [phone] [address], [address] [facility],[address] [facility] [phone] [facility] [phone]  [facility] [facility] [address]  Phone: [phone] Fax: [phone]      Notes:    Factors facilitating achievement of predicted outcomes: Family support, Motivated, Cooperative, Pleasant, and Sense of humor    Barriers to discharge: None    Additional Case Management Notes: Pt from home independent, lives with mother. Mother, Tanner Xiao able to transport at discharge. The Plan for Transition of Care is related to the following treatment goals of NSTEMI (non-ST elevated myocardial infarction) [facility]) [I21.4]        The Patient and/or Patient Representative Agree with the Discharge Plan?   Yes    Betsy Morales, [address] [facility]  Case Management Department  Ph: [phone]

## 2023-10-23 NOTE — ED NOTES
Patient denies chest pain. Patient alert & oriented. Patient's mother is at the bedside.      Edouard Sawyer RN  10/23/23 1261

## 2023-10-23 NOTE — ED NOTES
Patient to transport to [facility]. Patient awake, alert & oriented. Denies pain, no signs of distress noted.      Xiomara Bergman RN  10/23/23 0269

## 2023-10-23 NOTE — ED PROVIDER NOTES
[facility] ED Note    CHIEF COMPLAINT  Chest Pain (CP onset this morning with bilat arm pain and pain radiating into throat, SOB, nausea earlier today. Pt states that he has been forgetting to take his home medications. Pt with a Hx of CABG x4 and reports similar symptoms with past MI.  )       HISTORY OF PRESENT ILLNESS  Deepak Brothers is a 37 y.o. male with past medical history of CHF, CAD status post CABG x4, MI x3 5/5/20 to RCA, PCI left subclavian artery stenosis status post stenting complicated by in-stent thrombosis in June, who presents to the ED chest pain. Chest pain started at 5 AM this morning. He has radiation to his bilateral arms and throat. He has associated shortness of breath and had nausea earlier. Denies diaphoresis or emesis. States he has not taken any of his home medications in about a week because he is keeps forgetting. States he feels similar but not as bad when he had his prior heart attack. He does report some pain with deep breathing.     I have reviewed the following from the nursing documentation:    Past Medical History:   Diagnosis Date    Chronic systolic congestive heart failure ([address]) 8/4/2021    Coronary artery disease     Essential hypertension 04/07/2020    GERD (gastroesophageal reflux disease)     Headache     NSTEMI (non-ST elevated myocardial infarction) ([address])     4/25/20, 5/5/20, 3/5/21    Obesity     Other hyperlipidemia 04/07/2020    Stenosis of left subclavian artery ([address]) 03/2021    stented 3/12/21    Tobacco abuse counseling 04/07/2020     Past Surgical History:   Procedure Laterality Date    CORONARY ANGIOPLASTY  03/08/2021    PTCA RCA    CORONARY ANGIOPLASTY WITH STENT PLACEMENT  05/05/2020    MI RCA    CORONARY ANGIOPLASTY WITH STENT PLACEMENT  04/25/2020    MI Diag, LCx    CORONARY ARTERY BYPASS GRAFT  03/15/2021    cabgx4 (LIMA-LAD, SVG-D1, L radial off LIMA-OM, SVG-PDA), JAYDE exclusion, sternal plating    CORONARY ARTERY BYPASS GRAFT N/A 3/15/2021 Out of Food in the Last Year: Never true     Ran Out of Food in the Last Year: Never true   Transportation Needs: Unknown (7/5/2023)    PRAPARE - Transportation     Lack of Transportation (Medical): Not on file     Lack of Transportation (Non-Medical):  No   Physical Activity: Not on file   Stress: Not on file   Social Connections: Not on file   Intimate Partner Violence: Not on file   Housing Stability: Unknown (7/5/2023)    Housing Stability Vital Sign     Unable to Pay for Housing in the Last Year: Not on file     Number of Places Lived in the Last Year: Not on file     Unstable Housing in the Last Year: No     Current Facility-Administered Medications   Medication Dose Route Frequency Provider Last Rate Last Admin    [START ON 10/24/2023] aspirin chewable tablet 81 mg  81 mg Oral Daily Rere Leonardo MD        atorvastatin (LIPITOR) tablet 80 mg  80 mg Oral Nightly Rere Leonardo MD        carvedilol (COREG) tablet 6.25 mg  6.25 mg Oral BID WC Rere Leonardo MD        clopidogrel (PLAVIX) tablet 75 mg  75 mg Oral Daily Rere Leonardo MD   75 mg at 10/23/23 1423    dilTIAZem (CARDIZEM CD) extended release capsule 120 mg  120 mg Oral Daily Rere Leonardo MD   120 mg at 10/23/23 1423    lisinopril (PRINIVIL;ZESTRIL) tablet 20 mg  20 mg Oral Daily Rere Leonardo MD   20 mg at 10/23/23 1423    sodium chloride flush 0.9 % injection 5-40 mL  5-40 mL IntraVENous 2 times per day Rere Leonardo MD        sodium chloride flush 0.9 % injection 5-40 mL  5-40 mL IntraVENous PRN Rere Leonardo MD        0.9 % sodium chloride infusion   IntraVENous PRN Rere Leonardo MD        ondansetron (ZOFRAN-ODT) disintegrating tablet 4 mg  4 mg Oral Q8H PRN Rere Leonardo MD        Or    ondansetron [facility]) injection 4 mg  4 mg IntraVENous Q6H PRN Rere Leonardo MD        polyethylene glycol (GLYCOLAX) packet 17 g  17 g Oral Daily PRN Abhijit Su

## 2023-10-23 NOTE — CARE COORDINATION
A quick chart review reveals an admitted patient in the ER. A full assessment is not required at this time but we will follow along. He is from home and waiting on cardiology clearance. Currently on IV heparin. If there are needs we can address prior to departure please call the number listed below. Respectfully submitted,    Lisa ESCOBEDO, [facility]   [phone]    Electronically signed by GREGG Miner, LSW on 10/23/2023 at 12:27 PM

## 2023-10-23 NOTE — PLAN OF CARE
Problem: Discharge Planning  Goal: Discharge to home or other facility with appropriate resources  Outcome: Progressing     Problem: Safety - Adult  Goal: Free from fall injury  Outcome: Progressing     Problem: ABCDS Injury Assessment  Goal: Absence of physical injury  Outcome: Progressing     Problem: Cardiovascular - Adult  Goal: Maintains optimal cardiac output and hemodynamic stability  Outcome: Progressing     Problem: Cardiovascular - Adult  Goal: Absence of cardiac dysrhythmias or at baseline  Outcome: Progressing

## 2023-10-23 NOTE — H&P
V2.0  History and Physical      Name:  Tasha Villagomez /Age/Sex: 1980  (37 y.o. male)   MRN & CSN:  [id number] & [id number] Encounter Date/Time: 10/23/2023 2:59 PM EDT   Location:  [id number] PCP: Alejandro Goldberg DO       Hospital Day: 1    Assessment and Plan:   Tasha Villagomez is a 37 y.o. male with a pmh of hypertension, CAD s/p multiple MI, left subclavian stenosis s/p stenting 3/21, GERD, history of systolic heart failure (repeated ejection fraction improved).   who presents with NSTEMI (non-ST elevated myocardial infarction) [facility])    Hospital Problems             Last Modified POA    * (Principal) NSTEMI (non-ST elevated myocardial infarction) ([address]) 10/23/2023 Yes       Plan:  Chest pain with h/o CAD with elevated troponin and EKG changes with non compliance and elevated blood pressure   Asa, plavix, heparin, statin, coreg,   Cardiology consultation     H/o Tobacco abuse   Nicotine patch and lozenses     Concerns of non compliance         Disposition:   Current Living situation: Home   Expected Disposition: Home   Estimated D/C: Hopefully in a day or two    Diet Diet NPO   DVT Prophylaxis [] Lovenox, [x]  Heparin, [] SCDs, [] Ambulation,  [] Eliquis, [] Xarelto, [] Coumadin   Code Status Full Code   Surrogate Decision Maker/ POA      Personally reviewed Lab Studies and Imaging     Discussed management of the case with ER  who recommended admission     EKG interpreted personally and results ST depression in lateral depression    Imaging that was interpreted personally includes Chest x ray and results no infiltrate     Drugs that require monitoring for toxicity include Heparin and the method of monitoring was aPTT      History from:     patient, electronic medical record    History of Present Illness:     Chief Complaint: chest pain  hypertension, CAD s/p multiple MI, left subclavian stenosis s/p stenting 3/21, GERD, history of systolic heart failure (repeated ejection fraction improved) presented

## 2023-10-23 NOTE — PRE-PROCEDURE INSTRUCTIONS
In preparation for GXT myoview in AM 10/24/2023:    Avoid all caffeine starting at 2000 on 10/23 (includes all reg/decaf coffee, tea, soda, chocolate, ect),  No solid foods after 0500 on 10/24,   Patient may have water as desired/ ordered up until time of test.     Spoke with Dr. Zofia Ghotra regarding holding of beta blockers and elevated BP. Please hold beta blockers (coreg, labetolol) until stress test is complete per Dr. Zofia Ghotra. Thank you.

## 2023-10-23 NOTE — ED TRIAGE NOTES
Pt into ER from home with c/c CP onset this morning with bilat arm pain and pain radiating into throat, SOB, nausea earlier today. Pt states that he has been forgetting to take his home medications.   Pt with a Hx of CABG x4 and reports similar symptoms with past MI.

## 2023-10-23 NOTE — ED NOTES
Patient states he had chest pain around 0500 that woke him from his sleep.      Izabella Velasco RN  10/23/23 1724

## 2023-10-23 NOTE — ED NOTES
Patient resting on the stretcher. Call light in reach. No signs of distress noted. Patient playing a game of his cell phone.      Shaunna Vang RN  10/23/23 7299

## 2023-10-24 PROBLEM — E66.01 MORBID OBESITY DUE TO EXCESS CALORIES (HCC): Status: ACTIVE | Noted: 2023-10-24

## 2023-10-24 LAB
AMPHETAMINES UR QL SCN>1000 NG/ML: NORMAL
ANION GAP SERPL CALCULATED.3IONS-SCNC: 10 MMOL/L (ref 3–16)
ANION GAP SERPL CALCULATED.3IONS-SCNC: 12 MMOL/L (ref 3–16)
ANTI-XA UNFRAC HEPARIN: 0.11 IU/ML (ref 0.3–0.7)
ANTI-XA UNFRAC HEPARIN: 0.17 IU/ML (ref 0.3–0.7)
ANTI-XA UNFRAC HEPARIN: 0.34 IU/ML (ref 0.3–0.7)
BARBITURATES UR QL SCN>200 NG/ML: NORMAL
BENZODIAZ UR QL SCN>200 NG/ML: NORMAL
BUN SERPL-MCNC: 12 MG/DL (ref 7–20)
BUN SERPL-MCNC: 8 MG/DL (ref 7–20)
CALCIUM SERPL-MCNC: 8.9 MG/DL (ref 8.3–10.6)
CALCIUM SERPL-MCNC: 9.6 MG/DL (ref 8.3–10.6)
CANNABINOIDS UR QL SCN>50 NG/ML: NORMAL
CHLORIDE SERPL-SCNC: 100 MMOL/L (ref 99–110)
CHLORIDE SERPL-SCNC: 101 MMOL/L (ref 99–110)
CO2 SERPL-SCNC: 22 MMOL/L (ref 21–32)
CO2 SERPL-SCNC: 23 MMOL/L (ref 21–32)
COCAINE UR QL SCN: NORMAL
CREAT SERPL-MCNC: 0.7 MG/DL (ref 0.9–1.3)
CREAT SERPL-MCNC: 0.7 MG/DL (ref 0.9–1.3)
DEPRECATED RDW RBC AUTO: 13.7 % (ref 12.4–15.4)
DRUG SCREEN COMMENT UR-IMP: NORMAL
EKG ATRIAL RATE: 69 BPM
EKG ATRIAL RATE: 71 BPM
EKG DIAGNOSIS: NORMAL
EKG DIAGNOSIS: NORMAL
EKG P AXIS: 61 DEGREES
EKG P AXIS: 61 DEGREES
EKG P-R INTERVAL: 146 MS
EKG P-R INTERVAL: 154 MS
EKG Q-T INTERVAL: 402 MS
EKG Q-T INTERVAL: 430 MS
EKG QRS DURATION: 94 MS
EKG QRS DURATION: 98 MS
EKG QTC CALCULATION (BAZETT): 436 MS
EKG QTC CALCULATION (BAZETT): 460 MS
EKG R AXIS: 58 DEGREES
EKG R AXIS: 64 DEGREES
EKG T AXIS: 11 DEGREES
EKG T AXIS: 24 DEGREES
EKG VENTRICULAR RATE: 69 BPM
EKG VENTRICULAR RATE: 71 BPM
FENTANYL SCREEN, URINE: NORMAL
GFR SERPLBLD CREATININE-BSD FMLA CKD-EPI: >60 ML/MIN/{1.73_M2}
GFR SERPLBLD CREATININE-BSD FMLA CKD-EPI: >60 ML/MIN/{1.73_M2}
GLUCOSE SERPL-MCNC: 135 MG/DL (ref 70–99)
GLUCOSE SERPL-MCNC: 86 MG/DL (ref 70–99)
HCT VFR BLD AUTO: 50.4 % (ref 40.5–52.5)
HGB BLD-MCNC: 16.6 G/DL (ref 13.5–17.5)
MCH RBC QN AUTO: 29.5 PG (ref 26–34)
MCHC RBC AUTO-ENTMCNC: 32.8 G/DL (ref 31–36)
MCV RBC AUTO: 89.7 FL (ref 80–100)
METHADONE UR QL SCN>300 NG/ML: NORMAL
OPIATES UR QL SCN>300 NG/ML: NORMAL
OXYCODONE UR QL SCN: NORMAL
PCP UR QL SCN>25 NG/ML: NORMAL
PH UR STRIP: 7 [PH]
PLATELET # BLD AUTO: 175 K/UL (ref 135–450)
PMV BLD AUTO: 9.3 FL (ref 5–10.5)
POC ACT LR: 187 SEC
POTASSIUM SERPL-SCNC: 4.1 MMOL/L (ref 3.5–5.1)
POTASSIUM SERPL-SCNC: 4.8 MMOL/L (ref 3.5–5.1)
RBC # BLD AUTO: 5.62 M/UL (ref 4.2–5.9)
SODIUM SERPL-SCNC: 134 MMOL/L (ref 136–145)
SODIUM SERPL-SCNC: 134 MMOL/L (ref 136–145)
WBC # BLD AUTO: 11.2 K/UL (ref 4–11)

## 2023-10-24 PROCEDURE — 2580000003 HC RX 258: Performed by: INTERNAL MEDICINE

## 2023-10-24 PROCEDURE — 78452 HT MUSCLE IMAGE SPECT MULT: CPT

## 2023-10-24 PROCEDURE — B31J1ZZ FLUOROSCOPY OF LEFT UPPER EXTREMITY ARTERIES USING LOW OSMOLAR CONTRAST: ICD-10-PCS | Performed by: INTERNAL MEDICINE

## 2023-10-24 PROCEDURE — A9502 TC99M TETROFOSMIN: HCPCS | Performed by: INTERNAL MEDICINE

## 2023-10-24 PROCEDURE — 6360000004 HC RX CONTRAST MEDICATION: Performed by: INTERNAL MEDICINE

## 2023-10-24 PROCEDURE — 85520 HEPARIN ASSAY: CPT

## 2023-10-24 PROCEDURE — 6360000002 HC RX W HCPCS: Performed by: INTERNAL MEDICINE

## 2023-10-24 PROCEDURE — C1894 INTRO/SHEATH, NON-LASER: HCPCS | Performed by: INTERNAL MEDICINE

## 2023-10-24 PROCEDURE — 80307 DRUG TEST PRSMV CHEM ANLYZR: CPT

## 2023-10-24 PROCEDURE — 99152 MOD SED SAME PHYS/QHP 5/>YRS: CPT

## 2023-10-24 PROCEDURE — C1760 CLOSURE DEV, VASC: HCPCS | Performed by: INTERNAL MEDICINE

## 2023-10-24 PROCEDURE — 99152 MOD SED SAME PHYS/QHP 5/>YRS: CPT | Performed by: INTERNAL MEDICINE

## 2023-10-24 PROCEDURE — B2131ZZ FLUOROSCOPY OF MULTIPLE CORONARY ARTERY BYPASS GRAFTS USING LOW OSMOLAR CONTRAST: ICD-10-PCS | Performed by: INTERNAL MEDICINE

## 2023-10-24 PROCEDURE — 93005 ELECTROCARDIOGRAM TRACING: CPT

## 2023-10-24 PROCEDURE — 99153 MOD SED SAME PHYS/QHP EA: CPT

## 2023-10-24 PROCEDURE — 85347 COAGULATION TIME ACTIVATED: CPT

## 2023-10-24 PROCEDURE — C1769 GUIDE WIRE: HCPCS | Performed by: INTERNAL MEDICINE

## 2023-10-24 PROCEDURE — 93010 ELECTROCARDIOGRAM REPORT: CPT | Performed by: INTERNAL MEDICINE

## 2023-10-24 PROCEDURE — 4A023N7 MEASUREMENT OF CARDIAC SAMPLING AND PRESSURE, LEFT HEART, PERCUTANEOUS APPROACH: ICD-10-PCS | Performed by: INTERNAL MEDICINE

## 2023-10-24 PROCEDURE — 93455 CORONARY ART/GRFT ANGIO S&I: CPT | Performed by: INTERNAL MEDICINE

## 2023-10-24 PROCEDURE — 80048 BASIC METABOLIC PNL TOTAL CA: CPT

## 2023-10-24 PROCEDURE — 94760 N-INVAS EAR/PLS OXIMETRY 1: CPT

## 2023-10-24 PROCEDURE — 6370000000 HC RX 637 (ALT 250 FOR IP): Performed by: INTERNAL MEDICINE

## 2023-10-24 PROCEDURE — 36415 COLL VENOUS BLD VENIPUNCTURE: CPT

## 2023-10-24 PROCEDURE — 37246 TRLUML BALO ANGIOP 1ST ART: CPT | Performed by: INTERNAL MEDICINE

## 2023-10-24 PROCEDURE — 93005 ELECTROCARDIOGRAM TRACING: CPT | Performed by: INTERNAL MEDICINE

## 2023-10-24 PROCEDURE — 85027 COMPLETE CBC AUTOMATED: CPT

## 2023-10-24 PROCEDURE — 2709999900 HC NON-CHARGEABLE SUPPLY: Performed by: INTERNAL MEDICINE

## 2023-10-24 PROCEDURE — 03743DZ DILATION OF LEFT SUBCLAVIAN ARTERY WITH INTRALUMINAL DEVICE, PERCUTANEOUS APPROACH: ICD-10-PCS | Performed by: INTERNAL MEDICINE

## 2023-10-24 PROCEDURE — 2060000000 HC ICU INTERMEDIATE R&B

## 2023-10-24 PROCEDURE — B2181ZZ FLUOROSCOPY OF LEFT INTERNAL MAMMARY BYPASS GRAFT USING LOW OSMOLAR CONTRAST: ICD-10-PCS | Performed by: INTERNAL MEDICINE

## 2023-10-24 PROCEDURE — 93017 CV STRESS TEST TRACING ONLY: CPT

## 2023-10-24 PROCEDURE — 6360000002 HC RX W HCPCS

## 2023-10-24 PROCEDURE — B2111ZZ FLUOROSCOPY OF MULTIPLE CORONARY ARTERIES USING LOW OSMOLAR CONTRAST: ICD-10-PCS | Performed by: INTERNAL MEDICINE

## 2023-10-24 PROCEDURE — C1887 CATHETER, GUIDING: HCPCS | Performed by: INTERNAL MEDICINE

## 2023-10-24 PROCEDURE — 3430000000 HC RX DIAGNOSTIC RADIOPHARMACEUTICAL: Performed by: INTERNAL MEDICINE

## 2023-10-24 PROCEDURE — 2500000003 HC RX 250 WO HCPCS

## 2023-10-24 PROCEDURE — 93459 L HRT ART/GRFT ANGIO: CPT

## 2023-10-24 PROCEDURE — 37246 TRLUML BALO ANGIOP 1ST ART: CPT

## 2023-10-24 RX ORDER — HEPARIN SODIUM 1000 [USP'U]/ML
2000 INJECTION, SOLUTION INTRAVENOUS; SUBCUTANEOUS ONCE
Status: COMPLETED | OUTPATIENT
Start: 2023-10-24 | End: 2023-10-24

## 2023-10-24 RX ORDER — SODIUM CHLORIDE 0.9 % (FLUSH) 0.9 %
5-40 SYRINGE (ML) INJECTION PRN
Status: DISCONTINUED | OUTPATIENT
Start: 2023-10-24 | End: 2023-10-24

## 2023-10-24 RX ORDER — SODIUM CHLORIDE 0.9 % (FLUSH) 0.9 %
5-40 SYRINGE (ML) INJECTION EVERY 12 HOURS SCHEDULED
Status: DISCONTINUED | OUTPATIENT
Start: 2023-10-24 | End: 2023-10-25 | Stop reason: HOSPADM

## 2023-10-24 RX ORDER — SODIUM CHLORIDE 9 MG/ML
INJECTION, SOLUTION INTRAVENOUS PRN
Status: DISCONTINUED | OUTPATIENT
Start: 2023-10-24 | End: 2023-10-24 | Stop reason: SDUPTHER

## 2023-10-24 RX ORDER — SODIUM CHLORIDE 0.9 % (FLUSH) 0.9 %
5-40 SYRINGE (ML) INJECTION PRN
Status: DISCONTINUED | OUTPATIENT
Start: 2023-10-24 | End: 2023-10-25 | Stop reason: HOSPADM

## 2023-10-24 RX ORDER — SODIUM CHLORIDE 0.9 % (FLUSH) 0.9 %
5-40 SYRINGE (ML) INJECTION EVERY 12 HOURS SCHEDULED
Status: DISCONTINUED | OUTPATIENT
Start: 2023-10-24 | End: 2023-10-24 | Stop reason: SDUPTHER

## 2023-10-24 RX ORDER — REGADENOSON 0.08 MG/ML
0.4 INJECTION, SOLUTION INTRAVENOUS
Status: COMPLETED | OUTPATIENT
Start: 2023-10-24 | End: 2023-10-24

## 2023-10-24 RX ORDER — ACETAMINOPHEN 325 MG/1
650 TABLET ORAL EVERY 4 HOURS PRN
Status: DISCONTINUED | OUTPATIENT
Start: 2023-10-24 | End: 2023-10-25 | Stop reason: HOSPADM

## 2023-10-24 RX ORDER — SODIUM CHLORIDE 9 MG/ML
INJECTION, SOLUTION INTRAVENOUS PRN
Status: DISCONTINUED | OUTPATIENT
Start: 2023-10-24 | End: 2023-10-25 | Stop reason: HOSPADM

## 2023-10-24 RX ADMIN — Medication 10 ML: at 21:23

## 2023-10-24 RX ADMIN — CARVEDILOL 6.25 MG: 6.25 TABLET, FILM COATED ORAL at 09:58

## 2023-10-24 RX ADMIN — HEPARIN SODIUM 2000 UNITS: 1000 INJECTION INTRAVENOUS; SUBCUTANEOUS at 03:42

## 2023-10-24 RX ADMIN — ASPIRIN 81 MG: 81 TABLET, CHEWABLE ORAL at 09:58

## 2023-10-24 RX ADMIN — SODIUM CHLORIDE, PRESERVATIVE FREE 10 ML: 5 INJECTION INTRAVENOUS at 10:01

## 2023-10-24 RX ADMIN — HEPARIN SODIUM 1650 UNITS/HR: 10000 INJECTION, SOLUTION INTRAVENOUS at 09:47

## 2023-10-24 RX ADMIN — CLOPIDOGREL BISULFATE 75 MG: 75 TABLET ORAL at 09:58

## 2023-10-24 RX ADMIN — CARVEDILOL 6.25 MG: 6.25 TABLET, FILM COATED ORAL at 18:05

## 2023-10-24 RX ADMIN — DILTIAZEM HYDROCHLORIDE 120 MG: 120 CAPSULE, COATED, EXTENDED RELEASE ORAL at 09:58

## 2023-10-24 RX ADMIN — TETROFOSMIN 30 MILLICURIE: 1.38 INJECTION, POWDER, LYOPHILIZED, FOR SOLUTION INTRAVENOUS at 08:20

## 2023-10-24 RX ADMIN — IOPAMIDOL 210 ML: 755 INJECTION, SOLUTION INTRAVENOUS at 17:30

## 2023-10-24 RX ADMIN — LISINOPRIL 20 MG: 20 TABLET ORAL at 09:58

## 2023-10-24 RX ADMIN — ATORVASTATIN CALCIUM 80 MG: 80 TABLET, FILM COATED ORAL at 21:08

## 2023-10-24 RX ADMIN — HEPARIN SODIUM 1870 UNITS/HR: 10000 INJECTION, SOLUTION INTRAVENOUS at 13:06

## 2023-10-24 RX ADMIN — TETROFOSMIN 10 MILLICURIE: 1.38 INJECTION, POWDER, LYOPHILIZED, FOR SOLUTION INTRAVENOUS at 07:02

## 2023-10-24 RX ADMIN — REGADENOSON 0.4 MG: 0.08 INJECTION, SOLUTION INTRAVENOUS at 08:27

## 2023-10-24 RX ADMIN — HEPARIN SODIUM 2000 UNITS: 1000 INJECTION INTRAVENOUS; SUBCUTANEOUS at 13:03

## 2023-10-24 ASSESSMENT — PAIN SCALES - GENERAL
PAINLEVEL_OUTOF10: 4
PAINLEVEL_OUTOF10: 0

## 2023-10-24 NOTE — CARE COORDINATION
Case Management Discharge Note          Date / Time of Note: 10/24/2023 11:21 AM                  Patient Name: Cici Serrano   YOB: 1980  Diagnosis: NSTEMI (non-ST elevated myocardial infarction) [facility]) [I21.4]  Uncontrolled hypertension [I10]  Hx of medication noncompliance [Z91.148]  Chest pain, unspecified type [R07.9]   Date / Time: 10/23/2023  9:00 AM    Financial:  Payor: Karrie Brantley / Plan: Karrie Brantley / Product Type: *No Product type* /      Pharmacy:    Emily Carranza [facility], [address] [phone] - F [phone]  [address] [place] [phone]  Phone: [phone] Fax: [phone]    [address], [facility],[address] [phone] - F [phone]  [address] [place] [address] [address] [facility]  Phone: [phone] Fax: [phone]      Assistance purchasing medications?: Potential Assistance Purchasing Medications: No  Assistance provided by Case Management: None at this time    DISCHARGE Disposition: Home- No Services Needed    Transportation:  Transportation PLAN for discharge: family   Mode of Transport: Private Car    Additional CM Notes: Patient plans to return home with mother at discharge. Patient reports no needs.      The Plan for Transition of Care is related to the following treatment goals of NSTEMI (non-ST elevated myocardial infarction) ([address]) [I21.4]  Uncontrolled hypertension [I10]  Hx of medication noncompliance [Z91.148]  Chest pain, unspecified type [R07.9]      GREGG Hoff  [address] [place] [address]   Case Management Department  Ph: [phone]  Electronically signed by GREGG Hoff on 10/24/2023 at 11:24 AM

## 2023-10-24 NOTE — PLAN OF CARE
changes in respiratory status   Assess for changes in mentation and behavior   Position to facilitate oxygenation and minimize respiratory effort   Initiate smoking cessation protocol as indicated   Encourage broncho-pulmonary hygiene including cough, deep breathe, incentive spirometry   Assess and instruct to report shortness of breath or any respiratory difficulty     Problem: Skin/Tissue Integrity - Adult  Goal: Skin integrity remains intact  Outcome: Progressing  Flowsheets  Taken 10/24/2023 0247  Skin Integrity Remains Intact:   Monitor for areas of redness and/or skin breakdown   Assess vascular access sites hourly  Taken 10/24/2023 0244  Skin Integrity Remains Intact:   Monitor for areas of redness and/or skin breakdown   Assess vascular access sites hourly     Problem: Neurosensory - Adult  Goal: Achieves stable or improved neurological status  Outcome: Progressing  Flowsheets (Taken 10/24/2023 0247)  Achieves stable or improved neurological status: Assess for and report changes in neurological status

## 2023-10-24 NOTE — OP NOTE
[facility]   Procedure Note    CLINICAL HISTORY:       Gabo Cevallos is a 37 y.o. male with a history of CABG. He was admitted with complaints of chest pain. Cardiac markers were positive. EKG showed nonspecific ST-T wave abnormality.     Patient Active Problem List   Diagnosis    Essential hypertension    Tobacco abuse counseling    NSTEMI (non-ST elevated myocardial infarction) ([address])    CAD in native artery    Tobacco abuse    Left leg numbness    Dyslipidemia    H/o Subclavian artery stenosis, left s/p stenting    S/P CABG x 4    S/P left atrial appendage ligation    Chronic systolic congestive heart failure (HCC)    Chest pain    H/O CHF    SANDY (obstructive sleep apnea)    Left arm numbness    Morbid obesity due to excess calories ([address])       Current Facility-Administered Medications   Medication Dose Route Frequency Provider Last Rate Last Admin    sodium chloride flush 0.9 % injection 5-40 mL  5-40 mL IntraVENous 2 times per day Wendy Garcias MD        sodium chloride flush 0.9 % injection 5-40 mL  5-40 mL IntraVENous PRN Wendy Garcias MD        0.9 % sodium chloride infusion   IntraVENous PRN Wendy Garcias MD        aspirin chewable tablet 81 mg  81 mg Oral Daily Nemesio Avina MD   81 mg at 10/24/23 0958    atorvastatin (LIPITOR) tablet 80 mg  80 mg Oral Nightly Nemesio Avina MD   80 mg at 10/23/23 1957    carvedilol (COREG) tablet 6.25 mg  6.25 mg Oral BID WC Nemesio Avina MD   6.25 mg at 10/24/23 5238    clopidogrel (PLAVIX) tablet 75 mg  75 mg Oral Daily Nemesio Avina MD   75 mg at 10/24/23 0958    dilTIAZem (CARDIZEM CD) extended release capsule 120 mg  120 mg Oral Daily Nemesio Avina MD   120 mg at 10/24/23 0958    lisinopril (PRINIVIL;ZESTRIL) tablet 20 mg  20 mg Oral Daily Nemesio Avina MD   20 mg at 10/24/23 0958    sodium chloride flush 0.9 % injection 5-40 mL  5-40 mL IntraVENous 2 times per day Nemesio Avina MD   10 direction. We monitored the patient's level of consciousness and vital signs/physiologic status throughout the procedure duration (see start and start times above). ANGIOGRAM/CORONARY ARTERIOGRAM:       The left main coronary artery is normal.    The left anterior descending artery has a mid 90% lesion. D1 is stented     The left circumflex artery has mild diffuse disease . OM1 is diffusely disease     The right coronary artery is dominant vessel with mid 100% occlusio . SVG to PDA occluded   Svg to Diag patent   LIMA - lAD patent with jump to OM1     Subclavian stent with ostial 90% lesion , pull back gradient > 30 mmHG         INTERVENTION  Guide catheter: 6F MP guide   0.035 advantage used to cross into the left subclavian artery   Balloon angioplasty of the left subclavian stent performed with 7.0 X 40 mm cutting balloon and 10X 20 mm balloon   Post ballooning gradient < 10 mmHg     SUMMARY:   Severe native CAD   SVG to RCA occluded   SVG to diag patent   LIMA to LAD. OM patent   Ostial 90% subclavian stent stenosis s/p successful balloon angioplasty       RECOMMENDATION:  .    1. Recommend  high potency statin, aspirin and plavix lifelong   2. Referral to cardiac rehab placed  3. Patient has been advised on the importance of regular exercise of at least 20-30 minutes daily. 4. Patient counseled about and offered assistance for smoking cessation   5. Follow up in 1-2 weeks with cardiology  6.  Can be d/c from cardiology tomorrow     Erika Man MD [address] [facility],[address] Floor, Interventional Cardiology, and Peripheral Vascular [address]   (C): [phone]  (O): [phone]

## 2023-10-24 NOTE — PLAN OF CARE
Problem: Discharge Planning  Goal: Discharge to home or other facility with appropriate resources  10/24/2023 1136 by Александр Fields RN  Outcome: Progressing     Problem: Safety - Adult  Goal: Free from fall injury  10/24/2023 1136 by Александр Fields RN  Outcome: Progressing     Problem: ABCDS Injury Assessment  Goal: Absence of physical injury  10/24/2023 1136 by Александр Fields RN  Outcome: Progressing     Problem: Cardiovascular - Adult  Goal: Maintains optimal cardiac output and hemodynamic stability  10/24/2023 1136 by Александр Fields RN  Outcome: Progressing     Problem: Cardiovascular - Adult  Goal: Absence of cardiac dysrhythmias or at baseline  10/24/2023 1136 by Александр Fields RN  Outcome: Progressing     Problem: Respiratory - Adult  Goal: Achieves optimal ventilation and oxygenation  10/24/2023 1136 by Александр Fields RN  Outcome: Progressing     Problem: Skin/Tissue Integrity - Adult  Goal: Skin integrity remains intact  10/24/2023 1136 by Александр Fields RN  Outcome: Progressing     Problem: Neurosensory - Adult  Goal: Achieves stable or improved neurological status  10/24/2023 1136 by Александр Fields RN  Outcome: Progressing     Problem: Pain  Goal: Verbalizes/displays adequate comfort level or baseline comfort level  10/24/2023 1136 by Александр Fields RN  Outcome: Progressing

## 2023-10-25 VITALS
HEIGHT: 69 IN | RESPIRATION RATE: 17 BRPM | OXYGEN SATURATION: 96 % | BODY MASS INDEX: 34.94 KG/M2 | SYSTOLIC BLOOD PRESSURE: 119 MMHG | TEMPERATURE: 97.5 F | WEIGHT: 235.89 LBS | DIASTOLIC BLOOD PRESSURE: 96 MMHG | HEART RATE: 68 BPM

## 2023-10-25 LAB
ANION GAP SERPL CALCULATED.3IONS-SCNC: 7 MMOL/L (ref 3–16)
BUN SERPL-MCNC: 12 MG/DL (ref 7–20)
CALCIUM SERPL-MCNC: 9.4 MG/DL (ref 8.3–10.6)
CHLORIDE SERPL-SCNC: 103 MMOL/L (ref 99–110)
CO2 SERPL-SCNC: 29 MMOL/L (ref 21–32)
CREAT SERPL-MCNC: 0.9 MG/DL (ref 0.9–1.3)
DEPRECATED RDW RBC AUTO: 14.1 % (ref 12.4–15.4)
EKG ATRIAL RATE: 68 BPM
EKG DIAGNOSIS: NORMAL
EKG P AXIS: 54 DEGREES
EKG P-R INTERVAL: 174 MS
EKG Q-T INTERVAL: 472 MS
EKG QRS DURATION: 102 MS
EKG QTC CALCULATION (BAZETT): 501 MS
EKG R AXIS: 46 DEGREES
EKG T AXIS: -14 DEGREES
EKG VENTRICULAR RATE: 68 BPM
GFR SERPLBLD CREATININE-BSD FMLA CKD-EPI: >60 ML/MIN/{1.73_M2}
GLUCOSE SERPL-MCNC: 146 MG/DL (ref 70–99)
HCT VFR BLD AUTO: 46.2 % (ref 40.5–52.5)
HGB BLD-MCNC: 15.6 G/DL (ref 13.5–17.5)
MCH RBC QN AUTO: 29.9 PG (ref 26–34)
MCHC RBC AUTO-ENTMCNC: 33.8 G/DL (ref 31–36)
MCV RBC AUTO: 88.3 FL (ref 80–100)
PLATELET # BLD AUTO: 203 K/UL (ref 135–450)
PMV BLD AUTO: 9.7 FL (ref 5–10.5)
POTASSIUM SERPL-SCNC: 3.8 MMOL/L (ref 3.5–5.1)
RBC # BLD AUTO: 5.23 M/UL (ref 4.2–5.9)
SODIUM SERPL-SCNC: 139 MMOL/L (ref 136–145)
WBC # BLD AUTO: 10.1 K/UL (ref 4–11)

## 2023-10-25 PROCEDURE — 6370000000 HC RX 637 (ALT 250 FOR IP): Performed by: INTERNAL MEDICINE

## 2023-10-25 PROCEDURE — 93010 ELECTROCARDIOGRAM REPORT: CPT | Performed by: INTERNAL MEDICINE

## 2023-10-25 PROCEDURE — 85027 COMPLETE CBC AUTOMATED: CPT

## 2023-10-25 PROCEDURE — 80048 BASIC METABOLIC PNL TOTAL CA: CPT

## 2023-10-25 PROCEDURE — C1725 CATH, TRANSLUMIN NON-LASER: HCPCS | Performed by: INTERNAL MEDICINE

## 2023-10-25 PROCEDURE — 94760 N-INVAS EAR/PLS OXIMETRY 1: CPT

## 2023-10-25 PROCEDURE — 36415 COLL VENOUS BLD VENIPUNCTURE: CPT

## 2023-10-25 RX ADMIN — LISINOPRIL 20 MG: 20 TABLET ORAL at 08:01

## 2023-10-25 RX ADMIN — ASPIRIN 81 MG: 81 TABLET, CHEWABLE ORAL at 08:01

## 2023-10-25 RX ADMIN — ACETAMINOPHEN 650 MG: 325 TABLET ORAL at 00:16

## 2023-10-25 RX ADMIN — DILTIAZEM HYDROCHLORIDE 120 MG: 120 CAPSULE, COATED, EXTENDED RELEASE ORAL at 08:00

## 2023-10-25 RX ADMIN — CLOPIDOGREL BISULFATE 75 MG: 75 TABLET ORAL at 08:00

## 2023-10-25 RX ADMIN — CARVEDILOL 6.25 MG: 6.25 TABLET, FILM COATED ORAL at 08:00

## 2023-10-25 ASSESSMENT — PAIN DESCRIPTION - LOCATION
LOCATION: ARM
LOCATION: ARM

## 2023-10-25 ASSESSMENT — PAIN DESCRIPTION - DESCRIPTORS
DESCRIPTORS: ACHING
DESCRIPTORS: ACHING;DISCOMFORT

## 2023-10-25 ASSESSMENT — PAIN DESCRIPTION - ORIENTATION
ORIENTATION: LEFT;UPPER
ORIENTATION: LEFT;UPPER

## 2023-10-25 ASSESSMENT — PAIN - FUNCTIONAL ASSESSMENT
PAIN_FUNCTIONAL_ASSESSMENT: ACTIVITIES ARE NOT PREVENTED
PAIN_FUNCTIONAL_ASSESSMENT: ACTIVITIES ARE NOT PREVENTED

## 2023-10-25 ASSESSMENT — PAIN SCALES - GENERAL
PAINLEVEL_OUTOF10: 3
PAINLEVEL_OUTOF10: 1

## 2023-10-25 ASSESSMENT — PAIN SCALES - WONG BAKER: WONGBAKER_NUMERICALRESPONSE: 0

## 2023-10-25 NOTE — PLAN OF CARE
Problem: Discharge Planning  Goal: Discharge to home or other facility with appropriate resources  Outcome: Progressing  Flowsheets (Taken 10/24/2023 1930)  Discharge to home or other facility with appropriate resources:   Identify barriers to discharge with patient and caregiver   Arrange for needed discharge resources and transportation as appropriate   Identify discharge learning needs (meds, wound care, etc)   Refer to discharge planning if patient needs post-hospital services based on physician order or complex needs related to functional status, cognitive ability or social support system     Problem: Safety - Adult  Goal: Free from fall injury  Outcome: Progressing  Flowsheets (Taken 10/25/2023 0319)  Free From Fall Injury: Instruct family/caregiver on patient safety     Problem: ABCDS Injury Assessment  Goal: Absence of physical injury  Outcome: Progressing  Flowsheets (Taken 10/25/2023 0319)  Absence of Physical Injury: Implement safety measures based on patient assessment     Problem: Cardiovascular - Adult  Goal: Maintains optimal cardiac output and hemodynamic stability  Outcome: Progressing  Flowsheets (Taken 10/24/2023 1930)  Maintains optimal cardiac output and hemodynamic stability:   Monitor blood pressure and heart rate   Monitor urine output and notify Licensed Independent Practitioner for values outside of normal range   Assess for signs of decreased cardiac output     Problem: Cardiovascular - Adult  Goal: Absence of cardiac dysrhythmias or at baseline  Outcome: Progressing  Flowsheets (Taken 10/24/2023 1930)  Absence of cardiac dysrhythmias or at baseline:   Monitor cardiac rate and rhythm   Assess for signs of decreased cardiac output     Problem: Respiratory - Adult  Goal: Achieves optimal ventilation and oxygenation  Outcome: Progressing  Flowsheets (Taken 10/24/2023 0247 by Kia Turk RN)  Achieves optimal ventilation and oxygenation:   Assess for changes in respiratory status

## 2023-10-25 NOTE — PLAN OF CARE
Problem: Discharge Planning  Goal: Discharge to home or other facility with appropriate resources  10/25/2023 1028 by Paul Abbasi RN  Outcome: Adequate for Discharge  10/25/2023 0319 by Julee Garner RN  Outcome: Progressing  Flowsheets (Taken 10/24/2023 1930)  Discharge to home or other facility with appropriate resources:   Identify barriers to discharge with patient and caregiver   Arrange for needed discharge resources and transportation as appropriate   Identify discharge learning needs (meds, wound care, etc)   Refer to discharge planning if patient needs post-hospital services based on physician order or complex needs related to functional status, cognitive ability or social support system     Problem: Safety - Adult  Goal: Free from fall injury  10/25/2023 1028 by Paul Abbasi RN  Outcome: Adequate for Discharge  10/25/2023 0319 by Julee Garner RN  Outcome: Progressing  Flowsheets (Taken 10/25/2023 0319)  Free From Fall Injury: Instruct family/caregiver on patient safety     Problem: ABCDS Injury Assessment  Goal: Absence of physical injury  10/25/2023 1028 by Paul Abbasi RN  Outcome: Adequate for Discharge  10/25/2023 0319 by Julee Garner RN  Outcome: Progressing  Flowsheets (Taken 10/25/2023 0319)  Absence of Physical Injury: Implement safety measures based on patient assessment     Problem: Cardiovascular - Adult  Goal: Maintains optimal cardiac output and hemodynamic stability  10/25/2023 1028 by Paul Abbasi RN  Outcome: Adequate for Discharge  10/25/2023 0319 by Julee Garner RN  Outcome: Progressing  Flowsheets (Taken 10/24/2023 1930)  Maintains optimal cardiac output and hemodynamic stability:   Monitor blood pressure and heart rate   Monitor urine output and notify Licensed Independent Practitioner for values outside of normal range   Assess for signs of decreased cardiac output  Goal: Absence of cardiac dysrhythmias or at baseline  10/25/2023 1028 by Paul Abbasi RN  Outcome: (Taken 10/25/2023 0319)  Skin Integrity Remains Intact: Assess vascular access sites hourly     Problem: Neurosensory - Adult  Goal: Achieves stable or improved neurological status  10/25/2023 1028 by Kumar Kuo RN  Outcome: Adequate for Discharge  10/25/2023 0319 by Ravinder Sanders RN  Outcome: Progressing  Flowsheets (Taken 10/24/2023 1930)  Achieves stable or improved neurological status: Assess for and report changes in neurological status

## 2023-10-25 NOTE — CARE COORDINATION
Case Management Discharge Note          Date / Time of Note: 10/25/2023 10:40 AM                  Patient Name: Yonny Rose   YOB: 1980  Diagnosis: NSTEMI (non-ST elevated myocardial infarction) [facility]) [I21.4]  Uncontrolled hypertension [I10]  Hx of medication noncompliance [Z91.148]  Chest pain, unspecified type [R07.9]   Date / Time: 10/23/2023  9:00 AM    Financial:  Payor: Derrick Salas / Plan: Derrick Salas / Product Type: *No Product type* /      Pharmacy:    [address] [facility], [address] [place] [address] Road [phone] - f [phone]  [address] [address]  Phone: [phone] Fax: [phone]    [address], [facility],[address] [phone] - f [phone]  [address] [place] [address]  Phone: [phone] Fax: [phone]      Assistance purchasing medications?: Potential Assistance Purchasing Medications: No  Assistance provided by Case Management: None at this time    DISCHARGE Disposition: Home- No Services Needed        Transportation:  Transportation PLAN for discharge: family   Mode of Transport: Private Car      IMM Completed:   Not Indicated    Additional CM Notes: Pt will be returning home with his mother. No d/c needs identified.        The Plan for Transition of Care is related to the following treatment goals of NSTEMI (non-ST elevated myocardial infarction) ([address]) [I21.4]  Uncontrolled hypertension [I10]  Hx of medication noncompliance [Z91.148]  Chest pain, unspecified type [R07.9]        GREGG Monteiro  [address] [facility]   Case Management Department  Ph: [phone]  Fax: [phone]  Electronically signed by GREGG Monteiro on 10/25/2023 at 10:41 AM

## 2023-10-26 ENCOUNTER — TELEPHONE (OUTPATIENT)
Dept: FAMILY MEDICINE CLINIC | Age: 43
End: 2023-10-26

## 2023-10-26 ENCOUNTER — APPOINTMENT (OUTPATIENT)
Dept: CT IMAGING | Age: 43
DRG: 198 | End: 2023-10-26
Payer: MEDICAID

## 2023-10-26 ENCOUNTER — HOSPITAL ENCOUNTER (INPATIENT)
Age: 43
LOS: 1 days | Discharge: HOME | DRG: 198 | End: 2023-10-27
Attending: EMERGENCY MEDICINE | Admitting: HOSPITALIST
Payer: MEDICAID

## 2023-10-26 ENCOUNTER — APPOINTMENT (OUTPATIENT)
Dept: GENERAL RADIOLOGY | Age: 43
DRG: 198 | End: 2023-10-26
Payer: MEDICAID

## 2023-10-26 DIAGNOSIS — R07.9 CHEST PAIN, UNSPECIFIED TYPE: Primary | ICD-10-CM

## 2023-10-26 DIAGNOSIS — R79.89 ELEVATED TROPONIN: ICD-10-CM

## 2023-10-26 LAB
ALBUMIN SERPL-MCNC: 4.2 G/DL (ref 3.4–5)
ALBUMIN/GLOB SERPL: 1.3 {RATIO} (ref 1.1–2.2)
ALP SERPL-CCNC: 126 U/L (ref 40–129)
ALT SERPL-CCNC: 13 U/L (ref 10–40)
ANION GAP SERPL CALCULATED.3IONS-SCNC: 10 MMOL/L (ref 3–16)
AST SERPL-CCNC: 25 U/L (ref 15–37)
BASOPHILS # BLD: 0.1 K/UL (ref 0–0.2)
BASOPHILS NFR BLD: 0.9 %
BILIRUB SERPL-MCNC: 0.3 MG/DL (ref 0–1)
BUN SERPL-MCNC: 13 MG/DL (ref 7–20)
CALCIUM SERPL-MCNC: 9.5 MG/DL (ref 8.3–10.6)
CHLORIDE SERPL-SCNC: 102 MMOL/L (ref 99–110)
CO2 SERPL-SCNC: 23 MMOL/L (ref 21–32)
CREAT SERPL-MCNC: 0.8 MG/DL (ref 0.9–1.3)
D DIMER: 0.51 UG/ML FEU (ref 0–0.6)
DEPRECATED RDW RBC AUTO: 13.9 % (ref 12.4–15.4)
EKG ATRIAL RATE: 81 BPM
EKG DIAGNOSIS: NORMAL
EKG P AXIS: 60 DEGREES
EKG P-R INTERVAL: 176 MS
EKG Q-T INTERVAL: 380 MS
EKG QRS DURATION: 98 MS
EKG QTC CALCULATION (BAZETT): 441 MS
EKG R AXIS: 62 DEGREES
EKG T AXIS: 13 DEGREES
EKG VENTRICULAR RATE: 81 BPM
EOSINOPHIL # BLD: 0.2 K/UL (ref 0–0.6)
EOSINOPHIL NFR BLD: 1.5 %
GFR SERPLBLD CREATININE-BSD FMLA CKD-EPI: >60 ML/MIN/{1.73_M2}
GLUCOSE SERPL-MCNC: 143 MG/DL (ref 70–99)
HCT VFR BLD AUTO: 48.8 % (ref 40.5–52.5)
HGB BLD-MCNC: 16.8 G/DL (ref 13.5–17.5)
LIPASE SERPL-CCNC: 47 U/L (ref 13–60)
LYMPHOCYTES # BLD: 1.9 K/UL (ref 1–5.1)
LYMPHOCYTES NFR BLD: 13.6 %
MCH RBC QN AUTO: 30 PG (ref 26–34)
MCHC RBC AUTO-ENTMCNC: 34.4 G/DL (ref 31–36)
MCV RBC AUTO: 87.4 FL (ref 80–100)
MONOCYTES # BLD: 1.1 K/UL (ref 0–1.3)
MONOCYTES NFR BLD: 8.1 %
NEUTROPHILS # BLD: 10.5 K/UL (ref 1.7–7.7)
NEUTROPHILS NFR BLD: 75.9 %
NT-PROBNP SERPL-MCNC: 203 PG/ML (ref 0–124)
PLATELET # BLD AUTO: 212 K/UL (ref 135–450)
PMV BLD AUTO: 9.6 FL (ref 5–10.5)
POTASSIUM SERPL-SCNC: 4.5 MMOL/L (ref 3.5–5.1)
PROT SERPL-MCNC: 7.5 G/DL (ref 6.4–8.2)
RBC # BLD AUTO: 5.59 M/UL (ref 4.2–5.9)
SODIUM SERPL-SCNC: 135 MMOL/L (ref 136–145)
TROPONIN, HIGH SENSITIVITY: 313 NG/L (ref 0–22)
TROPONIN, HIGH SENSITIVITY: 325 NG/L (ref 0–22)
TROPONIN, HIGH SENSITIVITY: 339 NG/L (ref 0–22)
WBC # BLD AUTO: 13.8 K/UL (ref 4–11)

## 2023-10-26 PROCEDURE — 85379 FIBRIN DEGRADATION QUANT: CPT

## 2023-10-26 PROCEDURE — 93010 ELECTROCARDIOGRAM REPORT: CPT | Performed by: INTERNAL MEDICINE

## 2023-10-26 PROCEDURE — 84484 ASSAY OF TROPONIN QUANT: CPT

## 2023-10-26 PROCEDURE — 80053 COMPREHEN METABOLIC PANEL: CPT

## 2023-10-26 PROCEDURE — 74174 CTA ABD&PLVS W/CONTRAST: CPT

## 2023-10-26 PROCEDURE — 36415 COLL VENOUS BLD VENIPUNCTURE: CPT

## 2023-10-26 PROCEDURE — 93005 ELECTROCARDIOGRAM TRACING: CPT | Performed by: EMERGENCY MEDICINE

## 2023-10-26 PROCEDURE — G0378 HOSPITAL OBSERVATION PER HR: HCPCS

## 2023-10-26 PROCEDURE — 83690 ASSAY OF LIPASE: CPT

## 2023-10-26 PROCEDURE — 99285 EMERGENCY DEPT VISIT HI MDM: CPT

## 2023-10-26 PROCEDURE — 1200000000 HC SEMI PRIVATE

## 2023-10-26 PROCEDURE — 6360000004 HC RX CONTRAST MEDICATION: Performed by: EMERGENCY MEDICINE

## 2023-10-26 PROCEDURE — 71046 X-RAY EXAM CHEST 2 VIEWS: CPT

## 2023-10-26 PROCEDURE — 6370000000 HC RX 637 (ALT 250 FOR IP): Performed by: HOSPITALIST

## 2023-10-26 PROCEDURE — 83880 ASSAY OF NATRIURETIC PEPTIDE: CPT

## 2023-10-26 PROCEDURE — 2580000003 HC RX 258: Performed by: HOSPITALIST

## 2023-10-26 PROCEDURE — 85025 COMPLETE CBC W/AUTO DIFF WBC: CPT

## 2023-10-26 RX ORDER — SODIUM CHLORIDE 9 MG/ML
INJECTION, SOLUTION INTRAVENOUS CONTINUOUS
Status: DISCONTINUED | OUTPATIENT
Start: 2023-10-26 | End: 2023-10-26

## 2023-10-26 RX ORDER — POTASSIUM CHLORIDE 7.45 MG/ML
10 INJECTION INTRAVENOUS PRN
Status: DISCONTINUED | OUTPATIENT
Start: 2023-10-26 | End: 2023-10-27 | Stop reason: HOSPADM

## 2023-10-26 RX ORDER — FAMOTIDINE 20 MG/1
20 TABLET, FILM COATED ORAL 2 TIMES DAILY
Status: DISCONTINUED | OUTPATIENT
Start: 2023-10-26 | End: 2023-10-27 | Stop reason: HOSPADM

## 2023-10-26 RX ORDER — ONDANSETRON 2 MG/ML
4 INJECTION INTRAMUSCULAR; INTRAVENOUS EVERY 6 HOURS PRN
Status: DISCONTINUED | OUTPATIENT
Start: 2023-10-26 | End: 2023-10-27 | Stop reason: HOSPADM

## 2023-10-26 RX ORDER — SODIUM CHLORIDE 0.9 % (FLUSH) 0.9 %
10 SYRINGE (ML) INJECTION EVERY 12 HOURS SCHEDULED
Status: DISCONTINUED | OUTPATIENT
Start: 2023-10-26 | End: 2023-10-27 | Stop reason: HOSPADM

## 2023-10-26 RX ORDER — ACETAMINOPHEN 325 MG/1
650 TABLET ORAL EVERY 6 HOURS PRN
Status: DISCONTINUED | OUTPATIENT
Start: 2023-10-26 | End: 2023-10-27 | Stop reason: HOSPADM

## 2023-10-26 RX ORDER — FAMOTIDINE 20 MG/1
20 TABLET, FILM COATED ORAL 2 TIMES DAILY
Status: DISCONTINUED | OUTPATIENT
Start: 2023-10-26 | End: 2023-10-26 | Stop reason: SDUPTHER

## 2023-10-26 RX ORDER — CARVEDILOL 6.25 MG/1
6.25 TABLET ORAL 2 TIMES DAILY WITH MEALS
Status: DISCONTINUED | OUTPATIENT
Start: 2023-10-26 | End: 2023-10-27 | Stop reason: HOSPADM

## 2023-10-26 RX ORDER — ENOXAPARIN SODIUM 100 MG/ML
40 INJECTION SUBCUTANEOUS DAILY
Status: DISCONTINUED | OUTPATIENT
Start: 2023-10-26 | End: 2023-10-26

## 2023-10-26 RX ORDER — CLOPIDOGREL BISULFATE 75 MG/1
75 TABLET ORAL DAILY
Status: DISCONTINUED | OUTPATIENT
Start: 2023-10-26 | End: 2023-10-27 | Stop reason: HOSPADM

## 2023-10-26 RX ORDER — POLYETHYLENE GLYCOL 3350 17 G/17G
17 POWDER, FOR SOLUTION ORAL DAILY PRN
Status: DISCONTINUED | OUTPATIENT
Start: 2023-10-26 | End: 2023-10-27 | Stop reason: HOSPADM

## 2023-10-26 RX ORDER — SODIUM CHLORIDE 9 MG/ML
INJECTION, SOLUTION INTRAVENOUS PRN
Status: DISCONTINUED | OUTPATIENT
Start: 2023-10-26 | End: 2023-10-27 | Stop reason: HOSPADM

## 2023-10-26 RX ORDER — POTASSIUM CHLORIDE 20 MEQ/1
40 TABLET, EXTENDED RELEASE ORAL PRN
Status: DISCONTINUED | OUTPATIENT
Start: 2023-10-26 | End: 2023-10-27 | Stop reason: HOSPADM

## 2023-10-26 RX ORDER — ACETAMINOPHEN 650 MG/1
650 SUPPOSITORY RECTAL EVERY 6 HOURS PRN
Status: DISCONTINUED | OUTPATIENT
Start: 2023-10-26 | End: 2023-10-27 | Stop reason: HOSPADM

## 2023-10-26 RX ORDER — ONDANSETRON 4 MG/1
4 TABLET, ORALLY DISINTEGRATING ORAL EVERY 8 HOURS PRN
Status: DISCONTINUED | OUTPATIENT
Start: 2023-10-26 | End: 2023-10-27 | Stop reason: HOSPADM

## 2023-10-26 RX ORDER — ENOXAPARIN SODIUM 100 MG/ML
30 INJECTION SUBCUTANEOUS 2 TIMES DAILY
Status: DISCONTINUED | OUTPATIENT
Start: 2023-10-26 | End: 2023-10-27 | Stop reason: HOSPADM

## 2023-10-26 RX ORDER — DILTIAZEM HYDROCHLORIDE 120 MG/1
120 CAPSULE, COATED, EXTENDED RELEASE ORAL DAILY
Status: DISCONTINUED | OUTPATIENT
Start: 2023-10-26 | End: 2023-10-27 | Stop reason: HOSPADM

## 2023-10-26 RX ORDER — SODIUM CHLORIDE 0.9 % (FLUSH) 0.9 %
10 SYRINGE (ML) INJECTION PRN
Status: DISCONTINUED | OUTPATIENT
Start: 2023-10-26 | End: 2023-10-27 | Stop reason: HOSPADM

## 2023-10-26 RX ORDER — ATORVASTATIN CALCIUM 20 MG/1
20 TABLET, FILM COATED ORAL NIGHTLY
Status: DISCONTINUED | OUTPATIENT
Start: 2023-10-26 | End: 2023-10-27 | Stop reason: HOSPADM

## 2023-10-26 RX ORDER — NITROGLYCERIN 0.4 MG/1
0.4 TABLET SUBLINGUAL EVERY 5 MIN PRN
Status: DISCONTINUED | OUTPATIENT
Start: 2023-10-26 | End: 2023-10-27 | Stop reason: HOSPADM

## 2023-10-26 RX ORDER — LISINOPRIL 20 MG/1
20 TABLET ORAL DAILY
Status: DISCONTINUED | OUTPATIENT
Start: 2023-10-26 | End: 2023-10-27 | Stop reason: HOSPADM

## 2023-10-26 RX ORDER — ASPIRIN 81 MG/1
81 TABLET, CHEWABLE ORAL DAILY
Status: DISCONTINUED | OUTPATIENT
Start: 2023-10-27 | End: 2023-10-27 | Stop reason: HOSPADM

## 2023-10-26 RX ADMIN — Medication 10 ML: at 20:54

## 2023-10-26 RX ADMIN — CARVEDILOL 6.25 MG: 6.25 TABLET, FILM COATED ORAL at 17:55

## 2023-10-26 RX ADMIN — CLOPIDOGREL BISULFATE 75 MG: 75 TABLET ORAL at 13:51

## 2023-10-26 RX ADMIN — IOPAMIDOL 75 ML: 755 INJECTION, SOLUTION INTRAVENOUS at 07:53

## 2023-10-26 RX ADMIN — FAMOTIDINE 20 MG: 20 TABLET, FILM COATED ORAL at 20:53

## 2023-10-26 RX ADMIN — ATORVASTATIN CALCIUM 20 MG: 20 TABLET, FILM COATED ORAL at 20:53

## 2023-10-26 RX ADMIN — LISINOPRIL 20 MG: 20 TABLET ORAL at 13:51

## 2023-10-26 RX ADMIN — DILTIAZEM HYDROCHLORIDE 120 MG: 120 CAPSULE, COATED, EXTENDED RELEASE ORAL at 13:51

## 2023-10-26 RX ADMIN — FAMOTIDINE 20 MG: 20 TABLET, FILM COATED ORAL at 13:51

## 2023-10-26 ASSESSMENT — PAIN DESCRIPTION - FREQUENCY
FREQUENCY: INTERMITTENT
FREQUENCY: INTERMITTENT

## 2023-10-26 ASSESSMENT — PAIN DESCRIPTION - PAIN TYPE
TYPE: ACUTE PAIN
TYPE: ACUTE PAIN

## 2023-10-26 ASSESSMENT — PAIN DESCRIPTION - DESCRIPTORS
DESCRIPTORS: DULL;NAGGING
DESCRIPTORS: DULL

## 2023-10-26 ASSESSMENT — ENCOUNTER SYMPTOMS
COUGH: 0
ABDOMINAL PAIN: 0
SORE THROAT: 0
SHORTNESS OF BREATH: 1
BACK PAIN: 1
CHEST TIGHTNESS: 0

## 2023-10-26 ASSESSMENT — PAIN DESCRIPTION - ORIENTATION
ORIENTATION: RIGHT;LEFT
ORIENTATION: RIGHT;LEFT

## 2023-10-26 ASSESSMENT — PAIN SCALES - GENERAL
PAINLEVEL_OUTOF10: 2
PAINLEVEL_OUTOF10: 2

## 2023-10-26 ASSESSMENT — PAIN SCALES - WONG BAKER
WONGBAKER_NUMERICALRESPONSE: 0
WONGBAKER_NUMERICALRESPONSE: 0

## 2023-10-26 ASSESSMENT — PAIN DESCRIPTION - LOCATION
LOCATION: CHEST
LOCATION: CHEST

## 2023-10-26 ASSESSMENT — PAIN DESCRIPTION - ONSET
ONSET: GRADUAL
ONSET: GRADUAL

## 2023-10-26 NOTE — CARE COORDINATION
10/26/23 1035   Readmission Assessment   Number of Days since last admission? 1-7 days   Previous Disposition Home with Family   Who is being Interviewed Patient   What was the patient's/caregiver's perception as to why they think they needed to return back to the hospital? Other (Comment)  (Chest Pain)   Did you visit your Primary Care Physician after you left the hospital, before you returned this time? No   Why weren't you able to visit your PCP? Did not have an appointment   Did you see a specialist, such as Cardiac, Pulmonary, Orthopedic Physician, etc. after you left the hospital? No   Who advised the patient to return to the hospital? Self-referral   Does the patient report anything that got in the way of taking their medications? No   In our efforts to provide the best possible care to you and others like you, can you think of anything that we could have done to help you after you left the hospital the first time, so that you might not have needed to return so soon?  Other (Comment)  (None)     Electronically signed by NATIVIDAD Marmolejo on 10/26/2023 at 10:36 AM

## 2023-10-26 NOTE — H&P
Hospitalist  History and Physical    Patient:  Jessie Bazzi  MRN: [id number]  PCP: Julianne Mccall DO    CHIEF COMPLAINT:  chest pain      HISTORY OF PRESENT ILLNESS:   The patient Jessie Bazzi is a 37 y.o.male with medical history significant for coronary artery disease CHF hypertension non-ST elevation MI obesity left subclavian artery stenosis. Patient presented to the emergency room with 3 hours of diffuse chest pain. Patient denies radiation of the pain. Patient feels his pain is similar to his previous cardiac pain. Patient was recently evaluated by coronary angiogram.  No recent history of fever chills no history of nausea vomiting or diarrhea no history of hematemesis or melena. Past Medical History:        Diagnosis Date    Chronic systolic congestive heart failure ([address]) 8/4/2021    Coronary artery disease     Essential hypertension 04/07/2020    GERD (gastroesophageal reflux disease)     Headache     NSTEMI (non-ST elevated myocardial infarction) ([address])     4/25/20, 5/5/20, 3/5/21    Obesity     Other hyperlipidemia 04/07/2020    Stenosis of left subclavian artery ([address]) 03/2021    stented 3/12/21    Tobacco abuse counseling 04/07/2020       Past Surgical History:        Procedure Laterality Date    CORONARY ANGIOPLASTY  03/08/2021    PTCA RCA    CORONARY ANGIOPLASTY WITH STENT PLACEMENT  05/05/2020    MI RCA    CORONARY ANGIOPLASTY WITH STENT PLACEMENT  04/25/2020    MI Diag, LCx    CORONARY ARTERY BYPASS GRAFT  03/15/2021    cabgx4 (LIMA-LAD, SVG-D1, L radial off LIMA-OM, SVG-PDA), JAYDE exclusion, sternal plating    CORONARY ARTERY BYPASS GRAFT N/A 3/15/2021    TRANSESOPHAGEAL ECHOCARDIOGRAM, TOTAL CARDIO PULMONARY BYPASS, CORONARY ARTERY BYPASS GRAFTING X 4 (VEIN X 1, ARTERY X 4 ) USING LEFT INTERNAL MAMMARY ARTERY, LEFT ENDOSCOPICALLY HARVESTED RADIAL ARTERY, ENDOSCOPICALLY HARVESTED RIGHT SAPHENOUS VEIN.  LEFT ATRIAL APPENDAGE CLIPPING USING SIZE 35 ATRICLIP performed by Aishwarya Flores MD at acute finding in the abdomen or  Pelvis    Ekg  Normal sinus rhythmAbnormal    Echo   Mild conc. LVH with normal LV size & wall motion. EF is    65%(3/5/2021)    Assessment / Plan     Chest pain, unspecified R07.9  Atypical chest pain  Admit patient to telemetry  Elevation of troponin  Continue patient on Pepcid  Continue patient on aspirin, beta blocker and statin  Consult to cardiology    Pain management  R52  Continue with the IV and oral pain medication      Hypertension  Continue on home medication                    DVT and GI prophylaxis      Full Code      Zhane Walter MD M.D    This note was transcribed using [facility]. Please disregard any translational errors.

## 2023-10-26 NOTE — ACP (ADVANCE CARE PLANNING)
Advance Care Planning     Advance Care Planning Activator (Inpatient)  Conversation Note      Date of ACP Conversation: 10/26/2023     Conversation Conducted with: Patient with Decision Making Capacity    ACP Activator: Amanda Cedeño, [id number] [address] Decision Maker:     Current Designated Health Care Decision Maker:     Primary Decision Maker: Damion Napier - [phone]    Secondary Decision Maker: Ramos Holden - Aunt/Uncle - [phone]    Today we documented Decision Maker(s) consistent with Legal Next of Kin hierarchy. Care Preferences    Ventilation: \"If you were in your present state of health and suddenly became very ill and were unable to breathe on your own, what would your preference be about the use of a ventilator (breathing machine) if it were available to you? \"      Would the patient desire the use of ventilator (breathing machine)?: no    \"If your health worsens and it becomes clear that your chance of recovery is unlikely, what would your preference be about the use of a ventilator (breathing machine) if it were available to you? \"     Would the patient desire the use of ventilator (breathing machine)?: No      Resuscitation  \"CPR works best to restart the heart when there is a sudden event, like a heart attack, in someone who is otherwise healthy. Unfortunately, CPR does not typically restart the heart for people who have serious health conditions or who are very sick. \"    \"In the event your heart stopped as a result of an underlying serious health condition, would you want attempts to be made to restart your heart (answer \"yes\" for attempt to resuscitate) or would you prefer a natural death (answer \"no\" for do not attempt to resuscitate)? \" yes       [] Yes   [] No   Educated Patient / Marrys Dopp regarding differences between Advance Directives and portable DNR orders.     Length of ACP Conversation in minutes:  5 Minutes    Conversation Outcomes:  ACP discussion completed    Follow-up plan:    [] Schedule follow-up conversation to continue planning  [] Referred individual to Provider for additional questions/concerns   [] Advised patient/agent/surrogate to review completed ACP document and update if needed with changes in condition, patient preferences or care setting    [x] This note routed to one or more involved healthcare providers    Electronically signed by NATIVIDAD Santana on 10/26/2023 at 10:39 AM

## 2023-10-26 NOTE — PROGRESS NOTES
4 Eyes Skin Assessment     NAME:  Cliff Frausto  YOB: 1980  MEDICAL RECORD NUMBER:  [id number]    The patient is being assessed for  Admission    I agree that at least one RN has performed a thorough Head to Toe Skin Assessment on the patient. ALL assessment sites listed below have been assessed. Areas assessed by both nurses:    Head, Face, Ears, Shoulders, Back, Chest, Arms, Elbows, Hands, Sacrum. Buttock, Coccyx, Ischium, Legs. Feet and Heels, and Under Medical Devices         Does the Patient have a Wound?  No noted wound(s)       Kodak Prevention initiated by RN: No  Wound Care Orders initiated by RN: No    Pressure Injury (Stage 3,4, Unstageable, DTI, NWPT, and Complex wounds) if present, place Wound referral order by RN under : No    New Ostomies, if present place, Ostomy referral order under : No     Nurse 1 eSignature: Electronically signed by Barnett Cushing, RN on 10/26/23 at 11:16 AM EDT    **SHARE this note so that the co-signing nurse can place an eSignature**    Nurse 2 eSignature: Electronically signed by Tennille Parra RN on 10/26/23 at 1:25 PM EDT

## 2023-10-26 NOTE — TELEPHONE ENCOUNTER
Care Transitions Initial Follow Up Call    Outreach made within 2 business days of discharge: Yes    Patient: Rosendo Marrero Patient : 1980   MRN: [id number]  Reason for Admission: NSTEMI   Discharge Date: 10/25/23       Spoke with: Pt readmitted for chest pain 10/26/23 -present as of 10/26/23 314pm.     Discharge department/facility: [facility]     Follow Up  Future Appointments   Date Time Provider [address] [place] [address]   2023  3:30 PM Cata Garcia MD [facility]   2024  1:30 PM Maria Fernanda Restrepo DO [address] [place], [place]

## 2023-10-26 NOTE — ED TRIAGE NOTES
Pt presents to ED via private vehicle from home with a c/o chest pain, SOB, and back pain that started around 0300 this morning. Pt states \"it feels like I'm having a heart attack. \" Pt states he was woken up from his sleep to his chest and back hurting, as well as trouble breathing. Pt states he's had a heart attack before and it feels like that. Pt has PMH of NSTEMI, CHF, CAD, and HTN. Pt denies N/V/D. Pt denies fevers or sick contacts.

## 2023-10-26 NOTE — PROGRESS NOTES
S/p C 2 days ago   Patent LIMA  S/p angioplasty to subclavian stent   ECG unchanged  Recommend medical therapy   No intervention indicated at this time    Full consult to follow    Reese Duong MD [address] [facility], Interventional Cardiology, and Peripheral Vascular [address]   (O): [phone]  (F): [phone]

## 2023-10-26 NOTE — ED PROVIDER NOTES
[address] [facility] [address] [address]        Pt Name: Sofia Lemus  MRN: [id number]  [address] [facility] 1980  Date of evaluation: 10/26/2023  Provider: Rosa Bonds MD  PCP: Zeeshan Loving DO  Note Started: 7:05 AM EDT 10/26/23    CHIEF COMPLAINT       Chief Complaint   Patient presents with    Chest Pain     Pt presents to ED via private vehicle from home with a c/o chest pain, SOB, and back pain that started around 0300 this morning. Pt states \"it feels like I'm having a heart attack. \" Pt states he was woken up from his sleep to his chest and back hurting, as well as trouble breathing. Pt states he's had a heart attack before and it feels like that. Shortness of Breath       HISTORY OF PRESENT ILLNESS: 1 or more Elements     History from : Patient    Limitations to history : None    Sofia Lemus is a 37 y.o. male who presents via private vehicle for chest pain that started at approximately 3 hours this morning 4 hours prior to arrival.  Patient has history of CAD, CHF, NSTEMI, hypertension, tobacco use. Patient was just admitted to the hospital for stent evaluation, diagnostic cardiac cath. Patient states \"it feels like I am having a heart attack\". Constant. Nothing else seems to get better or worse. Chest pain is generalized aching in nature. Radiates to his back. Denies any nausea vomiting diarrhea fevers or chills. No other associated symptoms. See review of systems below for further details    Nursing Notes were all reviewed and agreed with or any disagreements were addressed in the HPI. REVIEW OF SYSTEMS :      Review of Systems   Constitutional: Negative. Negative for chills and fever. HENT:  Negative for congestion and sore throat. Respiratory:  Positive for shortness of breath. Negative for cough and chest tightness. Cardiovascular:  Positive for chest pain. Gastrointestinal:  Negative for abdominal pain.    Genitourinary: patient was discharged yesterday. CC/HPI Summary, DDx, ED Course, and Reassessment:     Differential Diagnosis: Acute Coronary Syndrome, Congestive Heart Failure, Thoracic Dissection, Pericarditis, Pericardial Effusion, Pulmonary Embolism, Pneumonia, Pneumothorax, Ischemic Bowel, Bowel Obstruction, PUD, GERD, Acute Cholecystitis, Pancreatitis, Hepatitis, Colitis, other    47-year-old male presents for severe chest and back pain patient appears uncomfortable. Patient's exam is otherwise unremarkable no reproducible abdominal tenderness vital signs are stable afebrile not tachycardic saturating well on room air. Patient recently had cardiac catheterization significant risk factors including CAD, hypertension, CHF, GERD, hyperlipidemia, chronic tobacco use. We will obtain full cardiac work-up including troponin, will obtain CT chest abdomen pelvis rule out dissection given patient's severe pain and acute onset of pain. Will likely recommend admission given patient's high heart score if patient has no emergent intrathoracic findings. Will also obtain BNP, chest x-ray look for signs of fluid overload given patient's subjective shortness of breath. CBC-CBC was ordered to rule out anemia, infection, abnormal platelet count, polycythemia, abnormal Red cell pathology, or any other pathology that might be causing the patient's symptoms   CMP-CMP was ordered to rule out electrolyte abnormalities, liver dysfunction, kidney dysfunction, electrolyte imbalance, abnormal transaminases, or any other pathology that might be causing the patient's symptoms. Lipase-lipase was ordered to rule out pancreatitis, pancreatic inflammation  EKG-ordered to rule out myocardial infarction, rhythm disturbances, conduction disturbances, LVH, SAVANNAH, pericarditis, voltage abnormalities, or other pathology that might be causing the patient's symptoms.   Chest x-ray-chest x-ray was ordered to rule out pneumonia, pneumothorax, congestive heart

## 2023-10-26 NOTE — CARE COORDINATION
10/26/23 1010   Service Assessment   Patient Orientation Alert and Oriented   Cognition Alert   History Provided By Patient   Primary Caregiver Self   Accompanied By/Relationship N/A   Support Systems Parent; Family Members;Friends/Neighbors   Patient's Healthcare Decision Maker is: Legal Next of [facility]   PCP Verified by CM Yes   Last Visit to PCP Within last 6 months   Prior Functional Level Independent in ADLs/IADLs   Current Functional Level Independent in ADLs/IADLs   Can patient return to prior living arrangement Yes   Ability to make needs known: Good   Family able to assist with home care needs: Yes   Would you like for me to discuss the discharge plan with any other family members/significant others, and if so, who? No  (MotherKeiko)   Financial Resources  Resources None   CM/SW Referral Other (see comment)  (N/A)   Discharge Planning   Type of Residence House   Living Arrangements Parent   Current Services Prior To Admission None   Potential Assistance Needed N/A   DME Ordered? No   Potential Assistance Purchasing Medications No   Type of Home Care Services None   Patient expects to be discharged to: House   History of falls? 0   Services At/After Discharge   Transition of Care Consult (CM Consult) Discharge Planning   Services At/After Discharge None   Mode of Transport at Discharge Other (see comment)  (Keiko Laboy)   Confirm Follow Up Transport Self   Condition of Participation: Discharge Planning   The Plan for Transition of Care is related to the following treatment goals: Strengthening     Case Management Assessment  Initial Evaluation    Date/Time of Evaluation: 10/26/2023 10:24 AM  Assessment Completed by: NATIVIDAD Gonzalez    If patient is discharged prior to next notation, then this note serves as note for discharge by case management.     Patient Name: Jessie Bazzi                   YOB: 1980  Diagnosis: Chest pain [R07.9] Date / Time: 10/26/2023  6:46 AM    Patient Admission Status: Inpatient   Readmission Risk (Low < 19, Mod (19-27), High > 27): Readmission Risk Score: 7.1    Current PCP: Shira Pina, DO  PCP verified by CM? (P) Yes    Chart Reviewed: Yes      History Provided by: (P) Patient  Patient Orientation: (P) Alert and Oriented    Patient Cognition: (P) Alert    Hospitalization in the last 30 days (Readmission):  Yes    If yes, Readmission Assessment in  Navigator will be completed.     Advance Directives:      Code Status: Prior   Patient's Primary Decision Maker is: (P) Legal Next of Kin    Primary Decision Maker: Mara Gonzales - Parent - [phone]    Secondary Decision Maker: Caro Scruggs - Aunt/Uncle - [phone]    Discharge Planning:    Patient lives with: (P) Parent Type of Home: (P) House  Primary Care Giver: (P) Self  Patient Support Systems include: (P) Parent, Family Members, Friends/Neighbors   Current Financial resources: (P) Medicaid  Current community resources: (P) None  Current services prior to admission: (P) None            Current DME:  None            Type of Home Care services:  (P) None    ADLS  Prior functional level: (P) Independent in ADLs/IADLs  Current functional level: (P) Independent in ADLs/IADLs    PT AM-PAC:   /24  OT AM-PAC:   /24    Family can provide assistance at DC: (P) Yes  Would you like Case Management to discuss the discharge plan with any other family members/significant others, and if so, who? (P) No (Mother, Loral Manual)  Plans to Return to Present Housing: (P) Yes  Other Identified Issues/Barriers to RETURNING to current housing: None  Potential Assistance needed at discharge: (P) N/A            Potential DME:  None  Patient expects to discharge to: (P) [address] [place] for transportation at discharge: (P) Self    Financial    Payor: [employer] MEDICAID / Plan: [address] / Product Type: *No Product type* /     Does insurance require precert for SNF:

## 2023-10-26 NOTE — PROGRESS NOTES
Patient admitted to room 4273 from ED. Oriented to room, call light, and floor policies. Plan of care reviewed with patient/family. Pt is resting in bed and c/o 2/10 chest pain pain at this time, pt states pain is worse with a deep breath, declines need for pain medication; no s/s of distress noted. Assessment completed; VSS. Tele in place reading normal sinus rhythm with a rate of 78, tele box # [id number] called and verified with [employer]. Pt rates a low fall risk; bed alarm deferred. Safety precautions in place; call light and bedside table within reach. Pt encouraged to call for needs or ambulation. Pt VU. Will continue to monitor.   Electronically signed by Breck Severe, RN on 10/26/2023 at 11:14 AM

## 2023-10-26 NOTE — PLAN OF CARE
Problem: Discharge Planning  Goal: Discharge to home or other facility with appropriate resources  Outcome: Progressing     Problem: Pain  Goal: Verbalizes/displays adequate comfort level or baseline comfort level  Outcome: Progressing     Problem: Safety - Adult  Goal: Free from fall injury  Outcome: Progressing     Problem: ABCDS Injury Assessment  Goal: Absence of physical injury  Outcome: Progressing Spontaneous, unlabored and symmetrical

## 2023-10-27 ENCOUNTER — TELEPHONE (OUTPATIENT)
Dept: INTERNAL MEDICINE CLINIC | Age: 43
End: 2023-10-27

## 2023-10-27 VITALS
HEIGHT: 69 IN | OXYGEN SATURATION: 97 % | TEMPERATURE: 98.1 F | RESPIRATION RATE: 16 BRPM | WEIGHT: 238.54 LBS | SYSTOLIC BLOOD PRESSURE: 123 MMHG | HEART RATE: 87 BPM | DIASTOLIC BLOOD PRESSURE: 81 MMHG | BODY MASS INDEX: 35.33 KG/M2

## 2023-10-27 LAB
CHOLEST SERPL-MCNC: 190 MG/DL (ref 0–199)
DEPRECATED RDW RBC AUTO: 13.9 % (ref 12.4–15.4)
EST. AVERAGE GLUCOSE BLD GHB EST-MCNC: 119.8 MG/DL
HBA1C MFR BLD: 5.8 %
HCT VFR BLD AUTO: 46.5 % (ref 40.5–52.5)
HDLC SERPL-MCNC: 30 MG/DL (ref 40–60)
HGB BLD-MCNC: 15.5 G/DL (ref 13.5–17.5)
LDLC SERPL CALC-MCNC: 132 MG/DL
MCH RBC QN AUTO: 29.6 PG (ref 26–34)
MCHC RBC AUTO-ENTMCNC: 33.5 G/DL (ref 31–36)
MCV RBC AUTO: 88.5 FL (ref 80–100)
PLATELET # BLD AUTO: 232 K/UL (ref 135–450)
PMV BLD AUTO: 10.4 FL (ref 5–10.5)
RBC # BLD AUTO: 5.25 M/UL (ref 4.2–5.9)
SLIDE REVIEW: ABNORMAL
TRIGL SERPL-MCNC: 139 MG/DL (ref 0–150)
VLDLC SERPL CALC-MCNC: 28 MG/DL
WBC # BLD AUTO: 15 K/UL (ref 4–11)

## 2023-10-27 PROCEDURE — 36415 COLL VENOUS BLD VENIPUNCTURE: CPT

## 2023-10-27 PROCEDURE — 83036 HEMOGLOBIN GLYCOSYLATED A1C: CPT

## 2023-10-27 PROCEDURE — 6370000000 HC RX 637 (ALT 250 FOR IP): Performed by: STUDENT IN AN ORGANIZED HEALTH CARE EDUCATION/TRAINING PROGRAM

## 2023-10-27 PROCEDURE — 99223 1ST HOSP IP/OBS HIGH 75: CPT | Performed by: INTERNAL MEDICINE

## 2023-10-27 PROCEDURE — 80061 LIPID PANEL: CPT

## 2023-10-27 PROCEDURE — 6370000000 HC RX 637 (ALT 250 FOR IP): Performed by: HOSPITALIST

## 2023-10-27 PROCEDURE — 2580000003 HC RX 258: Performed by: HOSPITALIST

## 2023-10-27 PROCEDURE — 85027 COMPLETE CBC AUTOMATED: CPT

## 2023-10-27 PROCEDURE — G0378 HOSPITAL OBSERVATION PER HR: HCPCS

## 2023-10-27 PROCEDURE — 94760 N-INVAS EAR/PLS OXIMETRY 1: CPT

## 2023-10-27 RX ORDER — NICOTINE 21 MG/24HR
1 PATCH, TRANSDERMAL 24 HOURS TRANSDERMAL DAILY
Status: DISCONTINUED | OUTPATIENT
Start: 2023-10-27 | End: 2023-10-27 | Stop reason: HOSPADM

## 2023-10-27 RX ADMIN — CLOPIDOGREL BISULFATE 75 MG: 75 TABLET ORAL at 09:51

## 2023-10-27 RX ADMIN — CARVEDILOL 6.25 MG: 6.25 TABLET, FILM COATED ORAL at 09:51

## 2023-10-27 RX ADMIN — FAMOTIDINE 20 MG: 20 TABLET, FILM COATED ORAL at 09:51

## 2023-10-27 RX ADMIN — Medication 10 ML: at 09:51

## 2023-10-27 RX ADMIN — DILTIAZEM HYDROCHLORIDE 120 MG: 120 CAPSULE, COATED, EXTENDED RELEASE ORAL at 09:51

## 2023-10-27 RX ADMIN — ASPIRIN 81 MG: 81 TABLET, CHEWABLE ORAL at 09:51

## 2023-10-27 ASSESSMENT — PAIN DESCRIPTION - ORIENTATION: ORIENTATION: RIGHT;LEFT

## 2023-10-27 ASSESSMENT — PAIN - FUNCTIONAL ASSESSMENT: PAIN_FUNCTIONAL_ASSESSMENT: ACTIVITIES ARE NOT PREVENTED

## 2023-10-27 ASSESSMENT — PAIN DESCRIPTION - DESCRIPTORS: DESCRIPTORS: DULL

## 2023-10-27 ASSESSMENT — PAIN DESCRIPTION - ONSET: ONSET: GRADUAL

## 2023-10-27 ASSESSMENT — PAIN SCALES - WONG BAKER
WONGBAKER_NUMERICALRESPONSE: 0

## 2023-10-27 ASSESSMENT — PAIN DESCRIPTION - FREQUENCY: FREQUENCY: INTERMITTENT

## 2023-10-27 ASSESSMENT — PAIN DESCRIPTION - LOCATION: LOCATION: CHEST

## 2023-10-27 ASSESSMENT — PAIN SCALES - GENERAL: PAINLEVEL_OUTOF10: 1

## 2023-10-27 ASSESSMENT — PAIN DESCRIPTION - PAIN TYPE: TYPE: ACUTE PAIN

## 2023-10-27 NOTE — CONSULTS
Interventional Cardiology Consultation     Chio Pena  1980    PCP: Cooper Yusuf DO  Referring Physician: Dr. Sree Pérez  Reason for Referral: Chest pain  Chief Complaint:   Chief Complaint   Patient presents with    Chest Pain     Pt presents to ED via private vehicle from home with a c/o chest pain, SOB, and back pain that started around 0300 this morning. Pt states \"it feels like I'm having a heart attack. \" Pt states he was woken up from his sleep to his chest and back hurting, as well as trouble breathing. Pt states he's had a heart attack before and it feels like that. Shortness of Breath       Subjective:     History of Present Illness: The patient is 37 y.o. male with a past medical history significant for hypertension hyperlipidemia status post coronary bypass grafting and left subclavian stenting who presents with the above complaint. He was admitted several days ago with unstable angina and underwent a coronary angiogram that showed a severe native coronary artery disease with a patent LIMA however subclavian was restenosed with about a 80% stenosis which was then subsequently balloon angioplasty. He had a sudden onset episode of chest discomfort and diaphoresis that then resolved he admits to having an new nicotine patch since quitting smoking 48 hours ago.   Since he has had no symptoms denies chest pain PND orthopnea palpitations syncope or edema          Past Medical History:   Diagnosis Date    Chronic systolic congestive heart failure ([address]) 8/4/2021    Coronary artery disease     Essential hypertension 04/07/2020    GERD (gastroesophageal reflux disease)     Headache     NSTEMI (non-ST elevated myocardial infarction) ([address])     4/25/20, 5/5/20, 3/5/21    Obesity     Other hyperlipidemia 04/07/2020    Stenosis of left subclavian artery ([address]) 03/2021    stented 3/12/21    Tobacco abuse counseling 04/07/2020     Past Surgical History:   Procedure Laterality Date

## 2023-10-27 NOTE — PROGRESS NOTES
Discharge instructions, follow up instructions, and medications reviewed with patient. Pt. Verbalized understanding. Denies questions. Discontinued IV without complications. Pt. tolerated well.   .  Electronically signed by Gera Geiger RN on 10/27/2023 at 5:42 PM

## 2023-10-27 NOTE — PLAN OF CARE
Problem: Discharge Planning  Goal: Discharge to home or other facility with appropriate resources  10/27/2023 1355 by Neftaly Siddiqui RN  Outcome: Progressing  10/27/2023 0150 by Evi Sands RN  Outcome: Progressing  Flowsheets (Taken 10/26/2023 2050)  Discharge to home or other facility with appropriate resources: Identify barriers to discharge with patient and caregiver     Problem: Pain  Goal: Verbalizes/displays adequate comfort level or baseline comfort level  10/27/2023 1355 by Neftaly Siddiqui RN  Outcome: Progressing  10/27/2023 0150 by Evi Sands RN  Outcome: Progressing     Problem: Safety - Adult  Goal: Free from fall injury  10/27/2023 1355 by Neftaly Siddiqui RN  Outcome: Progressing  10/27/2023 0150 by Evi Sands RN  Outcome: Progressing     Problem: ABCDS Injury Assessment  Goal: Absence of physical injury  10/27/2023 1355 by Neftaly Siddiqui RN  Outcome: Progressing  10/27/2023 0150 by Evi Sands RN  Outcome: Progressing     Problem: Cardiovascular - Adult  Goal: Maintains optimal cardiac output and hemodynamic stability  10/27/2023 1355 by Neftaly Siddiqui RN  Outcome: Progressing  10/27/2023 0150 by Evi Sands RN  Outcome: Progressing  Flowsheets (Taken 10/26/2023 2050)  Maintains optimal cardiac output and hemodynamic stability: Monitor blood pressure and heart rate  Goal: Absence of cardiac dysrhythmias or at baseline  10/27/2023 1355 by Neftaly Siddiqui RN  Outcome: Progressing  10/27/2023 0150 by Evi Sands RN  Outcome: Progressing  Flowsheets (Taken 10/26/2023 2050)  Absence of cardiac dysrhythmias or at baseline: Monitor cardiac rate and rhythm

## 2023-10-27 NOTE — PLAN OF CARE
Problem: Discharge Planning  Goal: Discharge to home or other facility with appropriate resources  10/27/2023 1654 by Star Post, RN  Outcome: Adequate for Discharge  10/27/2023 1355 by Star Post, RN  Outcome: Progressing     Problem: Pain  Goal: Verbalizes/displays adequate comfort level or baseline comfort level  10/27/2023 1654 by Star Post, RN  Outcome: Adequate for Discharge  10/27/2023 1355 by Star Post, RN  Outcome: Progressing     Problem: Safety - Adult  Goal: Free from fall injury  10/27/2023 1654 by Star Post, RN  Outcome: Adequate for Discharge  10/27/2023 1355 by Star Post, RN  Outcome: Progressing     Problem: ABCDS Injury Assessment  Goal: Absence of physical injury  10/27/2023 1654 by Star Cooper, RN  Outcome: Adequate for Discharge  10/27/2023 1355 by Star Post, RN  Outcome: Progressing     Problem: Cardiovascular - Adult  Goal: Maintains optimal cardiac output and hemodynamic stability  10/27/2023 1654 by Star Post, RN  Outcome: Adequate for Discharge  10/27/2023 1355 by Star Post, RN  Outcome: Progressing  Goal: Absence of cardiac dysrhythmias or at baseline  10/27/2023 1654 by Star Post, RN  Outcome: Adequate for Discharge  10/27/2023 1355 by Star Post, RN  Outcome: Progressing

## 2023-10-27 NOTE — PROGRESS NOTES
Pt alert and oriented x4. Pt c/o nagging chest pain, but states he always has it. Pt doesn't want pain medication. Pt tolerated night medication. Pt verbalized understanding of education. VSS. Standard safety measures in place. Needed items within reach. Call light within reach. Pt resting in bed.

## 2023-10-27 NOTE — PLAN OF CARE
Problem: Discharge Planning  Goal: Discharge to home or other facility with appropriate resources  10/27/2023 0150 by Karyle Smolder, RN  Outcome: Progressing     Problem: Pain  Goal: Verbalizes/displays adequate comfort level or baseline comfort level  10/27/2023 0150 by Karyle Smolder, RN  Outcome: Progressing     Problem: Safety - Adult  Goal: Free from fall injury  10/27/2023 0150 by Karyle Smolder, RN  Outcome: Progressing     Problem: ABCDS Injury Assessment  Goal: Absence of physical injury  10/27/2023 0150 by Karyle Smolder, RN  Outcome: Progressing     Problem: Cardiovascular - Adult  Goal: Maintains optimal cardiac output and hemodynamic stability  Outcome: Progressing     Problem: Cardiovascular - Adult  Goal: Absence of cardiac dysrhythmias or at baseline  Outcome: Progressing

## 2023-10-27 NOTE — TELEPHONE ENCOUNTER
Care Transitions Initial Follow Up Call    Outreach made within 2 business days of discharge: Yes    Patient: Mylinda Duane Patient : 1980   MRN: [id number]  Reason for Admission: Chest Pain   Discharge Date: 10/25/23       Spoke with: Pt readmitted for chest pain 10/26/23 -present as of 10/27/23 156 pm       Scheduled appointment with PCP within 7-14 days    Follow Up  Future Appointments   Date Time Provider [address] [place] [address]   2023  3:30 PM Jose R Charles MD [facility]   2024  1:30 PM Roya Jaimes DO [address] [facility]

## 2023-10-27 NOTE — PROGRESS NOTES
Pharmacy Medication Reconciliation Note     List of medications patient is currently taking is complete. Source of information:   1. Conversation with patient   2. EMR    Notes regarding home medications:   1. Patient with seemingly poor adherence to home medication regimen. States he had trouble getting his medications filled at [facility] and wasn't able to get any refills last time he tried. Per patient's dispense history, 30 days worth of maintenance meds last filled in July.     [address] [facility] [address], PharmLINDY  10/27/2023 2:28 PM

## 2023-10-28 NOTE — DISCHARGE SUMMARY
HCT 46.5 10/27/2023 06:08 AM     10/27/2023 06:08 AM       RENAL:   Lab Results   Component Value Date/Time     10/26/2023 07:23 AM    K 4.5 10/26/2023 07:23 AM     10/26/2023 07:23 AM    CO2 23 10/26/2023 07:23 AM    BUN 13 10/26/2023 07:23 AM    CREATININE 0.8 10/26/2023 07:23 AM           Discharge Medications:      Medication List        CHANGE how you take these medications      famotidine 20 MG tablet  Commonly known as: PEPCID  TAKE 1 TABLET BY MOUTH TWICE DAILY  What changed:   when to take this  reasons to take this            CONTINUE taking these medications      acetaminophen 500 MG tablet  Commonly known as: TYLENOL  Take 2 tablets by mouth 3 times daily as needed for Pain     aspirin 81 MG chewable tablet  Commonly known as: Aspirin Childrens  Take 1 tablet by mouth daily     atorvastatin 80 MG tablet  Commonly known as: LIPITOR  Take 1 tablet by mouth daily     carvedilol 6.25 MG tablet  Commonly known as: COREG  Take 1 tablet by mouth 2 times daily (with meals)     clopidogrel 75 MG tablet  Commonly known as: PLAVIX  Take 1 tablet by mouth daily     dilTIAZem 120 MG extended release capsule  Commonly known as: CARDIZEM CD  Take 1 capsule by mouth daily     lisinopril 20 MG tablet  Commonly known as: PRINIVIL;ZESTRIL  Take 1 tablet by mouth daily                 Time Spent on discharge is more than 30 min in the examination, evaluation, counseling and review of medications and discharge plan. Signed:  Ganga Purcell MD   10/28/2023      Thank you Dioni Ruiz DO for the opportunity to be involved in this patient's care. If you have any questions or concerns please feel free to contact me at [phone]. This note was transcribed using [facility]. Please disregard any translational errors.

## 2023-10-30 ENCOUNTER — TELEPHONE (OUTPATIENT)
Dept: FAMILY MEDICINE CLINIC | Age: 43
End: 2023-10-30

## 2023-10-30 NOTE — TELEPHONE ENCOUNTER
Care Transitions Initial Follow Up Call    Outreach made within 2 business days of discharge: Yes    Patient: Bennett Hebert Patient : 1980   MRN: [id number]  Reason for Admission: Chest Pain   Discharge Date: 10/27/23       Spoke with: Attempted to reach, no answer, LVM for pt to call the office.      Discharge department/facility: [facility]       Scheduled appointment with PCP within 7-14 days    Follow Up  Future Appointments   Date Time Provider [address] [place] [address]   2023  3:30 PM Billy Matthew MD [facility]   2024  1:30 PM Dioni Ruiz DO [address], [place]

## 2023-11-02 ENCOUNTER — HOSPITAL ENCOUNTER (EMERGENCY)
Age: 43
Discharge: HOME | End: 2023-11-02
Attending: STUDENT IN AN ORGANIZED HEALTH CARE EDUCATION/TRAINING PROGRAM
Payer: MEDICAID

## 2023-11-02 VITALS
OXYGEN SATURATION: 97 % | TEMPERATURE: 97.8 F | RESPIRATION RATE: 16 BRPM | DIASTOLIC BLOOD PRESSURE: 71 MMHG | WEIGHT: 237.66 LBS | HEART RATE: 71 BPM | BODY MASS INDEX: 35.1 KG/M2 | SYSTOLIC BLOOD PRESSURE: 135 MMHG

## 2023-11-02 DIAGNOSIS — Z51.89 VISIT FOR WOUND CHECK: Primary | ICD-10-CM

## 2023-11-02 PROCEDURE — 99283 EMERGENCY DEPT VISIT LOW MDM: CPT

## 2023-11-02 RX ORDER — GINSENG 100 MG
CAPSULE ORAL
Qty: 14.2 G | Refills: 3 | Status: SHIPPED | OUTPATIENT
Start: 2023-11-02

## 2023-11-02 ASSESSMENT — PAIN - FUNCTIONAL ASSESSMENT: PAIN_FUNCTIONAL_ASSESSMENT: NONE - DENIES PAIN

## 2023-11-02 NOTE — ED PROVIDER NOTES
RRR, No murmurs or rubs. Resp: Clear to auscultation bilaterally. Normal respiratory effort. GI: Soft, nontender to palpation. Nondistended. MSK: No deformity, moving all extremities. small circular wound in right inguinal region without erythema, bruising, pulsatile mass, tenderness, discharge. Neuro: Fluent speech. Symmetric facies. Answers questions appropriately. Normal gait. Psych: Appropriate affect, appropriate mood, cooperative. LABS  I have reviewed all labs for this visit. No results found for this visit on 11/02/23. RADIOLOGY  I have reviewed all radiographic studies for this visit. No orders to display          My ECG interpretation   N/A    ED COURSE/MDM    37 y.o. male presenting to the ED for wound check. Patient hemodynamically stable upon arrival to the emergency department. Patient afebrile. Differential diagnose includes but limited to cellulitis, arterial bleeding, venous bleeding. Based on physical exam there is low suspicion for arterial bleeding or venous bleeding. There is no pulsatile mass on exam, no obvious signs of ecchymosis as well. Low suspicion for cellulitis based on patient's physical exam.  Patient was given return precautions. Patient was told to follow-up with his primary care doctor. Patient was given a prescription for bacitracin to apply to wound site. - Chronic Conditions: CAD, CABG x4, NSTEMI, tobacco use, hypertension, CHF, congestive heart failure    - Records reviewed: Prior discharge summary.    - Consults: None     - Pertinent social determinants: None    - Disposition consideration: Appropriate for outpatient management    Is this patient to be included in the SEP-1 Core Measure? No   Exclusion criteria - the patient is NOT to be included for SEP-1 Core Measure due to:  2+ SIRS criteria are not met    Vi DIEGO MD, am the primary clinician of record.      During the patient's ED course, the patient was

## 2023-11-02 NOTE — DISCHARGE INSTRUCTIONS
Please follow-up primary care doctor. Please come back to the emergency department if you develop fever, discharge from wound site, increased redness at wound site, pain at wound site.

## 2023-11-02 NOTE — ED NOTES
Discharge and education instructions reviewed. Patient verbalized understanding, teach-back successful. Patient denied questions at this time. No acute distress noted. Patient instructed to follow-up as noted - return to emergency department if symptoms worsen. Patient verbalized understanding. Discharged per EDMD with discharge instructions.        Fabiola Wellington LPN  11/93/99 9530

## 2024-04-29 ENCOUNTER — HOSPITAL ENCOUNTER (EMERGENCY)
Age: 44
Discharge: HOME | End: 2024-04-29
Payer: MEDICAID

## 2024-04-29 VITALS
HEART RATE: 85 BPM | WEIGHT: 244.05 LBS | RESPIRATION RATE: 16 BRPM | TEMPERATURE: 97.7 F | HEIGHT: 69 IN | BODY MASS INDEX: 36.15 KG/M2 | OXYGEN SATURATION: 98 % | SYSTOLIC BLOOD PRESSURE: 160 MMHG | DIASTOLIC BLOOD PRESSURE: 99 MMHG

## 2024-04-29 DIAGNOSIS — Z76.0 ENCOUNTER FOR MEDICATION REFILL: Primary | ICD-10-CM

## 2024-04-29 LAB
ALBUMIN SERPL-MCNC: 4.1 G/DL (ref 3.4–5)
ALBUMIN/GLOB SERPL: 1.3 {RATIO} (ref 1.1–2.2)
ALP SERPL-CCNC: 115 U/L (ref 40–129)
ALT SERPL-CCNC: 14 U/L (ref 10–40)
ANION GAP SERPL CALCULATED.3IONS-SCNC: 9 MMOL/L (ref 3–16)
AST SERPL-CCNC: 19 U/L (ref 15–37)
BASOPHILS # BLD: 0.1 K/UL (ref 0–0.2)
BASOPHILS NFR BLD: 1 %
BILIRUB SERPL-MCNC: <0.2 MG/DL (ref 0–1)
BUN SERPL-MCNC: 8 MG/DL (ref 7–20)
CALCIUM SERPL-MCNC: 8.9 MG/DL (ref 8.3–10.6)
CHLORIDE SERPL-SCNC: 103 MMOL/L (ref 99–110)
CO2 SERPL-SCNC: 25 MMOL/L (ref 21–32)
CREAT SERPL-MCNC: 0.7 MG/DL (ref 0.9–1.3)
DEPRECATED RDW RBC AUTO: 13.6 % (ref 12.4–15.4)
EOSINOPHIL # BLD: 0.1 K/UL (ref 0–0.6)
EOSINOPHIL NFR BLD: 1.4 %
GFR SERPLBLD CREATININE-BSD FMLA CKD-EPI: >90 ML/MIN/{1.73_M2}
GLUCOSE SERPL-MCNC: 149 MG/DL (ref 70–99)
HCT VFR BLD AUTO: 41.9 % (ref 40.5–52.5)
HGB BLD-MCNC: 14 G/DL (ref 13.5–17.5)
LIPASE SERPL-CCNC: 49 U/L (ref 13–60)
LYMPHOCYTES # BLD: 2.2 K/UL (ref 1–5.1)
LYMPHOCYTES NFR BLD: 30.7 %
MCH RBC QN AUTO: 29.4 PG (ref 26–34)
MCHC RBC AUTO-ENTMCNC: 33.5 G/DL (ref 31–36)
MCV RBC AUTO: 88 FL (ref 80–100)
MONOCYTES # BLD: 0.5 K/UL (ref 0–1.3)
MONOCYTES NFR BLD: 7 %
NEUTROPHILS # BLD: 4.3 K/UL (ref 1.7–7.7)
NEUTROPHILS NFR BLD: 59.9 %
PLATELET # BLD AUTO: 198 K/UL (ref 135–450)
PMV BLD AUTO: 9.2 FL (ref 5–10.5)
POTASSIUM SERPL-SCNC: 4.2 MMOL/L (ref 3.5–5.1)
PROT SERPL-MCNC: 7.3 G/DL (ref 6.4–8.2)
RBC # BLD AUTO: 4.76 M/UL (ref 4.2–5.9)
SODIUM SERPL-SCNC: 137 MMOL/L (ref 136–145)
WBC # BLD AUTO: 7.2 K/UL (ref 4–11)

## 2024-04-29 PROCEDURE — 85025 COMPLETE CBC W/AUTO DIFF WBC: CPT

## 2024-04-29 PROCEDURE — 99283 EMERGENCY DEPT VISIT LOW MDM: CPT

## 2024-04-29 PROCEDURE — 80053 COMPREHEN METABOLIC PANEL: CPT

## 2024-04-29 PROCEDURE — 83690 ASSAY OF LIPASE: CPT

## 2024-04-29 RX ORDER — CARVEDILOL 6.25 MG/1
6.25 TABLET ORAL 2 TIMES DAILY
Qty: 60 TABLET | Refills: 0 | Status: SHIPPED | OUTPATIENT
Start: 2024-04-29

## 2024-04-29 RX ORDER — LISINOPRIL 20 MG/1
20 TABLET ORAL DAILY
Qty: 30 TABLET | Refills: 0 | Status: SHIPPED | OUTPATIENT
Start: 2024-04-29

## 2024-04-29 RX ORDER — CLOPIDOGREL BISULFATE 75 MG/1
75 TABLET ORAL DAILY
Qty: 30 TABLET | Refills: 0 | Status: SHIPPED | OUTPATIENT
Start: 2024-04-29

## 2024-04-29 RX ORDER — FAMOTIDINE 20 MG/1
20 TABLET, FILM COATED ORAL 2 TIMES DAILY
Qty: 60 TABLET | Refills: 0 | Status: SHIPPED | OUTPATIENT
Start: 2024-04-29

## 2024-04-29 RX ORDER — DILTIAZEM HYDROCHLORIDE 120 MG/1
120 CAPSULE, COATED, EXTENDED RELEASE ORAL DAILY
Qty: 30 CAPSULE | Refills: 0 | Status: SHIPPED | OUTPATIENT
Start: 2024-04-29

## 2024-04-29 RX ORDER — ASPIRIN 81 MG/1
81 TABLET ORAL DAILY
Qty: 30 TABLET | Refills: 0 | Status: SHIPPED | OUTPATIENT
Start: 2024-04-29

## 2024-04-29 RX ORDER — ATORVASTATIN CALCIUM 80 MG/1
80 TABLET, FILM COATED ORAL DAILY
Qty: 30 TABLET | Refills: 0 | Status: SHIPPED | OUTPATIENT
Start: 2024-04-29

## 2024-04-29 ASSESSMENT — LIFESTYLE VARIABLES: HOW OFTEN DO YOU HAVE A DRINK CONTAINING ALCOHOL: NEVER

## 2024-04-29 ASSESSMENT — PAIN - FUNCTIONAL ASSESSMENT: PAIN_FUNCTIONAL_ASSESSMENT: NONE - DENIES PAIN

## 2024-05-07 ENCOUNTER — OFFICE VISIT (OUTPATIENT)
Dept: FAMILY MEDICINE CLINIC | Age: 44
End: 2024-05-07

## 2024-05-07 VITALS — WEIGHT: 246.6 LBS | BODY MASS INDEX: 36.42 KG/M2 | DIASTOLIC BLOOD PRESSURE: 78 MMHG | SYSTOLIC BLOOD PRESSURE: 134 MMHG

## 2024-05-07 DIAGNOSIS — Z71.89 ACP (ADVANCE CARE PLANNING): ICD-10-CM

## 2024-05-07 DIAGNOSIS — Z12.11 COLON CANCER SCREENING: Primary | ICD-10-CM

## 2024-05-07 DIAGNOSIS — Z00.00 ENCOUNTER FOR WELL ADULT EXAM WITHOUT ABNORMAL FINDINGS: ICD-10-CM

## 2024-05-07 RX ORDER — LISINOPRIL 20 MG/1
20 TABLET ORAL DAILY
Qty: 90 TABLET | Refills: 3 | Status: SHIPPED | OUTPATIENT
Start: 2024-05-07

## 2024-05-07 RX ORDER — ATORVASTATIN CALCIUM 80 MG/1
80 TABLET, FILM COATED ORAL DAILY
Qty: 90 TABLET | Refills: 3 | Status: SHIPPED | OUTPATIENT
Start: 2024-05-07

## 2024-05-07 RX ORDER — CLOPIDOGREL BISULFATE 75 MG/1
75 TABLET ORAL DAILY
Qty: 90 TABLET | Refills: 3 | Status: SHIPPED | OUTPATIENT
Start: 2024-05-07

## 2024-05-07 RX ORDER — DILTIAZEM HYDROCHLORIDE 120 MG/1
120 CAPSULE, COATED, EXTENDED RELEASE ORAL DAILY
Qty: 90 CAPSULE | Refills: 3 | Status: SHIPPED | OUTPATIENT
Start: 2024-05-07

## 2024-05-07 RX ORDER — CARVEDILOL 6.25 MG/1
6.25 TABLET ORAL 2 TIMES DAILY WITH MEALS
Qty: 90 TABLET | Refills: 3 | Status: SHIPPED | OUTPATIENT
Start: 2024-05-07

## 2024-05-07 ASSESSMENT — PATIENT HEALTH QUESTIONNAIRE - PHQ9
2. FEELING DOWN, DEPRESSED OR HOPELESS: NOT AT ALL
SUM OF ALL RESPONSES TO PHQ QUESTIONS 1-9: 0
1. LITTLE INTEREST OR PLEASURE IN DOING THINGS: NOT AT ALL
SUM OF ALL RESPONSES TO PHQ9 QUESTIONS 1 & 2: 0
SUM OF ALL RESPONSES TO PHQ QUESTIONS 1-9: 0

## 2024-05-07 ASSESSMENT — ENCOUNTER SYMPTOMS
DIARRHEA: 0
ABDOMINAL PAIN: 0
SINUS PRESSURE: 0
TROUBLE SWALLOWING: 0
FACIAL SWELLING: 0
VOMITING: 0
EYE DISCHARGE: 0
CHEST TIGHTNESS: 0
BLOOD IN STOOL: 0
WHEEZING: 0
RHINORRHEA: 0
ANAL BLEEDING: 0
NAUSEA: 0
SHORTNESS OF BREATH: 0
COUGH: 0
SORE THROAT: 0

## 2024-05-07 NOTE — PROGRESS NOTES
2024     Clark Gonzales (:  1980) is a 44 y.o. male, here for evaluation of the following medical concerns:    Medication Refill  Pertinent negatives include no abdominal pain, arthralgias, chest pain, congestion, coughing, fatigue, fever, headaches, nausea, rash, sore throat or vomiting.     -need to discuss vaccinations and the need from these  -need to discuss HIV testing and basic labs.  -need to discuss healthy eating habits and exercise.   -need to discuss age appropriate screening recommendations  -need to have detailed conversation concerning QUINTEN and PSA testing     Today, denied chest pain, sob, n, v ,or diarrhea.   Review of Systems   Constitutional:  Negative for activity change, fatigue, fever and unexpected weight change.   HENT:  Negative for congestion, ear pain, facial swelling, hearing loss, rhinorrhea, sinus pressure, sore throat and trouble swallowing.    Eyes:  Negative for discharge and visual disturbance.   Respiratory:  Negative for cough, chest tightness, shortness of breath and wheezing.    Cardiovascular:  Negative for chest pain, palpitations and leg swelling.   Gastrointestinal:  Negative for abdominal pain, anal bleeding, blood in stool, diarrhea, nausea and vomiting.   Endocrine: Negative for cold intolerance, heat intolerance, polydipsia and polyphagia.   Genitourinary:  Negative for decreased urine volume, dysuria, frequency, genital sores, penile discharge, penile pain, testicular pain and urgency.   Musculoskeletal:  Negative for arthralgias.   Skin:  Negative for rash.   Allergic/Immunologic: Negative for food allergies.   Neurological:  Negative for dizziness, syncope, light-headedness and headaches.   Hematological:  Does not bruise/bleed easily.   Psychiatric/Behavioral:  Negative for suicidal ideas. The patient is not nervous/anxious.        Prior to Visit Medications    Medication Sig Taking? Authorizing Provider   atorvastatin (LIPITOR) 80 MG tablet Take 1

## 2024-05-11 NOTE — PATIENT INSTRUCTIONS
weight may be enough to improve your health.  Get family and friends involved to provide support. Talk to them about why you are trying to lose weight, and ask them to help. They can help by participating in exercise and having meals with you, even if they may be eating something different.  Find what works best for you. If you do not have time or do not like to cook, a program that offers meal replacement bars or shakes may be better for you. Or if you like to prepare meals, finding a plan that includes daily menus and recipes may be best.  Ask your doctor about other health professionals who can help you achieve your weight loss goals.  A dietitian can help you make healthy changes in your diet.  An exercise specialist or  can help you develop a safe and effective exercise program.  A counselor or psychiatrist can help you cope with issues such as depression, anxiety, or family problems that can make it hard to focus on weight loss.  Consider joining a support group for people who are trying to lose weight. Your doctor can suggest groups in your area.  Where can you learn more?  Go to https://www.[employer].net/patientEd and enter U357 to learn more about \"Starting a Weight Loss Plan: Care Instructions.\"  Current as of: September 20, 2023               Content Version: 14.0  © 2006-2024 [employer].   Care instructions adapted under license by [employer]. If you have questions about a medical condition or this instruction, always ask your healthcare professional. [employer] disclaims any warranty or liability for your use of this information.           Well Visit, Ages 18 to 65: Care Instructions  Well visits can help you stay healthy. Your doctor has checked your overall health and may have suggested ways to take good care of yourself. Your doctor also may have recommended tests. You can help prevent illness with healthy eating, good sleep, vaccinations, regular

## 2024-05-15 ENCOUNTER — HOSPITAL ENCOUNTER (EMERGENCY)
Age: 44
Discharge: HOME | End: 2024-05-15
Payer: MEDICAID

## 2024-05-15 VITALS
HEART RATE: 85 BPM | DIASTOLIC BLOOD PRESSURE: 71 MMHG | TEMPERATURE: 97.5 F | WEIGHT: 240.3 LBS | BODY MASS INDEX: 35.59 KG/M2 | RESPIRATION RATE: 16 BRPM | OXYGEN SATURATION: 99 % | SYSTOLIC BLOOD PRESSURE: 118 MMHG | HEIGHT: 69 IN

## 2024-05-15 DIAGNOSIS — R19.7 NAUSEA VOMITING AND DIARRHEA: Primary | ICD-10-CM

## 2024-05-15 DIAGNOSIS — R11.2 NAUSEA VOMITING AND DIARRHEA: Primary | ICD-10-CM

## 2024-05-15 PROCEDURE — 99283 EMERGENCY DEPT VISIT LOW MDM: CPT

## 2024-05-15 PROCEDURE — 6370000000 HC RX 637 (ALT 250 FOR IP): Performed by: PHYSICIAN ASSISTANT

## 2024-05-15 RX ORDER — ONDANSETRON 4 MG/1
4 TABLET, ORALLY DISINTEGRATING ORAL 3 TIMES DAILY PRN
Qty: 21 TABLET | Refills: 0 | Status: SHIPPED | OUTPATIENT
Start: 2024-05-15

## 2024-05-15 RX ORDER — ONDANSETRON 4 MG/1
4 TABLET, ORALLY DISINTEGRATING ORAL
Status: COMPLETED | OUTPATIENT
Start: 2024-05-15 | End: 2024-05-15

## 2024-05-15 RX ADMIN — ONDANSETRON 4 MG: 4 TABLET, ORALLY DISINTEGRATING ORAL at 13:48

## 2024-05-15 ASSESSMENT — PAIN - FUNCTIONAL ASSESSMENT: PAIN_FUNCTIONAL_ASSESSMENT: NONE - DENIES PAIN

## 2024-05-15 NOTE — DISCHARGE INSTRUCTIONS
Home in stable condition to use medications as written, drink small amounts of water frequently to stay hydrated, start advancing to easily digested foods such as applesauce and Jell-O and clear soup and eventually to light sandwiches.  Monitor for gradual improvement and follow-up with your family doctor for recheck and further care.  Return to the ER for any emergency worsening or concern.

## 2024-05-15 NOTE — ED TRIAGE NOTES
Patient to the ER with complaints of emesis x2 days. Patient says he has a hx of acid reflux and takes famotidine twice a day.  He has not missed any doses.   He also complains of diarrhea intermittently x2 days.   Denies any abdominal pain or other symptoms.   Alert and oriented x4 and ambulatory at time of triage.

## 2024-05-15 NOTE — ED PROVIDER NOTES
Pt sleeping comfortably, no drooling or airway compromise. Report called to Med 3. Pt stable for transport in crib and continuous pulse ox. JULIENNE Victoria RN
the time of this note:    No orders to display     No results found.   No results found.    MEDICAL DECISION MAKING / ED COURSE:      PROCEDURES:   Procedures    Patient was given:  Medications   ondansetron (ZOFRAN-ODT) disintegrating tablet 4 mg (has no administration in time range)       CONSULTS: (Who and What was discussed)  None      Chronic Conditions affecting care:    has a past medical history of Chronic systolic congestive heart failure (HCC) (8/4/2021), Coronary artery disease, Essential hypertension (04/07/2020), GERD (gastroesophageal reflux disease), Headache, NSTEMI (non-ST elevated myocardial infarction) ([facility]), Obesity, Other hyperlipidemia (04/07/2020), Stenosis of left subclavian artery ([facility]) (03/2021), and Tobacco abuse counseling (04/07/2020).     Records Reviewed (External and Source)     CC/HPI Summary, DDx, ED Course, and Reassessment:   This patient presents as above and evaluation and treatment has begun here.  Vital signs stable.  No current nausea or any abdominal pain on exam.  Patient without history of previous abdominal surgeries.  No fevers or vital sign acute abnormality.  No acute abdomen or indication of likely acute dehydration at this time among other issues.  Patient states that he is actually feeling pretty well right now because he has not had diarrhea for the last 45 minutes or so and no current pain or nausea or vomiting.  No other acute symptoms currently.    Disposition Considerations (Tests not ordered but considered, Shared Decision Making, Pt Expectation of Test or Tx.):   In a shared decision-making process, it is discussed with patient that in cases like this is where he looks and feels reasonably stable that addressing the nausea so that he can get some fluids and and doing something mild like Metamucil to help out with the frequent loose stools is a reasonable way to go.  No need for IV therapy or inpatient admission.  Zofran ODT offered here which patient is

## 2024-05-28 ENCOUNTER — APPOINTMENT (OUTPATIENT)
Dept: GENERAL RADIOLOGY | Age: 44
End: 2024-05-28
Payer: MEDICAID

## 2024-05-28 ENCOUNTER — APPOINTMENT (OUTPATIENT)
Dept: CT IMAGING | Age: 44
End: 2024-05-28
Payer: MEDICAID

## 2024-05-28 ENCOUNTER — HOSPITAL ENCOUNTER (EMERGENCY)
Age: 44
Discharge: HOME | End: 2024-05-28
Payer: MEDICAID

## 2024-05-28 VITALS
HEART RATE: 84 BPM | DIASTOLIC BLOOD PRESSURE: 74 MMHG | BODY MASS INDEX: 35.84 KG/M2 | SYSTOLIC BLOOD PRESSURE: 123 MMHG | RESPIRATION RATE: 21 BRPM | TEMPERATURE: 97.8 F | OXYGEN SATURATION: 98 % | WEIGHT: 242.73 LBS

## 2024-05-28 DIAGNOSIS — K21.00 GASTROESOPHAGEAL REFLUX DISEASE WITH ESOPHAGITIS WITHOUT HEMORRHAGE: ICD-10-CM

## 2024-05-28 DIAGNOSIS — K76.0 FATTY LIVER: ICD-10-CM

## 2024-05-28 DIAGNOSIS — Z87.19 HISTORY OF HEMATEMESIS: Primary | ICD-10-CM

## 2024-05-28 DIAGNOSIS — Z91.89 EXCESSIVE CONSUMPTION OF SODA POP: ICD-10-CM

## 2024-05-28 LAB
ALBUMIN SERPL-MCNC: 4.1 G/DL (ref 3.4–5)
ALP SERPL-CCNC: 118 U/L (ref 40–129)
ALT SERPL-CCNC: 12 U/L (ref 10–40)
ANION GAP SERPL CALCULATED.3IONS-SCNC: 11 MMOL/L (ref 3–16)
AST SERPL-CCNC: 17 U/L (ref 15–37)
BASOPHILS # BLD: 0.1 K/UL (ref 0–0.2)
BASOPHILS NFR BLD: 1 %
BILIRUB DIRECT SERPL-MCNC: <0.2 MG/DL (ref 0–0.3)
BILIRUB INDIRECT SERPL-MCNC: NORMAL MG/DL (ref 0–1)
BILIRUB SERPL-MCNC: 0.4 MG/DL (ref 0–1)
BILIRUB UR QL STRIP.AUTO: NEGATIVE
BUN SERPL-MCNC: 11 MG/DL (ref 7–20)
CALCIUM SERPL-MCNC: 9.3 MG/DL (ref 8.3–10.6)
CHLORIDE SERPL-SCNC: 101 MMOL/L (ref 99–110)
CLARITY UR: CLEAR
CO2 SERPL-SCNC: 24 MMOL/L (ref 21–32)
COLOR UR: YELLOW
CREAT SERPL-MCNC: 0.7 MG/DL (ref 0.9–1.3)
DEPRECATED RDW RBC AUTO: 13.6 % (ref 12.4–15.4)
EOSINOPHIL # BLD: 0.1 K/UL (ref 0–0.6)
EOSINOPHIL NFR BLD: 1.5 %
GFR SERPLBLD CREATININE-BSD FMLA CKD-EPI: >90 ML/MIN/{1.73_M2}
GLUCOSE SERPL-MCNC: 147 MG/DL (ref 70–99)
GLUCOSE UR STRIP.AUTO-MCNC: NEGATIVE MG/DL
HCT VFR BLD AUTO: 52.2 % (ref 40.5–52.5)
HGB BLD-MCNC: 17.5 G/DL (ref 13.5–17.5)
HGB UR QL STRIP.AUTO: NEGATIVE
KETONES UR STRIP.AUTO-MCNC: NEGATIVE MG/DL
LEUKOCYTE ESTERASE UR QL STRIP.AUTO: NEGATIVE
LIPASE SERPL-CCNC: 42 U/L (ref 13–60)
LYMPHOCYTES # BLD: 2.7 K/UL (ref 1–5.1)
LYMPHOCYTES NFR BLD: 27 %
MACROCYTES BLD QL SMEAR: ABNORMAL
MCH RBC QN AUTO: 29.4 PG (ref 26–34)
MCHC RBC AUTO-ENTMCNC: 33.5 G/DL (ref 31–36)
MCV RBC AUTO: 87.9 FL (ref 80–100)
MONOCYTES # BLD: 0.7 K/UL (ref 0–1.3)
MONOCYTES NFR BLD: 7.1 %
NEUTROPHILS # BLD: 6.4 K/UL (ref 1.7–7.7)
NEUTROPHILS NFR BLD: 63.4 %
NITRITE UR QL STRIP.AUTO: NEGATIVE
PH UR STRIP.AUTO: 5 [PH] (ref 5–8)
PLATELET # BLD AUTO: 254 K/UL (ref 135–450)
PLATELET BLD QL SMEAR: ADEQUATE
PMV BLD AUTO: 8.8 FL (ref 5–10.5)
POLYCHROMASIA BLD QL SMEAR: ABNORMAL
POTASSIUM SERPL-SCNC: 4.3 MMOL/L (ref 3.5–5.1)
PROT SERPL-MCNC: 7.6 G/DL (ref 6.4–8.2)
PROT UR STRIP.AUTO-MCNC: NEGATIVE MG/DL
RBC # BLD AUTO: 5.94 M/UL (ref 4.2–5.9)
SLIDE REVIEW: ABNORMAL
SODIUM SERPL-SCNC: 136 MMOL/L (ref 136–145)
SP GR UR STRIP.AUTO: 1.07 (ref 1–1.03)
TROPONIN, HIGH SENSITIVITY: 7 NG/L (ref 0–22)
TROPONIN, HIGH SENSITIVITY: 8 NG/L (ref 0–22)
UA COMPLETE W REFLEX CULTURE PNL UR: NORMAL
UA DIPSTICK W REFLEX MICRO PNL UR: NORMAL
URN SPEC COLLECT METH UR: NORMAL
UROBILINOGEN UR STRIP-ACNC: 1 E.U./DL
WBC # BLD AUTO: 10.1 K/UL (ref 4–11)

## 2024-05-28 PROCEDURE — 84484 ASSAY OF TROPONIN QUANT: CPT

## 2024-05-28 PROCEDURE — 81003 URINALYSIS AUTO W/O SCOPE: CPT

## 2024-05-28 PROCEDURE — 2500000003 HC RX 250 WO HCPCS: Performed by: PHYSICIAN ASSISTANT

## 2024-05-28 PROCEDURE — 99285 EMERGENCY DEPT VISIT HI MDM: CPT

## 2024-05-28 PROCEDURE — A4216 STERILE WATER/SALINE, 10 ML: HCPCS | Performed by: PHYSICIAN ASSISTANT

## 2024-05-28 PROCEDURE — 80048 BASIC METABOLIC PNL TOTAL CA: CPT

## 2024-05-28 PROCEDURE — 85025 COMPLETE CBC W/AUTO DIFF WBC: CPT

## 2024-05-28 PROCEDURE — 96375 TX/PRO/DX INJ NEW DRUG ADDON: CPT

## 2024-05-28 PROCEDURE — 83690 ASSAY OF LIPASE: CPT

## 2024-05-28 PROCEDURE — 2580000003 HC RX 258: Performed by: PHYSICIAN ASSISTANT

## 2024-05-28 PROCEDURE — 6370000000 HC RX 637 (ALT 250 FOR IP): Performed by: PHYSICIAN ASSISTANT

## 2024-05-28 PROCEDURE — 80076 HEPATIC FUNCTION PANEL: CPT

## 2024-05-28 PROCEDURE — 96374 THER/PROPH/DIAG INJ IV PUSH: CPT

## 2024-05-28 PROCEDURE — 71045 X-RAY EXAM CHEST 1 VIEW: CPT

## 2024-05-28 PROCEDURE — 6360000002 HC RX W HCPCS: Performed by: PHYSICIAN ASSISTANT

## 2024-05-28 PROCEDURE — 6360000004 HC RX CONTRAST MEDICATION: Performed by: PHYSICIAN ASSISTANT

## 2024-05-28 PROCEDURE — 93005 ELECTROCARDIOGRAM TRACING: CPT | Performed by: PHYSICIAN ASSISTANT

## 2024-05-28 PROCEDURE — 74177 CT ABD & PELVIS W/CONTRAST: CPT

## 2024-05-28 RX ORDER — METOCLOPRAMIDE 10 MG/1
10 TABLET ORAL NIGHTLY
Qty: 15 TABLET | Refills: 0 | Status: SHIPPED | OUTPATIENT
Start: 2024-05-28

## 2024-05-28 RX ORDER — MAGNESIUM HYDROXIDE/ALUMINUM HYDROXICE/SIMETHICONE 120; 1200; 1200 MG/30ML; MG/30ML; MG/30ML
30 SUSPENSION ORAL ONCE
Status: COMPLETED | OUTPATIENT
Start: 2024-05-28 | End: 2024-05-28

## 2024-05-28 RX ORDER — FAMOTIDINE 20 MG/1
20 TABLET, FILM COATED ORAL 2 TIMES DAILY
Qty: 30 TABLET | Refills: 0 | Status: SHIPPED | OUTPATIENT
Start: 2024-05-28 | End: 2024-06-12

## 2024-05-28 RX ORDER — SUCRALFATE 1 G/1
1 TABLET ORAL 3 TIMES DAILY
Qty: 21 TABLET | Refills: 0 | Status: SHIPPED | OUTPATIENT
Start: 2024-05-28 | End: 2024-06-04

## 2024-05-28 RX ORDER — ONDANSETRON 2 MG/ML
4 INJECTION INTRAMUSCULAR; INTRAVENOUS ONCE
Status: COMPLETED | OUTPATIENT
Start: 2024-05-28 | End: 2024-05-28

## 2024-05-28 RX ORDER — ONDANSETRON 4 MG/1
4 TABLET, ORALLY DISINTEGRATING ORAL EVERY 8 HOURS PRN
Qty: 10 TABLET | Refills: 0 | Status: SHIPPED | OUTPATIENT
Start: 2024-05-28

## 2024-05-28 RX ORDER — PANTOPRAZOLE SODIUM 20 MG/1
20 TABLET, DELAYED RELEASE ORAL 2 TIMES DAILY
Qty: 60 TABLET | Refills: 0 | Status: SHIPPED | OUTPATIENT
Start: 2024-05-28

## 2024-05-28 RX ADMIN — IOPAMIDOL 75 ML: 755 INJECTION, SOLUTION INTRAVENOUS at 11:07

## 2024-05-28 RX ADMIN — FAMOTIDINE 20 MG: 10 INJECTION, SOLUTION INTRAVENOUS at 10:31

## 2024-05-28 RX ADMIN — ALUMINUM HYDROXIDE, MAGNESIUM HYDROXIDE, AND SIMETHICONE 30 ML: 1200; 120; 1200 SUSPENSION ORAL at 10:31

## 2024-05-28 RX ADMIN — ONDANSETRON 4 MG: 2 INJECTION INTRAMUSCULAR; INTRAVENOUS at 10:31

## 2024-05-28 ASSESSMENT — LIFESTYLE VARIABLES: HOW OFTEN DO YOU HAVE A DRINK CONTAINING ALCOHOL: NEVER

## 2024-05-28 ASSESSMENT — PAIN DESCRIPTION - DESCRIPTORS: DESCRIPTORS: CRAMPING

## 2024-05-28 ASSESSMENT — PAIN SCALES - GENERAL
PAINLEVEL_OUTOF10: 2
PAINLEVEL_OUTOF10: 2

## 2024-05-28 ASSESSMENT — PAIN DESCRIPTION - ORIENTATION: ORIENTATION: LOWER

## 2024-05-28 ASSESSMENT — PAIN DESCRIPTION - LOCATION: LOCATION: ABDOMEN

## 2024-05-28 NOTE — DISCHARGE INSTRUCTIONS
I do believe retching resulted in blood product during emesis this morning.  You did not have emesis here in the emergency department.  I do understand GERD and excessive soda intake.  I do recommend reduce soda intake by 50%.  I do recommend medications as prescribed.  Carafate 1 g 3 times a day for 1 week, Pepcid 20 mg twice a day for 2 weeks and Protonix 20 mg twice a day for 1 month.  I do recommend contact GI for evaluation and consider EGD.  I did prescribe Reglan 10 mg take this at bedtime prevent morning nausea.  I did prescribe Zofran ODT for daytime nausea.  Your abdominal pelvic CT scan did not show any concerning pathology.

## 2024-05-28 NOTE — ED PROVIDER NOTES
[facility] EMERGENCY DEPARTMENT  EMERGENCY DEPARTMENT ENCOUNTER        Pt Name: Clark Gonzales  MRN: [id number]  Birthdate 1980  Date of evaluation: 5/28/2024  Provider: Arley Weber PA-C  PCP: Tulio Aviles DO  Note Started: 9:47 AM EDT 5/28/24      SHAQ. I have evaluated this patient.        CHIEF COMPLAINT       Chief Complaint   Patient presents with    Hematemesis     Vomiting blood since this AM, says it is \"diluted red,\" states he has mild lower ABD cramping.        HISTORY OF PRESENT ILLNESS: 1 or more Elements     History From: Patient    Clark Gonzales is a 44 y.o. male who presents to the emergency department with complaint of blood in emesis.  Patient has occasional morning bouts of emesis unclear etiology.  He has not seen GI.  Patient states followed by PCP with high soda intake and a lot of GERD and does take Pepcid 10 mg twice daily under direction of PCP.  Patient reports waking at 8 AM this morning.  He is to be at work at 7 AM.  He indicates he was in a rather hurry to try to get to work.  States he woke in started to get ready had some nausea with emesis x 2.  Completed getting ready and had a third episode of emesis this time he had some blood streaks secondary to the retching.  He did take his morning meds without event and has not had emesis since that time.  This all occurring about between 8 AM and 8:30 AM.  Assessment time approximately 9:30 AM.  No longer nauseous.  Has mild epigastric discomfort and left lower quadrant discomfort.  No diarrhea or constipation.  No urinary complaints.  No chest pain or shortness of breath.  He is followed by Dr. Morelos with history of MI and CABG x 4.  He continues to smoke.    Patient's record does indicate that he is on aspirin 81 as well as Plavix 75 both daily.    He does indicate that he was told by cardiology Dr. Morelos to go to ER if ever has blood in emesis or stool product.    Nursing Notes were all reviewed and

## 2024-05-29 LAB
EKG ATRIAL RATE: 97 BPM
EKG DIAGNOSIS: NORMAL
EKG P AXIS: 52 DEGREES
EKG P-R INTERVAL: 140 MS
EKG Q-T INTERVAL: 368 MS
EKG QRS DURATION: 94 MS
EKG QTC CALCULATION (BAZETT): 467 MS
EKG R AXIS: 62 DEGREES
EKG T AXIS: 59 DEGREES
EKG VENTRICULAR RATE: 97 BPM

## 2024-05-29 PROCEDURE — 93010 ELECTROCARDIOGRAM REPORT: CPT | Performed by: INTERNAL MEDICINE

## 2024-06-05 ENCOUNTER — HOSPITAL ENCOUNTER (EMERGENCY)
Age: 44
Discharge: HOME | End: 2024-06-05
Payer: MEDICAID

## 2024-06-05 VITALS
SYSTOLIC BLOOD PRESSURE: 124 MMHG | RESPIRATION RATE: 16 BRPM | WEIGHT: 246.03 LBS | BODY MASS INDEX: 36.33 KG/M2 | TEMPERATURE: 98 F | OXYGEN SATURATION: 95 % | HEART RATE: 82 BPM | DIASTOLIC BLOOD PRESSURE: 80 MMHG

## 2024-06-05 DIAGNOSIS — K62.5 RECTAL BLEEDING: Primary | ICD-10-CM

## 2024-06-05 PROCEDURE — 99282 EMERGENCY DEPT VISIT SF MDM: CPT

## 2024-06-05 ASSESSMENT — ENCOUNTER SYMPTOMS
RESPIRATORY NEGATIVE: 1
NAUSEA: 0
DIARRHEA: 0
BLOOD IN STOOL: 1
RECTAL PAIN: 0
ABDOMINAL PAIN: 0
CONSTIPATION: 0
VOMITING: 0

## 2024-06-05 ASSESSMENT — LIFESTYLE VARIABLES
HOW OFTEN DO YOU HAVE A DRINK CONTAINING ALCOHOL: NEVER
HOW MANY STANDARD DRINKS CONTAINING ALCOHOL DO YOU HAVE ON A TYPICAL DAY: PATIENT DOES NOT DRINK

## 2024-06-05 NOTE — ED PROVIDER NOTES
[facility] EMERGENCY DEPARTMENT  [address] [facility] [address]  Dept: [phone]  Loc: [phone]  eMERGENCYdEPARTMENT eNCOUnter      Pt Name: Clark Gonzales  MRN: [id number]  Birthdate 1980  Date of evaluation: 6/5/2024  Provider:LACHELLE Castellon CNP      Independently seen and evaluated by the advanced practice provider  CHIEF COMPLAINT       Chief Complaint   Patient presents with    Rectal Bleeding     Patient noticed bright red blood \"just today\" on toilet paper. Denies any other symptoms.          CRITICAL CARE TIME       HISTORY OF PRESENT ILLNESS  (Location/Symptom, Timing/Onset, Context/Setting, Quality, Duration,Modifying Factors, Severity.)   Clark Gonzales is a 44 y.o. male who  has a past medical history of Chronic systolic congestive heart failure ([facility]), Coronary artery disease, Essential hypertension, GERD (gastroesophageal reflux disease), Headache, NSTEMI (non-ST elevated myocardial infarction) ([facility]), Obesity, Other hyperlipidemia, Stenosis of left subclavian artery ([facility]), and Tobacco abuse counseling.       He presents to the ED today after having a bowel movement at work and seeing a small amount of bright red blood mixed with stool on the toilet paper. He has never had blood in his stool before and panicked, prompting him to come to the ER. His only other symptom is that he feels his rectum is \"bubbling up\" every time he has a bowel movement. Denies rectal pain, abdominal pain, N/V/D, dysuria, urinary frequency, hematuria. He has never had a colonoscopy. Denies history of diverticulosis or hemorrhoids. Denies use of anticoagulants.     .  Patient denies any known family history of colorectal cancer or any cancers that he is aware of  His doctor sent him a package for the Cologuard screening.    History provided by the patient.   Anticoagulation therapy: None  Antiplatelet therapy: Aspirin and clopidogrel  Social

## 2024-06-05 NOTE — ED NOTES
Provider order placed for patient's discharge. Provider reviewed decision to discharge with the patient. Discharge paperwork and any prescriptions were reviewed with the patient. Patient verbalized understanding of discharge education and any prescriptions and has no further questions or further needs at this time. Patient left with all personal belongings and was stable upon departure. Patient thanked for choosing [facility] and informed to return should any need arise.

## 2024-06-06 ENCOUNTER — APPOINTMENT (OUTPATIENT)
Dept: GENERAL RADIOLOGY | Age: 44
End: 2024-06-06
Payer: MEDICAID

## 2024-06-06 ENCOUNTER — HOSPITAL ENCOUNTER (EMERGENCY)
Age: 44
Discharge: HOME | End: 2024-06-06
Payer: MEDICAID

## 2024-06-06 VITALS
HEIGHT: 69 IN | RESPIRATION RATE: 17 BRPM | HEART RATE: 87 BPM | DIASTOLIC BLOOD PRESSURE: 78 MMHG | SYSTOLIC BLOOD PRESSURE: 122 MMHG | OXYGEN SATURATION: 98 % | TEMPERATURE: 98 F | BODY MASS INDEX: 36.34 KG/M2 | WEIGHT: 245.37 LBS

## 2024-06-06 DIAGNOSIS — R07.9 CHEST PAIN, UNSPECIFIED TYPE: Primary | ICD-10-CM

## 2024-06-06 DIAGNOSIS — K21.9 GASTROESOPHAGEAL REFLUX DISEASE, UNSPECIFIED WHETHER ESOPHAGITIS PRESENT: ICD-10-CM

## 2024-06-06 LAB
ALBUMIN SERPL-MCNC: 4.1 G/DL (ref 3.4–5)
ALBUMIN/GLOB SERPL: 1.3 {RATIO} (ref 1.1–2.2)
ALP SERPL-CCNC: 123 U/L (ref 40–129)
ALT SERPL-CCNC: 12 U/L (ref 10–40)
ANION GAP SERPL CALCULATED.3IONS-SCNC: 11 MMOL/L (ref 3–16)
AST SERPL-CCNC: 20 U/L (ref 15–37)
BASOPHILS # BLD: 0.1 K/UL (ref 0–0.2)
BASOPHILS NFR BLD: 1.2 %
BILIRUB SERPL-MCNC: 0.3 MG/DL (ref 0–1)
BUN SERPL-MCNC: 10 MG/DL (ref 7–20)
CALCIUM SERPL-MCNC: 9.8 MG/DL (ref 8.3–10.6)
CHLORIDE SERPL-SCNC: 104 MMOL/L (ref 99–110)
CO2 SERPL-SCNC: 23 MMOL/L (ref 21–32)
CREAT SERPL-MCNC: 0.7 MG/DL (ref 0.9–1.3)
DEPRECATED RDW RBC AUTO: 13.8 % (ref 12.4–15.4)
EOSINOPHIL # BLD: 0.2 K/UL (ref 0–0.6)
EOSINOPHIL NFR BLD: 1.6 %
GFR SERPLBLD CREATININE-BSD FMLA CKD-EPI: >90 ML/MIN/{1.73_M2}
GLUCOSE SERPL-MCNC: 153 MG/DL (ref 70–99)
HCT VFR BLD AUTO: 52.4 % (ref 40.5–52.5)
HGB BLD-MCNC: 17.7 G/DL (ref 13.5–17.5)
LIPASE SERPL-CCNC: 59 U/L (ref 13–60)
LYMPHOCYTES # BLD: 3 K/UL (ref 1–5.1)
LYMPHOCYTES NFR BLD: 28.6 %
MCH RBC QN AUTO: 29.7 PG (ref 26–34)
MCHC RBC AUTO-ENTMCNC: 33.7 G/DL (ref 31–36)
MCV RBC AUTO: 88.2 FL (ref 80–100)
MONOCYTES # BLD: 0.7 K/UL (ref 0–1.3)
MONOCYTES NFR BLD: 6.9 %
NEUTROPHILS # BLD: 6.6 K/UL (ref 1.7–7.7)
NEUTROPHILS NFR BLD: 61.7 %
NT-PROBNP SERPL-MCNC: 95 PG/ML (ref 0–124)
PLATELET # BLD AUTO: 255 K/UL (ref 135–450)
PMV BLD AUTO: 9.3 FL (ref 5–10.5)
POTASSIUM SERPL-SCNC: 4.4 MMOL/L (ref 3.5–5.1)
PROT SERPL-MCNC: 7.3 G/DL (ref 6.4–8.2)
RBC # BLD AUTO: 5.95 M/UL (ref 4.2–5.9)
SODIUM SERPL-SCNC: 138 MMOL/L (ref 136–145)
TROPONIN, HIGH SENSITIVITY: 7 NG/L (ref 0–22)
TROPONIN, HIGH SENSITIVITY: <6 NG/L (ref 0–22)
WBC # BLD AUTO: 10.7 K/UL (ref 4–11)

## 2024-06-06 PROCEDURE — 93005 ELECTROCARDIOGRAM TRACING: CPT | Performed by: EMERGENCY MEDICINE

## 2024-06-06 PROCEDURE — 71046 X-RAY EXAM CHEST 2 VIEWS: CPT

## 2024-06-06 PROCEDURE — 80053 COMPREHEN METABOLIC PANEL: CPT

## 2024-06-06 PROCEDURE — 99285 EMERGENCY DEPT VISIT HI MDM: CPT

## 2024-06-06 PROCEDURE — 83690 ASSAY OF LIPASE: CPT

## 2024-06-06 PROCEDURE — 85025 COMPLETE CBC W/AUTO DIFF WBC: CPT

## 2024-06-06 PROCEDURE — 83880 ASSAY OF NATRIURETIC PEPTIDE: CPT

## 2024-06-06 PROCEDURE — 84484 ASSAY OF TROPONIN QUANT: CPT

## 2024-06-06 RX ORDER — SUCRALFATE 1 G/1
1 TABLET ORAL 3 TIMES DAILY
Qty: 120 TABLET | Refills: 3 | Status: SHIPPED | OUTPATIENT
Start: 2024-06-06

## 2024-06-06 RX ORDER — PANTOPRAZOLE SODIUM 40 MG/1
40 TABLET, DELAYED RELEASE ORAL
Qty: 90 TABLET | Refills: 1 | Status: SHIPPED | OUTPATIENT
Start: 2024-06-06

## 2024-06-06 RX ORDER — FAMOTIDINE 20 MG/1
20 TABLET, FILM COATED ORAL 2 TIMES DAILY
Qty: 60 TABLET | Refills: 0 | Status: SHIPPED | OUTPATIENT
Start: 2024-06-06

## 2024-06-06 ASSESSMENT — LIFESTYLE VARIABLES
HOW OFTEN DO YOU HAVE A DRINK CONTAINING ALCOHOL: MONTHLY OR LESS
HOW MANY STANDARD DRINKS CONTAINING ALCOHOL DO YOU HAVE ON A TYPICAL DAY: 3 OR 4

## 2024-06-06 ASSESSMENT — PAIN - FUNCTIONAL ASSESSMENT: PAIN_FUNCTIONAL_ASSESSMENT: NONE - DENIES PAIN

## 2024-06-06 ASSESSMENT — HEART SCORE
ECG: NORMAL
ECG: NORMAL

## 2024-06-06 NOTE — DISCHARGE INSTRUCTIONS
Chest Pain     WHAT YOU SHOULD KNOW:   Chest pain is any discomfort between your belly and your neck. The discomfort may stay in your chest or radiate (move or travel) to other places, such as your back. You may have pain, tightness, burning, or pressure in your chest. You may only have chest pain after eating. Your chest pain may be sharp or dull. The chest pain may range from mild to severe. You may have other symptoms with your chest pain, such as nausea (upset stomach) or vomiting (throwing up). Many different things may cause chest pain. Since chest pain may be a sign of a serious health problem, it is important that new chest pain is checked by a medical caregiver. You may need tests to find the cause of your pain. Your treatment may depend on what is causing your chest pain.    RETURN TO THE ER IMMEDIATELY IF   ** Call 9-1-1 or 0 for an ambulance right away if you have any of the following symptoms.   ** Never try to drive yourself to the hospital if you have signs of a serious health problem.   Your chest discomfort does not go away after resting and taking your chest pain medicine as directed.    You have new or worsening chest pain, tightness, or discomfort that lasts longer than 15 to 20 minutes.     You have chest discomfort and feel lightheaded, dizzy, weak, or faint.    You have chest discomfort and suddenly start sweating for no reason that you know of.    You have nausea or vomiting with your chest discomfort.    You have new or worsening trouble breathing.    You lose feeling or movement in your face, arms, or legs, or suddenly feel weak.     You suddenly have trouble thinking clearly, seeing, or speaking.    You cough or vomit blood.

## 2024-06-06 NOTE — ED PROVIDER NOTES
[facility] EMERGENCY DEPARTMENT  EMERGENCY DEPARTMENT ENCOUNTER        Pt Name: Clark Gonzales  MRN: [id number]  Birthdate 1980  Date of evaluation: 6/6/2024  Provider: MERNA Bearden  PCP: Tulio Aviles DO  Note Started: 2:47 PM EDT 6/6/24      SHAQ. I have evaluated this patient.        CHIEF COMPLAINT       Chief Complaint   Patient presents with    Chest Pain     Pt to ED from home c/o left sided chest pain. Pt states \"i feel like my nervous system is messed up. I feel like  it's my chest not my heart\".        HISTORY OF PRESENT ILLNESS: 1 or more Elements     History from : Patient    Limitations to history : None    Clark Gonzales is a 44 y.o. male with past medical history of coronary artery disease, tobacco abuse, congestive heart failure, hypertension, hyperlipidemia, GERD on aspirin and Plavix, stenosis of left subclavian artery, seen in the ER yesterday for painless BRBPR declined workup in the ER given outpatient referrals, who presents today for chest pain.   ED Course as of 06/06/24 1727   Thu Jun 06, 2024   1456 Chest pain started eaerlier this am in the shower. Doubled over in pain. He drank water and has taken his famitodine and it helped. His epigastrium is still bothering him. He wants his famotidine increased.    [CS]   1459 No etoh. No coughing up blood. No rad of pain. This am he was sob with the pain but not this afternoon. No diaphoresis not exertional.   No recent change in diet   Does not have a GI doctor.   He takes all his meds.  [CS]      ED Course User Index  [CS] Svetlana Veliz PA   PT notes that he really needs a work note because he was in pain this am. He notes he does not have any pain presently and wants his PPI increased. He notes this does not feel like his cardiac pain and he does not want to stay in the hospital even if he is found to be with abnormal cardiac workup but is submitting to workup in the ER right now.   He does not want any

## 2024-06-07 LAB
EKG ATRIAL RATE: 77 BPM
EKG DIAGNOSIS: NORMAL
EKG P AXIS: 65 DEGREES
EKG P-R INTERVAL: 154 MS
EKG Q-T INTERVAL: 394 MS
EKG QRS DURATION: 94 MS
EKG QTC CALCULATION (BAZETT): 445 MS
EKG R AXIS: 70 DEGREES
EKG T AXIS: 65 DEGREES
EKG VENTRICULAR RATE: 77 BPM

## 2024-06-07 PROCEDURE — 93010 ELECTROCARDIOGRAM REPORT: CPT | Performed by: INTERNAL MEDICINE

## 2024-06-10 ENCOUNTER — HOSPITAL ENCOUNTER (EMERGENCY)
Age: 44
Discharge: HOME | End: 2024-06-10
Attending: EMERGENCY MEDICINE
Payer: MEDICAID

## 2024-06-10 ENCOUNTER — APPOINTMENT (OUTPATIENT)
Dept: CT IMAGING | Age: 44
End: 2024-06-10
Payer: MEDICAID

## 2024-06-10 VITALS
DIASTOLIC BLOOD PRESSURE: 79 MMHG | SYSTOLIC BLOOD PRESSURE: 123 MMHG | BODY MASS INDEX: 35.81 KG/M2 | RESPIRATION RATE: 18 BRPM | WEIGHT: 242.51 LBS | TEMPERATURE: 97.5 F | HEART RATE: 71 BPM | OXYGEN SATURATION: 96 %

## 2024-06-10 DIAGNOSIS — H81.10 BENIGN PAROXYSMAL POSITIONAL VERTIGO, UNSPECIFIED LATERALITY: Primary | ICD-10-CM

## 2024-06-10 LAB
ANION GAP SERPL CALCULATED.3IONS-SCNC: 10 MMOL/L (ref 3–16)
BASOPHILS # BLD: 0.2 K/UL (ref 0–0.2)
BASOPHILS NFR BLD: 1.8 %
BUN SERPL-MCNC: 11 MG/DL (ref 7–20)
CALCIUM SERPL-MCNC: 9.2 MG/DL (ref 8.3–10.6)
CHLORIDE SERPL-SCNC: 101 MMOL/L (ref 99–110)
CO2 SERPL-SCNC: 24 MMOL/L (ref 21–32)
CREAT SERPL-MCNC: 0.7 MG/DL (ref 0.9–1.3)
DEPRECATED RDW RBC AUTO: 13.8 % (ref 12.4–15.4)
EOSINOPHIL # BLD: 0.2 K/UL (ref 0–0.6)
EOSINOPHIL NFR BLD: 1.4 %
GFR SERPLBLD CREATININE-BSD FMLA CKD-EPI: >90 ML/MIN/{1.73_M2}
GLUCOSE SERPL-MCNC: 123 MG/DL (ref 70–99)
HCT VFR BLD AUTO: 51.5 % (ref 40.5–52.5)
HGB BLD-MCNC: 17.5 G/DL (ref 13.5–17.5)
LYMPHOCYTES # BLD: 3.2 K/UL (ref 1–5.1)
LYMPHOCYTES NFR BLD: 26.9 %
MCH RBC QN AUTO: 29.7 PG (ref 26–34)
MCHC RBC AUTO-ENTMCNC: 34 G/DL (ref 31–36)
MCV RBC AUTO: 87.5 FL (ref 80–100)
MONOCYTES # BLD: 0.8 K/UL (ref 0–1.3)
MONOCYTES NFR BLD: 6.9 %
NEUTROPHILS # BLD: 7.5 K/UL (ref 1.7–7.7)
NEUTROPHILS NFR BLD: 63 %
PLATELET # BLD AUTO: 246 K/UL (ref 135–450)
PMV BLD AUTO: 8.7 FL (ref 5–10.5)
POTASSIUM SERPL-SCNC: 4.4 MMOL/L (ref 3.5–5.1)
RBC # BLD AUTO: 5.89 M/UL (ref 4.2–5.9)
SODIUM SERPL-SCNC: 135 MMOL/L (ref 136–145)
WBC # BLD AUTO: 11.9 K/UL (ref 4–11)

## 2024-06-10 PROCEDURE — 36415 COLL VENOUS BLD VENIPUNCTURE: CPT

## 2024-06-10 PROCEDURE — 80048 BASIC METABOLIC PNL TOTAL CA: CPT

## 2024-06-10 PROCEDURE — 6370000000 HC RX 637 (ALT 250 FOR IP): Performed by: EMERGENCY MEDICINE

## 2024-06-10 PROCEDURE — 93005 ELECTROCARDIOGRAM TRACING: CPT | Performed by: EMERGENCY MEDICINE

## 2024-06-10 PROCEDURE — 99284 EMERGENCY DEPT VISIT MOD MDM: CPT

## 2024-06-10 PROCEDURE — 70450 CT HEAD/BRAIN W/O DYE: CPT

## 2024-06-10 PROCEDURE — 85025 COMPLETE CBC W/AUTO DIFF WBC: CPT

## 2024-06-10 RX ORDER — MECLIZINE HCL 12.5 MG/1
25 TABLET ORAL ONCE
Status: COMPLETED | OUTPATIENT
Start: 2024-06-10 | End: 2024-06-10

## 2024-06-10 RX ORDER — MECLIZINE HYDROCHLORIDE 25 MG/1
25 TABLET ORAL 3 TIMES DAILY PRN
Qty: 30 TABLET | Refills: 0 | Status: SHIPPED | OUTPATIENT
Start: 2024-06-10 | End: 2024-06-20

## 2024-06-10 RX ADMIN — MECLIZINE 25 MG: 12.5 TABLET ORAL at 14:35

## 2024-06-10 ASSESSMENT — LIFESTYLE VARIABLES
HOW OFTEN DO YOU HAVE A DRINK CONTAINING ALCOHOL: PATIENT DECLINED
HOW MANY STANDARD DRINKS CONTAINING ALCOHOL DO YOU HAVE ON A TYPICAL DAY: PATIENT DECLINED

## 2024-06-10 ASSESSMENT — PAIN - FUNCTIONAL ASSESSMENT
PAIN_FUNCTIONAL_ASSESSMENT: NONE - DENIES PAIN
PAIN_FUNCTIONAL_ASSESSMENT: NONE - DENIES PAIN

## 2024-06-10 NOTE — ED TRIAGE NOTES
Pt arrived via car from home for c/o dizziness when goes from lying to sitting, has a hx of vertigo. Pt awake, alert, and oriented x3. Skin warm, dry, and color appropriate for ethnicity. Respirations easy and unlabored.  Pt denies further needs at this time.

## 2024-06-10 NOTE — ED PROVIDER NOTES
[facility] EMERGENCY DEPARTMENT  eMERGENCY dEPARTMENT eNCOUnter      Pt Name: Clark Gonzales  MRN: [id number]  Birthdate 1980  Date of evaluation: 6/10/2024  Provider: AZEB NEWELL MD    CHIEF COMPLAINT       Chief Complaint   Patient presents with    Dizziness     Pt states that his head is spinning, when he closes his eyes the spinning stops, pt states he fell back on the bed when he was sitting in it.          CRITICAL CARE TIME   Total Critical Care time was 0 minutes, excluding separately reportable procedures.  There was a high probability of clinically significant/life threatening deterioration in the patient's condition which required my urgent intervention.        HISTORY OF PRESENT ILLNESS  (Location/Symptom, Timing/Onset, Context/Setting, Quality, Duration, Modifying Factors, Severity.)   History From: Patient  Limitations to history : None    Clark Gonzales is a 44 y.o. male who presents to the emergency department complaining of intermittent dizziness for 3 weeks.  He states the first time he noticed it was about 3 weeks ago.  He was playing a video game.  He states he had a sudden spinning sensation.  He states it lasted a couple minutes.  He states when he closes eyes for few minutes it went away.  He states it happened twice again last week and again this morning.  It never lasted longer than 5 minutes.  He states when he would close his eyes and sit still it would go away.  No visual symptoms.  No extremity weakness numbness or paresthesias.  No speech difficulty.      Nursing Notes were reviewed and I agree.      SCREENINGS        [place] Coma Scale  Eye Opening: Spontaneous  Best Verbal Response: Oriented  Best Motor Response: Obeys commands  [place] Coma Scale Score: 15                CIWA Assessment  BP: 123/79  Pulse: 71           REVIEW OF SYSTEMS    (2-9 systems for level 4, 10 or more for level 5)     HEENT: No visual complaints.  No facial numbness or

## 2024-06-10 NOTE — DISCHARGE INSTRUCTIONS
Take the meclizine 3 times daily as needed for dizziness.    For continued dizziness follow-up with ENT.  You can return here at anytime for worsening of symptoms or new symptoms of concern.

## 2024-06-11 LAB
EKG ATRIAL RATE: 73 BPM
EKG DIAGNOSIS: NORMAL
EKG P AXIS: 58 DEGREES
EKG P-R INTERVAL: 160 MS
EKG Q-T INTERVAL: 404 MS
EKG QRS DURATION: 98 MS
EKG QTC CALCULATION (BAZETT): 445 MS
EKG R AXIS: 46 DEGREES
EKG T AXIS: 52 DEGREES
EKG VENTRICULAR RATE: 73 BPM

## 2024-06-11 PROCEDURE — 93010 ELECTROCARDIOGRAM REPORT: CPT | Performed by: INTERNAL MEDICINE

## 2024-10-18 ENCOUNTER — APPOINTMENT (OUTPATIENT)
Dept: GENERAL RADIOLOGY | Age: 44
End: 2024-10-18
Payer: MEDICAID

## 2024-10-18 ENCOUNTER — HOSPITAL ENCOUNTER (EMERGENCY)
Age: 44
Discharge: HOME | End: 2024-10-18
Attending: EMERGENCY MEDICINE | Admitting: INTERNAL MEDICINE
Payer: MEDICAID

## 2024-10-18 VITALS
BODY MASS INDEX: 35.53 KG/M2 | HEIGHT: 69 IN | OXYGEN SATURATION: 94 % | TEMPERATURE: 98.3 F | HEART RATE: 95 BPM | DIASTOLIC BLOOD PRESSURE: 146 MMHG | RESPIRATION RATE: 29 BRPM | SYSTOLIC BLOOD PRESSURE: 167 MMHG | WEIGHT: 239.86 LBS

## 2024-10-18 DIAGNOSIS — R07.89 ATYPICAL CHEST PAIN: Primary | ICD-10-CM

## 2024-10-18 LAB
ANION GAP SERPL CALCULATED.3IONS-SCNC: 11 MMOL/L (ref 3–16)
BASOPHILS # BLD: 0.1 K/UL (ref 0–0.2)
BASOPHILS NFR BLD: 0.8 %
BUN SERPL-MCNC: 10 MG/DL (ref 7–20)
CALCIUM SERPL-MCNC: 9.7 MG/DL (ref 8.3–10.6)
CHLORIDE SERPL-SCNC: 102 MMOL/L (ref 99–110)
CO2 SERPL-SCNC: 25 MMOL/L (ref 21–32)
CREAT SERPL-MCNC: 0.8 MG/DL (ref 0.9–1.3)
DEPRECATED RDW RBC AUTO: 13.7 % (ref 12.4–15.4)
EOSINOPHIL # BLD: 0.2 K/UL (ref 0–0.6)
EOSINOPHIL NFR BLD: 1.5 %
GFR SERPLBLD CREATININE-BSD FMLA CKD-EPI: >90 ML/MIN/{1.73_M2}
GLUCOSE SERPL-MCNC: 95 MG/DL (ref 70–99)
HCT VFR BLD AUTO: 55.1 % (ref 40.5–52.5)
HGB BLD-MCNC: 18.7 G/DL (ref 13.5–17.5)
LYMPHOCYTES # BLD: 2.9 K/UL (ref 1–5.1)
LYMPHOCYTES NFR BLD: 25.8 %
MCH RBC QN AUTO: 30.6 PG (ref 26–34)
MCHC RBC AUTO-ENTMCNC: 33.9 G/DL (ref 31–36)
MCV RBC AUTO: 90.1 FL (ref 80–100)
MONOCYTES # BLD: 0.9 K/UL (ref 0–1.3)
MONOCYTES NFR BLD: 8 %
NEUTROPHILS # BLD: 7.2 K/UL (ref 1.7–7.7)
NEUTROPHILS NFR BLD: 63.9 %
PLATELET # BLD AUTO: 251 K/UL (ref 135–450)
PMV BLD AUTO: 9.4 FL (ref 5–10.5)
POTASSIUM SERPL-SCNC: 4.2 MMOL/L (ref 3.5–5.1)
RBC # BLD AUTO: 6.11 M/UL (ref 4.2–5.9)
SODIUM SERPL-SCNC: 138 MMOL/L (ref 136–145)
TROPONIN, HIGH SENSITIVITY: 11 NG/L (ref 0–22)
TROPONIN, HIGH SENSITIVITY: 7 NG/L (ref 0–22)
WBC # BLD AUTO: 11.2 K/UL (ref 4–11)

## 2024-10-18 PROCEDURE — 80048 BASIC METABOLIC PNL TOTAL CA: CPT

## 2024-10-18 PROCEDURE — 93005 ELECTROCARDIOGRAM TRACING: CPT | Performed by: EMERGENCY MEDICINE

## 2024-10-18 PROCEDURE — 6370000000 HC RX 637 (ALT 250 FOR IP): Performed by: NURSE PRACTITIONER

## 2024-10-18 PROCEDURE — 84484 ASSAY OF TROPONIN QUANT: CPT

## 2024-10-18 PROCEDURE — G0378 HOSPITAL OBSERVATION PER HR: HCPCS

## 2024-10-18 PROCEDURE — 85025 COMPLETE CBC W/AUTO DIFF WBC: CPT

## 2024-10-18 PROCEDURE — 99285 EMERGENCY DEPT VISIT HI MDM: CPT

## 2024-10-18 PROCEDURE — 71046 X-RAY EXAM CHEST 2 VIEWS: CPT

## 2024-10-18 RX ORDER — LOSARTAN POTASSIUM 25 MG/1
25 TABLET ORAL DAILY
Status: DISCONTINUED | OUTPATIENT
Start: 2024-10-19 | End: 2024-10-18

## 2024-10-18 RX ORDER — ATORVASTATIN CALCIUM 40 MG/1
40 TABLET, FILM COATED ORAL NIGHTLY
Status: DISCONTINUED | OUTPATIENT
Start: 2024-10-18 | End: 2024-10-18

## 2024-10-18 RX ORDER — PANTOPRAZOLE SODIUM 40 MG/1
40 TABLET, DELAYED RELEASE ORAL
Status: DISCONTINUED | OUTPATIENT
Start: 2024-10-19 | End: 2024-10-19 | Stop reason: HOSPADM

## 2024-10-18 RX ORDER — ENOXAPARIN SODIUM 100 MG/ML
30 INJECTION SUBCUTANEOUS 2 TIMES DAILY
Status: DISCONTINUED | OUTPATIENT
Start: 2024-10-19 | End: 2024-10-18

## 2024-10-18 RX ORDER — MAGNESIUM HYDROXIDE/ALUMINUM HYDROXICE/SIMETHICONE 120; 1200; 1200 MG/30ML; MG/30ML; MG/30ML
30 SUSPENSION ORAL EVERY 6 HOURS PRN
Status: DISCONTINUED | OUTPATIENT
Start: 2024-10-18 | End: 2024-10-18

## 2024-10-18 RX ORDER — SODIUM CHLORIDE 0.9 % (FLUSH) 0.9 %
5-40 SYRINGE (ML) INJECTION PRN
Status: DISCONTINUED | OUTPATIENT
Start: 2024-10-18 | End: 2024-10-18

## 2024-10-18 RX ORDER — NITROGLYCERIN 0.4 MG/1
0.4 TABLET SUBLINGUAL EVERY 5 MIN PRN
Status: DISCONTINUED | OUTPATIENT
Start: 2024-10-18 | End: 2024-10-18

## 2024-10-18 RX ORDER — ACETAMINOPHEN 650 MG/1
650 SUPPOSITORY RECTAL EVERY 6 HOURS PRN
Status: DISCONTINUED | OUTPATIENT
Start: 2024-10-18 | End: 2024-10-18

## 2024-10-18 RX ORDER — ACETAMINOPHEN 500 MG
500 TABLET ORAL 4 TIMES DAILY PRN
Qty: 28 TABLET | Refills: 0 | Status: SHIPPED | OUTPATIENT
Start: 2024-10-18 | End: 2024-10-25

## 2024-10-18 RX ORDER — LIDOCAINE 50 MG/G
1 PATCH TOPICAL DAILY
Qty: 10 PATCH | Refills: 0 | Status: SHIPPED | OUTPATIENT
Start: 2024-10-18 | End: 2024-10-28

## 2024-10-18 RX ORDER — SODIUM CHLORIDE 0.9 % (FLUSH) 0.9 %
5-40 SYRINGE (ML) INJECTION EVERY 12 HOURS SCHEDULED
Status: DISCONTINUED | OUTPATIENT
Start: 2024-10-18 | End: 2024-10-18

## 2024-10-18 RX ORDER — ONDANSETRON 4 MG/1
4 TABLET, ORALLY DISINTEGRATING ORAL EVERY 8 HOURS PRN
Status: DISCONTINUED | OUTPATIENT
Start: 2024-10-18 | End: 2024-10-18

## 2024-10-18 RX ORDER — SODIUM CHLORIDE 9 MG/ML
INJECTION, SOLUTION INTRAVENOUS PRN
Status: DISCONTINUED | OUTPATIENT
Start: 2024-10-18 | End: 2024-10-18

## 2024-10-18 RX ORDER — POTASSIUM CHLORIDE 1500 MG/1
40 TABLET, EXTENDED RELEASE ORAL PRN
Status: DISCONTINUED | OUTPATIENT
Start: 2024-10-18 | End: 2024-10-18

## 2024-10-18 RX ORDER — ONDANSETRON 2 MG/ML
4 INJECTION INTRAMUSCULAR; INTRAVENOUS EVERY 6 HOURS PRN
Status: DISCONTINUED | OUTPATIENT
Start: 2024-10-18 | End: 2024-10-18

## 2024-10-18 RX ORDER — POLYETHYLENE GLYCOL 3350 17 G/17G
17 POWDER, FOR SOLUTION ORAL DAILY PRN
Status: DISCONTINUED | OUTPATIENT
Start: 2024-10-18 | End: 2024-10-18

## 2024-10-18 RX ORDER — ASPIRIN 81 MG/1
81 TABLET, CHEWABLE ORAL DAILY
Status: DISCONTINUED | OUTPATIENT
Start: 2024-10-19 | End: 2024-10-18

## 2024-10-18 RX ORDER — LIDOCAINE 4 G/G
1 PATCH TOPICAL ONCE
Status: DISCONTINUED | OUTPATIENT
Start: 2024-10-18 | End: 2024-10-19 | Stop reason: HOSPADM

## 2024-10-18 RX ORDER — METOPROLOL SUCCINATE 25 MG/1
25 TABLET, EXTENDED RELEASE ORAL DAILY
Status: DISCONTINUED | OUTPATIENT
Start: 2024-10-19 | End: 2024-10-18

## 2024-10-18 RX ORDER — ACETAMINOPHEN 325 MG/1
650 TABLET ORAL EVERY 6 HOURS PRN
Status: DISCONTINUED | OUTPATIENT
Start: 2024-10-18 | End: 2024-10-18

## 2024-10-18 RX ORDER — ASPIRIN 81 MG/1
324 TABLET, CHEWABLE ORAL ONCE
Status: COMPLETED | OUTPATIENT
Start: 2024-10-18 | End: 2024-10-18

## 2024-10-18 RX ORDER — POTASSIUM CHLORIDE 7.45 MG/ML
10 INJECTION INTRAVENOUS PRN
Status: DISCONTINUED | OUTPATIENT
Start: 2024-10-18 | End: 2024-10-18

## 2024-10-18 RX ORDER — ACETAMINOPHEN 325 MG/1
650 TABLET ORAL ONCE
Status: COMPLETED | OUTPATIENT
Start: 2024-10-18 | End: 2024-10-18

## 2024-10-18 RX ORDER — MAGNESIUM SULFATE IN WATER 40 MG/ML
2000 INJECTION, SOLUTION INTRAVENOUS PRN
Status: DISCONTINUED | OUTPATIENT
Start: 2024-10-18 | End: 2024-10-18

## 2024-10-18 RX ADMIN — ASPIRIN 324 MG: 81 TABLET, CHEWABLE ORAL at 18:59

## 2024-10-18 RX ADMIN — ACETAMINOPHEN 650 MG: 325 TABLET ORAL at 18:58

## 2024-10-18 ASSESSMENT — PAIN SCALES - GENERAL
PAINLEVEL_OUTOF10: 0
PAINLEVEL_OUTOF10: 2
PAINLEVEL_OUTOF10: 2
PAINLEVEL_OUTOF10: 0

## 2024-10-18 ASSESSMENT — PAIN DESCRIPTION - DESCRIPTORS
DESCRIPTORS: TINGLING
DESCRIPTORS: ACHING

## 2024-10-18 ASSESSMENT — PAIN DESCRIPTION - ORIENTATION: ORIENTATION: LEFT

## 2024-10-18 ASSESSMENT — PAIN - FUNCTIONAL ASSESSMENT
PAIN_FUNCTIONAL_ASSESSMENT: ACTIVITIES ARE NOT PREVENTED
PAIN_FUNCTIONAL_ASSESSMENT: 0-10
PAIN_FUNCTIONAL_ASSESSMENT: NONE - DENIES PAIN
PAIN_FUNCTIONAL_ASSESSMENT: ACTIVITIES ARE NOT PREVENTED

## 2024-10-18 ASSESSMENT — PAIN DESCRIPTION - PAIN TYPE: TYPE: ACUTE PAIN

## 2024-10-18 ASSESSMENT — PAIN DESCRIPTION - LOCATION
LOCATION: CHEST
LOCATION: CHEST

## 2024-10-18 ASSESSMENT — HEART SCORE: ECG: NORMAL

## 2024-10-18 NOTE — ED PROVIDER NOTES
Negative for leg swelling.   Gastrointestinal:  Negative for abdominal pain, anal bleeding, nausea and vomiting.   Genitourinary:  Negative for difficulty urinating, dysuria, frequency and urgency.   Musculoskeletal:  Negative for back pain and neck pain.   Skin:  Negative for rash and wound.   Neurological:  Negative for dizziness and light-headedness.   Hematological: Negative.    Psychiatric/Behavioral: Negative.         Positives and Pertinent negatives as per HPI.     SURGICAL HISTORY     Past Surgical History:   Procedure Laterality Date    CORONARY ANGIOPLASTY  03/08/2021    PTCA RCA    CORONARY ANGIOPLASTY WITH STENT PLACEMENT  05/05/2020    MI RCA    CORONARY ANGIOPLASTY WITH STENT PLACEMENT  04/25/2020    MI Diag, LCx    CORONARY ARTERY BYPASS GRAFT  03/15/2021    cabgx4 (LIMA-LAD, SVG-D1, L radial off LIMA-OM, SVG-PDA), JAYDE exclusion, sternal plating    CORONARY ARTERY BYPASS GRAFT N/A 3/15/2021    TRANSESOPHAGEAL ECHOCARDIOGRAM, TOTAL CARDIO PULMONARY BYPASS, CORONARY ARTERY BYPASS GRAFTING X 4 (VEIN X 1, ARTERY X 4 ) USING LEFT INTERNAL MAMMARY ARTERY, LEFT ENDOSCOPICALLY HARVESTED RADIAL ARTERY, ENDOSCOPICALLY HARVESTED RIGHT SAPHENOUS VEIN. LEFT ATRIAL APPENDAGE CLIPPING USING SIZE 35 ATRICLIP performed by Katja Morris MD at [facility]    MOUTH SURGERY      wisdom teeth removed    VASCULAR SURGERY  03/12/2021    L subclavian artery stenting       CURRENTMEDICATIONS       Previous Medications    ASPIRIN 81 MG EC TABLET    Take 1 tablet by mouth daily    ATORVASTATIN (LIPITOR) 80 MG TABLET    Take 1 tablet by mouth daily    BACITRACIN 500 UNIT/GM OINTMENT    Apply topically 2 times daily.    CARVEDILOL (COREG) 6.25 MG TABLET    Take 1 tablet by mouth 2 times daily (with meals)    CLOPIDOGREL (PLAVIX) 75 MG TABLET    Take 1 tablet by mouth daily    DILTIAZEM (CARDIZEM CD) 120 MG EXTENDED RELEASE CAPSULE    Take 1 capsule by mouth daily    FAMOTIDINE (PEPCID) 20 MG TABLET    Take 1 tablet by mouth 2  daily     atorvastatin 80 MG tablet  Commonly known as: LIPITOR  Take 1 tablet by mouth daily     carvedilol 6.25 MG tablet  Commonly known as: COREG  Take 1 tablet by mouth 2 times daily (with meals)     clopidogrel 75 MG tablet  Commonly known as: PLAVIX  Take 1 tablet by mouth daily     dilTIAZem 120 MG extended release capsule  Commonly known as: CARDIZEM CD  Take 1 capsule by mouth daily     famotidine 20 MG tablet  Commonly known as: Pepcid  Take 1 tablet by mouth 2 times daily  Ask about: Which instructions should I use?     lisinopril 20 MG tablet  Commonly known as: PRINIVIL;ZESTRIL  Take 1 tablet by mouth daily     metoclopramide 10 MG tablet  Commonly known as: Reglan  Take 1 tablet by mouth nightly     pantoprazole 40 MG tablet  Commonly known as: PROTONIX  Take 1 tablet by mouth every morning (before breakfast)  Ask about: Which instructions should I use?     sucralfate 1 GM tablet  Commonly known as: Carafate  Take 1 tablet by mouth in the morning, at noon, and at bedtime  Ask about: Which instructions should I use?               Where to Get Your Medications        These medications were sent to [id number] - [facility] [address] - P [phone] - F [phone] [address] [address]      Hours: 24-hours Phone: [phone]   acetaminophen 500 MG tablet  lidocaine 5 %                    (Please note that portions of this note were completed with a voice recognition program.  Efforts were made to edit the dictations but occasionally words are mis-transcribed.)    LACHELLE Rodriguez CNP (electronically signed)            Claire Pal APRN - CNP  10/19/24 [id number]

## 2024-10-19 LAB
EKG ATRIAL RATE: 69 BPM
EKG DIAGNOSIS: NORMAL
EKG P AXIS: 63 DEGREES
EKG P-R INTERVAL: 150 MS
EKG Q-T INTERVAL: 394 MS
EKG QRS DURATION: 104 MS
EKG QTC CALCULATION (BAZETT): 422 MS
EKG R AXIS: 73 DEGREES
EKG T AXIS: 64 DEGREES
EKG VENTRICULAR RATE: 69 BPM

## 2024-10-19 ASSESSMENT — ENCOUNTER SYMPTOMS
ANAL BLEEDING: 0
SORE THROAT: 0
EYE PAIN: 0
BACK PAIN: 0
VOMITING: 0
ABDOMINAL PAIN: 0
SHORTNESS OF BREATH: 0
NAUSEA: 0
COUGH: 0

## 2024-10-19 NOTE — ED PROVIDER NOTES
[facility] EMERGENCY DEPARTMENT  EMERGENCY DEPARTMENT ENCOUNTER      Pt Name: Clark Gonzales  MRN: [id number]  Birthdate 1980  Date of evaluation: 10/18/2024  Provider: SARAH BLISS DO    CHIEF COMPLAINT  Chief Complaint   Patient presents with    Chest Pain     Chest pain since waking up this morning. Denies any other symptoms       I have fully participated in the care of Clark Gonzales and have had a face-to-face evaluation. I have reviewed and agree with all pertinent clinical information, and midlevel provider's history, and physical exam. I have also reviewed the labs, EKG, and imaging studies and treatment plan. I have also reviewed and agree with the medications, allergies and past medical history section for this Clark Gonzales. I agree with the diagnosis, and I concur.    I personally saw the patient and made/approved the management plan and take responsibility for the patient management.      This patient is at risk for a communicable infection.  Therefore, personal protection equipment consisting of a mask was worn for the exam.    Past Medical History:   Diagnosis Date    Chronic systolic congestive heart failure (HCC) 8/4/2021    Coronary artery disease     Essential hypertension 04/07/2020    GERD (gastroesophageal reflux disease)     Headache     NSTEMI (non-ST elevated myocardial infarction) ([facility])     4/25/20, 5/5/20, 3/5/21    Obesity     Other hyperlipidemia 04/07/2020    Stenosis of left subclavian artery ([facility]) 03/2021    stented 3/12/21    Tobacco abuse counseling 04/07/2020       MDM:  History: Clark Gonzales is a 44 y.o. male who presents with chest pain.  States that hurts to breathe or take a deep breath.  He describes a stabbing sensation that started at 3 PM today.  He smokes 1/2 pack of cigarettes per day.  He denies any coughing.  He denies other complaints at this time..    Physical Exam: Heart is regular rate and rhythm without murmurs clicks or rubs.  Lungs  hernia, rib fractures, or any other pathology that might be causing the patient's symptoms    CBC-CBC was ordered to rule out anemia, infection, abnormal platelet count, polycythemia, abnormal Red cell pathology, or any other pathology that might be causing the patient's symptoms    CMP-CMP was ordered to rule out electrolyte abnormalities, kidney dysfunction, electrolyte imbalance, abnormal transaminases, liver dysfunction, or any other pathology that might be causing the patient's symptoms.    Urine-urinalysis was ordered to rule out infection, renal failure, dehydration, pregnancy, proteinuria, bilirubinuria, or any other pathology that might be causing the patient's symptoms    See discharge instructions for specific medications, discharge information, and treatments. They were verbally instructed to return to emergency if any problems.      Medications   lidocaine 4 % external patch 1 patch (1 patch TransDERmal Not Given 10/18/24 1859)   pantoprazole (PROTONIX) tablet 40 mg (has no administration in time range)   aspirin chewable tablet 324 mg (324 mg Oral Given 10/18/24 1859)   acetaminophen (TYLENOL) tablet 650 mg (650 mg Oral Given 10/18/24 1858)       Discharge Medication List as of 10/18/2024 10:01 PM        START taking these medications    Details   acetaminophen (TYLENOL) 500 MG tablet Take 1 tablet by mouth 4 times daily as needed for Pain, Disp-28 tablet, R-0Normal      lidocaine (LIDODERM) 5 % Place 1 patch onto the skin daily for 10 days 12 hours on, 12 hours off., Disp-10 patch, R-0Normal             The patient's blood pressure was found to be elevated according to CMS/Medicare and the Affordable Care Act/ObamaCare criteria. Elevated blood pressure could occur because of pain or anxiety or other reasons and does not mean that they need to have their blood pressure treated or medications otherwise adjusted. However, this could also be a sign that they will need to have their blood pressure treated

## 2024-10-19 NOTE — DISCHARGE INSTRUCTIONS
Return to the emergency department for new or worsening symptoms including, limited to, developing chest pain accompanied by nausea, vomiting, lightheadedness, dizziness, sweats, shortness of breath, passing out, feelings of you are going to pass out, other symptoms/concerns.    Medications as prescribed.    Follow-up with your primary care provider for reevaluation next 3-4 days.

## 2024-10-19 NOTE — CONSULTS
V2.0  [facility] Consult Note      Name:  Clark Gonzales /Age/Sex: 1980  (44 y.o. male)   MRN & CSN:  [id number] & [id number] Encounter Date/Time: 10/18/2024 9:12 PM EDT   Location:  010/B-10 PCP: Tulio Aviles DO     Attending:No att. providers found  Consulting Provider: Penny Maguire MD      Hospital Day: 1    Assessment and Recommendations   Clark Gonzales is a 44 y.o. obese male smoker with pmh of CAD, CHF, hypertension, hypercholesterolemia, SANDY who presents with Chest pain.    Hospital Problems             Last Modified POA    * (Principal) Chest pain 10/18/2024 Yes    Tobacco abuse 10/18/2024 Yes    Essential hypertension 10/18/2024 Yes    Coronary artery disease involving native coronary artery of native heart 10/18/2024 Yes    Chronic systolic heart failure (HCC) 10/18/2024 Yes       Recommendations:   As needed Tylenol or ibuprofen for musculoskeletal chest pain  Continue home regimen for chronic stable conditions  Okay to discharge home      Diet No diet orders on file   DVT Prophylaxis [] Lovenox, []  Heparin, [] SCDs, [] Ambulation,  [] Eliquis, [] Xarelto   Code Status Prior   Surrogate Decision Maker/ POA Mother     Personally reviewed Lab Studies and Imaging     Discussed management of the case with ED provider who agrees with discharge home.    EKG interpreted personally and results normal sinus rhythm with a ventricular rate of 69, QTc 2022 and no acute ischemic changes.    Imaging that was interpreted personally includes chest x-ray and results negative for any acute cardiopulmonary process.    Drugs that require monitoring for toxicity include none and the method of monitoring was n/a.      History From:    History obtained from the patient.     History of Present Illness:      Chief Complaint: Chest pain    Clark Gonzales is a 44 y.o. obese male smoker who presents with left-sided chest pain, nonradiating, not associated with any shortness of breath, nausea or diaphoresis, worse  angioplasty with stent (04/25/2020); Coronary angioplasty (03/08/2021); vascular surgery (03/12/2021); Coronary artery bypass graft (03/15/2021); and Coronary artery bypass graft (N/A, 3/15/2021).  Allergies: No Known Allergies  Fam HX: family history includes Diabetes in his brother and mother; Heart Disease in his maternal aunt, maternal cousin, and maternal uncle; High Blood Pressure in his maternal aunt; Other in his father and mother. He was adopted.  Soc HX:   Social History     Socioeconomic History    Marital status: Single   Tobacco Use    Smoking status: Every Day     Current packs/day: 1.00     Average packs/day: 1 pack/day for 34.8 years (34.8 ttl pk-yrs)     Types: Cigarettes     Start date: 12/19/1989     Passive exposure: Past    Smokeless tobacco: Never    Tobacco comments:     started age 11. tried vaping age 28 when trying to quit smoking. .   Vaping Use    Vaping status: Never Used   Substance and Sexual Activity    Alcohol use: No    Drug use: Not Currently    Sexual activity: Yes     Partners: Female     Social Determinants of Health     Financial Resource Strain: Low Risk  (7/5/2023)    Overall Financial Resource Strain (CARDIA)     Difficulty of Paying Living Expenses: Not hard at all   Transportation Needs: Unknown (7/5/2023)    PRAPARE - Transportation     Lack of Transportation (Non-Medical): No   Housing Stability: Unknown (7/5/2023)    Housing Stability Vital Sign     Unstable Housing in the Last Year: No         Personally Reviewed Medications:   Medications:    Infusions:   PRN Meds:     Labs and Imaging   XR CHEST (2 VW)    Result Date: 10/18/2024  EXAMINATION: TWO XRAY VIEWS OF THE CHEST 10/18/2024 6:27 pm COMPARISON: 06/06/2024 HISTORY: Acute chest pain. FINDINGS: Post CABG changes with left atrial appendage clip.  Cardiomediastinal silhouette and pulmonary vasculature are normal.  No consolidation, pleural effusion, or pneumothorax.  Osteopenia.     No acute abnormality.       CBC:

## 2024-10-21 PROCEDURE — 93010 ELECTROCARDIOGRAM REPORT: CPT | Performed by: INTERNAL MEDICINE

## 2024-11-25 ENCOUNTER — HOSPITAL ENCOUNTER (EMERGENCY)
Age: 44
Discharge: HOME | End: 2024-11-25
Payer: MEDICAID

## 2024-11-25 VITALS
DIASTOLIC BLOOD PRESSURE: 93 MMHG | HEART RATE: 97 BPM | RESPIRATION RATE: 17 BRPM | SYSTOLIC BLOOD PRESSURE: 133 MMHG | BODY MASS INDEX: 34.94 KG/M2 | TEMPERATURE: 97.9 F | OXYGEN SATURATION: 97 % | WEIGHT: 235.89 LBS | HEIGHT: 69 IN

## 2024-11-25 DIAGNOSIS — Z76.0 ENCOUNTER FOR MEDICATION REFILL: ICD-10-CM

## 2024-11-25 DIAGNOSIS — Z02.89 ENCOUNTER TO OBTAIN EXCUSE FROM WORK: ICD-10-CM

## 2024-11-25 DIAGNOSIS — R11.10 VOMITING, UNSPECIFIED VOMITING TYPE, UNSPECIFIED WHETHER NAUSEA PRESENT: ICD-10-CM

## 2024-11-25 DIAGNOSIS — Z87.19 HISTORY OF GASTROESOPHAGEAL REFLUX (GERD): Primary | ICD-10-CM

## 2024-11-25 PROCEDURE — 6370000000 HC RX 637 (ALT 250 FOR IP): Performed by: GENERAL ACUTE CARE HOSPITAL

## 2024-11-25 PROCEDURE — 99283 EMERGENCY DEPT VISIT LOW MDM: CPT

## 2024-11-25 RX ORDER — ONDANSETRON 4 MG/1
4 TABLET, ORALLY DISINTEGRATING ORAL ONCE
Status: COMPLETED | OUTPATIENT
Start: 2024-11-25 | End: 2024-11-25

## 2024-11-25 RX ORDER — FAMOTIDINE 20 MG/1
20 TABLET, FILM COATED ORAL 2 TIMES DAILY
Qty: 60 TABLET | Refills: 0 | Status: SHIPPED | OUTPATIENT
Start: 2024-11-25

## 2024-11-25 RX ORDER — FAMOTIDINE 20 MG/1
20 TABLET, FILM COATED ORAL ONCE
Status: COMPLETED | OUTPATIENT
Start: 2024-11-25 | End: 2024-11-25

## 2024-11-25 RX ADMIN — ALUMINUM HYDROXIDE, MAGNESIUM HYDROXIDE, AND SIMETHICONE: 1200; 120; 1200 SUSPENSION ORAL at 21:14

## 2024-11-25 RX ADMIN — ONDANSETRON 4 MG: 4 TABLET, ORALLY DISINTEGRATING ORAL at 21:14

## 2024-11-25 RX ADMIN — FAMOTIDINE 20 MG: 20 TABLET, FILM COATED ORAL at 21:14

## 2024-11-25 ASSESSMENT — PAIN - FUNCTIONAL ASSESSMENT: PAIN_FUNCTIONAL_ASSESSMENT: NONE - DENIES PAIN

## 2024-11-26 NOTE — DISCHARGE INSTRUCTIONS
Continue taking your prescribed medications as directed    Follow up as directed    Return for new or worsening symptoms

## 2024-11-26 NOTE — ED TRIAGE NOTES
Patient to the ER with complaints of vomiting that has been going on all day.  Patient says he is supposed to take pepcid daily and has been out of his prescription for about 1 month.     Alert and oriented x4 and ambulatory at time of triage.       Patient says his doctor left the practice and he has not gotten a new one.  Patient does not want any testing done, he just wants his medications refilled.

## 2024-11-26 NOTE — ED PROVIDER NOTES
**ADVANCED PRACTICE PROVIDER, I HAVE EVALUATED THIS PATIENT**        [facility] EMERGENCY DEPARTMENT  EMERGENCY DEPARTMENT ENCOUNTER      Pt Name: Clark Gonzales  MRN:[id number]  Birthdate 1980  Date of evaluation: 11/25/2024  Provider: LACHELLE Murguia CNP  Note Started: 9:02 PM EST 11/25/24        Chief Complaint:    Chief Complaint   Patient presents with    Vomiting    Medication Refill         Nursing Notes, Past Medical Hx, Past Surgical Hx, Social Hx, Allergies, and Family Hx were all reviewed and agreed with or any disagreements were addressed in the HPI.    HPI: (Location, Duration, Timing, Severity, Quality, Assoc Sx, Context, Modifying factors)    History From: Patient  Limitations to history : None    Social Determinants Significantly Affecting Health : None    Chief Complaint-    This is a  44 y.o. male who presents to the emergency department today stating that he has been vomiting all day today.  He denies recent travel or known sick contacts.  Patient's \"it is my reflux\".  He denies having any abdominal pain.  Patient states that he has been out of his Pepcid.  Patient states that he missed work today because of his symptoms and is requesting a work note.  He denies history of any inflammatory bowel disease.  He denies previous abdominal surgeries.  He denies chest pain or shortness of breath.  He denies fever, chills, or other symptoms.    PastMedical/Surgical History:      Diagnosis Date    Chronic systolic congestive heart failure (HCC) 8/4/2021    Coronary artery disease     Essential hypertension 04/07/2020    GERD (gastroesophageal reflux disease)     Headache     NSTEMI (non-ST elevated myocardial infarction) ([facility])     4/25/20, 5/5/20, 3/5/21    Obesity     Other hyperlipidemia 04/07/2020    Stenosis of left subclavian artery (HCC) 03/2021    stented 3/12/21    Tobacco abuse counseling 04/07/2020         Procedure Laterality Date    CORONARY ANGIOPLASTY  03/08/2021        has a past medical history of Chronic systolic congestive heart failure (HCC) (8/4/2021), Coronary artery disease, Essential hypertension (04/07/2020), GERD (gastroesophageal reflux disease), Headache, NSTEMI (non-ST elevated myocardial infarction) (HCC), Obesity, Other hyperlipidemia (04/07/2020), Stenosis of left subclavian artery (HCC) (03/2021), and Tobacco abuse counseling (04/07/2020).     Records Reviewed (External and Source) previous records reviewed in order to gain further information regarding patient's PMH as well as his HPI.  Nursing notes reviewed.    CC/HPI Summary, DDx, ED Course, and Reassessment: This is a 44-year-old male who presents to the emergency department today stating that he has been vomiting all day today.  He denies recent travel or known sick contacts.  Patient's \"it is my reflux\".  He denies having any abdominal pain.  Patient states that he has been out of his Pepcid.  Patient states that he missed work today because of his symptoms and is requesting a work note.  He denies history of any inflammatory bowel disease.  He denies previous abdominal surgeries.  He denies chest pain or shortness of breath.  He denies fever, chills, or other symptoms.  Physical exam complete.  Patient arrives nontoxic, afebrile, hypertensive with blood pressure 154/105.  Patient states that he has not taken his blood pressure medication today.  He is counseled the importance of good blood pressure control.  He denies headache, chest pain, or trouble breathing.  His abdomen is soft and nontender.    Differential diagnoses include but not limited to gastritis, gastroenteritis, esophagitis, cholecystitis, pancreatitis, bowel obstruction, perforated viscus, electrolyte derangement, NELLIE, dehydration    Patient medicated with a GI cocktail, Zofran 4 mg ODT, and Pepcid 20 mg by mouth.    As above, patient declines additional ED workup.  His abdomen is soft and nontender.  At this time there is no evidence of

## 2024-11-27 ASSESSMENT — ENCOUNTER SYMPTOMS
BACK PAIN: 0
ABDOMINAL PAIN: 0
RECTAL PAIN: 0
NAUSEA: 1
VOMITING: 1
CONSTIPATION: 0
ANAL BLEEDING: 0
VOICE CHANGE: 0
ABDOMINAL DISTENTION: 0
COUGH: 0
WHEEZING: 0
BLOOD IN STOOL: 0
DIARRHEA: 0
SORE THROAT: 0
CHEST TIGHTNESS: 0
SHORTNESS OF BREATH: 0

## (undated) DEVICE — TOWEL,OR,DSP,ST,GREEN,DLX,4/PK,20PK/CS: Brand: MEDLINE

## (undated) DEVICE — DECANTER: Brand: UNBRANDED

## (undated) DEVICE — SPONGE GZ W4XL4IN COT 12 PLY TYP VII WVN C FLD DSGN

## (undated) DEVICE — SUTURE PERMAHAND SZ 2-0 L30IN NONABSORBABLE BLK SILK W/O A305H

## (undated) DEVICE — SET ADMIN PRIMING 12ML L36IN 2ND INCL RLER CLMP SPIN M

## (undated) DEVICE — STERILE LATEX POWDER-FREE SURGICAL GLOVESWITH NITRILE COATING: Brand: PROTEXIS

## (undated) DEVICE — SET EXTN L41IN 2 NDL FREE VALVES FREE INJ PRT

## (undated) DEVICE — STERILE LATEX POWDER-FREE SURGICAL GLOVESWITH NITRILE AND EMOLLIENT COATINGS: Brand: PROTEXIS

## (undated) DEVICE — AEGIS 1" DISK 4MM HOLE, PEEL OPEN: Brand: MEDLINE

## (undated) DEVICE — SUTURE MCRYL SZ 4-0 L27IN ABSRB UD L19MM PS-2 1/2 CIR PRIM Y426H

## (undated) DEVICE — GAUZE SPONGES,12 PLY: Brand: CURITY

## (undated) DEVICE — AVA HIGH FLOW BUNDLE: Brand: MEDLINE

## (undated) DEVICE — SCANLAN® VASCU-STATT® II SINGLE-USE BULLDOG CLAMP W/FIRMER CLAMPING PRESS - MIDI STRAIGHT (YELLOW), CLAMPING PRESSURE 75-80 G (2/STERILE PKG): Brand: SCANLAN® VASCU-STATT® II SINGLE-USE BULLDOG CLAMP W/FIRMER CLAMPING PRESS

## (undated) DEVICE — BAG BLD TRNSF 1 CPLR IV ST 600ML TERUFLEX

## (undated) DEVICE — CONNECTOR IV TB L28MM CLR VLV ACCS NDLLSS DISP MAXPLUS

## (undated) DEVICE — GOWN,SIRUS,NONRNF,SETINSLV,XL,20/CS: Brand: MEDLINE

## (undated) DEVICE — OPEN HRT BASIC PK

## (undated) DEVICE — CATHETER IV 18 GAX1.25 IN WNG INTROCAN SAFETY

## (undated) DEVICE — SYRINGE MED 10ML LUERLOCK TIP W/O SFTY DISP

## (undated) DEVICE — BATTERY DRVR W/ SELF DRL TAP FOR THINFLAP PWR DRVR

## (undated) DEVICE — CLIP SM RED INTERN HMOCLP TITAN LIGATING

## (undated) DEVICE — MERCY HEALTH WEST TURNOVER: Brand: MEDLINE INDUSTRIES, INC.

## (undated) DEVICE — BANDAGE COMPR W6INXL10YD ST M E WHITE/BEIGE

## (undated) DEVICE — HYPODERMIC SAFETY NEEDLE: Brand: MAGELLAN

## (undated) DEVICE — DRAPE SLUSH DISC W44XL66IN ST FOR RND BSIN HUSH SLUSH SYS

## (undated) DEVICE — CANNULA PERF 7FR L5.5IN AORT ROOT RADPQ STD TIP W/ VENT LN

## (undated) DEVICE — STRIP,CLOSURE,WOUND,MEDI-STRIP,1/2X4: Brand: MEDLINE

## (undated) DEVICE — BLADE OPHTH 180DEG CUT SURF BLU STR SHRP DBL BVL GRINDLESS

## (undated) DEVICE — PRESSURE MONITORING SET: Brand: TRUWAVE, VAMP PLUS

## (undated) DEVICE — CATHETER THOR 36FR L23CM DIA12MM POLYVI CHL TAPR CONN TIP

## (undated) DEVICE — PENCIL SMOKE EVAC PUSH BUTTON COATED

## (undated) DEVICE — KIT ART LN 20GA L12CM FEP RADPQ 0.025X13.75IN SPR GWIRE

## (undated) DEVICE — SET ADMIN PRIMING 67ML L105IN NVENT 180UM FLTR 3 RLER CLMP

## (undated) DEVICE — SUTURE PERMA-HAND 1 L30IN NONABSORBABLE BLK SILK BRAID W/O SA87G

## (undated) DEVICE — Z DISCONTINUED USE 2516375 APPLICATOR MEDICATED 3 CC CLR STRL CHLORAPREP

## (undated) DEVICE — NEPTUNE E-SEP SMOKE EVACUATION PENCIL, COATED, 70MM BLADE, PUSH BUTTON SWITCH: Brand: NEPTUNE E-SEP

## (undated) DEVICE — CONTAINER,SPECIMEN,PNEU TUBE,3OZ,OR STRL: Brand: MEDLINE

## (undated) DEVICE — X-RAY CASSETTE COVER: Brand: CONVERTORS

## (undated) DEVICE — DRESSING TRNSPAR W FAB BORD SORBAVIEW ULT 475X425IN

## (undated) DEVICE — SUTURE PROL SZ 3-0 L36IN NONABSORBABLE BLU L17MM RB-1 1/2 8558H

## (undated) DEVICE — SUTURE NONABSORBABLE MONOFILAMENT 5-0 C-1 1X24 IN PROLENE 8725H

## (undated) DEVICE — TRAY KIT 2 APPL 2 FLAT TIP

## (undated) DEVICE — KIT COMPL CK0289R4  BB9L99R8

## (undated) DEVICE — SUTURE NONABSORBABLE MONOFILAMENT 4-0 RB-1 36 IN BLU PROLENE 8557H

## (undated) DEVICE — AUTOTRANSFUSION BOWL SET 225 CC XTRA

## (undated) DEVICE — 3M™ COBAN™ NL STERILE NON-LATEX SELF-ADHERENT WRAP, 2083S, 3 IN X 5 YD (7,5 CM X 4,5 M), 24 ROLLS/CASE: Brand: 3M™ COBAN™

## (undated) DEVICE — 3M™ TEGADERM™ TRANSPARENT FILM DRESSING FRAME STYLE, 1626W, 4 IN X 4-3/4 IN (10 CM X 12 CM), 50/CT 4CT/CASE: Brand: 3M™ TEGADERM™

## (undated) DEVICE — ELECTRODE PT RET AD L9FT HI MOIST COND ADH HYDRGEL CORDED

## (undated) DEVICE — TIP PERF FLAT

## (undated) DEVICE — PLATELET CONCENTRATION PACK PROC 14-20 ML SMARTPREP 2

## (undated) DEVICE — KIT OR ROOM TURNOVER W/STRAP

## (undated) DEVICE — TOWEL,OR,DSP,ST,BLUE,STD,4/PK,20PK/CS: Brand: MEDLINE

## (undated) DEVICE — TOWEL,OR,DSP,ST,WHITE,DLX,XR,4/PK,20PK/C: Brand: MEDLINE

## (undated) DEVICE — CANNULA PERF L15IN DIA29FR VEN 3 STG THN WALL DSGN W  VENT

## (undated) DEVICE — FAIRFIELD CABG ACCESSARY PK

## (undated) DEVICE — SYSTEM ENDOSCP VES HARV W/ TOOL CANN SEAL SHT PRT BLNT TIP

## (undated) DEVICE — BASIC SINGLE BASIN 1-LF: Brand: MEDLINE INDUSTRIES, INC.

## (undated) DEVICE — SUTURE PROL SZ 7-0 L24IN NONABSORBABLE BLU L8MM BV175-6 3/8 8735H

## (undated) DEVICE — PROCEDURE PACK FOR CABG ACCS CUST

## (undated) DEVICE — STERILE POLYISOPRENE POWDER-FREE SURGICAL GLOVES: Brand: PROTEXIS

## (undated) DEVICE — SUTURE VCRL SZ 0 L27IN ABSRB UD L36MM CT-1 1/2 CIR J260H

## (undated) DEVICE — CLIP LIG M BLU TI HRT SHP WIRE HORZ 180 PER BX

## (undated) DEVICE — SUTURE VCRL SZ 3-0 L27IN ABSRB UD L26MM SH 1/2 CIR J416H

## (undated) DEVICE — STERNUM BLADE, OFFSET (31.7 X 0.64 X 6.3MM)

## (undated) DEVICE — MONITOR LN W LUER LOK 72

## (undated) DEVICE — 60 ML SYRINGE,LUER-LOCK TIP: Brand: MONOJECT

## (undated) DEVICE — TUBING, SUCTION, 1/4" X 12', STRAIGHT: Brand: MEDLINE

## (undated) DEVICE — AORTIC PNCH 4.0MM 8IN BX 10 DISP

## (undated) DEVICE — Device: Brand: MEDEX

## (undated) DEVICE — [HIGH FLOW INSUFFLATOR,  DO NOT USE IF PACKAGE IS DAMAGED,  KEEP DRY,  KEEP AWAY FROM SUNLIGHT,  PROTECT FROM HEAT AND RADIOACTIVE SOURCES.]: Brand: PNEUMOSURE

## (undated) DEVICE — LABEL MED CUST CVR

## (undated) DEVICE — SUTURE PERMA-HAND SZ 4-0 L144IN NONABSORBABLE BLK LIGAPAK LA53G

## (undated) DEVICE — SUTURE NONABSORBABLE MONOFILAMENT 7-0 BV-1 1X24 IN PROLENE 8702H

## (undated) DEVICE — APPLICATOR PERF 2 SYR

## (undated) DEVICE — COVER LT HNDL CAM BLU DISP W/ SURG CTRL

## (undated) DEVICE — DRAIN SURG SGL COLL PT TB FOR ATS BG OASIS

## (undated) DEVICE — SUTURE VCRL SZ 2-0 L36IN ABSRB UD L36MM CT-1 1/2 CIR J945H

## (undated) DEVICE — SUTURE ETHBND EXCEL SZ 2-0 L36IN NONABSORBABLE GRN L26MM SH X523H

## (undated) DEVICE — BLOOD TRANSFUSION FILTER: Brand: HAEMONETICS

## (undated) DEVICE — SUTURE GOR TX SZ 2-0 L36IN NONABSORBABLE L18MM TH-18 1/2 3N04B

## (undated) DEVICE — BANDAGE COMPR W4INXL15FT BGE E SGL LAYERED CLP CLSR

## (undated) DEVICE — SWAN-GANZ CCOMBO THERMODILUTION CATHETER: Brand: SWAN-GANZ CCOMBO

## (undated) DEVICE — APPLICATOR PREP 26ML 0.7% IOD POVACRYLEX 74% ISO ALC ST

## (undated) DEVICE — SUTURE NONABSORBABLE MONOFILAMENT 6-0 C-1 1X30 IN PROLENE 8706H

## (undated) DEVICE — SUTURE S STL SZ 6 L18IN NONABSORBABLE SIL L48MM CCS 1/2 CIR M654G

## (undated) DEVICE — NEEDLE HYPO 18GA L1.5IN THN WALL PIVOTING SHLD BVL ORIENTED

## (undated) DEVICE — SYRINGE 20ML LL S/C 50

## (undated) DEVICE — SOLUTION IV IRRIG POUR BRL 0.9% SODIUM CHL 2F7124

## (undated) DEVICE — APPLICATOR ENDOSCP CANN S STL STYL N DEHP FOR DELIVERING

## (undated) DEVICE — TRAY URIN CATH PED 16FR BLLN 5CC 2 CONN SIL STR TIP W/ URIN

## (undated) DEVICE — BLADE CLIPPER GEN PURP NS

## (undated) DEVICE — GUIDE SURG SELECTION FOR GILLINOV COSGROVE LAA EXCLUSION SYS

## (undated) DEVICE — CANNULA ART 21FR L14IN VENT 3/8IN CONN SFT FLO ANG TIP W/

## (undated) DEVICE — PAD THRM M SPL TORSO

## (undated) DEVICE — TIP PERF 10 IN

## (undated) DEVICE — SPONGE LAP W18XL18IN WHT COT 4 PLY FLD STRUNG RADPQ DISP ST

## (undated) DEVICE — DISCONTINUED USE 394516 DRESSING MEPILEX SACRUM 7.2X7.2

## (undated) DEVICE — X-RAY DETECTABLE SPONGES,12 PLY: Brand: VISTEC

## (undated) DEVICE — CLOTH SURG PREP PREOPERATIVE CHLORHEXIDINE GLUC 2% READYPREP

## (undated) DEVICE — PERFUSION SET 120 MM

## (undated) DEVICE — 3M™ TEGADERM™ TRANSPARENT FILM DRESSING FRAME STYLE, 1624W, 2-3/8 IN X 2-3/4 IN (6 CM X 7 CM), 100/CT 4CT/CASE: Brand: 3M™ TEGADERM™

## (undated) DEVICE — SHEET,DRAPE,53X77,STERILE: Brand: MEDLINE

## (undated) DEVICE — 48" PROBE COVER W/GEL, ULTRASOUND, STERILE: Brand: SITE-RITE

## (undated) DEVICE — DISSECTOR LAP DIA5MM BLNT TIP ENDOPATH

## (undated) DEVICE — THORACIC CATHETER, RIGHT ANGLE, SILICONE, WITH CLOTSTOP®: Brand: AXIOM® ATRAUM™ WITH CLOTSTOP®

## (undated) DEVICE — BANDAGE,GAUZE,BULKEE II,4.5"X4.1YD,STRL: Brand: MEDLINE

## (undated) DEVICE — CANNULA PERF L125IN OD15FR POLYVI CHL VEN RG AUTO INFL SMOOT

## (undated) DEVICE — COVER LT HNDL BLU PLAS

## (undated) DEVICE — EVERGRIP INSERT SET 86MM: Brand: FOGARTY EVERGRIP

## (undated) DEVICE — SET PERF L15IN BLU CLMP MULT FEM LUER ON SGL INLET LEG DLP

## (undated) DEVICE — CAP TBNG ACC CHEMO SFTY LUER FEM OR M TEXIUM

## (undated) DEVICE — LINER SUCTION